# Patient Record
Sex: MALE | Race: BLACK OR AFRICAN AMERICAN | Employment: UNEMPLOYED | URBAN - METROPOLITAN AREA
[De-identification: names, ages, dates, MRNs, and addresses within clinical notes are randomized per-mention and may not be internally consistent; named-entity substitution may affect disease eponyms.]

---

## 2022-03-19 ENCOUNTER — APPOINTMENT (OUTPATIENT)
Dept: CT IMAGING | Age: 62
End: 2022-03-19
Payer: MEDICAID

## 2022-03-19 ENCOUNTER — HOSPITAL ENCOUNTER (EMERGENCY)
Age: 62
Discharge: HOME | End: 2022-03-19
Payer: MEDICAID

## 2022-03-19 ENCOUNTER — APPOINTMENT (OUTPATIENT)
Dept: ULTRASOUND IMAGING | Age: 62
End: 2022-03-19
Payer: MEDICAID

## 2022-03-19 VITALS
DIASTOLIC BLOOD PRESSURE: 98 MMHG | TEMPERATURE: 98 F | SYSTOLIC BLOOD PRESSURE: 171 MMHG | BODY MASS INDEX: 24.25 KG/M2 | HEART RATE: 87 BPM | RESPIRATION RATE: 15 BRPM | WEIGHT: 160 LBS | HEIGHT: 68 IN | OXYGEN SATURATION: 98 %

## 2022-03-19 DIAGNOSIS — R10.84 GENERALIZED ABDOMINAL PAIN: Primary | ICD-10-CM

## 2022-03-19 DIAGNOSIS — R03.0 ELEVATED BLOOD PRESSURE READING: ICD-10-CM

## 2022-03-19 DIAGNOSIS — D73.4 SPLENIC CYST: ICD-10-CM

## 2022-03-19 DIAGNOSIS — K76.89 HEPATIC CYST: ICD-10-CM

## 2022-03-19 LAB
A/G RATIO: 1.4 (ref 1.1–2.2)
ALBUMIN SERPL-MCNC: 4.3 G/DL (ref 3.4–5)
ALP BLD-CCNC: 100 U/L (ref 40–129)
ALT SERPL-CCNC: 16 U/L (ref 10–40)
ANION GAP SERPL CALCULATED.3IONS-SCNC: 11 MMOL/L (ref 3–16)
AST SERPL-CCNC: 22 U/L (ref 15–37)
BASOPHILS ABSOLUTE: 0 K/UL (ref 0–0.2)
BASOPHILS RELATIVE PERCENT: 0.8 %
BILIRUB SERPL-MCNC: 0.6 MG/DL (ref 0–1)
BILIRUBIN URINE: NEGATIVE
BLOOD, URINE: ABNORMAL
BUN BLDV-MCNC: 20 MG/DL (ref 7–20)
CALCIUM SERPL-MCNC: 9.8 MG/DL (ref 8.3–10.6)
CHLORIDE BLD-SCNC: 100 MMOL/L (ref 99–110)
CLARITY: CLEAR
CO2: 27 MMOL/L (ref 21–32)
COLOR: YELLOW
CREAT SERPL-MCNC: 1.3 MG/DL (ref 0.8–1.3)
EOSINOPHILS ABSOLUTE: 0.1 K/UL (ref 0–0.6)
EOSINOPHILS RELATIVE PERCENT: 1.2 %
EPITHELIAL CELLS, UA: 1 /HPF (ref 0–5)
GFR AFRICAN AMERICAN: >60
GFR NON-AFRICAN AMERICAN: 56
GLUCOSE BLD-MCNC: 260 MG/DL (ref 70–99)
GLUCOSE URINE: 500 MG/DL
HCT VFR BLD CALC: 38.5 % (ref 40.5–52.5)
HEMOGLOBIN: 13.1 G/DL (ref 13.5–17.5)
HYALINE CASTS: 1 /LPF (ref 0–8)
KETONES, URINE: NEGATIVE MG/DL
LEUKOCYTE ESTERASE, URINE: NEGATIVE
LIPASE: 39 U/L (ref 13–60)
LYMPHOCYTES ABSOLUTE: 1.4 K/UL (ref 1–5.1)
LYMPHOCYTES RELATIVE PERCENT: 30 %
MCH RBC QN AUTO: 30.9 PG (ref 26–34)
MCHC RBC AUTO-ENTMCNC: 34.1 G/DL (ref 31–36)
MCV RBC AUTO: 90.7 FL (ref 80–100)
MICROSCOPIC EXAMINATION: YES
MONOCYTES ABSOLUTE: 0.5 K/UL (ref 0–1.3)
MONOCYTES RELATIVE PERCENT: 11.5 %
NEUTROPHILS ABSOLUTE: 2.6 K/UL (ref 1.7–7.7)
NEUTROPHILS RELATIVE PERCENT: 56.5 %
NITRITE, URINE: NEGATIVE
PDW BLD-RTO: 12.6 % (ref 12.4–15.4)
PH UA: 6 (ref 5–8)
PLATELET # BLD: 263 K/UL (ref 135–450)
PMV BLD AUTO: 7.9 FL (ref 5–10.5)
POTASSIUM REFLEX MAGNESIUM: 3.6 MMOL/L (ref 3.5–5.1)
PROTEIN UA: >=300 MG/DL
RBC # BLD: 4.24 M/UL (ref 4.2–5.9)
RBC UA: 7 /HPF (ref 0–4)
SODIUM BLD-SCNC: 138 MMOL/L (ref 136–145)
SPECIFIC GRAVITY UA: 1.02 (ref 1–1.03)
TOTAL PROTEIN: 7.4 G/DL (ref 6.4–8.2)
URINE REFLEX TO CULTURE: ABNORMAL
URINE TYPE: ABNORMAL
UROBILINOGEN, URINE: 0.2 E.U./DL
WBC # BLD: 4.6 K/UL (ref 4–11)
WBC UA: 2 /HPF (ref 0–5)

## 2022-03-19 PROCEDURE — 80053 COMPREHEN METABOLIC PANEL: CPT

## 2022-03-19 PROCEDURE — 6360000004 HC RX CONTRAST MEDICATION: Performed by: PHYSICIAN ASSISTANT

## 2022-03-19 PROCEDURE — 85025 COMPLETE CBC W/AUTO DIFF WBC: CPT

## 2022-03-19 PROCEDURE — 83690 ASSAY OF LIPASE: CPT

## 2022-03-19 PROCEDURE — 76870 US EXAM SCROTUM: CPT

## 2022-03-19 PROCEDURE — 74177 CT ABD & PELVIS W/CONTRAST: CPT

## 2022-03-19 PROCEDURE — 36415 COLL VENOUS BLD VENIPUNCTURE: CPT

## 2022-03-19 PROCEDURE — 99283 EMERGENCY DEPT VISIT LOW MDM: CPT

## 2022-03-19 PROCEDURE — 6370000000 HC RX 637 (ALT 250 FOR IP): Performed by: PHYSICIAN ASSISTANT

## 2022-03-19 PROCEDURE — 81001 URINALYSIS AUTO W/SCOPE: CPT

## 2022-03-19 RX ORDER — OXYCODONE HYDROCHLORIDE AND ACETAMINOPHEN 5; 325 MG/1; MG/1
1 TABLET ORAL ONCE
Status: COMPLETED | OUTPATIENT
Start: 2022-03-19 | End: 2022-03-19

## 2022-03-19 RX ADMIN — OXYCODONE AND ACETAMINOPHEN 1 TABLET: 5; 325 TABLET ORAL at 09:49

## 2022-03-19 RX ADMIN — IOPAMIDOL 75 ML: 755 INJECTION, SOLUTION INTRAVENOUS at 10:32

## 2022-03-19 ASSESSMENT — PAIN SCALES - GENERAL
PAINLEVEL_OUTOF10: 10
PAINLEVEL_OUTOF10: 10

## 2022-03-19 ASSESSMENT — PAIN DESCRIPTION - PAIN TYPE: TYPE: CHRONIC PAIN

## 2022-03-19 ASSESSMENT — PAIN - FUNCTIONAL ASSESSMENT: PAIN_FUNCTIONAL_ASSESSMENT: 0-10

## 2022-03-19 NOTE — ED NOTES
Pt given dc instructions with justyna.  States pain is \"much better\"     Debby Kilgore, TIBURCIO  03/19/22 3238

## 2022-03-19 NOTE — ED PROVIDER NOTES
Diabetes mellitus ([facility] [address]     Hypertension          SURGICAL HISTORY   No past surgical history on file. CURRENTMEDICATIONS       There are no discharge medications for this patient. ALLERGIES     Patient has no known allergies. FAMILYHISTORY     No family history on file. SOCIAL HISTORY       Social History     Tobacco Use    Smoking status: Never Smoker    Smokeless tobacco: Never Used   Vaping Use    Vaping Use: Never used   Substance Use Topics    Alcohol use: Never    Drug use: Never       SCREENINGS    Parul Coma Scale  Eye Opening: Spontaneous  Best Verbal Response: Oriented  Best Motor Response: Obeys commands  Parul Coma Scale Score: 15        PHYSICAL EXAM    (up to 7 for level 4, 8 or more for level 5)     ED Triage Vitals [03/19/22 0922]   BP Temp Temp Source Pulse Resp SpO2 Height Weight   (!) 213/106 98 °F (36.7 °C) Oral 81 16 97 % 5' 8\" (1.727 m) 160 lb (72.6 kg)       Physical Exam  Vitals and nursing note reviewed. Constitutional:       Appearance: He is well-developed. He is not diaphoretic. HENT:      Head: Atraumatic. Nose: Nose normal.   Eyes:      General:         Right eye: No discharge. Left eye: No discharge. Cardiovascular:      Rate and Rhythm: Normal rate and regular rhythm. Heart sounds: No murmur heard. No friction rub. No gallop. Pulmonary:      Effort: Pulmonary effort is normal. No respiratory distress. Breath sounds: No stridor. No wheezing, rhonchi or rales. Abdominal:      General: Bowel sounds are normal. There is no distension. Palpations: Abdomen is soft. There is no mass. Tenderness: There is abdominal tenderness. There is no guarding or rebound. Hernia: No hernia is present. Comments: Subjective tenderness to palpate in the lower abdomen without guarding, rebound or rigidity. Genitourinary:     Comments: No external rash, erythema or vesicle. Normal lie of testicles.   No testicular mass or crepitus. Musculoskeletal:         General: No swelling. Normal range of motion. Cervical back: Normal range of motion. Skin:     General: Skin is warm and dry. Findings: No erythema or rash. Neurological:      Mental Status: He is alert and oriented to person, place, and time. Cranial Nerves: No cranial nerve deficit. Psychiatric:         Behavior: Behavior normal.         DIAGNOSTIC RESULTS   LABS:    Labs Reviewed   CBC WITH AUTO DIFFERENTIAL - Abnormal; Notable for the following components:       Result Value    Hemoglobin 13.1 (*)     Hematocrit 38.5 (*)     All other components within normal limits   COMPREHENSIVE METABOLIC PANEL W/ REFLEX TO MG FOR LOW K - Abnormal; Notable for the following components:    Glucose 260 (*)     GFR Non- 56 (*)     All other components within normal limits   URINALYSIS WITH REFLEX TO CULTURE - Abnormal; Notable for the following components:    Glucose, Ur 500 (*)     Blood, Urine SMALL (*)     Protein, UA >=300 (*)     All other components within normal limits   MICROSCOPIC URINALYSIS - Abnormal; Notable for the following components:    RBC, UA 7 (*)     All other components within normal limits   LIPASE       When ordered only abnormal lab results are displayed. All other labs were within normal range or not returned as of this dictation. EKG: When ordered, EKG's are interpreted by the Emergency Department Physician in the absence of a cardiologist.  Please see their note for interpretation of EKG. RADIOLOGY:   Non-plain film images such as CT, Ultrasound and MRI are read by the radiologist. Plain radiographic images are visualized and preliminarily interpreted by the ED Provider with the below findings:        Interpretation per the Radiologist below, if available at the time of this note:    [address]   Final Result   Some nonspecific heterogeneous echotexture of the bilateral testicles without   focal abnormality. Otherwise unremarkable testicular ultrasound with normal Doppler flow. RECOMMENDATIONS:   Unavailable         CT ABDOMEN PELVIS W IV CONTRAST Additional Contrast? None   Final Result   1. No evidence of acute appendicitis or other acute gastrointestinal   abnormality. 2. Mild prostatomegaly. 3. Right nephrolithiasis with a dominant stone in the lower pole of the right   kidney measuring 1.2 cm but no obstructive uropathy. 4. Hepatic and splenic cyst are likely benign. PROCEDURES   Unless otherwise noted below, none     Procedures    CRITICAL CARE TIME       CONSULTS:  None      EMERGENCY DEPARTMENT COURSE and DIFFERENTIAL DIAGNOSIS/MDM:   Vitals:    Vitals:    03/19/22 0922 03/19/22 1348   BP: (!) 213/106 (!) 171/98   Pulse: 81 87   Resp: 16 15   Temp: 98 °F (36.7 °C)    TempSrc: Oral    SpO2: 97% 98%   Weight: 160 lb (72.6 kg)    Height: 5' 8\" (1.727 m)        Patient was given the following medications:  Medications   oxyCODONE-acetaminophen (PERCOCET) 5-325 MG per tablet 1 tablet (1 tablet Oral Given 3/19/22 0949)   iopamidol (ISOVUE-370) 76 % injection 75 mL (75 mLs IntraVENous Given 3/19/22 1032)           Patient presented with some generalized abdominal discomfort. Has history of prostate cancer. Has been having pain for multiple months. Is followed with urology as an outpatient. Apparently supposed get MRI as an outpatient which she is yet to get. Given his abdominal pain CT was obtained that does reveal some hepatic cysts and splenic cyst.  No other acute findings. Blood pressure is slightly elevated. Nothing to suggest endorgan damage or hypertensive urgency or emergency. Low suspicion for obstruction, perforated viscus, diverticulitis, obstruction, testicular torsion, Myriam's gangrene or other emergent etiology. He will follow up with urology as an outpatient return here for any worsening of symptoms or problems at home. FINAL IMPRESSION      1.  Generalized abdominal pain    2. Hepatic cyst    3. Splenic cyst    4. Elevated blood pressure reading          DISPOSITION/PLAN   DISPOSITION Decision To Discharge 03/19/2022 01:44:46 PM      PATIENT REFERRED TO:  Naty Alba MD  [facility]. [phone]    Schedule an appointment as soon as possible for a visit in 3 days  For re-check    [address] [facility], [address] [facility]  [phone]    Schedule an appointment as soon as possible for a visit in 3 days  For re-check    [facility] Emergency Department  [address] [place] [address]  [phone]    As needed      DISCHARGE MEDICATIONS:  There are no discharge medications for this patient. DISCONTINUED MEDICATIONS:  There are no discharge medications for this patient.              (Please note that portions of this note were completed with a voice recognition program.  Efforts were made to edit the dictations but occasionally words are mis-transcribed.)    Kady Herrera PA-C (electronically signed)            Kady Herrera PA-C  03/19/22 1745

## 2022-04-21 ENCOUNTER — HOSPITAL ENCOUNTER (OUTPATIENT)
Age: 62
Discharge: HOME | End: 2022-04-21
Payer: MEDICAID

## 2022-04-21 ENCOUNTER — HOSPITAL ENCOUNTER (OUTPATIENT)
Dept: GENERAL RADIOLOGY | Age: 62
Discharge: HOME | End: 2022-04-21
Payer: MEDICAID

## 2022-04-21 ENCOUNTER — HOSPITAL ENCOUNTER (OUTPATIENT)
Dept: NUCLEAR MEDICINE | Age: 62
Discharge: HOME | End: 2022-04-21
Payer: MEDICAID

## 2022-04-21 DIAGNOSIS — C61 MALIGNANT NEOPLASM OF PROSTATE (HCC): ICD-10-CM

## 2022-04-21 DIAGNOSIS — R97.20 ELEVATED PROSTATE SPECIFIC ANTIGEN (PSA): ICD-10-CM

## 2022-04-21 PROCEDURE — 3430000000 HC RX DIAGNOSTIC RADIOPHARMACEUTICAL: Performed by: UROLOGY

## 2022-04-21 PROCEDURE — 78306 BONE IMAGING WHOLE BODY: CPT

## 2022-04-21 PROCEDURE — A9503 TC99M MEDRONATE: HCPCS | Performed by: UROLOGY

## 2022-04-21 PROCEDURE — 71046 X-RAY EXAM CHEST 2 VIEWS: CPT

## 2022-04-21 RX ORDER — TC 99M MEDRONATE 20 MG/10ML
24.55 INJECTION, POWDER, LYOPHILIZED, FOR SOLUTION INTRAVENOUS
Status: COMPLETED | OUTPATIENT
Start: 2022-04-21 | End: 2022-04-21

## 2022-04-21 RX ADMIN — TC 99M MEDRONATE 24.55 MILLICURIE: 20 INJECTION, POWDER, LYOPHILIZED, FOR SOLUTION INTRAVENOUS at 10:55

## 2022-04-25 RX ORDER — AMLODIPINE BESYLATE 10 MG/1
10 TABLET ORAL DAILY
Status: ON HOLD | COMMUNITY
End: 2022-06-06 | Stop reason: HOSPADM

## 2022-04-25 NOTE — PROGRESS NOTES
Name_______________________________________Printed:____________________  Date and time of surgery_5/16 0730_______________________Arrival Time:_0600_____________   1. The instructions given regarding when and if a patient needs to stop oral intake prior to surgery varies. Follow the specific instructions you were given                  _x__Nothing to eat or to drink after Midnight the night before.                   ____Carbo loading or ERAS instructions will be given to select patients-if you have been given those instructions -please do the following                           The evening before your surgery after dinner before midnight drink 40 ounces of gatorade. If you are diabetic use sugar free. The morning of surgery drink 40 ounces of water. This needs to be finished 3 hours prior to your surgery start time. 2. Take the following pills with a small sip of water on the morning of surgery__ norvasc_________________________________________________                  Do not take blood pressure medications ending in pril or sartan the anais prior to surgery or the morning of surgery_   3. Aspirin, Ibuprofen, Advil, Naproxen, Vitamin E and other Anti-inflammatory products and supplements should be stopped for 5 -7days before surgery or as directed by your physician. 4. Check with your Doctor regarding stopping Plavix, Coumadin,Eliquis, Lovenox,Effient,Pradaxa,Xarelto, Fragmin or other blood thinners and follow their instructions. 5. Do not smoke, and do not drink any alcoholic beverages 24 hours prior to surgery. This includes NA Beer. Refrain from the usage of any recreational drugs. 6. You may brush your teeth and gargle the morning of surgery. DO NOT SWALLOW WATER   7. You MUST make arrangements for a responsible adult to stay on site while you are here and take you home after your surgery. You will not be allowed to leave alone or drive yourself home.   It is strongly suggested someone stay with you the first 24 hrs. Your surgery will be cancelled if you do not have a ride home. 8. A parent/legal guardian must accompany a child scheduled for surgery and plan to stay at the hospital until the child is discharged. Please do not bring other children with you. 9. Please wear simple, loose fitting clothing to the hospital.  Kana Brown not bring valuables (money, credit cards, checkbooks, etc.) Do not wear any makeup (including no eye makeup) or nail polish on your fingers or toes. 10. DO NOT wear any jewelry or piercings on day of surgery. All body piercing jewelry must be removed. 11. If you have ___dentures, they will be removed before going to the OR; we will provide you a container. If you wear ___contact lenses or ___glasses, they will be removed; please bring a case for them. 12. Please see your family doctor/pediatrician for a history & physical and/or concerning medications. Bring any test results/reports from your physician's office. PCP__________________Phone___________H&P Appt. Date________             13 If you  have a Living Will and Durable Power of  for Healthcare, please bring in a copy. 15. Notify your Surgeon if you develop any illness between now and surgery  time, cough, cold, fever, sore throat, nausea, vomiting, etc.  Please notify your surgeon if you experience dizziness, shortness of breath or blurred vision between now & the time of your surgery             15. DO NOT shave your operative site 96 hours prior to surgery. For face & neck surgery, men may use an electric razor 48 hours prior to surgery. 16. Shower the night before or morning of surgery using an antibacterial soap or as you have been instructed. 17. To provide excellent care visitors will be limited to one in the room at any given time. 18.  Please bring picture ID and insurance card.              19.  Visit our web site for additional information:  [email or web address]              20.During flu season no children under the age of 15 are permitted in the hospital for the safety of all patients. 21. If you take a long acting insulin in the evening only  take half of your usual  dose the night  before your procedure              22. If you use a c-pap please bring DOS if staying overnight,             23.For your convenience [facility] has a pharmacy on site to fill your prescriptions. 24. If you use oxygen and have a portable tank please bring it  with you the DOS             25. Bring a complete list of all your medications with name and dose include any supplements. 26. Other___PATs by 5/6  PCP 4/26_______________________________________   *Please call pre admission testing if you any further questions   [address] [place]  [phone]   [address] [place] [address]       VISITOR POLICY(subject to change)    Current policy is 2 visitors per patient. No children. A mask is required. Visiting hours are 8a-8p. Overnight visitors will be at the discretion of the nurse. All above information reviewed with patient in person or by phone. Patient verbalizes understanding. All questions and concerns addressed.                                                                                                  Patient/Rep_per phone/pt___________________                                                                                                                                    PRE OP INSTRUCTIONS

## 2022-04-25 NOTE — PROGRESS NOTES
Per [employer] [facility]  arranged-#[id number]     5/16/22 0700 at main [id number]

## 2022-05-02 ENCOUNTER — HOSPITAL ENCOUNTER (OUTPATIENT)
Age: 62
Discharge: HOME | End: 2022-05-02
Payer: MEDICAID

## 2022-05-02 DIAGNOSIS — Z01.818 PREOP TESTING: ICD-10-CM

## 2022-05-02 LAB
A/G RATIO: 1.6 (ref 1.1–2.2)
ABO/RH: NORMAL
ALBUMIN SERPL-MCNC: 4.4 G/DL (ref 3.4–5)
ALP BLD-CCNC: 83 U/L (ref 40–129)
ALT SERPL-CCNC: 10 U/L (ref 10–40)
ANION GAP SERPL CALCULATED.3IONS-SCNC: 15 MMOL/L (ref 3–16)
ANTIBODY SCREEN: NORMAL
APTT: 34.5 SEC (ref 26.2–38.6)
AST SERPL-CCNC: 15 U/L (ref 15–37)
BASOPHILS ABSOLUTE: 0 K/UL (ref 0–0.2)
BASOPHILS RELATIVE PERCENT: 1 %
BILIRUB SERPL-MCNC: 0.5 MG/DL (ref 0–1)
BUN BLDV-MCNC: 21 MG/DL (ref 7–20)
CALCIUM SERPL-MCNC: 10.2 MG/DL (ref 8.3–10.6)
CHLORIDE BLD-SCNC: 102 MMOL/L (ref 99–110)
CO2: 24 MMOL/L (ref 21–32)
CREAT SERPL-MCNC: 1.5 MG/DL (ref 0.8–1.3)
EKG ATRIAL RATE: 84 BPM
EKG DIAGNOSIS: NORMAL
EKG P AXIS: 59 DEGREES
EKG P-R INTERVAL: 166 MS
EKG Q-T INTERVAL: 368 MS
EKG QRS DURATION: 82 MS
EKG QTC CALCULATION (BAZETT): 434 MS
EKG R AXIS: -5 DEGREES
EKG T AXIS: 51 DEGREES
EKG VENTRICULAR RATE: 84 BPM
EOSINOPHILS ABSOLUTE: 0 K/UL (ref 0–0.6)
EOSINOPHILS RELATIVE PERCENT: 0.8 %
GFR AFRICAN AMERICAN: 57
GFR NON-AFRICAN AMERICAN: 47
GLUCOSE BLD-MCNC: 150 MG/DL (ref 70–99)
HCT VFR BLD CALC: 37.6 % (ref 40.5–52.5)
HEMOGLOBIN: 12.9 G/DL (ref 13.5–17.5)
INR BLD: 1.14 (ref 0.88–1.12)
LYMPHOCYTES ABSOLUTE: 1.3 K/UL (ref 1–5.1)
LYMPHOCYTES RELATIVE PERCENT: 28.2 %
MCH RBC QN AUTO: 31.4 PG (ref 26–34)
MCHC RBC AUTO-ENTMCNC: 34.2 G/DL (ref 31–36)
MCV RBC AUTO: 91.6 FL (ref 80–100)
MONOCYTES ABSOLUTE: 0.5 K/UL (ref 0–1.3)
MONOCYTES RELATIVE PERCENT: 10.2 %
NEUTROPHILS ABSOLUTE: 2.8 K/UL (ref 1.7–7.7)
NEUTROPHILS RELATIVE PERCENT: 59.8 %
PDW BLD-RTO: 12.4 % (ref 12.4–15.4)
PLATELET # BLD: 211 K/UL (ref 135–450)
PMV BLD AUTO: 7.9 FL (ref 5–10.5)
POTASSIUM SERPL-SCNC: 4.3 MMOL/L (ref 3.5–5.1)
PROTHROMBIN TIME: 12.9 SEC (ref 9.9–12.7)
RBC # BLD: 4.11 M/UL (ref 4.2–5.9)
SODIUM BLD-SCNC: 141 MMOL/L (ref 136–145)
TOTAL PROTEIN: 7.2 G/DL (ref 6.4–8.2)
WBC # BLD: 4.6 K/UL (ref 4–11)

## 2022-05-02 PROCEDURE — 85610 PROTHROMBIN TIME: CPT

## 2022-05-02 PROCEDURE — 80053 COMPREHEN METABOLIC PANEL: CPT

## 2022-05-02 PROCEDURE — 85730 THROMBOPLASTIN TIME PARTIAL: CPT

## 2022-05-02 PROCEDURE — 86850 RBC ANTIBODY SCREEN: CPT

## 2022-05-02 PROCEDURE — 86900 BLOOD TYPING SEROLOGIC ABO: CPT

## 2022-05-02 PROCEDURE — 86901 BLOOD TYPING SEROLOGIC RH(D): CPT

## 2022-05-02 PROCEDURE — 85025 COMPLETE CBC W/AUTO DIFF WBC: CPT

## 2022-05-02 PROCEDURE — 36415 COLL VENOUS BLD VENIPUNCTURE: CPT

## 2022-05-02 PROCEDURE — 93005 ELECTROCARDIOGRAM TRACING: CPT | Performed by: UROLOGY

## 2022-05-02 PROCEDURE — 93010 ELECTROCARDIOGRAM REPORT: CPT | Performed by: INTERNAL MEDICINE

## 2022-05-12 NOTE — PROGRESS NOTES
Michael Mendoza at Dr. Gricelda Pino office contacted regarding h & p.  Vmsg left for Dariana at Dr. Rosa Virgen office as well.

## 2022-05-16 ENCOUNTER — HOSPITAL ENCOUNTER (OUTPATIENT)
Age: 62
Setting detail: SURGERY ADMIT
Discharge: HOME | End: 2022-05-16
Attending: UROLOGY | Admitting: UROLOGY
Payer: MEDICAID

## 2022-05-16 ENCOUNTER — ANESTHESIA (OUTPATIENT)
Dept: OPERATING ROOM | Age: 62
End: 2022-05-16
Payer: MEDICAID

## 2022-05-16 ENCOUNTER — ANESTHESIA EVENT (OUTPATIENT)
Dept: OPERATING ROOM | Age: 62
End: 2022-05-16
Payer: MEDICAID

## 2022-05-16 VITALS
TEMPERATURE: 97.4 F | HEART RATE: 88 BPM | WEIGHT: 148 LBS | BODY MASS INDEX: 23.23 KG/M2 | OXYGEN SATURATION: 94 % | DIASTOLIC BLOOD PRESSURE: 102 MMHG | RESPIRATION RATE: 18 BRPM | SYSTOLIC BLOOD PRESSURE: 169 MMHG | HEIGHT: 67 IN

## 2022-05-16 DIAGNOSIS — C61 PROSTATE CANCER (HCC): ICD-10-CM

## 2022-05-16 DIAGNOSIS — Z01.818 PREOP TESTING: Primary | ICD-10-CM

## 2022-05-16 LAB
ABO/RH: NORMAL
ANTIBODY SCREEN: NORMAL
GLUCOSE BLD-MCNC: 139 MG/DL (ref 70–99)
GLUCOSE BLD-MCNC: 148 MG/DL (ref 70–99)
PERFORMED ON: ABNORMAL
PERFORMED ON: ABNORMAL

## 2022-05-16 PROCEDURE — 3600000019 HC SURGERY ROBOT ADDTL 15MIN: Performed by: UROLOGY

## 2022-05-16 PROCEDURE — 2580000003 HC RX 258: Performed by: NURSE ANESTHETIST, CERTIFIED REGISTERED

## 2022-05-16 PROCEDURE — 36415 COLL VENOUS BLD VENIPUNCTURE: CPT

## 2022-05-16 PROCEDURE — 6370000000 HC RX 637 (ALT 250 FOR IP): Performed by: UROLOGY

## 2022-05-16 PROCEDURE — 6360000002 HC RX W HCPCS: Performed by: NURSE ANESTHETIST, CERTIFIED REGISTERED

## 2022-05-16 PROCEDURE — S2900 ROBOTIC SURGICAL SYSTEM: HCPCS | Performed by: UROLOGY

## 2022-05-16 PROCEDURE — 6360000002 HC RX W HCPCS: Performed by: UROLOGY

## 2022-05-16 PROCEDURE — 2709999900 HC NON-CHARGEABLE SUPPLY: Performed by: UROLOGY

## 2022-05-16 PROCEDURE — 88342 IMHCHEM/IMCYTCHM 1ST ANTB: CPT

## 2022-05-16 PROCEDURE — 86900 BLOOD TYPING SEROLOGIC ABO: CPT

## 2022-05-16 PROCEDURE — 86901 BLOOD TYPING SEROLOGIC RH(D): CPT

## 2022-05-16 PROCEDURE — 6370000000 HC RX 637 (ALT 250 FOR IP): Performed by: ANESTHESIOLOGY

## 2022-05-16 PROCEDURE — 86850 RBC ANTIBODY SCREEN: CPT

## 2022-05-16 PROCEDURE — 88309 TISSUE EXAM BY PATHOLOGIST: CPT

## 2022-05-16 PROCEDURE — 88341 IMHCHEM/IMCYTCHM EA ADD ANTB: CPT

## 2022-05-16 PROCEDURE — 2500000003 HC RX 250 WO HCPCS: Performed by: UROLOGY

## 2022-05-16 PROCEDURE — C9290 INJ, BUPIVACAINE LIPOSOME: HCPCS | Performed by: UROLOGY

## 2022-05-16 PROCEDURE — 6360000002 HC RX W HCPCS: Performed by: ANESTHESIOLOGY

## 2022-05-16 PROCEDURE — 7100000001 HC PACU RECOVERY - ADDTL 15 MIN: Performed by: UROLOGY

## 2022-05-16 PROCEDURE — 7100000011 HC PHASE II RECOVERY - ADDTL 15 MIN: Performed by: UROLOGY

## 2022-05-16 PROCEDURE — 7100000000 HC PACU RECOVERY - FIRST 15 MIN: Performed by: UROLOGY

## 2022-05-16 PROCEDURE — 88305 TISSUE EXAM BY PATHOLOGIST: CPT

## 2022-05-16 PROCEDURE — C9290 INJ, BUPIVACAINE LIPOSOME: HCPCS

## 2022-05-16 PROCEDURE — 6370000000 HC RX 637 (ALT 250 FOR IP)

## 2022-05-16 PROCEDURE — 2580000003 HC RX 258: Performed by: ANESTHESIOLOGY

## 2022-05-16 PROCEDURE — 3700000000 HC ANESTHESIA ATTENDED CARE: Performed by: UROLOGY

## 2022-05-16 PROCEDURE — 6360000002 HC RX W HCPCS

## 2022-05-16 PROCEDURE — A4217 STERILE WATER/SALINE, 500 ML: HCPCS | Performed by: UROLOGY

## 2022-05-16 PROCEDURE — 3700000001 HC ADD 15 MINUTES (ANESTHESIA): Performed by: UROLOGY

## 2022-05-16 PROCEDURE — 3600000009 HC SURGERY ROBOT BASE: Performed by: UROLOGY

## 2022-05-16 PROCEDURE — 88302 TISSUE EXAM BY PATHOLOGIST: CPT

## 2022-05-16 PROCEDURE — 2580000003 HC RX 258: Performed by: UROLOGY

## 2022-05-16 PROCEDURE — 7100000010 HC PHASE II RECOVERY - FIRST 15 MIN: Performed by: UROLOGY

## 2022-05-16 PROCEDURE — 2500000003 HC RX 250 WO HCPCS: Performed by: NURSE ANESTHETIST, CERTIFIED REGISTERED

## 2022-05-16 RX ORDER — KETAMINE HCL IN NACL, ISO-OSM 100MG/10ML
SYRINGE (ML) INJECTION PRN
Status: DISCONTINUED | OUTPATIENT
Start: 2022-05-16 | End: 2022-05-16 | Stop reason: SDUPTHER

## 2022-05-16 RX ORDER — ACETAMINOPHEN 325 MG/1
TABLET ORAL
Status: COMPLETED
Start: 2022-05-16 | End: 2022-05-16

## 2022-05-16 RX ORDER — FENTANYL CITRATE 50 UG/ML
INJECTION, SOLUTION INTRAMUSCULAR; INTRAVENOUS PRN
Status: DISCONTINUED | OUTPATIENT
Start: 2022-05-16 | End: 2022-05-16 | Stop reason: SDUPTHER

## 2022-05-16 RX ORDER — MIDAZOLAM HYDROCHLORIDE 1 MG/ML
INJECTION INTRAMUSCULAR; INTRAVENOUS PRN
Status: DISCONTINUED | OUTPATIENT
Start: 2022-05-16 | End: 2022-05-16 | Stop reason: SDUPTHER

## 2022-05-16 RX ORDER — CIPROFLOXACIN 2 MG/ML
400 INJECTION, SOLUTION INTRAVENOUS
Status: COMPLETED | OUTPATIENT
Start: 2022-05-16 | End: 2022-05-16

## 2022-05-16 RX ORDER — HYDROMORPHONE HCL 110MG/55ML
PATIENT CONTROLLED ANALGESIA SYRINGE INTRAVENOUS PRN
Status: DISCONTINUED | OUTPATIENT
Start: 2022-05-16 | End: 2022-05-16 | Stop reason: SDUPTHER

## 2022-05-16 RX ORDER — OXYCODONE HYDROCHLORIDE 5 MG/1
5 TABLET ORAL
Status: COMPLETED | OUTPATIENT
Start: 2022-05-16 | End: 2022-05-16

## 2022-05-16 RX ORDER — HYDROMORPHONE HCL 110MG/55ML
0.5 PATIENT CONTROLLED ANALGESIA SYRINGE INTRAVENOUS EVERY 5 MIN PRN
Status: DISCONTINUED | OUTPATIENT
Start: 2022-05-16 | End: 2022-05-16 | Stop reason: HOSPADM

## 2022-05-16 RX ORDER — PROPOFOL 10 MG/ML
INJECTION, EMULSION INTRAVENOUS PRN
Status: DISCONTINUED | OUTPATIENT
Start: 2022-05-16 | End: 2022-05-16 | Stop reason: SDUPTHER

## 2022-05-16 RX ORDER — ACETAMINOPHEN 325 MG/1
650 TABLET ORAL ONCE
Status: COMPLETED | OUTPATIENT
Start: 2022-05-16 | End: 2022-05-16

## 2022-05-16 RX ORDER — ONDANSETRON 2 MG/ML
4 INJECTION INTRAMUSCULAR; INTRAVENOUS
Status: COMPLETED | OUTPATIENT
Start: 2022-05-16 | End: 2022-05-16

## 2022-05-16 RX ORDER — LIDOCAINE HYDROCHLORIDE 20 MG/ML
JELLY TOPICAL
Status: COMPLETED | OUTPATIENT
Start: 2022-05-16 | End: 2022-05-16

## 2022-05-16 RX ORDER — LABETALOL HYDROCHLORIDE 5 MG/ML
5 INJECTION, SOLUTION INTRAVENOUS
Status: DISCONTINUED | OUTPATIENT
Start: 2022-05-16 | End: 2022-05-16 | Stop reason: HOSPADM

## 2022-05-16 RX ORDER — SODIUM CHLORIDE 9 MG/ML
INJECTION, SOLUTION INTRAVENOUS CONTINUOUS PRN
Status: DISCONTINUED | OUTPATIENT
Start: 2022-05-16 | End: 2022-05-16 | Stop reason: SDUPTHER

## 2022-05-16 RX ORDER — DEXMEDETOMIDINE HYDROCHLORIDE 100 UG/ML
INJECTION, SOLUTION INTRAVENOUS PRN
Status: DISCONTINUED | OUTPATIENT
Start: 2022-05-16 | End: 2022-05-16 | Stop reason: SDUPTHER

## 2022-05-16 RX ORDER — SUCCINYLCHOLINE/SOD CL,ISO/PF 200MG/10ML
SYRINGE (ML) INTRAVENOUS PRN
Status: DISCONTINUED | OUTPATIENT
Start: 2022-05-16 | End: 2022-05-16 | Stop reason: SDUPTHER

## 2022-05-16 RX ORDER — HYDROMORPHONE HCL 110MG/55ML
0.25 PATIENT CONTROLLED ANALGESIA SYRINGE INTRAVENOUS EVERY 5 MIN PRN
Status: DISCONTINUED | OUTPATIENT
Start: 2022-05-16 | End: 2022-05-16 | Stop reason: HOSPADM

## 2022-05-16 RX ORDER — SODIUM CHLORIDE 9 MG/ML
INJECTION, SOLUTION INTRAVENOUS CONTINUOUS
Status: DISCONTINUED | OUTPATIENT
Start: 2022-05-16 | End: 2022-05-16 | Stop reason: HOSPADM

## 2022-05-16 RX ORDER — GLYCOPYRROLATE 0.2 MG/ML
INJECTION INTRAMUSCULAR; INTRAVENOUS PRN
Status: DISCONTINUED | OUTPATIENT
Start: 2022-05-16 | End: 2022-05-16 | Stop reason: SDUPTHER

## 2022-05-16 RX ORDER — PHENYLEPHRINE HCL IN 0.9% NACL 1 MG/10 ML
SYRINGE (ML) INTRAVENOUS PRN
Status: DISCONTINUED | OUTPATIENT
Start: 2022-05-16 | End: 2022-05-16 | Stop reason: SDUPTHER

## 2022-05-16 RX ORDER — LIDOCAINE HYDROCHLORIDE 10 MG/ML
1 INJECTION, SOLUTION EPIDURAL; INFILTRATION; INTRACAUDAL; PERINEURAL
Status: DISCONTINUED | OUTPATIENT
Start: 2022-05-16 | End: 2022-05-16 | Stop reason: HOSPADM

## 2022-05-16 RX ORDER — BUPIVACAINE HYDROCHLORIDE 5 MG/ML
INJECTION, SOLUTION EPIDURAL; INTRACAUDAL
Status: COMPLETED | OUTPATIENT
Start: 2022-05-16 | End: 2022-05-16

## 2022-05-16 RX ORDER — AMOXICILLIN 250 MG
1 CAPSULE ORAL 2 TIMES DAILY
Qty: 30 TABLET | Refills: 1 | Status: SHIPPED | OUTPATIENT
Start: 2022-05-16 | End: 2022-06-15

## 2022-05-16 RX ORDER — MAGNESIUM HYDROXIDE 1200 MG/15ML
LIQUID ORAL
Status: COMPLETED | OUTPATIENT
Start: 2022-05-16 | End: 2022-05-16

## 2022-05-16 RX ORDER — DEXAMETHASONE SODIUM PHOSPHATE 4 MG/ML
INJECTION, SOLUTION INTRA-ARTICULAR; INTRALESIONAL; INTRAMUSCULAR; INTRAVENOUS; SOFT TISSUE PRN
Status: DISCONTINUED | OUTPATIENT
Start: 2022-05-16 | End: 2022-05-16 | Stop reason: SDUPTHER

## 2022-05-16 RX ORDER — ONDANSETRON 2 MG/ML
INJECTION INTRAMUSCULAR; INTRAVENOUS PRN
Status: DISCONTINUED | OUTPATIENT
Start: 2022-05-16 | End: 2022-05-16 | Stop reason: SDUPTHER

## 2022-05-16 RX ORDER — LIDOCAINE HYDROCHLORIDE 10 MG/ML
0.5 INJECTION, SOLUTION EPIDURAL; INFILTRATION; INTRACAUDAL; PERINEURAL ONCE
Status: DISCONTINUED | OUTPATIENT
Start: 2022-05-16 | End: 2022-05-16 | Stop reason: HOSPADM

## 2022-05-16 RX ORDER — MAGNESIUM HYDROXIDE 1200 MG/15ML
LIQUID ORAL CONTINUOUS PRN
Status: COMPLETED | OUTPATIENT
Start: 2022-05-16 | End: 2022-05-16

## 2022-05-16 RX ORDER — ATROPA BELLADONNA AND OPIUM 16.2; 6 MG/1; MG/1
SUPPOSITORY RECTAL
Status: COMPLETED | OUTPATIENT
Start: 2022-05-16 | End: 2022-05-16

## 2022-05-16 RX ORDER — MAGNESIUM SULFATE HEPTAHYDRATE 500 MG/ML
INJECTION, SOLUTION INTRAMUSCULAR; INTRAVENOUS PRN
Status: DISCONTINUED | OUTPATIENT
Start: 2022-05-16 | End: 2022-05-16 | Stop reason: SDUPTHER

## 2022-05-16 RX ORDER — HYDROCODONE BITARTRATE AND ACETAMINOPHEN 5; 325 MG/1; MG/1
1 TABLET ORAL EVERY 6 HOURS PRN
Qty: 20 TABLET | Refills: 0 | Status: SHIPPED | OUTPATIENT
Start: 2022-05-16 | End: 2022-05-21

## 2022-05-16 RX ORDER — CIPROFLOXACIN 2 MG/ML
INJECTION, SOLUTION INTRAVENOUS
Status: COMPLETED
Start: 2022-05-16 | End: 2022-05-16

## 2022-05-16 RX ORDER — PROCHLORPERAZINE EDISYLATE 5 MG/ML
5 INJECTION INTRAMUSCULAR; INTRAVENOUS ONCE
Status: COMPLETED | OUTPATIENT
Start: 2022-05-16 | End: 2022-05-16

## 2022-05-16 RX ORDER — LIDOCAINE HYDROCHLORIDE 20 MG/ML
INJECTION, SOLUTION EPIDURAL; INFILTRATION; INTRACAUDAL; PERINEURAL PRN
Status: DISCONTINUED | OUTPATIENT
Start: 2022-05-16 | End: 2022-05-16 | Stop reason: SDUPTHER

## 2022-05-16 RX ORDER — ROCURONIUM BROMIDE 10 MG/ML
INJECTION, SOLUTION INTRAVENOUS PRN
Status: DISCONTINUED | OUTPATIENT
Start: 2022-05-16 | End: 2022-05-16 | Stop reason: SDUPTHER

## 2022-05-16 RX ORDER — SODIUM CHLORIDE, SODIUM LACTATE, POTASSIUM CHLORIDE, CALCIUM CHLORIDE 600; 310; 30; 20 MG/100ML; MG/100ML; MG/100ML; MG/100ML
INJECTION, SOLUTION INTRAVENOUS CONTINUOUS PRN
Status: DISCONTINUED | OUTPATIENT
Start: 2022-05-16 | End: 2022-05-16 | Stop reason: SDUPTHER

## 2022-05-16 RX ADMIN — DEXAMETHASONE SODIUM PHOSPHATE 4 MG: 4 INJECTION, SOLUTION INTRAMUSCULAR; INTRAVENOUS at 07:48

## 2022-05-16 RX ADMIN — CIPROFLOXACIN 400 MG: 2 INJECTION, SOLUTION INTRAVENOUS at 07:26

## 2022-05-16 RX ADMIN — MAGNESIUM SULFATE HEPTAHYDRATE 1 G: 500 INJECTION, SOLUTION INTRAMUSCULAR; INTRAVENOUS at 07:49

## 2022-05-16 RX ADMIN — HYDROMORPHONE HYDROCHLORIDE 0.2 MG: 2 INJECTION, SOLUTION INTRAMUSCULAR; INTRAVENOUS; SUBCUTANEOUS at 08:54

## 2022-05-16 RX ADMIN — SODIUM CHLORIDE, POTASSIUM CHLORIDE, SODIUM LACTATE AND CALCIUM CHLORIDE: 600; 310; 30; 20 INJECTION, SOLUTION INTRAVENOUS at 09:24

## 2022-05-16 RX ADMIN — PROPOFOL 20 MG: 10 INJECTION, EMULSION INTRAVENOUS at 08:51

## 2022-05-16 RX ADMIN — ACETAMINOPHEN 650 MG: 325 TABLET ORAL at 07:10

## 2022-05-16 RX ADMIN — SODIUM CHLORIDE: 9 INJECTION, SOLUTION INTRAVENOUS at 07:10

## 2022-05-16 RX ADMIN — HYDROMORPHONE HYDROCHLORIDE 0.5 MG: 2 INJECTION, SOLUTION INTRAMUSCULAR; INTRAVENOUS; SUBCUTANEOUS at 11:25

## 2022-05-16 RX ADMIN — MIDAZOLAM 2 MG: 1 INJECTION INTRAMUSCULAR; INTRAVENOUS at 07:33

## 2022-05-16 RX ADMIN — GLYCOPYRROLATE 0.1 MG: 0.2 INJECTION, SOLUTION INTRAMUSCULAR; INTRAVENOUS at 07:53

## 2022-05-16 RX ADMIN — Medication 10 MG: at 08:16

## 2022-05-16 RX ADMIN — ONDANSETRON 4 MG: 2 INJECTION INTRAMUSCULAR; INTRAVENOUS at 07:48

## 2022-05-16 RX ADMIN — PROPOFOL 200 MG: 10 INJECTION, EMULSION INTRAVENOUS at 07:40

## 2022-05-16 RX ADMIN — ROCURONIUM BROMIDE 10 MG: 10 INJECTION, SOLUTION INTRAVENOUS at 07:40

## 2022-05-16 RX ADMIN — LIDOCAINE HYDROCHLORIDE 50 MG: 20 INJECTION, SOLUTION EPIDURAL; INFILTRATION; INTRACAUDAL; PERINEURAL at 07:40

## 2022-05-16 RX ADMIN — Medication 50 MCG: at 08:31

## 2022-05-16 RX ADMIN — SUGAMMADEX 200 MG: 100 INJECTION, SOLUTION INTRAVENOUS at 10:17

## 2022-05-16 RX ADMIN — OXYCODONE 5 MG: 5 TABLET ORAL at 13:08

## 2022-05-16 RX ADMIN — FENTANYL CITRATE 50 MCG: 50 INJECTION, SOLUTION INTRAMUSCULAR; INTRAVENOUS at 07:40

## 2022-05-16 RX ADMIN — PHENYLEPHRINE HYDROCHLORIDE 50 MCG/MIN: 10 INJECTION INTRAVENOUS at 07:46

## 2022-05-16 RX ADMIN — HYDROMORPHONE HYDROCHLORIDE 0.5 MG: 2 INJECTION, SOLUTION INTRAMUSCULAR; INTRAVENOUS; SUBCUTANEOUS at 11:46

## 2022-05-16 RX ADMIN — ROCURONIUM BROMIDE 10 MG: 10 INJECTION, SOLUTION INTRAVENOUS at 08:50

## 2022-05-16 RX ADMIN — HYDROMORPHONE HYDROCHLORIDE 0.4 MG: 2 INJECTION, SOLUTION INTRAMUSCULAR; INTRAVENOUS; SUBCUTANEOUS at 08:19

## 2022-05-16 RX ADMIN — PROCHLORPERAZINE EDISYLATE 5 MG: 5 INJECTION INTRAMUSCULAR; INTRAVENOUS at 14:07

## 2022-05-16 RX ADMIN — ROCURONIUM BROMIDE 10 MG: 10 INJECTION, SOLUTION INTRAVENOUS at 09:21

## 2022-05-16 RX ADMIN — Medication 30 MG: at 07:53

## 2022-05-16 RX ADMIN — PROPOFOL 20 MG: 10 INJECTION, EMULSION INTRAVENOUS at 08:06

## 2022-05-16 RX ADMIN — FENTANYL CITRATE 50 MCG: 50 INJECTION, SOLUTION INTRAMUSCULAR; INTRAVENOUS at 08:01

## 2022-05-16 RX ADMIN — Medication 50 MCG: at 09:57

## 2022-05-16 RX ADMIN — DEXMEDETOMIDINE HYDROCHLORIDE 8 MCG: 100 INJECTION, SOLUTION INTRAVENOUS at 09:58

## 2022-05-16 RX ADMIN — ROCURONIUM BROMIDE 40 MG: 10 INJECTION, SOLUTION INTRAVENOUS at 07:48

## 2022-05-16 RX ADMIN — ONDANSETRON 4 MG: 2 INJECTION INTRAMUSCULAR; INTRAVENOUS at 13:40

## 2022-05-16 RX ADMIN — PROPOFOL 40 MG: 10 INJECTION, EMULSION INTRAVENOUS at 09:09

## 2022-05-16 RX ADMIN — PROPOFOL 40 MG: 10 INJECTION, EMULSION INTRAVENOUS at 09:12

## 2022-05-16 RX ADMIN — Medication 120 MG: at 07:41

## 2022-05-16 RX ADMIN — Medication 50 MCG: at 07:40

## 2022-05-16 RX ADMIN — Medication 50 MCG: at 09:39

## 2022-05-16 ASSESSMENT — PAIN DESCRIPTION - LOCATION
LOCATION: ABDOMEN
LOCATION: ABDOMEN

## 2022-05-16 ASSESSMENT — PAIN DESCRIPTION - DESCRIPTORS
DESCRIPTORS: DISCOMFORT;ACHING
DESCRIPTORS: ACHING;DISCOMFORT
DESCRIPTORS: ACHING

## 2022-05-16 ASSESSMENT — PAIN SCALES - GENERAL
PAINLEVEL_OUTOF10: 0
PAINLEVEL_OUTOF10: 3
PAINLEVEL_OUTOF10: 7
PAINLEVEL_OUTOF10: 5
PAINLEVEL_OUTOF10: 8

## 2022-05-16 ASSESSMENT — PAIN - FUNCTIONAL ASSESSMENT
PAIN_FUNCTIONAL_ASSESSMENT: 0-10
PAIN_FUNCTIONAL_ASSESSMENT: PREVENTS OR INTERFERES SOME ACTIVE ACTIVITIES AND ADLS
PAIN_FUNCTIONAL_ASSESSMENT: PREVENTS OR INTERFERES SOME ACTIVE ACTIVITIES AND ADLS

## 2022-05-16 ASSESSMENT — PAIN DESCRIPTION - PAIN TYPE: TYPE: SURGICAL PAIN

## 2022-05-16 NOTE — ANESTHESIA PRE PROCEDURE
Department of Anesthesiology  Preprocedure Note       Name:  Jagdeep Means   Age:  58 y.o.  :  1960                                          MRN:  [id number]         Date:  2022      Surgeon: Shira Nguyenor):  Pema Jones MD    Procedure: Procedure(s):  ROBOTIC LAPAROSCOPIC RADICAL PROSTATECTOMY WITH BILATERAL LYMPH NODE DISSECTION AND BILATERAL NERVE SPARE    Medications prior to admission:   Prior to Admission medications    Medication Sig Start Date End Date Taking? Authorizing Provider   amLODIPine (NORVASC) 5 MG tablet Take 5 mg by mouth daily   Yes Historical Provider, MD   UNABLE TO FIND Diabetes meds-daughter to call back with name   Yes Historical Provider, MD       Current medications:    No current facility-administered medications for this encounter. Allergies:  No Known Allergies    Problem List:  There is no problem list on file for this patient. Past Medical History:        Diagnosis Date    Diabetes mellitus ([address]     Hypertension        Past Surgical History:  History reviewed. No pertinent surgical history. Social History:    Social History     Tobacco Use    Smoking status: Never Smoker    Smokeless tobacco: Never Used   Substance Use Topics    Alcohol use: Never                                Counseling given: Not Answered      Vital Signs (Current):   Vitals:    22 1317 22 0639   BP:  (!) 206/97   Pulse:  109   Resp:  20   Temp:  98.4 °F (36.9 °C)   TempSrc:  Temporal   SpO2:  99%   Weight: 150 lb (68 kg) 148 lb (67.1 kg)   Height: 5' 7\" (1.702 m)                                               BP Readings from Last 3 Encounters:   22 (!) 206/97   22 (!) 171/98       NPO Status:                                                                                 BMI:   Wt Readings from Last 3 Encounters:   22 148 lb (67.1 kg)   22 160 lb (72.6 kg)     Body mass index is 23.18 kg/m².     CBC:   Lab Results   Component Value Date WBC 4.6 05/02/2022    RBC 4.11 05/02/2022    HGB 12.9 05/02/2022    HCT 37.6 05/02/2022    MCV 91.6 05/02/2022    RDW 12.4 05/02/2022     05/02/2022       CMP:   Lab Results   Component Value Date     05/02/2022    K 4.3 05/02/2022    K 3.6 03/19/2022     05/02/2022    CO2 24 05/02/2022    BUN 21 05/02/2022    CREATININE 1.5 05/02/2022    GFRAA 57 05/02/2022    AGRATIO 1.6 05/02/2022    LABGLOM 47 05/02/2022    GLUCOSE 150 05/02/2022    PROT 7.2 05/02/2022    CALCIUM 10.2 05/02/2022    BILITOT 0.5 05/02/2022    ALKPHOS 83 05/02/2022    AST 15 05/02/2022    ALT 10 05/02/2022       POC Tests: No results for input(s): POCGLU, POCNA, POCK, POCCL, POCBUN, POCHEMO, POCHCT in the last 72 hours. Coags:   Lab Results   Component Value Date    PROTIME 12.9 05/02/2022    INR 1.14 05/02/2022    APTT 34.5 05/02/2022       HCG (If Applicable): No results found for: PREGTESTUR, PREGSERUM, HCG, HCGQUANT     ABGs: No results found for: PHART, PO2ART, AVF8WKN, OXZ5ABX, BEART, W7UWIIIB     Type & Screen (If Applicable):  No results found for: LABABO, LABRH    Drug/Infectious Status (If Applicable):  No results found for: HIV, HEPCAB    COVID-19 Screening (If Applicable): No results found for: COVID19        Anesthesia Evaluation  Patient summary reviewed and Nursing notes reviewed no history of anesthetic complications:   Airway: Mallampati: II  TM distance: >3 FB   Neck ROM: full  Mouth opening: > = 3 FB Dental: normal exam         Pulmonary:Negative Pulmonary ROS breath sounds clear to auscultation      (-) wheezes                           Cardiovascular:  Exercise tolerance: good (>4 METS),   (+) hypertension:,     (-) CABG/stent, dysrhythmias and  angina      Rhythm: regular  Rate: abnormal                 ROS comment: BP elevated today.   Patient stopped norvasc May 8th     Neuro/Psych:      (-) seizures, TIA and CVA            ROS comment: Patient states he is very tense GI/Hepatic/Renal:        (-) GERD       Endo/Other:    (+) Diabetes, malignancy/cancer (prostate). ROS comment: Denies FH of problems with GA Abdominal:             Vascular:     - DVT and PE. Other Findings:           Anesthesia Plan      general     ASA 3     (Medical history and informed consent taken with the help of  at bedside.)  Induction: intravenous. MIPS: Postoperative opioids intended and Prophylactic antiemetics administered. Anesthetic plan and risks discussed with patient. Plan discussed with CRNA.                 Atul Gaona MD   5/16/2022

## 2022-05-16 NOTE — PROGRESS NOTES
Phase 1 complete, pt seen by anesthesiologist. VSS, pt resting comfortably. Incision sites x5 are CDI, ice applied. Will transition to phase 2 for d/c.

## 2022-05-16 NOTE — H&P
Urology Preoperative History & Physical  [facility]     Patient: Raad He MRN: [id number]  Room/Bed: OR/NONE   YOB: 1960  Age/Sex: 58 y.o.male  Admission Date: 5/16/2022     Date of Service:  5/16/2022    ASSESSMENT/PLAN     PCa here for RALP/BPLND    Plan: To OR for above procedure    All patient questions were answered. He understands the plan as listed above. HISTORY     Chief Complaint: As above    History of Present Illness: Raad He is a 58 y.o. male with above listed problems. No changes in history/physical since last evaluation. No change in symptoms. Past Medical History:  He has a past medical history of Diabetes mellitus ([address] and Hypertension. Hospital Problem List:  Active Problems:    * No active hospital problems. *  Resolved Problems:    * No resolved hospital problems. *      Past Surgical History:  He has no past surgical history on file. Social History:  He reports that he has never smoked. He has never used smokeless tobacco. He reports that he does not drink alcohol and does not use drugs. Family History:  family history is not on file. Allergies:  No Known Allergies    Medications:  Scheduled Meds:   lidocaine PF  0.5 mL IntraDERmal Once    ciprofloxacin  400 mg IntraVENous On Call to OR     Continuous Infusions:   sodium chloride      sodium chloride 125 mL/hr at 05/16/22 0710     PRN Meds:lidocaine PF    Review of Systems:  Pertinent positives/negatives reviewed in HPI. All other systems reviewed and negative, unless noted below.     Constitutional: Negative  Genitourinary: see HPI  HEENT: Negative   Cardiovascular: Negative   Respiratory: Negative   Gastrointestinal: Negative   Musculoskeletal: Negative   Neurological: Negative   Psychiatric: Negative   Integumentary: Negative     PHYSICAL EXAM     Vitals:    05/16/22 0639   BP: (!) 206/97   Pulse: 109   Resp: 20   Temp: 98.4 °F (36.9 °C)   SpO2: 99% CONSTITUTIONAL: The patient is well nourished/developed, with no distress noted. CARDIOVASCULAR: normal rate. RESPIRATOR: non-labored breathing. GENITOURINARY: Defer to OR; see clinic note for  exam.    Ins/Outs:  No intake or output data in the 24 hours ending 05/16/22 0733    LABS     CBC   Lab Results   Component Value Date    WBC 4.6 05/02/2022    RBC 4.11 05/02/2022    HGB 12.9 05/02/2022    HCT 37.6 05/02/2022    MCV 91.6 05/02/2022    MCH 31.4 05/02/2022    MCHC 34.2 05/02/2022    RDW 12.4 05/02/2022     05/02/2022    MPV 7.9 05/02/2022     BMP   Lab Results   Component Value Date     05/02/2022    K 4.3 05/02/2022    K 3.6 03/19/2022     05/02/2022    CO2 24 05/02/2022    BUN 21 05/02/2022    CREATININE 1.5 05/02/2022    GLUCOSE 150 05/02/2022    CALCIUM 10.2 05/02/2022     Urinalysis:   Lab Results   Component Value Date    COLORU Yellow 03/19/2022    GLUCOSEU 500 03/19/2022    BLOODU SMALL 03/19/2022    NITRU Negative 03/19/2022    LEUKOCYTESUR Negative 03/19/2022     Urine culture: No results for input(s): Christine Palm in the last 72 hours. PSA: No results found for: PSA      IMAGING     XR CHEST (2 VW)    Result Date: 4/22/2022  EXAMINATION: TWO XRAY VIEWS OF THE CHEST 4/21/2022 1:22 pm COMPARISON: None. HISTORY: ORDERING SYSTEM PROVIDED HISTORY: Malignant neoplasm of prostate [facility] TECHNOLOGIST PROVIDED HISTORY: Reason for Exam: malignant neoplasm FINDINGS: The cardiomediastinal silhouette is normal in size and contour. The lungs are clear. No pleural effusion or pneumothorax is present. No acute cardiopulmonary process     NM BONE SCAN WHOLE BODY    Result Date: 4/21/2022  EXAMINATION: WHOLE BODY BONE SCAN  4/21/2022 TECHNIQUE: The patient was injected intravenously with 24.55 mCi of 99 mTc MDP and scintigraphy of the entire skeleton was performed approximately three hours later.  Oblique coned-down images of the thorax are submitted as well as lateral coned-down images of the head and neck. COMPARISON: CT abdomen and pelvis 19 March 2022 HISTORY: ORDERING SYSTEM PROVIDED HISTORY: Malignant neoplasm of prostate [facility]) TECHNOLOGIST PROVIDED HISTORY: Reason for Exam: Prostate Cancer FINDINGS: No abnormal foci of radiotracer uptake to suggest metastatic disease. Foci of activity in the shoulders, sternoclavicular joints, hips, knees, ankles, wrists, SI joints and elbows are most likely degenerative. Asymmetric increased activity is noted in the L5 facets, right greater than left. However, review recent CT scan demonstrates significant degenerative changes these locations accounting for activity. Physiologic activity is present in the skeletal and renal collecting systems. No evidence of bony metastatic disease. Distribution of radiotracer uptake most compatible with degenerative change.             Electronically signed by: Lin Thomas MD MD, JONELLE 5/16/2022   [facility]  Office Contact: [phone]

## 2022-05-16 NOTE — PROGRESS NOTES
Pt arrived to PACU from OR, VSS, pt arouses to voice. Laparoscopic incision sites x5 are CDI, ice applied. Baeza catheter drainage noted to be light pink. Will continue to monitor.

## 2022-05-16 NOTE — PROGRESS NOTES
CLINICAL PHARMACY NOTE: MEDS TO BEDS    Total # of Prescriptions Filled: 2   The following medications were delivered to the patient:  · Stimulant laxative 8.6-50 mg  · Norco 5-325 mg    Additional Documentation:    Delivered to Patient daughter =signed  Candace Adams CPhT

## 2022-05-16 NOTE — ANESTHESIA POSTPROCEDURE EVALUATION
Department of Anesthesiology  Postprocedure Note    Patient: Silke Kruse  MRN: [id number]  YOB: 1960  Date of evaluation: 5/16/2022  Time:  11:26 AM     Procedure Summary     Date: 05/16/22 Room / Location: [facility] OR [address] [place] [address]    Anesthesia Start: 6083 Anesthesia Stop: 7600    Procedure: ROBOTIC LAPAROSCOPIC RADICAL PROSTATECTOMY WITH BILATERAL LYMPH NODE DISSECTION AND BILATERAL NERVE SPARE (N/A Abdomen) Diagnosis: ([address] [place] [address]  MALIGNANT NEOPLASM PROSTATE)    Surgeons: Kari Pham MD Responsible Provider: Andrés Ray MD    Anesthesia Type: general ASA Status: 3          Anesthesia Type: No value filed. Sim Phase I: Sim Score: 8    Sim Phase II:      Last vitals: Reviewed and per EMR flowsheets.        Anesthesia Post Evaluation    Patient location during evaluation: PACU  Patient participation: complete - patient participated  Level of consciousness: awake  Airway patency: patent  Nausea & Vomiting: no vomiting  Complications: no  Cardiovascular status: hemodynamically stable  Respiratory status: acceptable  Hydration status: euvolemic  Multimodal analgesia pain management approach

## 2022-05-16 NOTE — PROGRESS NOTES
Discharge note:  Discharge order obtained, and discharge instructions reviewed. Patient educated, using the teach back method, about follow up instructions and discharge instructions. A completed copy of the AVS instructions given to patient and all questions answered. IV catheter removed without complaints, catheter intact, site WNL. Discharged to lobby via wheel chair per transportation.

## 2022-05-16 NOTE — OP NOTE
Urology Operative Report  [facility]    Provider: Luis Enrique Hernandez MD MD Patient ID:  Admission Date: 2022 Name: Ying Zee  OR Date: 2022  MRN: [id number]   Patient Location: OR/NONE : 1960  Attending: Luis Enrique Hernandez MD Date of Service: 2022  PCP: No primary care provider on file. Date of Operation: 2022     Preoperative Diagnosis: High risk prostate cancer    Postoperative Diagnosis: same    Procedure:    1. Robotic assisted laparoscopic radical prostatectomy  2. Right side pelvic lymphadenectomy  3. Left side pelvic lymphadenectomy    Surgeon:   Luis Enrique Hernandez MD, JONELEL    Anesthesia: General endotracheal anesthesia    Indications: Ying Zee is a 58 y.o. male who presents for the above named surgery. Informed consent was obtained and the risks, benefits, and details of the procedure were explained to the patient who elected to proceed. Details of Procedure:  After informed consent was obtained, making the patient aware of the risks, benefits and alternatives to the procedure, he was taken back to the surgical suite and positioned in a supine position on the surgical table. SCDs were placed on the lower extremities. General anesthesia was induced, and he was transferred to a dorsal lithotomy position. All pressure points were carefully padded. His genitalia and abdomen were prepped and draped in the usual sterile fashion. A timeout procedure was performed, properly identifying the patient, procedure, and operative site. A pierre catheter was placed and the bladder was drained. A supraumbilical incision was made, and a Veress needle was used to introduce pneumoperitoneum requiring 1 attempt(s). A camera port was then introduced into the abdomen, and the intra-abdominal contents were inspected for any sign of injury. There was none.  The laparoscopic ports were placed in the usual fashion, two 8 mm ports in the left lower quadrant, one 8 mm port in the right lower quadrant, an assistant port in the lateral right lower quadrant, and a 5 mm port in the right upper quadrant. The patient was put in steep Trendelenburg position and the robot was docked. The laparoscopic instruments were introduced into the abdomen under direct vision. A retrovesical approach to the prostate was used. An incision was made in the peritoneum and the bilateral seminal vesicles and vas deferens were identified in the midline. The vas was followed out laterally and cut. The seminal vesicles were freed from all surrounding attachments. Care was taken on the side of attempted nerve sparing to avoid using electrical energy. Denonvier's fascia was incised and the posterior plane of dissection extended distally to the apex of the prostate. The bladder was then dropped from its retropubic location and bladder attachments were taken down until the endopelvic fascia was identified. The endopelvic fascia was incised and lateral prostate attachments taken down allowing completion of the lateral dissection of the prostate. This was performed bilaterally. The anterior prostate fat was cleaned off the prostate and removed by the bedside assistant. A dorsal vein stitch of 2-0 vicryl was used to tie off the vascular complex. At this point, attention was turned to the bladder neck. The prostato-vesicle junction was identified and the Baeza balloon was deflated. The bladder neck was opened using the monopolar scissors. Care was taken to preserve the bladder neck. The urethral catheter was pulled back and the prostate elevated under tension using the fourth arm. The posterior bladder neck was then divided until the bilateral seminal vesicles and bilateral vas deferens were identified in the midline. They were delivered into the anterior surgical field. A nerve-sparing procedure was attempted on the both sides. The nervous tissues were carefully dissected off the lateral aspect of the prostate. No electrical energy was used, and minimal traction was applied. The lateral pelvic vascular pedicles were taken down using a combination of bipolar electrocautery with hem-o-lock clips, completing the posterior-lateral dissections. The dorsal vein complex was transected followed by transection of the urethral-prostatic junction using sharp dissection with the monopolar scissors. An adequate stump of urethra was left in place. The prostate was completely free and was placed into a specimen bag. Hemostasis was ensured. The bilateral lymphadenectomy was then done. Beginning with the right side, the peritoneum was incised over the iliac vessles. The lymph node packet margins were the external iliac vessels, obturator nerve, internal iliac artery, bladder medially, and pelvic floor. The obturator neve was identified and spared. Surgicel was placed into the dissection bed. We then performed lymphadenectomy of the left side. The peritoneum was incised over the iliac vessles. The lymph node packet margins were the external iliac vessels, obturator nerve, internal iliac artery, bladder medially, and pelvic floor. The obturator neve was identified and spared. Surgicel was placed into the dissection bed. The left and right side lymph node packets were then placed in a specimen pouch. A Reinaldo stitch was then placed using a 3-0 stratafix stitch to reapproximate the posterior bladder fascia with the posterior periurethral fascia. The urethrovesical anastomosis was accomplished using two interlocked 3-0 stratafix stitches in a running fashion starting at the 6 o'clock position and running circumferentially to the 12 o'clock position. These were tied in the midline. After completion of the anastomosis, a new 18-Syriac Baeza catheter was placed into the bladder and the balloon was inflated with 10mL of sterile water. The bladder was irrigated with 150 mL of normal saline. There was no evidence of any leakage.  The catheter was allowed to remain in the bladder. Hemostasis was again ensured. The midline incision was extended, allowing delivery of the specimen bags through the midline. The midline incision was closed with 0-PDS interrupted stitches for the fascial level followed by 3-0 Vicryl deep dermal stitches. All incisions were closed with 4-0 monofilament and dermabond. The new 18-Yoruba Baeza catheter had a statLock positioned. At the end of the procedure all needle, lap, instrument and sponge counts were correct. The patient tolerated the procedure well, was extubated without issue in the operating room, and was transported to the PACU in stable condition. Findings: There did appear to be an adequate surgical margin. There was a watertight urethral vesicle anastomosis. Estimated Blood Loss: Approximately 50 mL                  Drains:   18Fr urethral Baeza catheter to gravity drainage          Specimens:   1. Prostate, bilateral seminal vesicles, and anterior prostate fat. 2. Right side pelvic lymph nodes. 3. Left side pelvic lymph nodes. Complications: none apparent           Disposition:  PACU - hemodynamically stable.      Plan: Observation in PACU, Baeza x1 week, follow-up pathology           Loren Martinez MD, [facility] [id number]  5/16/2022

## 2022-05-26 ENCOUNTER — APPOINTMENT (OUTPATIENT)
Dept: CT IMAGING | Age: 62
DRG: 720 | End: 2022-05-26
Payer: MEDICAID

## 2022-05-26 ENCOUNTER — ANESTHESIA EVENT (OUTPATIENT)
Dept: OPERATING ROOM | Age: 62
DRG: 720 | End: 2022-05-26
Payer: MEDICAID

## 2022-05-26 ENCOUNTER — HOSPITAL ENCOUNTER (INPATIENT)
Age: 62
LOS: 11 days | Discharge: HOME HEALTH | DRG: 720 | End: 2022-06-06
Attending: EMERGENCY MEDICINE | Admitting: INTERNAL MEDICINE
Payer: MEDICAID

## 2022-05-26 ENCOUNTER — APPOINTMENT (OUTPATIENT)
Dept: GENERAL RADIOLOGY | Age: 62
DRG: 720 | End: 2022-05-26
Payer: MEDICAID

## 2022-05-26 ENCOUNTER — ANESTHESIA (OUTPATIENT)
Dept: OPERATING ROOM | Age: 62
DRG: 720 | End: 2022-05-26
Payer: MEDICAID

## 2022-05-26 DIAGNOSIS — A41.9 SEPTIC SHOCK (HCC): ICD-10-CM

## 2022-05-26 DIAGNOSIS — R65.21 SEPTIC SHOCK (HCC): ICD-10-CM

## 2022-05-26 DIAGNOSIS — N17.9 AKI (ACUTE KIDNEY INJURY) (HCC): Primary | ICD-10-CM

## 2022-05-26 DIAGNOSIS — N20.1 URETEROLITHIASIS: ICD-10-CM

## 2022-05-26 PROBLEM — R65.20 SEVERE SEPSIS (HCC): Status: ACTIVE | Noted: 2022-05-26

## 2022-05-26 LAB
A/G RATIO: 0.8 (ref 1.1–2.2)
A/G RATIO: 0.8 (ref 1.1–2.2)
ALBUMIN SERPL-MCNC: 3 G/DL (ref 3.4–5)
ALBUMIN SERPL-MCNC: 3.1 G/DL (ref 3.4–5)
ALP BLD-CCNC: 131 U/L (ref 40–129)
ALP BLD-CCNC: 142 U/L (ref 40–129)
ALT SERPL-CCNC: 25 U/L (ref 10–40)
ALT SERPL-CCNC: 44 U/L (ref 10–40)
ANION GAP SERPL CALCULATED.3IONS-SCNC: 21 MMOL/L (ref 3–16)
ANION GAP SERPL CALCULATED.3IONS-SCNC: 25 MMOL/L (ref 3–16)
AST SERPL-CCNC: 33 U/L (ref 15–37)
AST SERPL-CCNC: 93 U/L (ref 15–37)
BACTERIA: ABNORMAL /HPF
BASOPHILS ABSOLUTE: 0.1 K/UL (ref 0–0.2)
BASOPHILS RELATIVE PERCENT: 0.7 %
BILIRUB SERPL-MCNC: 1.5 MG/DL (ref 0–1)
BILIRUB SERPL-MCNC: 1.9 MG/DL (ref 0–1)
BILIRUBIN URINE: ABNORMAL
BLOOD, URINE: ABNORMAL
BUN BLDV-MCNC: 90 MG/DL (ref 7–20)
BUN BLDV-MCNC: 95 MG/DL (ref 7–20)
CALCIUM SERPL-MCNC: 8.6 MG/DL (ref 8.3–10.6)
CALCIUM SERPL-MCNC: 9.2 MG/DL (ref 8.3–10.6)
CHLORIDE BLD-SCNC: 83 MMOL/L (ref 99–110)
CHLORIDE BLD-SCNC: 88 MMOL/L (ref 99–110)
CHLORIDE URINE RANDOM: 81 MMOL/L
CLARITY: CLEAR
CO2: 15 MMOL/L (ref 21–32)
CO2: 21 MMOL/L (ref 21–32)
COLOR: YELLOW
CREAT SERPL-MCNC: 8 MG/DL (ref 0.8–1.3)
CREAT SERPL-MCNC: 8.4 MG/DL (ref 0.8–1.3)
CREATININE URINE: 20.1 MG/DL (ref 39–259)
EOSINOPHILS ABSOLUTE: 0 K/UL (ref 0–0.6)
EOSINOPHILS RELATIVE PERCENT: 0 %
EPITHELIAL CELLS, UA: ABNORMAL /HPF (ref 0–5)
GFR AFRICAN AMERICAN: 8
GFR AFRICAN AMERICAN: 8
GFR NON-AFRICAN AMERICAN: 6
GFR NON-AFRICAN AMERICAN: 7
GLUCOSE BLD-MCNC: 261 MG/DL (ref 70–99)
GLUCOSE BLD-MCNC: 274 MG/DL (ref 70–99)
GLUCOSE BLD-MCNC: 297 MG/DL (ref 70–99)
GLUCOSE BLD-MCNC: 370 MG/DL (ref 70–99)
GLUCOSE URINE: 500 MG/DL
HCT VFR BLD CALC: 31.6 % (ref 40.5–52.5)
HEMOGLOBIN: 10.5 G/DL (ref 13.5–17.5)
KETONES, URINE: NEGATIVE MG/DL
LACTIC ACID, SEPSIS: 2.7 MMOL/L (ref 0.4–1.9)
LACTIC ACID, SEPSIS: 6.5 MMOL/L (ref 0.4–1.9)
LEUKOCYTE ESTERASE, URINE: ABNORMAL
LIPASE: 31 U/L (ref 13–60)
LYMPHOCYTES ABSOLUTE: 0.2 K/UL (ref 1–5.1)
LYMPHOCYTES RELATIVE PERCENT: 1.2 %
MCH RBC QN AUTO: 30.3 PG (ref 26–34)
MCHC RBC AUTO-ENTMCNC: 33.3 G/DL (ref 31–36)
MCV RBC AUTO: 90.9 FL (ref 80–100)
MICROSCOPIC EXAMINATION: YES
MONOCYTES ABSOLUTE: 0.9 K/UL (ref 0–1.3)
MONOCYTES RELATIVE PERCENT: 5.7 %
NEUTROPHILS ABSOLUTE: 14.8 K/UL (ref 1.7–7.7)
NEUTROPHILS RELATIVE PERCENT: 92.4 %
NITRITE, URINE: POSITIVE
PDW BLD-RTO: 13 % (ref 12.4–15.4)
PERFORMED ON: ABNORMAL
PERFORMED ON: ABNORMAL
PH UA: 7.5 (ref 5–8)
PHOSPHORUS: 5.2 MG/DL (ref 2.5–4.9)
PLATELET # BLD: 228 K/UL (ref 135–450)
PMV BLD AUTO: 8 FL (ref 5–10.5)
POTASSIUM REFLEX MAGNESIUM: 4.1 MMOL/L (ref 3.5–5.1)
POTASSIUM REFLEX MAGNESIUM: 4.2 MMOL/L (ref 3.5–5.1)
POTASSIUM, UR: 5.7 MMOL/L
PROTEIN UA: >=300 MG/DL
RBC # BLD: 3.47 M/UL (ref 4.2–5.9)
RBC UA: ABNORMAL /HPF (ref 0–4)
SODIUM BLD-SCNC: 125 MMOL/L (ref 136–145)
SODIUM BLD-SCNC: 128 MMOL/L (ref 136–145)
SODIUM URINE: 128 MMOL/L
SPECIFIC GRAVITY UA: 1.02 (ref 1–1.03)
TOTAL CK: 64 U/L (ref 39–308)
TOTAL PROTEIN: 6.8 G/DL (ref 6.4–8.2)
TOTAL PROTEIN: 7.2 G/DL (ref 6.4–8.2)
UREA NITROGEN, UR: 87.2 MG/DL (ref 800–1666)
URIC ACID, SERUM: 8.8 MG/DL (ref 3.5–7.2)
URINE REFLEX TO CULTURE: YES
URINE TYPE: ABNORMAL
UROBILINOGEN, URINE: 0.2 E.U./DL
WBC # BLD: 16.1 K/UL (ref 4–11)
WBC UA: ABNORMAL /HPF (ref 0–5)

## 2022-05-26 PROCEDURE — 82550 ASSAY OF CK (CPK): CPT

## 2022-05-26 PROCEDURE — 2580000003 HC RX 258: Performed by: EMERGENCY MEDICINE

## 2022-05-26 PROCEDURE — C1758 CATHETER, URETERAL: HCPCS | Performed by: UROLOGY

## 2022-05-26 PROCEDURE — 74420 UROGRAPHY RTRGR +-KUB: CPT

## 2022-05-26 PROCEDURE — 7100000001 HC PACU RECOVERY - ADDTL 15 MIN: Performed by: UROLOGY

## 2022-05-26 PROCEDURE — 2580000003 HC RX 258: Performed by: PHYSICIAN ASSISTANT

## 2022-05-26 PROCEDURE — 96365 THER/PROPH/DIAG IV INF INIT: CPT

## 2022-05-26 PROCEDURE — 2580000003 HC RX 258: Performed by: INTERNAL MEDICINE

## 2022-05-26 PROCEDURE — 84520 ASSAY OF UREA NITROGEN: CPT

## 2022-05-26 PROCEDURE — 81001 URINALYSIS AUTO W/SCOPE: CPT

## 2022-05-26 PROCEDURE — 3700000000 HC ANESTHESIA ATTENDED CARE: Performed by: UROLOGY

## 2022-05-26 PROCEDURE — 83690 ASSAY OF LIPASE: CPT

## 2022-05-26 PROCEDURE — 2580000003 HC RX 258: Performed by: NURSE ANESTHETIST, CERTIFIED REGISTERED

## 2022-05-26 PROCEDURE — 87086 URINE CULTURE/COLONY COUNT: CPT

## 2022-05-26 PROCEDURE — 84540 ASSAY OF URINE/UREA-N: CPT

## 2022-05-26 PROCEDURE — 85025 COMPLETE CBC W/AUTO DIFF WBC: CPT

## 2022-05-26 PROCEDURE — 2720000010 HC SURG SUPPLY STERILE: Performed by: UROLOGY

## 2022-05-26 PROCEDURE — 3600000004 HC SURGERY LEVEL 4 BASE: Performed by: UROLOGY

## 2022-05-26 PROCEDURE — BT141ZZ FLUOROSCOPY OF KIDNEYS, URETERS AND BLADDER USING LOW OSMOLAR CONTRAST: ICD-10-PCS | Performed by: UROLOGY

## 2022-05-26 PROCEDURE — 82040 ASSAY OF SERUM ALBUMIN: CPT

## 2022-05-26 PROCEDURE — 84295 ASSAY OF SERUM SODIUM: CPT

## 2022-05-26 PROCEDURE — 7100000000 HC PACU RECOVERY - FIRST 15 MIN: Performed by: UROLOGY

## 2022-05-26 PROCEDURE — 87040 BLOOD CULTURE FOR BACTERIA: CPT

## 2022-05-26 PROCEDURE — 3600000014 HC SURGERY LEVEL 4 ADDTL 15MIN: Performed by: UROLOGY

## 2022-05-26 PROCEDURE — 36415 COLL VENOUS BLD VENIPUNCTURE: CPT

## 2022-05-26 PROCEDURE — 82436 ASSAY OF URINE CHLORIDE: CPT

## 2022-05-26 PROCEDURE — 82374 ASSAY BLOOD CARBON DIOXIDE: CPT

## 2022-05-26 PROCEDURE — 87150 DNA/RNA AMPLIFIED PROBE: CPT

## 2022-05-26 PROCEDURE — 84133 ASSAY OF URINE POTASSIUM: CPT

## 2022-05-26 PROCEDURE — 6360000004 HC RX CONTRAST MEDICATION: Performed by: UROLOGY

## 2022-05-26 PROCEDURE — 87088 URINE BACTERIA CULTURE: CPT

## 2022-05-26 PROCEDURE — P9045 ALBUMIN (HUMAN), 5%, 250 ML: HCPCS | Performed by: NURSE ANESTHETIST, CERTIFIED REGISTERED

## 2022-05-26 PROCEDURE — 82435 ASSAY OF BLOOD CHLORIDE: CPT

## 2022-05-26 PROCEDURE — 6370000000 HC RX 637 (ALT 250 FOR IP): Performed by: INTERNAL MEDICINE

## 2022-05-26 PROCEDURE — C2617 STENT, NON-COR, TEM W/O DEL: HCPCS | Performed by: UROLOGY

## 2022-05-26 PROCEDURE — 2000000000 HC ICU R&B

## 2022-05-26 PROCEDURE — 80053 COMPREHEN METABOLIC PANEL: CPT

## 2022-05-26 PROCEDURE — C1769 GUIDE WIRE: HCPCS | Performed by: UROLOGY

## 2022-05-26 PROCEDURE — 84550 ASSAY OF BLOOD/URIC ACID: CPT

## 2022-05-26 PROCEDURE — 96375 TX/PRO/DX INJ NEW DRUG ADDON: CPT

## 2022-05-26 PROCEDURE — 6360000002 HC RX W HCPCS: Performed by: INTERNAL MEDICINE

## 2022-05-26 PROCEDURE — 82570 ASSAY OF URINE CREATININE: CPT

## 2022-05-26 PROCEDURE — 82947 ASSAY GLUCOSE BLOOD QUANT: CPT

## 2022-05-26 PROCEDURE — 74176 CT ABD & PELVIS W/O CONTRAST: CPT

## 2022-05-26 PROCEDURE — 3700000001 HC ADD 15 MINUTES (ANESTHESIA): Performed by: UROLOGY

## 2022-05-26 PROCEDURE — 6360000002 HC RX W HCPCS: Performed by: PHYSICIAN ASSISTANT

## 2022-05-26 PROCEDURE — 82310 ASSAY OF CALCIUM: CPT

## 2022-05-26 PROCEDURE — 2709999900 HC NON-CHARGEABLE SUPPLY: Performed by: UROLOGY

## 2022-05-26 PROCEDURE — 6360000002 HC RX W HCPCS: Performed by: EMERGENCY MEDICINE

## 2022-05-26 PROCEDURE — 2580000003 HC RX 258: Performed by: UROLOGY

## 2022-05-26 PROCEDURE — 6360000002 HC RX W HCPCS: Performed by: NURSE ANESTHETIST, CERTIFIED REGISTERED

## 2022-05-26 PROCEDURE — 99285 EMERGENCY DEPT VISIT HI MDM: CPT

## 2022-05-26 PROCEDURE — 6370000000 HC RX 637 (ALT 250 FOR IP): Performed by: PHYSICIAN ASSISTANT

## 2022-05-26 PROCEDURE — 83605 ASSAY OF LACTIC ACID: CPT

## 2022-05-26 PROCEDURE — 51702 INSERT TEMP BLADDER CATH: CPT

## 2022-05-26 PROCEDURE — 87186 SC STD MICRODIL/AGAR DIL: CPT

## 2022-05-26 PROCEDURE — 82565 ASSAY OF CREATININE: CPT

## 2022-05-26 PROCEDURE — 0T768DZ DILATION OF RIGHT URETER WITH INTRALUMINAL DEVICE, VIA NATURAL OR ARTIFICIAL OPENING ENDOSCOPIC: ICD-10-PCS | Performed by: UROLOGY

## 2022-05-26 PROCEDURE — 84100 ASSAY OF PHOSPHORUS: CPT

## 2022-05-26 PROCEDURE — 84300 ASSAY OF URINE SODIUM: CPT

## 2022-05-26 DEVICE — STENT URET 6FR L26CM PERCFLX HYDR+ TAPR TIP GRAD
Type: IMPLANTABLE DEVICE | Site: URETER | Status: NON-FUNCTIONAL
Removed: 2022-08-18

## 2022-05-26 RX ORDER — SILDENAFIL CITRATE 20 MG/1
20 TABLET ORAL DAILY
Status: ON HOLD | COMMUNITY
End: 2022-06-06 | Stop reason: HOSPADM

## 2022-05-26 RX ORDER — ACETAMINOPHEN 650 MG/1
650 SUPPOSITORY RECTAL EVERY 6 HOURS PRN
Status: DISCONTINUED | OUTPATIENT
Start: 2022-05-26 | End: 2022-06-06 | Stop reason: HOSPADM

## 2022-05-26 RX ORDER — HYDROMORPHONE HCL 110MG/55ML
0.5 PATIENT CONTROLLED ANALGESIA SYRINGE INTRAVENOUS EVERY 5 MIN PRN
Status: DISCONTINUED | OUTPATIENT
Start: 2022-05-26 | End: 2022-05-26 | Stop reason: HOSPADM

## 2022-05-26 RX ORDER — POLYETHYLENE GLYCOL 3350 17 G/17G
17 POWDER, FOR SOLUTION ORAL DAILY PRN
Status: DISCONTINUED | OUTPATIENT
Start: 2022-05-26 | End: 2022-06-06 | Stop reason: HOSPADM

## 2022-05-26 RX ORDER — ACETAMINOPHEN 325 MG/1
650 TABLET ORAL EVERY 6 HOURS PRN
Status: DISCONTINUED | OUTPATIENT
Start: 2022-05-26 | End: 2022-06-06 | Stop reason: HOSPADM

## 2022-05-26 RX ORDER — METOCLOPRAMIDE 10 MG/1
10 TABLET ORAL
Status: DISCONTINUED | OUTPATIENT
Start: 2022-05-26 | End: 2022-05-26

## 2022-05-26 RX ORDER — DIPHENHYDRAMINE HYDROCHLORIDE 50 MG/ML
25 INJECTION INTRAMUSCULAR; INTRAVENOUS ONCE
Status: COMPLETED | OUTPATIENT
Start: 2022-05-26 | End: 2022-05-26

## 2022-05-26 RX ORDER — ONDANSETRON 2 MG/ML
4 INJECTION INTRAMUSCULAR; INTRAVENOUS
Status: DISCONTINUED | OUTPATIENT
Start: 2022-05-26 | End: 2022-05-26 | Stop reason: HOSPADM

## 2022-05-26 RX ORDER — FENTANYL CITRATE 50 UG/ML
INJECTION, SOLUTION INTRAMUSCULAR; INTRAVENOUS PRN
Status: DISCONTINUED | OUTPATIENT
Start: 2022-05-26 | End: 2022-05-26 | Stop reason: SDUPTHER

## 2022-05-26 RX ORDER — SODIUM CHLORIDE 9 MG/ML
INJECTION, SOLUTION INTRAVENOUS PRN
Status: DISCONTINUED | OUTPATIENT
Start: 2022-05-26 | End: 2022-05-26 | Stop reason: HOSPADM

## 2022-05-26 RX ORDER — ALBUMIN, HUMAN INJ 5% 5 %
SOLUTION INTRAVENOUS PRN
Status: DISCONTINUED | OUTPATIENT
Start: 2022-05-26 | End: 2022-05-26 | Stop reason: SDUPTHER

## 2022-05-26 RX ORDER — 0.9 % SODIUM CHLORIDE 0.9 %
1000 INTRAVENOUS SOLUTION INTRAVENOUS ONCE
Status: COMPLETED | OUTPATIENT
Start: 2022-05-26 | End: 2022-05-26

## 2022-05-26 RX ORDER — SODIUM CHLORIDE 9 MG/ML
INJECTION, SOLUTION INTRAVENOUS CONTINUOUS PRN
Status: DISCONTINUED | OUTPATIENT
Start: 2022-05-26 | End: 2022-05-26 | Stop reason: SDUPTHER

## 2022-05-26 RX ORDER — ONDANSETRON 4 MG/1
4 TABLET, ORALLY DISINTEGRATING ORAL EVERY 8 HOURS PRN
Status: DISCONTINUED | OUTPATIENT
Start: 2022-05-26 | End: 2022-06-06 | Stop reason: HOSPADM

## 2022-05-26 RX ORDER — 0.9 % SODIUM CHLORIDE 0.9 %
1109 INTRAVENOUS SOLUTION INTRAVENOUS ONCE
Status: DISCONTINUED | OUTPATIENT
Start: 2022-05-26 | End: 2022-05-26 | Stop reason: ALTCHOICE

## 2022-05-26 RX ORDER — SODIUM CHLORIDE 0.9 % (FLUSH) 0.9 %
5-40 SYRINGE (ML) INJECTION EVERY 12 HOURS SCHEDULED
Status: DISCONTINUED | OUTPATIENT
Start: 2022-05-26 | End: 2022-06-06 | Stop reason: HOSPADM

## 2022-05-26 RX ORDER — FENTANYL CITRATE 50 UG/ML
50 INJECTION, SOLUTION INTRAMUSCULAR; INTRAVENOUS
Status: DISCONTINUED | OUTPATIENT
Start: 2022-05-26 | End: 2022-05-26 | Stop reason: ALTCHOICE

## 2022-05-26 RX ORDER — PROPOFOL 10 MG/ML
INJECTION, EMULSION INTRAVENOUS PRN
Status: DISCONTINUED | OUTPATIENT
Start: 2022-05-26 | End: 2022-05-26 | Stop reason: SDUPTHER

## 2022-05-26 RX ORDER — SODIUM CHLORIDE 9 MG/ML
INJECTION, SOLUTION INTRAVENOUS PRN
Status: DISCONTINUED | OUTPATIENT
Start: 2022-05-26 | End: 2022-06-06 | Stop reason: HOSPADM

## 2022-05-26 RX ORDER — ONDANSETRON 2 MG/ML
4 INJECTION INTRAMUSCULAR; INTRAVENOUS EVERY 6 HOURS PRN
Status: DISCONTINUED | OUTPATIENT
Start: 2022-05-26 | End: 2022-06-06 | Stop reason: HOSPADM

## 2022-05-26 RX ORDER — SODIUM CHLORIDE 0.9 % (FLUSH) 0.9 %
5-40 SYRINGE (ML) INJECTION PRN
Status: DISCONTINUED | OUTPATIENT
Start: 2022-05-26 | End: 2022-05-26 | Stop reason: HOSPADM

## 2022-05-26 RX ORDER — SODIUM CHLORIDE 0.9 % (FLUSH) 0.9 %
5-40 SYRINGE (ML) INJECTION PRN
Status: DISCONTINUED | OUTPATIENT
Start: 2022-05-26 | End: 2022-06-06 | Stop reason: HOSPADM

## 2022-05-26 RX ORDER — ONDANSETRON 2 MG/ML
4 INJECTION INTRAMUSCULAR; INTRAVENOUS ONCE
Status: COMPLETED | OUTPATIENT
Start: 2022-05-26 | End: 2022-05-26

## 2022-05-26 RX ORDER — SODIUM CHLORIDE 9 MG/ML
INJECTION, SOLUTION INTRAVENOUS CONTINUOUS
Status: DISCONTINUED | OUTPATIENT
Start: 2022-05-26 | End: 2022-05-27

## 2022-05-26 RX ORDER — INSULIN LISPRO 100 [IU]/ML
0-12 INJECTION, SOLUTION INTRAVENOUS; SUBCUTANEOUS
Status: DISCONTINUED | OUTPATIENT
Start: 2022-05-26 | End: 2022-06-06 | Stop reason: HOSPADM

## 2022-05-26 RX ORDER — MAGNESIUM HYDROXIDE 1200 MG/15ML
LIQUID ORAL
Status: COMPLETED | OUTPATIENT
Start: 2022-05-26 | End: 2022-05-26

## 2022-05-26 RX ORDER — HEPARIN SODIUM 5000 [USP'U]/ML
5000 INJECTION, SOLUTION INTRAVENOUS; SUBCUTANEOUS 3 TIMES DAILY
Status: DISCONTINUED | OUTPATIENT
Start: 2022-05-26 | End: 2022-06-06 | Stop reason: HOSPADM

## 2022-05-26 RX ORDER — INSULIN LISPRO 100 [IU]/ML
0-6 INJECTION, SOLUTION INTRAVENOUS; SUBCUTANEOUS NIGHTLY
Status: DISCONTINUED | OUTPATIENT
Start: 2022-05-26 | End: 2022-06-06 | Stop reason: HOSPADM

## 2022-05-26 RX ORDER — PROPOFOL 10 MG/ML
INJECTION, EMULSION INTRAVENOUS CONTINUOUS PRN
Status: DISCONTINUED | OUTPATIENT
Start: 2022-05-26 | End: 2022-05-26 | Stop reason: SDUPTHER

## 2022-05-26 RX ORDER — INSULIN GLARGINE 100 [IU]/ML
10 INJECTION, SOLUTION SUBCUTANEOUS NIGHTLY
Status: DISCONTINUED | OUTPATIENT
Start: 2022-05-26 | End: 2022-05-28

## 2022-05-26 RX ORDER — SULFAMETHOXAZOLE AND TRIMETHOPRIM 800; 160 MG/1; MG/1
1 TABLET ORAL 2 TIMES DAILY
Status: ON HOLD | COMMUNITY
Start: 2022-05-26 | End: 2022-06-06 | Stop reason: HOSPADM

## 2022-05-26 RX ORDER — SODIUM CHLORIDE 0.9 % (FLUSH) 0.9 %
5-40 SYRINGE (ML) INJECTION EVERY 12 HOURS SCHEDULED
Status: DISCONTINUED | OUTPATIENT
Start: 2022-05-26 | End: 2022-05-26 | Stop reason: HOSPADM

## 2022-05-26 RX ORDER — MIDAZOLAM HYDROCHLORIDE 1 MG/ML
INJECTION INTRAMUSCULAR; INTRAVENOUS PRN
Status: DISCONTINUED | OUTPATIENT
Start: 2022-05-26 | End: 2022-05-26 | Stop reason: SDUPTHER

## 2022-05-26 RX ADMIN — PIPERACILLIN AND TAZOBACTAM 3375 MG: 3; .375 INJECTION, POWDER, LYOPHILIZED, FOR SOLUTION INTRAVENOUS at 20:53

## 2022-05-26 RX ADMIN — Medication 10 ML: at 21:24

## 2022-05-26 RX ADMIN — FENTANYL CITRATE 25 MCG: 50 INJECTION, SOLUTION INTRAMUSCULAR; INTRAVENOUS at 17:32

## 2022-05-26 RX ADMIN — FENTANYL CITRATE 25 MCG: 50 INJECTION, SOLUTION INTRAMUSCULAR; INTRAVENOUS at 17:24

## 2022-05-26 RX ADMIN — HEPARIN SODIUM 5000 UNITS: 5000 INJECTION INTRAVENOUS; SUBCUTANEOUS at 21:06

## 2022-05-26 RX ADMIN — SODIUM CHLORIDE: 9 INJECTION, SOLUTION INTRAVENOUS at 19:04

## 2022-05-26 RX ADMIN — SODIUM CHLORIDE 1000 ML: 9 INJECTION, SOLUTION INTRAVENOUS at 13:02

## 2022-05-26 RX ADMIN — INSULIN GLARGINE 10 UNITS: 100 INJECTION, SOLUTION SUBCUTANEOUS at 21:25

## 2022-05-26 RX ADMIN — PROPOFOL 100 MCG/KG/MIN: 10 INJECTION, EMULSION INTRAVENOUS at 17:25

## 2022-05-26 RX ADMIN — FENTANYL CITRATE 50 MCG: 50 INJECTION, SOLUTION INTRAMUSCULAR; INTRAVENOUS at 14:45

## 2022-05-26 RX ADMIN — SODIUM CHLORIDE: 9 INJECTION, SOLUTION INTRAVENOUS at 17:18

## 2022-05-26 RX ADMIN — DIPHENHYDRAMINE HYDROCHLORIDE 25 MG: 50 INJECTION, SOLUTION INTRAMUSCULAR; INTRAVENOUS at 13:03

## 2022-05-26 RX ADMIN — ALBUMIN (HUMAN) 12.5 G: 12.5 INJECTION, SOLUTION INTRAVENOUS at 17:35

## 2022-05-26 RX ADMIN — FENTANYL CITRATE 25 MCG: 50 INJECTION, SOLUTION INTRAMUSCULAR; INTRAVENOUS at 17:27

## 2022-05-26 RX ADMIN — INSULIN LISPRO 3 UNITS: 100 INJECTION, SOLUTION INTRAVENOUS; SUBCUTANEOUS at 21:24

## 2022-05-26 RX ADMIN — FENTANYL CITRATE 25 MCG: 50 INJECTION, SOLUTION INTRAMUSCULAR; INTRAVENOUS at 17:37

## 2022-05-26 RX ADMIN — CEFEPIME HYDROCHLORIDE 1000 MG: 1 INJECTION, POWDER, FOR SOLUTION INTRAMUSCULAR; INTRAVENOUS at 14:50

## 2022-05-26 RX ADMIN — SODIUM CHLORIDE: 9 INJECTION, SOLUTION INTRAVENOUS at 15:22

## 2022-05-26 RX ADMIN — PROPOFOL 30 MG: 10 INJECTION, EMULSION INTRAVENOUS at 17:25

## 2022-05-26 RX ADMIN — PROPOFOL 30 MG: 10 INJECTION, EMULSION INTRAVENOUS at 17:24

## 2022-05-26 RX ADMIN — MIDAZOLAM 2 MG: 1 INJECTION INTRAMUSCULAR; INTRAVENOUS at 17:24

## 2022-05-26 RX ADMIN — METOCLOPRAMIDE 10 MG: 10 TABLET ORAL at 12:06

## 2022-05-26 RX ADMIN — ONDANSETRON 4 MG: 2 INJECTION INTRAMUSCULAR; INTRAVENOUS at 13:06

## 2022-05-26 ASSESSMENT — PAIN - FUNCTIONAL ASSESSMENT: PAIN_FUNCTIONAL_ASSESSMENT: 0-10

## 2022-05-26 ASSESSMENT — ENCOUNTER SYMPTOMS
RESPIRATORY NEGATIVE: 1
BACK PAIN: 0
ANAL BLEEDING: 0
RECTAL PAIN: 0
COLOR CHANGE: 0
ABDOMINAL PAIN: 0
CONSTIPATION: 0
NAUSEA: 1
BLOOD IN STOOL: 0
COUGH: 0
DIARRHEA: 0
VOMITING: 1
CHEST TIGHTNESS: 0
SHORTNESS OF BREATH: 0

## 2022-05-26 ASSESSMENT — PAIN SCALES - GENERAL
PAINLEVEL_OUTOF10: 9
PAINLEVEL_OUTOF10: 0

## 2022-05-26 ASSESSMENT — PAIN DESCRIPTION - LOCATION: LOCATION: ABDOMEN

## 2022-05-26 NOTE — ED NOTES
Pt transported to OR by OR staff with all belongings. No sign of distress at time of transfer.       Silvia Santoyo RN  05/26/22 4324

## 2022-05-26 NOTE — ANESTHESIA PRE PROCEDURE
Department of Anesthesiology  Preprocedure Note       Name:  Paula Jacinto   Age:  58 y.o.  :  1960                                          MRN:  [id number]         Date:  2022      Surgeon: Talita Guzman):  Peggy Sullivan MD    Procedure: Procedure(s):  CYSTOSCOPY, RIGHT RETROGRADE PYELOGRAM, PLACEMENT OF RIGHT URETERAL STENT    Medications prior to admission:   Prior to Admission medications    Medication Sig Start Date End Date Taking? Authorizing Provider   sildenafil (REVATIO) 20 MG tablet Take 20 mg by mouth daily    Historical Provider, MD   sulfamethoxazole-trimethoprim (BACTRIM DS;SEPTRA DS) 800-160 MG per tablet Take 1 tablet by mouth 2 times daily 22  Historical Provider, MD   metFORMIN (GLUCOPHAGE) 1000 MG tablet Take 1,000 mg by mouth 2 times daily (with meals)     Historical Provider, MD   senna-docusate (PERICOLACE) 8.6-50 MG per tablet Take 1 tablet by mouth 2 times daily For constipation while on pain medication. 5/16/22 6/15/22  Torey Tinsley MD   amLODIPine (NORVASC) 10 MG tablet Take 10 mg by mouth daily     Historical Provider, MD       Current medications:    No current facility-administered medications for this visit. Current Outpatient Medications   Medication Sig Dispense Refill    sildenafil (REVATIO) 20 MG tablet Take 20 mg by mouth daily      sulfamethoxazole-trimethoprim (BACTRIM DS;SEPTRA DS) 800-160 MG per tablet Take 1 tablet by mouth 2 times daily      metFORMIN (GLUCOPHAGE) 1000 MG tablet Take 1,000 mg by mouth 2 times daily (with meals)       senna-docusate (PERICOLACE) 8.6-50 MG per tablet Take 1 tablet by mouth 2 times daily For constipation while on pain medication.  30 tablet 1    amLODIPine (NORVASC) 10 MG tablet Take 10 mg by mouth daily        Facility-Administered Medications Ordered in Other Visits   Medication Dose Route Frequency Provider Last Rate Last Admin    fentaNYL (SUBLIMAZE) injection 50 mcg  50 mcg IntraVENous Q1H PRN Lisbet Kasper D Pendl, DO   50 mcg at 05/26/22 1445    0.9 % sodium chloride infusion   IntraVENous Continuous Germaine Ortega  mL/hr at 05/26/22 1522 New Bag at 05/26/22 1522    0.9 % sodium chloride bolus  1,109 mL IntraVENous Once TALYA Mancuso           Allergies:  No Known Allergies    Problem List:    Patient Active Problem List   Diagnosis Code    [facility] C61       Past Medical History:        Diagnosis Date    Diabetes mellitus ([facility]     Hypertension        Past Surgical History:        Procedure Laterality Date    PROSTATECTOMY N/A 5/16/2022    ROBOTIC LAPAROSCOPIC RADICAL PROSTATECTOMY WITH BILATERAL LYMPH NODE DISSECTION AND BILATERAL NERVE SPARE performed by Radha Jenkins MD at [address] [place] [address] [employer] History:    Social History     Tobacco Use    Smoking status: Never Smoker    Smokeless tobacco: Never Used   Substance Use Topics    Alcohol use: Never                                Counseling given: Not Answered      Vital Signs (Current): There were no vitals filed for this visit.                                            BP Readings from Last 3 Encounters:   05/26/22 113/73   05/16/22 (!) 169/102   03/19/22 (!) 171/98       NPO Status:                                                                                 BMI:   Wt Readings from Last 3 Encounters:   05/26/22 155 lb (70.3 kg)   05/16/22 148 lb (67.1 kg)   03/19/22 160 lb (72.6 kg)     There is no height or weight on file to calculate BMI.    CBC:   Lab Results   Component Value Date    WBC 16.1 05/26/2022    RBC 3.47 05/26/2022    HGB 10.5 05/26/2022    HCT 31.6 05/26/2022    MCV 90.9 05/26/2022    RDW 13.0 05/26/2022     05/26/2022       CMP:   Lab Results   Component Value Date     05/26/2022    K 4.2 05/26/2022    CL 83 05/26/2022    CO2 21 05/26/2022    BUN 90 05/26/2022    CREATININE 8.0 05/26/2022    GFRAA 8 05/26/2022    AGRATIO 0.8 05/26/2022    LABGLOM 7 05/26/2022    GLUCOSE 370 05/26/2022    PROT 7.2 05/26/2022    CALCIUM 9.2 05/26/2022    BILITOT 1.5 05/26/2022    ALKPHOS 131 05/26/2022    AST 33 05/26/2022    ALT 25 05/26/2022       POC Tests: No results for input(s): POCGLU, POCNA, POCK, POCCL, POCBUN, POCHEMO, POCHCT in the last 72 hours. Coags:   Lab Results   Component Value Date    PROTIME 12.9 05/02/2022    INR 1.14 05/02/2022    APTT 34.5 05/02/2022       HCG (If Applicable): No results found for: PREGTESTUR, PREGSERUM, HCG, HCGQUANT     ABGs: No results found for: PHART, PO2ART, GOW3TVB, MSE2EST, BEART, H5IMJMWY     Type & Screen (If Applicable):  No results found for: LABABO, LABRH    Drug/Infectious Status (If Applicable):  No results found for: HIV, HEPCAB    COVID-19 Screening (If Applicable): No results found for: COVID19        Anesthesia Evaluation  Patient summary reviewed and Nursing notes reviewed no history of anesthetic complications:   Airway: Mallampati: II  TM distance: >3 FB   Neck ROM: full  Mouth opening: > = 3 FB   Dental: normal exam         Pulmonary:Negative Pulmonary ROS breath sounds clear to auscultation      (-) wheezes                           Cardiovascular:  Exercise tolerance: good (>4 METS),   (+) hypertension:,     (-) CABG/stent, dysrhythmias and  angina      Rhythm: regular  Rate: abnormal                 ROS comment: BP elevated today. Patient stopped norvasc May 8th     Neuro/Psych:      (-) seizures, TIA and CVA            ROS comment: Patient states he is very tense GI/Hepatic/Renal:        (-) GERD       Endo/Other:    (+) Diabetes, malignancy/cancer (prostate). ROS comment: Denies FH of problems with GA Abdominal:             Vascular:     - DVT and PE. Other Findings:             Anesthesia Plan      general     ASA 3     (Medical history and informed consent taken with the help of  at bedside.)  Induction: intravenous. MIPS: Postoperative opioids intended and Prophylactic antiemetics administered.   Anesthetic plan and risks discussed with patient. Plan discussed with CRNA.                     Merari Mayfield MD   5/26/2022

## 2022-05-26 NOTE — ACP (ADVANCE CARE PLANNING)
Advanced Care Planning Note. Purpose of Encounter: Advanced care planning in light of hospitalization  Parties In Attendance: Patient,  Daughter  Decisional Capacity: Yes  Subjective: Patient  understand that this conversation is to address long term care goal  Objective: To hospital severe sepsis  Goals of Care Determination: Patient would pursue CPR and Intubation if required.   Code Status: full code  Time spent on Advanced care Plannin minutes  Advanced Care Planning Documents: documented patient's wishes, would like Daughter [email or web address] to make medical decisions if unable to make decisions    Cherise Cabrera MD  2022 5:13 PM

## 2022-05-26 NOTE — CONSULTS
Cortland Jeans, MD Catana Chill, MD Lorna Bradford, MD                Office: [phone]                      Fax: [phone]               [email or web address]                   Nephrology consult received . Brief Summary Note -- full consult report will follow. Chart reviewed. Discussed with ER team.   Thank you for allowing me to participate in this patient's care. Please do not hesitate to contact us anytime. We will follow along with you. Elaine Schultz MD  [facility] [address] [place] [address]   [phone] or [address] [employer]    =====================================================    Brief / Initial Plan:   NS bolus  Then start maintainance NS at higher rate    Mildly obstructing 12 mm right UPJ  Consult urology  pierre catheter inserted in ER    Urine lytes  Labs in am again    Dialysis not needed urgently today. Active Problems:    * No active hospital problems. *  Resolved Problems:    * No resolved hospital problems. *          Labs reviewed by me     CBC: Recent Labs     05/26/22  1205   WBC 16.1*   HGB 10.5*   HCT 31.6*   MCV 90.9        BMP:   Recent Labs     05/26/22  1205   *   K 4.2   CL 83*   CO2 21   BUN 90*   CREATININE 8.0*     Magnesium: No results found for: MG    Arterial Blood Gasses  No results for input(s): PH, PCO2, PO2 in the last 72 hours. Invalid input(s): Kali Condon    UA:No results for input(s): NITRITE, COLORU, PHUR, LABCAST, WBCUA, RBCUA, MUCUS, TRICHOMONAS, YEAST, BACTERIA, CLARITYU, SPECGRAV, LEUKOCYTESUR, UROBILINOGEN, BILIRUBINUR, BLOODU, GLUCOSEU, AMORPHOUS in the last 72 hours.     Invalid input(s): Merceda Anatoliy    LIVER PROFILE:   Recent Labs     05/26/22  1205   AST 33   ALT 25   LIPASE 31.0   BILITOT 1.5*   ALKPHOS 131*     PT/INR:    Lab Results   Component Value Date    PROTIME 12.9 05/02/2022    INR 1.14 05/02/2022     PTT:    Lab Results   Component Value Date    APTT 34.5 05/02/2022     BRITTANY:  No

## 2022-05-26 NOTE — OP NOTE
Urology Operative Report  [facility]    Provider: Denis Parish MD MD Patient ID:  Admission Date: 2022 Name: Ying Zee  OR Date: 2022  MRN: [id number]   Patient Location: OR/NONE : 1960  Attending: Neida Fonseca MD Date of Service: 2022  PCP: Elmo Montoya PA-C     Date of Operation: 2022    Preoperative Diagnosis:   1. RIGHT side ureteral stone  2. Renal failure  3. Urosepsis    Postoperative Diagnosis: same    Procedure:    1. Cystoscopy  2. Right side ureteral stent placement  3. Fluoroscopic imaging < 1 hr physician time  4. Bilateral retrograde ureteropyelogram    Surgeon:   Denis Parish MD, MD    Anesthesia: Monitored Local Anesthesia with Sedation    Indications: Ying Zee is a 58 y.o. male who presents for the above named surgery. Informed consent was obtained and the risks, benefits, and details of the procedure were explained to the patient who elected to proceed. Details of Procedure: The patient was brought to the operating room and placed in the supine position on the operating room table. SCDs were placed on the lower extremities. Following induction of anesthesia the patient was positioned in a lithotomy position, all pressure points were padded, and the genitals were prepped and draped in the usual sterile fashion. A routine timeout was performed, confirming the patient, procedure, site, risk of fire, patient allergies and confirming that preoperative antibiotics had been administered prior to beginning. A 21 fr rigid cystoscope was advanced into the urethra. The urethrovesical anastomosis appeared intact with some healing necrotic tissue, and no evidence of breakdown of the anastomosis. The bladder was inspected and there were no suspicious lesions, stones or diverticula seen. There was some expected catheter cystitis. Attention was turned to the left ureteral orifice.  A 5 Fr open ended catheter was inserted into the

## 2022-05-26 NOTE — ANESTHESIA POSTPROCEDURE EVALUATION
Department of Anesthesiology  Postprocedure Note    Patient: Prosper Phelps  MRN: [id number]  YOB: 1960  Date of evaluation: 5/26/2022  Time:  5:57 PM     Procedure Summary     Date: 05/26/22 Room / Location: [address] [place] [address]    Anesthesia Start: 1718 Anesthesia Stop: 4332    Procedure: CYSTOSCOPY, BILATERAL RETROGRADE PYELOGRAM, PLACEMENT OF RIGHT URETERAL STENT (Right Ureter) Diagnosis:       Hydronephrosis with ureteral calculus      (Right Ureteral Calculus, Right Hydronephrosis, Urinary Tract Infection)    Surgeons: Jazmine Stephenson MD Responsible Provider: Blaise Jenkins MD    Anesthesia Type: general ASA Status: 3          Anesthesia Type: No value filed. Sim Phase I:      Sim Phase II:      Last vitals: Reviewed and per EMR flowsheets.        Anesthesia Post Evaluation    Patient location during evaluation: PACU  Patient participation: complete - patient participated  Level of consciousness: awake  Airway patency: patent  Nausea & Vomiting: no vomiting and no nausea  Complications: no  Cardiovascular status: hemodynamically stable  Respiratory status: acceptable  Hydration status: stable  Multimodal analgesia pain management approach

## 2022-05-26 NOTE — CONSULTS
MD Alyse Bowers MD Brendan Rice, MD               Office: [phone]                      Fax: [phone]             [facility]                   NEPHROLOGY INITIAL CONSULT NOTE:     PATIENT NAME: Natasha Espinoza  : 1960  MRN: [id number]  REASON FOR CONSULT: For evaluation and management of Acute Kidney Injury. (My recommendations will be communicated by way of shared medical record.)  Ordered By TALYA Washburn         RECOMMENDATIONS:   -In ER NS bolus ~1L  Then start maintainance NS at higher rate     Mildly obstructing 12 mm right UPJ  Consulting urology   -Discussed with urologist-Dr. Shanti Traylor   pierre catheter inserted in ER     Trend lactic acid with IV fluids  Follow-up sodium trend, goal less than 8 points in 24 hours    Empirical antibiotics, follow urine culture    check UA w/ microscopy, urine lytes,  Hold ADDISON blockade     no need for dialysis,  at higher risk for decompensation, needing closer monitoring. D/W team - referring ER TALYA Washburn, nurse , urology - Dr Shanti Traylor       IMPRESSION:       Admitted for:  Ureterolithiasis [N20.1]  CHRISSY (acute kidney injury) ([facility] [N17.9]  Septic shock ([facility] [facility] [A41.9, R65.21]  Severe sepsis ([address] [A41.9, R65.20]    ICD-10-CM    1. CHRISSY (acute kidney injury) ([address]  N17.9    2. Ureterolithiasis  N20.1    3. Septic shock (HCC)  A41.9     R65.21          CHRISSY (on CKD: 3B):   - BL Scr- 1.5 as off 22 ---> 8.0 on admission  -: Etiology of CHRISSY - presumed ATN + obstruction    - other differentials: unlikely GN / TI / TMA process  - UA : results reviewed:  Showing large amount of blood, with significant proteinuria, with leukocytes, with white cells RBC high specific gravity  - Renal imaging: on on admission with CT scan: - Obstructed 12 mm right UPJ    History of prostate cancer    Associated problems:   - Volume status: hypo-volemic  : BP: no need for tight control    : Na: hyponatremia - acute-moderate range, likely due to renal failure  - Azotemia: Prerenal severe, likely some uremic encephalopathy  - Electrolytes: K: Normal  - Acid-Base: Lactic acidosis high  - Anemia: Mild on chronic disease      Other major problems: Management per primary and other consulting teams.   //         Hospital Problems           Last Modified POA    * (Principal) Severe sepsis ([facility] 5/26/2022 Yes        : other supportive care :   - Check daily renal function panel with electrolytes-phosphorus  - Strict monitoring of I/Os, daily weight  - Renal feeds/diet  - Current medications reviewed. - Nephrotoxic medications have been discontinued. - Dose adjusted and appropriate. - Dose meds for eGFR <15 mL/min/1.73m2 during CHRISSY    - Avoid heavy opioids due to renal failure - may use very low dose dilaudid / fentanyl with close monitoring of CNS and respiratory depression. Please refer to the orders. High Complexity. Multiple complex problems. Discussed with patient and treatment team-    Time spent > 30 (~35) minutes. Thank you for allowing me to participate in this patient's care. Please do not hesitate to contact me anytime. We will follow along with you.        Shavonne Radford MD,  [facility] [address] [place] [address] [place]: [phone] or Via [employer]  Fax: [phone]        =======================================================================================   =======================================================================================      CHIEF COMPLAINT:   Chief Complaint   Patient presents with    Post-op Problem     prostate ca, pt had catheter removed on the 16th, has been having pain since, placed on antibiotic without relief, pt has n/v. pt is incontinent of urine and stool     History Obtained From:  reason patient could not give history:  altered mental status + treatment team + Electronic Medical Records    HPI: Mr. Abhishek Mosqueda is a 58 y.o. male with significant past medical history as below:   Past Medical History:   Diagnosis Date    Diabetes mellitus ([facility]     Hypertension       Presents with Post-op Problem (prostate ca, pt had catheter removed on the 16th, has been having pain since, placed on antibiotic without relief, pt has n/v. pt is incontinent of urine and stool)    Admitted with Ureterolithiasis [N20.1]  CHRISSY (acute kidney injury) ([facility] [facility] [N17.9]  Septic shock ([facility] [address] [A41.9, R65.21]  Severe sepsis ([facility] [A41.9, R65.20]   Found to have severe acute kidney injury, with creatinine around 8, so we are called for that. I evaluated patient, was taken to urgently OR, for stent placement, patient currently in PACU, drowsy, with sedation,  Regarding: CHRISSY on CKD  · Duration: acute on chronic  · Location: kidneys  · Severity: Severe   · Timing: continous  · Context (ex: related to condition):  , refer to my assessment / impression. · Modifying factors (ex: medications, interventions):  , refer to my plan / recommendation. · Associated signs & symptoms (ex: edema, SOB): As mentioned above in CC and HPI      Past medical, Surgical, Social, Family medical history reviewed by me. PAST MEDICAL HISTORY:   Past Medical History:   Diagnosis Date    [facility]     Hypertension      PAST SURGICAL HISTORY:   Past Surgical History:   Procedure Laterality Date    PROSTATECTOMY N/A 5/16/2022    ROBOTIC LAPAROSCOPIC RADICAL PROSTATECTOMY WITH BILATERAL LYMPH NODE DISSECTION AND BILATERAL NERVE SPARE performed by Leilani Agustin MD at [address] [place] [address]: History reviewed. No pertinent family history.   SOCIAL HISTORY:   Social History     Socioeconomic History    Marital status:      Spouse name: None    Number of children: None    Years of education: None    Highest education level: None   Occupational History    None   Tobacco Use    Smoking status: Never Smoker    Smokeless tobacco: Never Used   Vaping Use    Vaping Use: Never used   Substance and Sexual Activity    Alcohol use: Never    Drug use: Never    Sexual activity: None   Other Topics Concern    None   Social History Narrative    None     Social Determinants of Health     Financial Resource Strain:     Difficulty of Paying Living Expenses: Not on file   Food Insecurity:     Worried About Running Out of Food in the Last Year: Not on file    Carmen of Food in the Last Year: Not on file   Transportation Needs:     Lack of Transportation (Medical): Not on file    Lack of Transportation (Non-Medical): Not on file   Physical Activity:     Days of Exercise per Week: Not on file    Minutes of Exercise per Session: Not on file   Stress:     Feeling of Stress : Not on file   Social Connections:     Frequency of Communication with Friends and Family: Not on file    Frequency of Social Gatherings with Friends and Family: Not on file    Attends Mandaeism Services: Not on file    Active Member of [address] [place] [address] [employer] or Organizations: Not on file    Attends Club or Organization Meetings: Not on file    Marital Status: Not on file   Intimate Partner Violence:     Fear of Current or Ex-Partner: Not on file    Emotionally Abused: Not on file    Physically Abused: Not on file    Sexually Abused: Not on file   Housing Stability:     Unable to Pay for Housing in the Last Year: Not on file    Number of [place] in the Last Year: Not on file    Unstable Housing in the Last Year: Not on file          MEDICATIONS: reviewed by me. Medications Prior to Admission:  No current facility-administered medications on file prior to encounter.      Current Outpatient Medications on File Prior to Encounter   Medication Sig Dispense Refill    sildenafil (REVATIO) 20 MG tablet Take 20 mg by mouth daily      sulfamethoxazole-trimethoprim (BACTRIM DS;SEPTRA DS) 800-160 MG per tablet Take 1 tablet by mouth 2 times daily      metFORMIN (GLUCOPHAGE) 1000 MG tablet Take 1,000 mg by mouth 2 times daily (with meals)       senna-docusate (PERICOLACE) 8.6-50 MG per tablet Take 1 tablet by mouth 2 times daily For constipation while on pain medication. 30 tablet 1    amLODIPine (NORVASC) 10 MG tablet Take 10 mg by mouth daily            Current Facility-Administered Medications:     fentaNYL (SUBLIMAZE) injection 50 mcg, 50 mcg, IntraVENous, Q1H PRN, Angelica NEVAREZ Pendl, DO, 50 mcg at 05/26/22 1445    0.9 % sodium chloride infusion, , IntraVENous, Continuous, Elaine Schultz MD, Last Rate: 125 mL/hr at 05/26/22 1522, New Bag at 05/26/22 1522    0.9 % sodium chloride bolus, 1,109 mL, IntraVENous, Once, TALYA Rapp    insulin lispro (HUMALOG) injection vial 0-12 Units, 0-12 Units, SubCUTAneous, TID WC, Juliana Forde MD    insulin lispro (HUMALOG) injection vial 0-6 Units, 0-6 Units, SubCUTAneous, Nightly, Collette Olguin MD    insulin glargine (LANTUS) injection vial 10 Units, 10 Units, SubCUTAneous, Nightly, Collette Olguin MD      Allergies reviewed by me: Patient has no known allergies. REVIEW OF SYSTEMS:Review of systems not obtained due to patient factors - mental status          =======================================================================================     PHYSICAL EXAM:  Recent vital signs and recent I/Os reviewed by me.      Wt Readings from Last 3 Encounters:   05/26/22 155 lb (70.3 kg)   05/16/22 148 lb (67.1 kg)   03/19/22 160 lb (72.6 kg)     BP Readings from Last 3 Encounters:   05/26/22 104/65   05/16/22 (!) 169/102   03/19/22 (!) 171/98     Patient Vitals for the past 24 hrs:   BP Temp Temp src Pulse Resp SpO2 Height Weight   05/26/22 1815 104/65 -- -- (!) 104 24 99 % -- --   05/26/22 1800 95/60 -- -- 99 21 99 % -- --   05/26/22 1755 (!) 95/56 -- -- 99 21 98 % -- --   05/26/22 1750 (!) 94/59 -- -- (!) 101 18 97 % -- --   05/26/22 1746 (!) 97/59 97.5 °F (36.4 °C) Temporal (!) 104 20 99 % -- --   05/26/22 1615 113/73 -- -- (!) 104 24 97 % -- --   05/26/22 1600 117/72 -- -- (!) 107 27 97 % -- --   05/26/22 1545 117/75 -- -- (!) 103 23 96 % -- --   05/26/22 1530 120/71 -- -- (!) 103 23 97 % -- --   05/26/22 1515 119/73 -- -- (!) 103 23 96 % -- --   05/26/22 1445 122/66 -- -- (!) 107 26 -- -- --   05/26/22 1430 126/77 -- -- (!) 111 25 -- -- --   05/26/22 1415 134/72 -- -- (!) 107 26 -- -- --   05/26/22 1326 -- -- -- (!) 111 (!) 32 94 % -- --   05/26/22 1245 (!) 158/79 -- -- -- -- 91 % -- --   05/26/22 1116 127/74 97.3 °F (36.3 °C) Tympanic (!) 108 16 95 % 5' 9\" (1.753 m) 155 lb (70.3 kg)       Intake/Output Summary (Last 24 hours) at 5/26/2022 1832  Last data filed at 5/26/2022 1741  Gross per 24 hour   Intake 850 ml   Output --   Net 850 ml       Physical Exam  Vitals reviewed. Constitutional:       General: He is not in acute distress. Appearance: Normal appearance. HENT:      Head: Normocephalic and atraumatic. Right Ear: External ear normal.      Left Ear: External ear normal.      Nose: Nose normal.      Mouth/Throat:      Mouth: Mucous membranes are dry. Eyes:      General: No scleral icterus. Conjunctiva/sclera: Conjunctivae normal.   Neck:      Vascular: No JVD. Cardiovascular:      Rate and Rhythm: Normal rate and regular rhythm. Heart sounds: S1 normal and S2 normal.   Pulmonary:      Effort: Respiratory distress (mild) present. Breath sounds: Rhonchi present. Abdominal:      General: Bowel sounds are normal. There is no distension. Musculoskeletal:         General: No swelling or deformity. Cervical back: Normal range of motion and neck supple. Skin:     General: Skin is dry. Coloration: Skin is not jaundiced. Neurological:      Mental Status: He is disoriented.       Comments: Unable to obtain as part of sedation     Psychiatric:      Comments: Unable to obtain as part of sedation                  =======================================================================================     DATA:  Diagnostic tests reviewed by me for today's visit: (AS NEEDED FOR MY EVALUATION AND MANAGEMENT). Recent Labs     05/26/22  1205   WBC 16.1*   HCT 31.6*        Iron Saturation:  No components found for: PERCENTFE  FERRITIN:  No results found for: FERRITIN  IRON:  No results found for: IRON  TIBC:  No results found for: TIBC    Recent Labs     05/26/22  1205   *   K 4.2   CL 83*   CO2 21   BUN 90*   CREATININE 8.0*     Recent Labs     05/26/22  1205   CALCIUM 9.2     No results for input(s): PH, PCO2, PO2 in the last 72 hours.     Invalid input(s): Lacey Stage    ABG:  No results found for: PH, PCO2, PO2, HCO3, BE, THGB, TCO2, O2SAT  VBG:  No results found for: PHVEN, MYD2NJQ, BEVEN, B0ZQIITE    LDH:  No results found for: LDH  Uric Acid:    Lab Results   Component Value Date    LABURIC 8.8 05/26/2022       PT/INR:    Lab Results   Component Value Date    PROTIME 12.9 05/02/2022    INR 1.14 05/02/2022     Warfarin PT/INR:  No components found for: Albino Maurer  PTT:    Lab Results   Component Value Date    APTT 34.5 05/02/2022   [APTT}  Last 3 Troponin:  No results found for: TROPONINI    U/A:    Lab Results   Component Value Date    COLORU Yellow 05/26/2022    PROTEINU >=300 05/26/2022    PHUR 7.5 05/26/2022    WBCUA 10-20 05/26/2022    RBCUA 5-10 05/26/2022    BACTERIA 2+ 05/26/2022    CLARITYU Clear 05/26/2022    SPECGRAV 1.020 05/26/2022    LEUKOCYTESUR LARGE 05/26/2022    UROBILINOGEN 0.2 05/26/2022    BILIRUBINUR SMALL 05/26/2022    BLOODU LARGE 05/26/2022    GLUCOSEU 500 05/26/2022     Microalbumen/Creatinine ratio:  No components found for: RUCREAT  24 Hour Urine for Protein:  No components found for: RAWUPRO, UHRS3, WKLC04EE, UTV3  24 Hour Urine for Creatinine Clearance:  No components found for: CREAT4, UHRS10, UTV10  Urine Toxicology:  No components found for: IAMMENTA, IBARBIT, IBENZO, ICOCAINE, IMARTHC, IOPIATES, IPHENCYC    HgBA1c:  No results found for: LABA1C  RPR:  No results found for: RPR  HIV:  No results found for: HIV  BRITTANY:  No results found for: ANATITER, BRITTANY  RF:  No results found for: RF  DSDNA:  No components found for: DNA  AMYLASE:  No results found for: AMYLASE  LIPASE:    Lab Results   Component Value Date    LIPASE 31.0 05/26/2022     Fibrinogen Level:  No components found for: FIB       BELOW MENTIONED RADIOLOGY STUDY RESULTS BY ME (AS NEEDED FOR MY EVALUATION AND MANAGEMENT). CT ABDOMEN PELVIS WO CONTRAST Additional Contrast? None    Result Date: 5/26/2022  EXAMINATION: CT OF THE ABDOMEN AND PELVIS WITHOUT CONTRAST 5/26/2022 1:36 pm TECHNIQUE: CT of the abdomen and pelvis was performed without the administration of intravenous contrast. Multiplanar reformatted images are provided for review. Automated exposure control, iterative reconstruction, and/or weight based adjustment of the mA/kV was utilized to reduce the radiation dose to as low as reasonably achievable. COMPARISON: 03/19/2022 HISTORY: ORDERING SYSTEM PROVIDED HISTORY: recent prostate surg - n/v TECHNOLOGIST PROVIDED HISTORY: Reason for exam:->recent prostate surg - n/v Additional Contrast?->None Reason for Exam: recent prostate surg - n/v Additional signs and symptoms: french breathing instructions provided, pt still not holding breath. Best scan possible. FINDINGS: Lower Chest: There is mild bibasilar dependent atelectasis. Organs: There is mild right hydronephrosis secondary to a 12 mm calculus lodged within the right ureteral vesicular junction. There is mild right perinephric stranding. The remainder of the solid abdominal organs are unremarkable. GI/Bowel: There is no bowel dilatation, wall thickening or obstruction. Pelvis: The bladder is decompressed by Baeza catheter and thus not evaluated. Postop changes of prostatectomy are present. There are multiple extraperitoneal pelvic gas collections within the surgical bed and pelvic sidewalls.  Peritoneum/Retroperitoneum: There is no free air, free fluid or intraperitoneal in phlegm a nat change. There is no adenopathy. Bones/Soft Tissues: There is no acute fracture or aggressive osseous lesion. Mildly obstructing 12 mm right UPJ Postsurgical pelvic changes without complicating feature.  RECOMMENDATIONS: Unavailable

## 2022-05-26 NOTE — CONSULTS
[facility] Consult Note  [facility]    Provider: Jaden Pandey MD MD Patient ID:  Admission Date: 2022 Name: Kirk Hurley Date: n/a MRN: [id number]   Patient Location: ED- : 1960  Attending: Jere Linton DO Date of Service: 2022  PCP: Josie Mendiola PA-C     Diagnoses:  1. CHRISSY (acute kidney injury) ([facility] [address]    2. Ureterolithiasis    3. Severe sepsis [facility]      Prostate cancer     Assessment/Plan:  57 yo M s/p RARP and BL PLND 5/15, final path pT3b, pN0. Baeza removed 2 days ago and since that time patient with increasing pain, fatigue, nausea and emesis. Endorses minimal UOP, UI and FI with loose stool that he says resembles urine. Renal failure with Scr 8 and leukocytosis with  Lactic acid 6.5. CT showing mostly expected post op changes, no clear e/o bowel injury, abscess, or urine leak,  but multiple extraperitoneal gas collections in the surgical bed. There is a 12 mm R UPJ stone with hydro    - UCX empiric abx  - plan on OR for EUA, cystoscopy with bilateral retograde pyelogram, and RIGHT ureteral stent placement possible LEFT if indicated. - No clear evidence of bowel injury/rectal injury on CT scan- consider general surgery next if no clinical improvement after stent placement   - entire conversation with his daughter translating for him      All the patients questions were answered in detail. He understands the plan as listed above. · Review/order of labs  · Review/order of radiology studies  · discussion with referring MD  · Decision to obtain old records or supplemental information from family.  daughter  · transferring data from old records into today's office visit record  · Independent review and interpretation of test or study   Total time spent face-to-face with the patient 20 min, including greater than 50% of this time in discussion with the patient/family concerning the following:  · Recommended tests  · management options  · risks/benefits of management options  · importance of compliance  · Prognosis  · Risk factor reduction  · Patient/family education                                                                                                                                                      CC:   Chief Complaint   Patient presents with    Post-op Problem     prostate ca, pt had catheter removed on the 16th, has been having pain since, placed on antibiotic without relief, pt has n/v. pt is incontinent of urine and stool       HPI: Mary Sheppard is a 58 y.o. male. I saw the patient today for obstructing infected stone, UI, sepsis, and renal failure     Past medical History:   He has a past medical history of Diabetes mellitus ([address] and Hypertension. Past Surgical History:  He has a past surgical history that includes Prostatectomy (N/A, 5/16/2022). Allergies:   No Known Allergies    Social History:  He reports that he has never smoked. He has never used smokeless tobacco. He reports that he does not drink alcohol and does not use drugs. Family History:  family history is not on file. Medications:   Scheduled Meds:   sodium chloride  1,109 mL IntraVENous Once     Continuous Infusions:   sodium chloride 125 mL/hr at 05/26/22 1522     PRN Meds:fentanNYL    Review of Systems:  Constitutional: Negative for fever    Genitourinary: see HPI  Eyes: negative for sudden change in vision  EENT: no complaints  Cardiovascular: Negative for chest pain  Respiratory: Negative for shortness of breath  Gastrointestinal: +nausea  Musculoskeletal: + back pain   Neurological: +weakness  Psychiatric: Negative for anxiety  Integumentary: Negative for rashes or adenopathy     Physical Exam:  Vitals:    05/26/22 1530   BP: 120/71   Pulse: (!) 103   Resp: 23   Temp:    SpO2: 97%     Constitutional: ill appearing  HEENT: MMM. Hearing intact. PERRL  Neck: no thyroid masses appreciated. Trachea is midline.  Neck appears unremarkable   Lymph: no palpable adenopathy in supraclavicular, or axillary lymph nodes  Cardiovascular: Regular rate. No peripheral edema  Respiratory: Respirations are even and non-labored. No audible breath sounds. Genitourinary:pierre in place   Rectal:  Deferred for OR  Abdomen: No evidence of peritonitis, no rebound but diffusely TTP and distended, incisions CDI  Psychiatric: A + O x 3, normal affect. Insight appears intact. Muskuloskeletal: GERMAN x 4   Skin: Pink, warm and dry. No rashes on face and arms. Labs:  Lab Results   Component Value Date    WBC 16.1 (H) 05/26/2022    HGB 10.5 (L) 05/26/2022    HCT 31.6 (L) 05/26/2022    MCV 90.9 05/26/2022     05/26/2022     Lab Results   Component Value Date    CREATININE 8.0 ([facility]) 05/26/2022    BUN 90 (HH) 05/26/2022     (L) 05/26/2022    K 4.2 05/26/2022    CL 83 (L) 05/26/2022    CO2 21 05/26/2022     No results found for: PSA     Imaging:   EXAMINATION:   CT OF THE ABDOMEN AND PELVIS WITHOUT CONTRAST 5/26/2022 1:36 pm       TECHNIQUE:   CT of the abdomen and pelvis was performed without the administration of   intravenous contrast. Multiplanar reformatted images are provided for review. Automated exposure control, iterative reconstruction, and/or weight based   adjustment of the mA/kV was utilized to reduce the radiation dose to as low   as reasonably achievable.       COMPARISON:   03/19/2022       HISTORY:   ORDERING SYSTEM PROVIDED HISTORY: recent prostate surg - n/v   TECHNOLOGIST PROVIDED HISTORY:   Reason for exam:->recent prostate surg - n/v   Additional Contrast?->None   Reason for Exam: recent prostate surg - n/v   Additional signs and symptoms: Nigerian breathing instructions provided, pt   still not holding breath. Best scan possible.       FINDINGS:   Lower Chest: There is mild bibasilar dependent atelectasis.       Organs:  There is mild right hydronephrosis secondary to a 12 mm calculus   lodged within the right ureteral vesicular junction.  There is mild right   perinephric stranding.  The remainder of the solid abdominal organs are   unremarkable.       GI/Bowel: There is no bowel dilatation, wall thickening or obstruction.       Pelvis: The bladder is decompressed by Baeza catheter and thus not evaluated. Postop changes of prostatectomy are present.  There are multiple   extraperitoneal pelvic gas collections within the surgical bed and pelvic   sidewalls.       Peritoneum/Retroperitoneum: There is no free air, free fluid or   intraperitoneal in phlegm a nat change.  There is no adenopathy.       Bones/Soft Tissues:  There is no acute fracture or aggressive osseous lesion.           Impression   Mildly obstructing 12 mm right UPJ       Postsurgical pelvic changes without complicating feature.       RECOMMENDATIONS:   Unavailable             Denis Parish MD, MD  5/26/2022

## 2022-05-26 NOTE — PROGRESS NOTES
Pharmacy Home Medication Reconciliation Note    A medication reconciliation has been completed for Lisa December 1960    Pharmacy: [address]  Information provided by: family member    The patient's home medication list is as follows:  Prior to Admission medications    Medication Sig Start Date End Date Taking? Authorizing Provider   sildenafil (REVATIO) 20 MG tablet Take 20 mg by mouth daily   Yes Historical Provider, MD   sulfamethoxazole-trimethoprim (BACTRIM DS;SEPTRA DS) 800-160 MG per tablet Take 1 tablet by mouth 2 times daily 5/26/22 6/1/22 Yes Historical Provider, MD   metFORMIN (GLUCOPHAGE) 1000 MG tablet Take 1,000 mg by mouth 2 times daily (with meals)     Historical Provider, MD   senna-docusate (PERICOLACE) 8.6-50 MG per tablet Take 1 tablet by mouth 2 times daily For constipation while on pain medication. 5/16/22 6/15/22  Andra Champion MD   amLODIPine (NORVASC) 10 MG tablet Take 10 mg by mouth daily     Historical Provider, MD   UNABLE TO FIND Diabetes meds-daughter to call back with name  5/26/22  Historical Provider, MD       Of note, patient was recently prescribed bactrim. He has not yet started taking this but has picked it up from the pharmacy. Timing of last doses updated.     Thank you,  Bonnie Palomares, PharmD, BCCCP

## 2022-05-26 NOTE — H&P
HOSPITALISTS HISTORY AND PHYSICAL    5/26/2022 5:09 PM    Patient Information:  Reema German is a 58 y.o. male [id number]  PCP:  Nitin Rider PA-C (Tel: [phone] )    Chief complaint:    Chief Complaint   Patient presents with    Post-op Problem     prostate ca, pt had catheter removed on the 16th, has been having pain since, placed on antibiotic without relief, pt has n/v. pt is incontinent of urine and stool     History of Present Illness:  Rocio Garcia is a 58 y.o. male history of diabetes and hypertension recent adding a low-dose with prostate cancer underwent robotic radical prostatectomy by Dr. Ria Layton on 5/16/2022 was started on Bactrim. Patient had Baeza placed liters removed on the 23rd since then patient has been having increased urinary output nausea and vomiting and poor p.o. intake. Patient has been having chills and sweats at home but no fever in the ED patient was found to have CHRISSY severe sepsis and 5 mm right nephrolithiasis            REVIEW OF SYSTEMS:   Constitutional:see above  ENT: Negative for rhinorrhea, epistaxis, hoarseness, sore throat. Respiratory: Negative for shortness of breath,wheezing  Cardiovascular: Negative for chest pain, palpitations   Gastrointestinal:see above  Genitourinary: Negative for polyuria, dysuria   Hematologic/Lymphatic: Negative for bleeding tendency, easy bruising  Musculoskeletal: Negative for myalgias and arthralgias  Neurologic: Negative for confusion,dysarthria. Skin: Negative for itching,rash  Psychiatric: Negative for depression,anxiety, agitation. Endocrine: Negative for polydipsia,polyuria,heat /cold intolerance. Past Medical History:   has a past medical history of Diabetes mellitus ([address] and Hypertension. Past Surgical History:   has a past surgical history that includes Prostatectomy (N/A, 5/16/2022). Medications:  No current facility-administered medications on file prior to encounter. Current Outpatient Medications on File Prior to Encounter   Medication Sig Dispense Refill    sildenafil (REVATIO) 20 MG tablet Take 20 mg by mouth daily      sulfamethoxazole-trimethoprim (BACTRIM DS;SEPTRA DS) 800-160 MG per tablet Take 1 tablet by mouth 2 times daily      metFORMIN (GLUCOPHAGE) 1000 MG tablet Take 1,000 mg by mouth 2 times daily (with meals)       senna-docusate (PERICOLACE) 8.6-50 MG per tablet Take 1 tablet by mouth 2 times daily For constipation while on pain medication. 30 tablet 1    amLODIPine (NORVASC) 10 MG tablet Take 10 mg by mouth daily          Allergies:  No Known Allergies     Social History:  Patient Lives at home   reports that he has never smoked. He has never used smokeless tobacco. He reports that he does not drink alcohol and does not use drugs. Family History:  htn    Physical Exam:  /73   Pulse (!) 104   Temp 97.3 °F (36.3 °C) (Tympanic)   Resp 24   Ht 5' 9\" (1.753 m)   Wt 155 lb (70.3 kg)   SpO2 97%   BMI 22.89 kg/m²     General appearance:  Appears comfortable. AAOx3  HEENT: atraumatic, Pupils equal, muscous membranes moist, no masses appreciated  Cardiovascular:tachycardia no murmurs appreciated cap refill < 2 secs  Respiratory: CTAB no wheezing  Gastrointestinal: Abdomen soft, non-tender, BS+  EXT: no edema  Neurology: no gross focal deficts  Psychiatry: Appropriate affect.  Not agitated  Skin: Warm, dry, no rashes appreciated    Labs:  CBC:   Lab Results   Component Value Date    WBC 16.1 05/26/2022    RBC 3.47 05/26/2022    HGB 10.5 05/26/2022    HCT 31.6 05/26/2022    MCV 90.9 05/26/2022    MCH 30.3 05/26/2022    MCHC 33.3 05/26/2022    RDW 13.0 05/26/2022     05/26/2022    MPV 8.0 05/26/2022     BMP:    Lab Results   Component Value Date     05/26/2022    K 4.2 05/26/2022    CL 83 05/26/2022    CO2 21 05/26/2022    BUN 90 05/26/2022

## 2022-05-26 NOTE — ED PROVIDER NOTES
[facility]        Pt Name: Berenice Corado  MRN: [id number]  [place] 1960  Date of evaluation: 5/26/2022  Provider: TALYA Garrett  PCP: Catrachito Jesus PA-C  Note Started: 2:59 PM EDT        I have seen and evaluated this patient with my supervising physician Breana Harris, [address] [place] [address]       Chief Complaint   Patient presents with    Post-op Problem     prostate ca, pt had catheter removed on the 16th, has been having pain since, placed on antibiotic without relief, pt has n/v. pt is incontinent of urine and stool       HISTORY OF PRESENT ILLNESS   (Location, Timing/Onset, Context/Setting, Quality, Duration, Modifying Factors, Severity, Associated Signs and Symptoms)  Note limiting factors. Chief Complaint: Post-op Problem     Berenice Corado is a 58 y.o. male with past medical history of diabetes and hypertension who presents to the ED with complaint of a postop problem. Patient states he was recently diagnosed with prostate cancer. Patient states he had robotic radical prostatectomy done by urologist, Dr. Jasmin Locke, on 5/16/2022. Had Baeza catheter that he states was removed on 5/23/2022. Patient states ever since the catheter has been removed he has had decreased urinary output. Patient states he has had some dribbling of urine and some incontinence. He states he has had decreased oral intake with associated nausea and vomiting. Denies abdominal pain, flank pain or back pain. Denies any changes in bowel movements. Denies fever or chills. Denies any rashes or lesions. Denies chest pain or shortness of breath. Came to the ED due to nausea, vomiting and feelings of fatigue. States he is scheduled to follow-up with urologist on 30 June. Nursing Notes were all reviewed and agreed with or any disagreements were addressed in the HPI.     REVIEW OF SYSTEMS    (2-9 systems for level 4, 10 or more for level 5)     Review of Systems   Constitutional: Positive for activity change, appetite change and fatigue. Negative for chills, diaphoresis and fever. Respiratory: Negative. Negative for cough, chest tightness and shortness of breath. Cardiovascular: Negative. Negative for chest pain, palpitations and leg swelling. Gastrointestinal: Positive for nausea and vomiting. Negative for abdominal pain, anal bleeding, blood in stool, constipation, diarrhea and rectal pain. Genitourinary: Positive for decreased urine volume and difficulty urinating. Negative for dysuria, flank pain, frequency, hematuria and urgency. Musculoskeletal: Negative for arthralgias, back pain, myalgias, neck pain and neck stiffness. Skin: Negative for color change, pallor, rash and wound. Neurological: Positive for weakness. Negative for dizziness and light-headedness. Positives and Pertinent negatives as per HPI. Except as noted above in the ROS, all other systems were reviewed and negative. PAST MEDICAL HISTORY     Past Medical History:   Diagnosis Date    Diabetes mellitus [address] [address]     Hypertension          SURGICAL HISTORY     Past Surgical History:   Procedure Laterality Date    PROSTATECTOMY N/A 5/16/2022    ROBOTIC LAPAROSCOPIC RADICAL PROSTATECTOMY WITH BILATERAL LYMPH NODE DISSECTION AND BILATERAL NERVE SPARE performed by Nidia Garcia MD at [address] [place] [address]       Previous Medications    AMLODIPINE (NORVASC) 10 MG TABLET    Take 10 mg by mouth daily     METFORMIN (GLUCOPHAGE) 1000 MG TABLET    Take 1,000 mg by mouth 2 times daily (with meals)     SENNA-DOCUSATE (PERICOLACE) 8.6-50 MG PER TABLET    Take 1 tablet by mouth 2 times daily For constipation while on pain medication.     SILDENAFIL (REVATIO) 20 MG TABLET    Take 20 mg by mouth daily    SULFAMETHOXAZOLE-TRIMETHOPRIM (BACTRIM DS;SEPTRA DS) 800-160 MG PER TABLET    Take 1 tablet by mouth 2 times daily         ALLERGIES     Patient has no known allergies. FAMILYHISTORY     History reviewed. No pertinent family history. SOCIAL HISTORY       Social History     Tobacco Use    Smoking status: Never Smoker    Smokeless tobacco: Never Used   Vaping Use    Vaping Use: Never used   Substance Use Topics    Alcohol use: Never    Drug use: Never       SCREENINGS    Parul Coma Scale  Eye Opening: Spontaneous  Best Verbal Response: Oriented  Best Motor Response: Obeys commands  Parul Coma Scale Score: 15        PHYSICAL EXAM    (up to 7 for level 4, 8 or more for level 5)     ED Triage Vitals [05/26/22 1116]   BP Temp Temp Source Heart Rate Resp SpO2 Height Weight   127/74 97.3 °F (36.3 °C) Tympanic (!) 108 16 95 % 5' 9\" (1.753 m) 155 lb (70.3 kg)       Physical Exam  Constitutional:       General: He is not in acute distress. Appearance: Normal appearance. He is well-developed. He is not ill-appearing, toxic-appearing or diaphoretic. HENT:      Head: Normocephalic and atraumatic. Right Ear: External ear normal.      Left Ear: External ear normal.   Eyes:      General:         Right eye: No discharge. Left eye: No discharge. Cardiovascular:      Rate and Rhythm: Regular rhythm. Tachycardia present. Pulses: Normal pulses. Heart sounds: Normal heart sounds. No murmur heard. No friction rub. No gallop. Comments: 2+ radial pulses bilaterally. No pedal edema. No calf tenderness. No JVD. Pulmonary:      Effort: Pulmonary effort is normal. No respiratory distress. Breath sounds: Normal breath sounds. No stridor. No wheezing, rhonchi or rales. Chest:      Chest wall: No tenderness. Abdominal:      General: Abdomen is flat. Bowel sounds are normal. There is no distension. Palpations: Abdomen is soft. There is no mass. Tenderness: There is no abdominal tenderness. There is no right CVA tenderness, left CVA tenderness, guarding or rebound.  Negative signs include Cooley's sign, Rovsing's sign and McBurney's sign. Hernia: No hernia is present. Musculoskeletal:         General: Normal range of motion. Cervical back: Normal range of motion and neck supple. Skin:     General: Skin is warm and dry. Coloration: Skin is not pale. Findings: No erythema or rash. Neurological:      Mental Status: He is alert and oriented to person, place, and time.    Psychiatric:         Behavior: Behavior normal.         DIAGNOSTIC RESULTS   LABS:    Labs Reviewed   URINALYSIS WITH REFLEX TO CULTURE - Abnormal; Notable for the following components:       Result Value    Glucose, Ur 500 (*)     Bilirubin Urine SMALL (*)     Blood, Urine LARGE (*)     Protein, UA >=300 (*)     Nitrite, Urine POSITIVE (*)     Leukocyte Esterase, Urine LARGE (*)     All other components within normal limits   CBC WITH AUTO DIFFERENTIAL - Abnormal; Notable for the following components:    WBC 16.1 (*)     RBC 3.47 (*)     Hemoglobin 10.5 (*)     Hematocrit 31.6 (*)     Neutrophils Absolute 14.8 (*)     Lymphocytes Absolute 0.2 (*)     All other components within normal limits   COMPREHENSIVE METABOLIC PANEL W/ REFLEX TO MG FOR LOW K - Abnormal; Notable for the following components:    Sodium 125 (*)     Chloride 83 (*)     Anion Gap 21 (*)     Glucose 370 (*)     BUN 90 (*)     CREATININE 8.0 (*)     GFR Non- 7 (*)     GFR  8 (*)     Albumin 3.1 (*)     Albumin/Globulin Ratio 0.8 (*)     Total Bilirubin 1.5 (*)     Alkaline Phosphatase 131 (*)     All other components within normal limits    Narrative:     CALL  [facility] tel. [phone],  Chemistry results called to and read back by marian fregoso, 05/26/2022 12:41,  by Clarke Beckwith, SEPSIS - Abnormal; Notable for the following components:    Lactic Acid, Sepsis 2.7 (*)     All other components within normal limits   LACTATE, SEPSIS - Abnormal; Notable for the following components:    Lactic Acid, Sepsis 6.5 (*)     All other components within normal limits    Narrative:     Isai Tucker  [facility] tel. [phone],  Chemistry results called to and read back by TIBURCIO Parada, 05/26/2022  15:16, by Carlos Cifuentes   URIC ACID - Abnormal; Notable for the following components:    Uric Acid, Serum 8.8 (*)     All other components within normal limits   MICROSCOPIC URINALYSIS - Abnormal; Notable for the following components:    WBC, UA 10-20 (*)     RBC, UA 5-10 (*)     Bacteria, UA 2+ (*)     All other components within normal limits   CULTURE, BLOOD 1   CULTURE, BLOOD 2   CULTURE, URINE   LIPASE    Narrative:     Isai Tucker  [facility] tel. [phone],  Chemistry results called to and read back by marian fregoso, 05/26/2022 12:41,  by Adriel Leyva [facility]    CK   ELECTROLYTES URINE RANDOM   UREA NITROGEN, URINE   CREATININE, RANDOM URINE   SODIUM       When ordered only abnormal lab results are displayed. All other labs were within normal range or not returned as of this dictation. EKG: When ordered, EKG's are interpreted by the Emergency Department Physician in the absence of a cardiologist.  Please see their note for interpretation of EKG. RADIOLOGY:   Non-plain film images such as CT, Ultrasound and MRI are read by the radiologist. Plain radiographic images are visualized and preliminarily interpreted by the ED Provider with the below findings:        Interpretation per the Radiologist below, if available at the time of this note:    CT ABDOMEN PELVIS WO CONTRAST Additional Contrast? None   Final Result   Mildly obstructing 12 mm right UPJ      Postsurgical pelvic changes without complicating feature. RECOMMENDATIONS:   Unavailable           CT ABDOMEN PELVIS WO CONTRAST Additional Contrast? None    Result Date: 5/26/2022  EXAMINATION: CT OF THE ABDOMEN AND PELVIS WITHOUT CONTRAST 5/26/2022 1:36 pm TECHNIQUE: CT of the abdomen and pelvis was performed without the administration of intravenous contrast. Multiplanar reformatted images are provided for review.  Automated exposure control, iterative reconstruction, and/or weight based adjustment of the mA/kV was utilized to reduce the radiation dose to as low as reasonably achievable. COMPARISON: 03/19/2022 HISTORY: ORDERING SYSTEM PROVIDED HISTORY: recent prostate surg - n/v TECHNOLOGIST PROVIDED HISTORY: Reason for exam:->recent prostate surg - n/v Additional Contrast?->None Reason for Exam: recent prostate surg - n/v Additional signs and symptoms: [place] breathing instructions provided, pt still not holding breath. Best scan possible. FINDINGS: Lower Chest: There is mild bibasilar dependent atelectasis. Organs: There is mild right hydronephrosis secondary to a 12 mm calculus lodged within the right ureteral vesicular junction. There is mild right perinephric stranding. The remainder of the solid abdominal organs are unremarkable. GI/Bowel: There is no bowel dilatation, wall thickening or obstruction. Pelvis: The bladder is decompressed by Baeza catheter and thus not evaluated. Postop changes of prostatectomy are present. There are multiple extraperitoneal pelvic gas collections within the surgical bed and pelvic sidewalls. Peritoneum/Retroperitoneum: There is no free air, free fluid or intraperitoneal in phlegm a ant change. There is no adenopathy. Bones/Soft Tissues: There is no acute fracture or aggressive osseous lesion. Mildly obstructing 12 mm right UPJ Postsurgical pelvic changes without complicating feature. RECOMMENDATIONS: Unavailable           PROCEDURES   Unless otherwise noted below, none     Procedures    CRITICAL CARE TIME   There was a high probability of life-threatening clinical deterioration in the patient's condition requiring my urgent intervention. I personally saw the patient and independently provided 34 minutes of non-concurrent critical care out of the total shared critical care time provided, excluding separately reportable procedures.          CONSULTS:  IP CONSULT TO UROLOGY  IP CONSULT TO NEPHROLOGY  IP CONSULT TO NEPHROLOGY      EMERGENCY DEPARTMENT COURSE and DIFFERENTIAL DIAGNOSIS/MDM:   Vitals:    Vitals:    05/26/22 1430 05/26/22 1445 05/26/22 1515 05/26/22 1530   BP: 126/77 122/66 119/73 120/71   Pulse: (!) 111 (!) 107 (!) 103 (!) 103   Resp: 25 26 23 23   Temp:       TempSrc:       SpO2:   96% 97%   Weight:       Height:           Patient was given the following medications:  Medications   fentaNYL (SUBLIMAZE) injection 50 mcg (50 mcg IntraVENous Given 5/26/22 1445)   0.9 % sodium chloride infusion ( IntraVENous New Bag 5/26/22 1522)   0.9 % sodium chloride bolus (has no administration in time range)   diphenhydrAMINE (BENADRYL) injection 25 mg (25 mg IntraVENous Given 5/26/22 1303)   0.9 % sodium chloride bolus (0 mLs IntraVENous Stopped 5/26/22 1510)   ondansetron (ZOFRAN) injection 4 mg (4 mg IntraVENous Given 5/26/22 1306)   cefepime (MAXIPIME) 1000 mg IVPB minibag (0 mg IntraVENous Stopped 5/26/22 1541)         Is this patient to be included in the SEP-1 Core Measure due to severe sepsis or septic shock? Yes   SEP-1 CORE MEASURE DATA      Sepsis Criteria   Severe Sepsis Criteria   Septic Shock Criteria     Must be confirmed or suspected to move forward with diagnosis of sepsis. Must meet 2:    [] Temperature > 100.9 F (38.3 C)        or < 96.8 F (36 C)  [x] HR > 90  [x] RR > 20  [x] WBC > 12 or < 4 or 10% bands      AND:      [x] Infection Confirmed or        Suspected. Must meet 1:    [x] Lactate > 2       or   [x] Signs of Organ Dysfunction:    - SBP < 90 or MAP < 65  - Altered mental status  - Creatinine > 2 or increased from      baseline  - Urine Output < 0.5 ml/kg/hr  - Bilirubin > 2  - INR > 1.5 (not anticoagulated)  - Platelets < 981,391  - Acute Respiratory Failure as     evidenced by new need for NIPPV     or mechanical ventilation      [] No criteria met for Severe Sepsis.    Must meet 1:    [x] Lactate > 4        or   [] SBP < 90 or MAP < 65 for at        least two readings in the first        hour after fluid bolus        administration      [] Vasopressors initiated (if hypotension persists after fluid resuscitation)        [] No criteria met for Septic Shock. Patient Vitals for the past 6 hrs:   BP Temp Pulse Resp SpO2 Height Weight Weight Method Percent Weight Change   05/26/22 1116 127/74 97.3 °F (36.3 °C) (!) 108 16 95 % 5' 9\" (1.753 m) 155 lb (70.3 kg) Stated 0   05/26/22 1245 (!) 158/79 -- -- -- 91 % -- -- -- --   05/26/22 1326 -- -- (!) 111 (!) 32 94 % -- -- -- --   05/26/22 1415 134/72 -- (!) 107 26 -- -- -- -- --   05/26/22 1430 126/77 -- (!) 111 25 -- -- -- -- --   05/26/22 1445 122/66 -- (!) 107 26 -- -- -- -- --   05/26/22 1515 119/73 -- (!) 103 23 96 % -- -- -- --   05/26/22 1530 120/71 -- (!) 103 23 97 % -- -- -- --      Recent Labs     05/26/22  1205   WBC 16.1*   CREATININE 8.0*   BILITOT 1.5*            Time Septic Shock Identified: [id number]    Fluid Resuscitation Rational: at least 30mL/kg based on entered actual weight at time of triage    Repeat lactate level: worsening    Reassessment Exam:   I have reassessed tissue perfusion and hemodynamic status after fluid bolus at this time: 1630    Patient is a 80-year-old male who presents to the ED with complaint of nausea, vomiting and difficulty with urination ever since he had prostate surgery. He had prostate surgery on 5/16. Worsening symptoms for the past couple days. Upon arrival patient noted to be tachycardic. Remaining vital signs were relatively unremarkable. He had some dribbling of urine. IV was established and blood work obtained. CBC showed white count of 16.1 with hemoglobin of 10.5 and platelets of 212. CMP showed CHRISSY with creatinine of 8 and BUN of 90. Anion gap of 21. Sodium 125. Glucose of 370. Lipase was normal.  Initial lactic 2.7 but repeat was 6.5.   Ordered 30 mL/kg fluid bolus here in the emergency department given lactic greater than 4 and concern for severe sepsis with tachycardia, tachypnea, suspected infection, white blood cell elevation and lactic acid elevation. CT of the abdomen and pelvis showed a mildly obstructing 12 mm right UPJ stone. Case discussed with nephrologist, Dr. Bonilla Soares, and urologist, Dr. Evan Resendez, here in the emergency department. Nephrology will come see patient here in the emergency department. Urology states would like urine sent for infection. Baeza catheter was placed given the significant CHRISSY and concern for retention. With Baeza catheter insertion patient still had only approximately 10 cc of urine output from the Baeza catheter. We will try to send for urinalysis but may not be adequate specimen. He was already started on antibiotics. Urology will speak to the OR staff and may take patient to the OR for further intervention. Patient will require admission. Case will discussed with hospital service for admission. FINAL IMPRESSION      1. CHRISSY (acute kidney injury) ([facility] [address]    2. Ureterolithiasis    3. Septic shock [facility])          DISPOSITION/PLAN   DISPOSITION Decision To Admit 05/26/2022 03:53:21 PM      PATIENT REFERRED TO:  No follow-up provider specified.     DISCHARGE MEDICATIONS:  New Prescriptions    No medications on file       DISCONTINUED MEDICATIONS:  Discontinued Medications    UNABLE TO FIND    Diabetes meds-daughter to call back with name              (Please note that portions of this note were completed with a voice recognition program.  Efforts were made to edit the dictations but occasionally words are mis-transcribed.)    TALYA Clements (electronically signed)          TALYA Oliveros  05/26/22 [place], [place]  05/26/22 8080

## 2022-05-27 ENCOUNTER — APPOINTMENT (OUTPATIENT)
Dept: GENERAL RADIOLOGY | Age: 62
DRG: 720 | End: 2022-05-27
Payer: MEDICAID

## 2022-05-27 PROBLEM — R65.21 SEPTIC SHOCK (HCC): Status: ACTIVE | Noted: 2022-05-26

## 2022-05-27 LAB
A/G RATIO: 0.7 (ref 1.1–2.2)
ALBUMIN SERPL-MCNC: 2.7 G/DL (ref 3.4–5)
ALP BLD-CCNC: 120 U/L (ref 40–129)
ALT SERPL-CCNC: 42 U/L (ref 10–40)
ANION GAP SERPL CALCULATED.3IONS-SCNC: 26 MMOL/L (ref 3–16)
AST SERPL-CCNC: 81 U/L (ref 15–37)
BANDED NEUTROPHILS RELATIVE PERCENT: 6 % (ref 0–7)
BASOPHILS ABSOLUTE: 0 K/UL (ref 0–0.2)
BASOPHILS RELATIVE PERCENT: 0 %
BILIRUB SERPL-MCNC: 2 MG/DL (ref 0–1)
BUN BLDV-MCNC: 104 MG/DL (ref 7–20)
BURR CELLS: ABNORMAL
CALCIUM SERPL-MCNC: 8.4 MG/DL (ref 8.3–10.6)
CHLORIDE BLD-SCNC: 90 MMOL/L (ref 99–110)
CO2: 15 MMOL/L (ref 21–32)
CREAT SERPL-MCNC: 10 MG/DL (ref 0.8–1.3)
DOHLE BODIES: PRESENT
EOSINOPHILS ABSOLUTE: 0 K/UL (ref 0–0.6)
EOSINOPHILS RELATIVE PERCENT: 0 %
GFR AFRICAN AMERICAN: 6
GFR NON-AFRICAN AMERICAN: 5
GLUCOSE BLD-MCNC: 153 MG/DL (ref 70–99)
GLUCOSE BLD-MCNC: 157 MG/DL (ref 70–99)
GLUCOSE BLD-MCNC: 201 MG/DL (ref 70–99)
GLUCOSE BLD-MCNC: 221 MG/DL (ref 70–99)
HCT VFR BLD CALC: 30.1 % (ref 40.5–52.5)
HEMOGLOBIN: 10 G/DL (ref 13.5–17.5)
LACTIC ACID: 4.7 MMOL/L (ref 0.4–2)
LACTIC ACID: 4.8 MMOL/L (ref 0.4–2)
LACTIC ACID: 5.5 MMOL/L (ref 0.4–2)
LYMPHOCYTES ABSOLUTE: 0.3 K/UL (ref 1–5.1)
LYMPHOCYTES RELATIVE PERCENT: 2 %
MCH RBC QN AUTO: 30.5 PG (ref 26–34)
MCHC RBC AUTO-ENTMCNC: 33.1 G/DL (ref 31–36)
MCV RBC AUTO: 92.2 FL (ref 80–100)
MONOCYTES ABSOLUTE: 0.6 K/UL (ref 0–1.3)
MONOCYTES RELATIVE PERCENT: 5 %
NEUTROPHILS ABSOLUTE: 11.9 K/UL (ref 1.7–7.7)
NEUTROPHILS RELATIVE PERCENT: 87 %
PDW BLD-RTO: 13.2 % (ref 12.4–15.4)
PERFORMED ON: ABNORMAL
PHOSPHORUS: 6 MG/DL (ref 2.5–4.9)
PLATELET # BLD: 175 K/UL (ref 135–450)
PMV BLD AUTO: 8.1 FL (ref 5–10.5)
POTASSIUM SERPL-SCNC: 4.1 MMOL/L (ref 3.5–5.1)
RBC # BLD: 3.26 M/UL (ref 4.2–5.9)
REPORT: NORMAL
SODIUM BLD-SCNC: 131 MMOL/L (ref 136–145)
TOTAL PROTEIN: 6.4 G/DL (ref 6.4–8.2)
WBC # BLD: 12.8 K/UL (ref 4–11)

## 2022-05-27 PROCEDURE — 2500000003 HC RX 250 WO HCPCS: Performed by: INTERNAL MEDICINE

## 2022-05-27 PROCEDURE — 6360000002 HC RX W HCPCS: Performed by: INTERNAL MEDICINE

## 2022-05-27 PROCEDURE — 87040 BLOOD CULTURE FOR BACTERIA: CPT

## 2022-05-27 PROCEDURE — 99291 CRITICAL CARE FIRST HOUR: CPT | Performed by: INTERNAL MEDICINE

## 2022-05-27 PROCEDURE — 87449 NOS EACH ORGANISM AG IA: CPT

## 2022-05-27 PROCEDURE — 83605 ASSAY OF LACTIC ACID: CPT

## 2022-05-27 PROCEDURE — 2580000003 HC RX 258: Performed by: INTERNAL MEDICINE

## 2022-05-27 PROCEDURE — 99223 1ST HOSP IP/OBS HIGH 75: CPT | Performed by: INTERNAL MEDICINE

## 2022-05-27 PROCEDURE — 2000000000 HC ICU R&B

## 2022-05-27 PROCEDURE — 6370000000 HC RX 637 (ALT 250 FOR IP): Performed by: INTERNAL MEDICINE

## 2022-05-27 PROCEDURE — 71045 X-RAY EXAM CHEST 1 VIEW: CPT

## 2022-05-27 PROCEDURE — 36415 COLL VENOUS BLD VENIPUNCTURE: CPT

## 2022-05-27 PROCEDURE — 80053 COMPREHEN METABOLIC PANEL: CPT

## 2022-05-27 PROCEDURE — 51798 US URINE CAPACITY MEASURE: CPT

## 2022-05-27 PROCEDURE — 85025 COMPLETE CBC W/AUTO DIFF WBC: CPT

## 2022-05-27 RX ORDER — HYDROMORPHONE HYDROCHLORIDE 1 MG/ML
1 INJECTION, SOLUTION INTRAMUSCULAR; INTRAVENOUS; SUBCUTANEOUS EVERY 4 HOURS PRN
Status: DISCONTINUED | OUTPATIENT
Start: 2022-05-27 | End: 2022-06-06 | Stop reason: HOSPADM

## 2022-05-27 RX ORDER — METOCLOPRAMIDE HYDROCHLORIDE 5 MG/ML
5 INJECTION INTRAMUSCULAR; INTRAVENOUS EVERY 8 HOURS PRN
Status: DISCONTINUED | OUTPATIENT
Start: 2022-05-27 | End: 2022-06-06 | Stop reason: HOSPADM

## 2022-05-27 RX ORDER — 0.9 % SODIUM CHLORIDE 0.9 %
1000 INTRAVENOUS SOLUTION INTRAVENOUS ONCE
Status: DISCONTINUED | OUTPATIENT
Start: 2022-05-27 | End: 2022-05-27

## 2022-05-27 RX ORDER — 0.9 % SODIUM CHLORIDE 0.9 %
500 INTRAVENOUS SOLUTION INTRAVENOUS ONCE
Status: COMPLETED | OUTPATIENT
Start: 2022-05-27 | End: 2022-05-27

## 2022-05-27 RX ORDER — FUROSEMIDE 10 MG/ML
20 INJECTION INTRAMUSCULAR; INTRAVENOUS ONCE
Status: COMPLETED | OUTPATIENT
Start: 2022-05-27 | End: 2022-05-27

## 2022-05-27 RX ADMIN — MEROPENEM 1000 MG: 1 INJECTION, POWDER, FOR SOLUTION INTRAVENOUS at 11:07

## 2022-05-27 RX ADMIN — METOCLOPRAMIDE 5 MG: 5 INJECTION, SOLUTION INTRAMUSCULAR; INTRAVENOUS at 20:39

## 2022-05-27 RX ADMIN — HEPARIN SODIUM 5000 UNITS: 5000 INJECTION INTRAVENOUS; SUBCUTANEOUS at 08:57

## 2022-05-27 RX ADMIN — INSULIN LISPRO 1 UNITS: 100 INJECTION, SOLUTION INTRAVENOUS; SUBCUTANEOUS at 21:49

## 2022-05-27 RX ADMIN — SODIUM CHLORIDE 500 ML: 9 INJECTION, SOLUTION INTRAVENOUS at 08:56

## 2022-05-27 RX ADMIN — HYDROMORPHONE HYDROCHLORIDE 1 MG: 1 INJECTION, SOLUTION INTRAMUSCULAR; INTRAVENOUS; SUBCUTANEOUS at 20:40

## 2022-05-27 RX ADMIN — HEPARIN SODIUM 5000 UNITS: 5000 INJECTION INTRAVENOUS; SUBCUTANEOUS at 15:44

## 2022-05-27 RX ADMIN — Medication 10 ML: at 11:09

## 2022-05-27 RX ADMIN — HYDROMORPHONE HYDROCHLORIDE 1 MG: 1 INJECTION, SOLUTION INTRAMUSCULAR; INTRAVENOUS; SUBCUTANEOUS at 16:50

## 2022-05-27 RX ADMIN — FUROSEMIDE 20 MG: 10 INJECTION, SOLUTION INTRAMUSCULAR; INTRAVENOUS at 19:11

## 2022-05-27 RX ADMIN — INSULIN LISPRO 4 UNITS: 100 INJECTION, SOLUTION INTRAVENOUS; SUBCUTANEOUS at 13:08

## 2022-05-27 RX ADMIN — INSULIN LISPRO 4 UNITS: 100 INJECTION, SOLUTION INTRAVENOUS; SUBCUTANEOUS at 17:06

## 2022-05-27 RX ADMIN — Medication 10 ML: at 22:00

## 2022-05-27 RX ADMIN — HEPARIN SODIUM 5000 UNITS: 5000 INJECTION INTRAVENOUS; SUBCUTANEOUS at 21:49

## 2022-05-27 RX ADMIN — SODIUM BICARBONATE: 84 INJECTION, SOLUTION INTRAVENOUS at 11:44

## 2022-05-27 RX ADMIN — INSULIN GLARGINE 10 UNITS: 100 INJECTION, SOLUTION SUBCUTANEOUS at 21:49

## 2022-05-27 ASSESSMENT — PAIN DESCRIPTION - DESCRIPTORS
DESCRIPTORS: DISCOMFORT

## 2022-05-27 ASSESSMENT — PAIN SCALES - GENERAL
PAINLEVEL_OUTOF10: 9
PAINLEVEL_OUTOF10: 6
PAINLEVEL_OUTOF10: 5
PAINLEVEL_OUTOF10: 6

## 2022-05-27 ASSESSMENT — PAIN DESCRIPTION - ORIENTATION
ORIENTATION: MID

## 2022-05-27 ASSESSMENT — ENCOUNTER SYMPTOMS
EYE DISCHARGE: 0
CONSTIPATION: 0
COUGH: 0
BACK PAIN: 0
EYE REDNESS: 0
SORE THROAT: 0
DIARRHEA: 0
WHEEZING: 0
ABDOMINAL PAIN: 0
RHINORRHEA: 0
NAUSEA: 0
SHORTNESS OF BREATH: 0
TROUBLE SWALLOWING: 0

## 2022-05-27 ASSESSMENT — PAIN DESCRIPTION - LOCATION
LOCATION: ABDOMEN
LOCATION: ABDOMEN
LOCATION: ABDOMEN;OTHER (COMMENT)

## 2022-05-27 ASSESSMENT — PAIN - FUNCTIONAL ASSESSMENT
PAIN_FUNCTIONAL_ASSESSMENT: PREVENTS OR INTERFERES SOME ACTIVE ACTIVITIES AND ADLS
PAIN_FUNCTIONAL_ASSESSMENT: PREVENTS OR INTERFERES SOME ACTIVE ACTIVITIES AND ADLS

## 2022-05-27 ASSESSMENT — PAIN DESCRIPTION - PAIN TYPE
TYPE: ACUTE PAIN
TYPE: ACUTE PAIN

## 2022-05-27 ASSESSMENT — PAIN SCALES - WONG BAKER: WONGBAKER_NUMERICALRESPONSE: 0

## 2022-05-27 NOTE — PROGRESS NOTES
Patient admitted to room 5910 from PACU. Bedside report received. VSS. NSR on tele. Patient on 2L with saturation of 98%. Daughter at bedside to translate for father. Patient denies any pain per daughter. Pierre in place. 25 ml of light pink urine in pierre. Plan of care reviewed and all questions answered. Call light within reach.  Bed alarm on

## 2022-05-27 NOTE — CONSULTS
Infectious Diseases   Consult Note        Admission Date: 5/26/2022  Hospital Day: Hospital Day: 2   Attending: Connor Chiu MD  Date of service: 5/27/22     Reason for admission: Ureterolithiasis [N20.1]  CHRISSY (acute kidney injury) ([facility] [address] [N17.9]  Septic shock ([facility] [address] [A41.9, R65.21]  Severe sepsis ([facility] [address] [A41.9, R65.20]    Chief complaint/ Reason for consult: Urosepsis     Microbiology:        I have reviewed allavailable micro lab data and cultures    · Blood culture (2/2) - collected on 5/26/2022: ESBL E coli    · Urine culture  - collected on 5/26/2022: > 100,000 cfu/ml of E coli      Antibiotics and immunizations:       Current antibiotics: All antibiotics and their doses were reviewed by me    Recent Abx Admin                   meropenem (MERREM) 1,000 mg in sodium chloride 0.9 % 100 mL IVPB (mini-bag) (mg) 1,000 mg New Bag 05/27/22 1107    piperacillin-tazobactam (ZOSYN) 3,375 mg in dextrose 5 % 50 mL IVPB extended infusion (mini-bag) (mg) 3,375 mg New Bag 05/26/22 2053    cefepime (MAXIPIME) 1000 mg IVPB minibag (mg) 1,000 mg New Bag 05/26/22 1450                  Immunization History: All immunization history was reviewed by me today. There is no immunization history on file for this patient. Known drug allergies: All allergies were reviewed and updated    No Known Allergies    Social history:     Social History:  All social andepidemiologic history was reviewed and updated by me today as needed. · Tobacco use:   reports that he has never smoked. He has never used smokeless tobacco.  · Alcohol use:   reports no history of alcohol use. · Currently lives in: [facility]  ·  reports no history of drug use.      COVID VACCINATION AND LAB RESULT RECORDS:     Internal Administration   First Dose      Second Dose           Last COVID Lab No results found for: SARS-COV-2, SARS-COV-2 RNA, SARS-COV-2, SARS-COV-2, SARS-COV-2 BY PCR, SARS-COV-2, SARS-COV-2, SARS-COV-2         Assessment: The patient is a 58 y.o. old male who  has a past medical history of Diabetes mellitus ([address] and Hypertension. with following problems:    · Septic shock with leukocytosis, tachycardia, tachypnea and hypotension  · Severe lactic acidosis  · Gram-negative bacteremia with ESBL E. coli  · Complicated E. coli UTI  · History of robotic assisted laparoscopic radical prostatectomy with bilateral pelvic lymphadenectomy on 5/16/2022 -surgical pathology indicated acinar adenocarcinoma  · S/p cystoscopy and right ureteral stent placement for right ureteral stone  · Hyponatremia  · Poorly controlled type 2 diabetes mellitus      Discussion:      The patient was admitted with septic shock. He is tolerating the hypotensive and has lactic acidosis despite adequate fluid resuscitation and vasopressors. Blood and urine cultures from 5/26/2022 have grown ESBL E. coli.     The patient had a CT scan of abdomen and pelvis without contrast done yesterday which showed mildly obstructing right ureteropelvic junction stone measuring 12 mm in diameter    Serum creatinine is still 4.7 this morning    The patient received IV cefepime and IV Zosyn yesterday    Plan:     Diagnostic Workup:    · Will order repeat blood cultures today  · Trend lactate level  · Follow-up on formal susceptibility of E. coli isolated from the urine culture  · Continue to follow fever curve, WBC count and blood cultures  · Follow up on liverand renal functions closely    Antimicrobials:    · Agree with switching antibiotic to IV meropenem for ESBL E. coli combination   · Continue IV meropenem at a renally adjusted dose dialysis dose of 1 g every 24-hour as an extended infusion over 3 hours  · Vasopressor support as needed to maintain mean arterial pressure greater than 65 mmHg  · Continue oxygen support to maintain oxygen saturation above 92%  · We will follow up on the culture results and clinical progress and will make further recommendations accordingly. · Continue close vitals monitoring. · Maintain good glycemic control. · Fall precautions. Aspiration precautions. · Continue to watch for new fever or diarrhea. · DVT prophylaxis. · The patient is critically ill. High risk of morbidity and mortality  · Continue close monitoring in ICU setting  · Discussed with clinical pharmacist      Drug Monitoring:    · Continue serial monitoring for antibiotic toxicity as follows: CBC, CMP  · Continue to watch for following: new or worsening fever, hypotension, hives, lip swelling and redness or purulence at vascular access sites. I/v access Management:    · Continue to monitor i.v access sites for erythema, induration, discharge or tenderness. · As always, continue efforts to minimizetubes/lines/drains as clinically appropriate to reduce chances of line associated infections. Current isolation precautions: There are no current isolations documented for this patient. Risk of Complications/Morbidity/Mortality: High     · Patient is critically ill and has a potentially life threatening infection that poses threat to life/bodily function. · There is potential for worsening infection/ sudden clinically significant or life-threatening deterioration in the patient's condition without appropriate antimicrobial therapy. · Complex medical decision making process was involved to select appropriate antimicrobial agents to reverse the cause of patient's severe infection/ illness. · Antimicrobial therapy requires intensive monitoring for toxicity and frequent dose adjustments to prevent toxicity and permanent end-organ dysfunction. Critical care time:  33 minutes      Thank you for involving me in the care of your patient. I will continue to follow. If you have any additional questions, please do not hesitate to contact me.     Subjective:     Presenting complaint in ER:     Chief Complaint   Patient presents with    Post-op Problem     prostate ca, pt had catheter removed on the 16th, has been having pain since, placed on antibiotic without relief, pt has n/v. pt is incontinent of urine and stool        HPI: Silke Kruse is a 58 y.o. male patient, who was seen at the request of Dr. Porsha Chavez MD.    History was obtained from chart review and the patient. The patient was admitted on 5/26/2022. I have been consulted to see the patient for above mentioned reason(s). The patient has multiple medical comorbidities, and presented to the ER for Nausea, vomiting and abdominal pain. The patient has history of recent prostatectomy. He is a [employer] speaker. The patient had a cystoscopy and ureteral stent placement done on 5/26/2022    I have been asked for my opinion for management for this patient. Past Medical History: All past medical history reviewed today. Past Medical History:   Diagnosis Date    Diabetes mellitus ([facility] [address]     Hypertension          Past Surgical History: All pastsurgical history was reviewed today. Past Surgical History:   Procedure Laterality Date    CYSTOSCOPY Right 5/26/2022    CYSTOSCOPY, BILATERAL RETROGRADE PYELOGRAM, PLACEMENT OF RIGHT URETERAL STENT performed by Lizzeth Martell MD at [address] [place] [address] 5/16/2022    ROBOTIC LAPAROSCOPIC RADICAL PROSTATECTOMY WITH BILATERAL LYMPH NODE DISSECTION AND BILATERAL NERVE SPARE performed by Kari Pham MD at [facility]         Family History: All family history was reviewed today. History reviewed. No pertinent family history. Medications: All current and past medications were reviewed.     Medications Prior to Admission: sildenafil (REVATIO) 20 MG tablet, Take 20 mg by mouth daily  sulfamethoxazole-trimethoprim (BACTRIM DS;SEPTRA DS) 800-160 MG per tablet, Take 1 tablet by mouth 2 times daily  metFORMIN (GLUCOPHAGE) 1000 MG tablet, Take 1,000 mg by mouth 2 times daily (with meals)   senna-docusate (PERICOLACE) 8.6-50 MG per tablet, Take 1 tablet by mouth 2 times daily For constipation while on pain medication. amLODIPine (NORVASC) 10 MG tablet, Take 10 mg by mouth daily      meropenem  500 mg IntraVENous Q12H    sodium chloride flush  5-40 mL IntraVENous 2 times per day    heparin (porcine)  5,000 Units SubCUTAneous TID    insulin lispro  0-12 Units SubCUTAneous TID WC    insulin lispro  0-6 Units SubCUTAneous Nightly    insulin glargine  10 Units SubCUTAneous Nightly          REVIEW OF SYSTEMS:       Review of Systems   Constitutional: Positive for fatigue. Negative for chills, diaphoresis and fever. HENT: Negative for ear discharge, ear pain, rhinorrhea, sore throat and trouble swallowing. Eyes: Negative for discharge and redness. Respiratory: Negative for cough, shortness of breath and wheezing. Cardiovascular: Negative for chest pain and leg swelling. Gastrointestinal: Negative for abdominal pain, constipation, diarrhea and nausea. Endocrine: Negative for polyuria. Genitourinary: Negative for dysuria, flank pain, frequency, hematuria and urgency. Musculoskeletal: Negative for back pain and myalgias. Skin: Negative for rash. Neurological: Negative for dizziness, seizures and headaches. Hematological: Does not bruise/bleed easily. Psychiatric/Behavioral: Negative for hallucinations and suicidal ideas. All other systems reviewed and are negative. Objective:       PHYSICAL EXAM:      Vitals:   Vitals:    05/27/22 0900 05/27/22 1000 05/27/22 1100 05/27/22 1200   BP: 132/67 127/66 138/72 131/74   Pulse: 95 94 96 95   Resp:    17   Temp:       TempSrc:       SpO2: 95% 91% 93% 93%   Weight:       Height:           Physical Exam  Vitals and nursing note reviewed. Constitutional:       Appearance: Normal appearance. He is well-developed. Comments: Tired, hypotensive at times   HENT:      Head: Normocephalic and atraumatic.       Right Ear: External ear normal.      Left Ear: External ear normal.      Nose: Nose normal. No congestion or rhinorrhea. Mouth/Throat:      Mouth: Mucous membranes are moist.      Pharynx: No oropharyngeal exudate or posterior oropharyngeal erythema. Eyes:      General: No scleral icterus. Right eye: No discharge. Left eye: No discharge. Conjunctiva/sclera: Conjunctivae normal.      Pupils: Pupils are equal, round, and reactive to light. Cardiovascular:      Rate and Rhythm: Normal rate and regular rhythm. Pulses: Normal pulses. Heart sounds: No murmur heard. No friction rub. Pulmonary:      Effort: Pulmonary effort is normal. No respiratory distress. Breath sounds: Normal breath sounds. No stridor. No wheezing, rhonchi or rales. Abdominal:      General: Bowel sounds are normal.      Palpations: Abdomen is soft. Tenderness: There is no abdominal tenderness. There is no right CVA tenderness, left CVA tenderness, guarding or rebound. Musculoskeletal:         General: No swelling or tenderness. Normal range of motion. Cervical back: Normal range of motion and neck supple. No rigidity. No muscular tenderness. Lymphadenopathy:      Cervical: No cervical adenopathy. Skin:     General: Skin is warm and dry. Coloration: Skin is not jaundiced. Findings: No erythema or rash. Neurological:      General: No focal deficit present. Mental Status: He is alert and oriented to person, place, and time. Mental status is at baseline. Motor: No abnormal muscle tone. Psychiatric:         Mood and Affect: Mood normal.         Behavior: Behavior normal.         Thought Content: Thought content normal.           Lines and drains: All vascular access sites are healthy with no local erythema, discharge or tenderness. Intake and output:     I/O last 3 completed shifts: In: 2162.6 [I.V.:1817.3; IV Piggyback:345.3]  Out: 75 [Urine:75]    Lab Data:   All available labs were reviewed by me today.      CBC: Complicated UTI (urinary tract infection) N39.0    Hyponatremia E87.1    S/P radical cystoprostatectomy Z90.79, Z90.6    Poorly controlled type 2 diabetes mellitus ([address] E11.65         Please note that this chart was generated using Dragon dictation software. Although every effort was made to ensure the accuracy of this automated transcription, some errors in transcription may have occurred inadvertently. If you may need any clarification, please do not hesitate to contact me through EPIC or at the phone number provided below with my electronic signature. Any pictures or media included in this note were obtained after taking informed verbal consent from the patient and with their approval to include those in the patient's medical record.         Farzad Cyr MD, MPH, FACP, [facility], [facility]  5/27/2022, 1:16 PM  [facility] Infectious Disease   [address] [place] [address]., [address]  Office: [phone]  Fax: [phone]  Clinic days:  Tuesday & Thursday

## 2022-05-27 NOTE — PLAN OF CARE
Problem: Pain  Goal: Verbalizes/displays adequate comfort level or baseline comfort level  5/27/2022 1008 by Duglas Moses, RN  Outcome: Progressing  Flowsheets (Taken 5/27/2022 1008)  Verbalizes/displays adequate comfort level or baseline comfort level:   Encourage patient to monitor pain and request assistance   Assess pain using appropriate pain scale  Note: Patient able to use pain rating scale 0/10 adequately without problems with the use of bedside . Pain medications explained along with frequency and when to notify the nurse with  possible side effects. Verbalizes understanding. Call light within reach. Resting quietly in bed. Denies needs at present.     5/27/2022 0433 by Tito Reddy, RN  Outcome: Not Progressing

## 2022-05-27 NOTE — PROGRESS NOTES
Spoke with Dr. Leonela Hagan this AM via telephone. Stated that HD is a possibility for this weekend. Requested permission to place Vascath this afternoon via IR. Dr. Leonela Hagan agreeable. Orders placed. Contacted IR; stated that Dr. Leonela Hagan would need to contact Dr. Nika Garcia in Radiology prior to them scheduling placement. Sent Dr. Leonela Hagan a perfect serve secure message of need to call and speak with Dr. Nika Garcia. Message read at ; no response. Awaiting further instructions at this time.

## 2022-05-27 NOTE — PROGRESS NOTES
[facility]    5/27/2022 8:54 AM        Name: Betsy Monaco . Admitted: 5/26/2022  Primary Care Provider: Randee Paul (Tel: [phone])          Subjective: In bed no further nausea vomiting or diarrhea no chest pain    Reviewed interval ancillary notes    Current Medications  meropenem (MERREM) 1,000 mg in sodium chloride 0.9 % 100 mL IVPB (mini-bag), Once   Followed by  [START ON 5/28/2022] meropenem (MERREM) 1,000 mg in sodium chloride 0.9 % 100 mL IVPB (mini-bag), Q24H  0.9 % sodium chloride bolus, Once  sodium bicarbonate 150 mEq in dextrose 5 % 1,000 mL infusion, Continuous  sodium chloride flush 0.9 % injection 5-40 mL, 2 times per day  sodium chloride flush 0.9 % injection 5-40 mL, PRN  0.9 % sodium chloride infusion, PRN  heparin (porcine) injection 5,000 Units, TID  ondansetron (ZOFRAN-ODT) disintegrating tablet 4 mg, Q8H PRN   Or  ondansetron (ZOFRAN) injection 4 mg, Q6H PRN  polyethylene glycol (GLYCOLAX) packet 17 g, Daily PRN  acetaminophen (TYLENOL) tablet 650 mg, Q6H PRN   Or  acetaminophen (TYLENOL) suppository 650 mg, Q6H PRN  insulin lispro (HUMALOG) injection vial 0-12 Units, TID WC  insulin lispro (HUMALOG) injection vial 0-6 Units, Nightly  insulin glargine (LANTUS) injection vial 10 Units, Nightly        Objective:  /72   Pulse 95   Temp 98.2 °F (36.8 °C) (Temporal)   Resp 17   Ht 5' 9\" (1.753 m)   Wt 161 lb 6.4 oz (73.2 kg)   SpO2 91%   BMI 23.83 kg/m²     Intake/Output Summary (Last 24 hours) at 5/27/2022 0854  Last data filed at 5/27/2022 0529  Gross per 24 hour   Intake 2162.63 ml   Output 75 ml   Net 2087.63 ml      Wt Readings from Last 3 Encounters:   05/27/22 161 lb 6.4 oz (73.2 kg)   05/16/22 148 lb (67.1 kg)   03/19/22 160 lb (72.6 kg)       General appearance:  Appears comfortable.  AAOx3  HEENT: atraumatic, Pupils equal, muscous

## 2022-05-27 NOTE — PROGRESS NOTES
Pt resting in bed at this time. Denies pain at this time. Perfect serve secure message sent to nephrologist on call to come speak to family regarding pt condition. Family would like nephrologist to explain fluids to them and their purpose, as well as to address HD plans and low urine OP. Nephrology aware of low urine OP. 50 cc out this shift and 50 cc out overnight reported. Spoke with Dr. Adelina Perez earlier in shift and he stated that he updated family via telephone earlier in the day. Made nephrologist on call aware. New orders placed. Will monitor.

## 2022-05-27 NOTE — PLAN OF CARE
Problem: Chronic Conditions and Co-morbidities  Goal: Patient's chronic conditions and co-morbidity symptoms are monitored and maintained or improved  Outcome: Not Progressing     Problem: Discharge Planning  Goal: Discharge to home or other facility with appropriate resources  Outcome: Not Progressing     Problem: Pain  Goal: Verbalizes/displays adequate comfort level or baseline comfort level  Outcome: Not Progressing     Problem: Skin/Tissue Integrity  Goal: Absence of new skin breakdown  Description: 1. Monitor for areas of redness and/or skin breakdown  2. Assess vascular access sites hourly  3. Every 4-6 hours minimum:  Change oxygen saturation probe site  4. Every 4-6 hours:  If on nasal continuous positive airway pressure, respiratory therapy assess nares and determine need for appliance change or resting period.   Outcome: Not Progressing     Problem: Safety - Adult  Goal: Free from fall injury  Outcome: Not Progressing

## 2022-05-27 NOTE — ED PROVIDER NOTES
In addition to the advanced practice provider, I personally saw Lisa Rivera and performed a substantive portion of the visit including all aspects of the medical decision making. Briefly, this is a 58 y.o. male here for difficulty urinating. Patient with history of prostate cancer, had a radical prostatectomy on 5/16/2022, he had his Baeza catheter removed 2 days ago however is making minimal urine since that time. Patient also reports \"deep burning\" lower abdominal pain and generalized weakness. .    On exam, patient afebrile, modestly ill-appearing however nontoxic. No distress. Heart tachycardic, regular rhythm. Lungs CTAB. Abdomen soft, nondistended, no tenderness elicited with palpation all quadrants. Of note, patient is primarily [place] speaking, his daughter is present at bedside acting as . Patient declined  phone when offered and preferred to speak through his daughter. Screenings   [place] Coma Scale  Eye Opening: Spontaneous  Best Verbal Response: Oriented  Best Motor Response: Obeys commands  [place] Coma Scale Score: 15      Is this patient to be included in the SEP-1 Core Measure due to severe sepsis or septic shock? Yes   SEP-1 CORE MEASURE DATA      Sepsis Criteria   Severe Sepsis Criteria   Septic Shock Criteria     Must be confirmed or suspected to move forward with diagnosis of sepsis. Must meet 2:    [] Temperature > 100.9 F (38.3 C)        or < 96.8 F (36 C)  [] HR > 90  [] RR > 20  [] WBC > 12 or < 4 or 10% bands      AND:      [] Infection Confirmed or        Suspected.      Must meet 1:    [] Lactate > 2       or   [] Signs of Organ Dysfunction:    - SBP < 90 or MAP < 65  - Altered mental status  - Creatinine > 2 or increased from      baseline  - Urine Output < 0.5 ml/kg/hr  - Bilirubin > 2  - INR > 1.5 (not anticoagulated)  - Platelets < 995,555  - Acute Respiratory Failure as     evidenced by new need for NIPPV     or mechanical ventilation      [] No criteria met for Severe Sepsis. Must meet 1:    [] Lactate > 4        or   [] SBP < 90 or MAP < 65 for at        least two readings in the first        hour after fluid bolus        administration      [] Vasopressors initiated (if hypotension persists after fluid resuscitation)        [] No criteria met for Septic Shock. Patient Vitals for the past 6 hrs:   BP Temp Pulse Resp SpO2   05/27/22 0734 119/72 98.2 °F (36.8 °C) 95 17 (!) 89 %   05/27/22 0736 -- -- 95 -- 91 %   05/27/22 0800 126/73 -- 93 -- 95 %   05/27/22 0900 132/67 -- 95 -- 95 %   05/27/22 1000 127/66 -- 94 -- 91 %      Recent Labs     05/26/22  1205 05/26/22  1845 05/27/22  0314 05/27/22  0659   WBC 16.1*  --  12.8*  --    LACTA  --   --   --  4.7*   CREATININE 8.0* 8.4* 10.0*  --    BILITOT 1.5* 1.9* 2.0*  --      --  175  --          Time Severe Sepsis Identified: [id number]    Fluid Resuscitation Rational: at least 30mL/kg based on entered actual weight at time of triage    Repeat lactate level: ordered and pending at this time    Reassessment Exam:   Not applicable. Patient does not have septic shock. MDM    Patient afebrile, modestly ill-appearing however nontoxic. He displays no outward painful or respiratory distress. Abdomen is without peritoneal signs on my exam.  CT imaging does return with 12 mm ureteral stone, no evidence of bowel obstruction, perforation or intra-abdominal abscess. Labs with acute renal failure. A Baeza catheter was was placed with only about 10 cc urine output, there was delay in obtaining urinalysis given lack of urine output however my suspicion for infection is very high. Lactic acid 6.5, suspect due to dehydration and infection, very low suspicion for mesenteric ischemia. Patient received IV fluid rehydration, will initiate on cefepime for antibiotic coverage. Case was discussed with nephrology who recommend normal saline infusion and will order.   Case was also discussed with urology who will evaluate for potential surgical intervention. Patient remained alert and not hypotensive over his emergency department course, he is not in shock. Case discussed with internal medicine team who will admit for further evaluation and care. I discussed findings and plan with patient and his daughter at length and daughter continue to translate for us. Critical Care:    I have discussed the case with the advanced practice provider. I have personally performed a history, physical exam, and my own medical decision making. I have reviewed the note and agree with the findings and plan. Upon my evaluation, this patient had a high probability of imminent or life-threatening deterioration due to severe sepsis, acute renal failure which required my direct attention, intervention, and personal management. Specialist consultation with nephrology and urology was obtained. I personally saw the patient and independently provided 38 minutes of non-concurrent critical care out of the total shared critical care time provided. The critical care time spent while evaluating and treating this patient was exclusive of any time spent doing separately billable procedures. This critical care time includes time at the bedside, data interpretation, medication management, monitoring for potential decompensation and physician consultation. Specifics of interventions taken and potentially life-threatening diagnostic considerations are listed above in the medical decision making. Patient Referrals:  Nidia Garcia MD  [address] [address]  [phone]    Call  Call ASAP for follow up at discharge       Discharge Medications:  Current Discharge Medication List          FINAL IMPRESSION  1. CHRISSY (acute kidney injury) ([facility] [address]    2. Ureterolithiasis    3. Septic shock ([facility])        Blood pressure 127/66, pulse 94, temperature 98.2 °F (36.8 °C), temperature source Temporal, resp.  rate 17, height 5' 9\" (1.753 m), weight 161 lb 6.4 oz (73.2 kg), SpO2 91 %. For further details of [address] [place] [address]'s emergency department encounter, please see documentation by advanced practice provider, TALYA Monk.     Aleena Reed DO (electronically signed)  Attending Emergency Physician       Aleena Reed DO  05/27/22 1024

## 2022-05-27 NOTE — CONSULTS
P Pulmonary and Critical Care   Consult Note      Reason for Consult: Severe sepsis  Requesting Physician: Dr Davin Mejia:   Savanna Shorten / HPI:                The patient is a 58 y.o. male with significant past medical history of:      Diagnosis Date    Diabetes mellitus ([address]     Hypertension      Patient presented to the emergency department yesterday with a chief complaint of abdominal pain, nausea and vomiting. He had had a recent prostatectomy. Baeza catheter was removed and after removal of the catheter he had poor urine output and developed the symptoms. Evaluation in the ED revealed a partially obstructing stone. He did have cystoscopy and stent placement yesterday. He is [place] speaking. The visit was conducted with the assistance of a qualified .   His main complaint is hiccups which she had been having even before he was admitted to the hospital.      Past Surgical History:        Procedure Laterality Date    CYSTOSCOPY Right 5/26/2022    CYSTOSCOPY, BILATERAL RETROGRADE PYELOGRAM, PLACEMENT OF RIGHT URETERAL STENT performed by Julisa Pate MD at [address] [place] [address] 5/16/2022    ROBOTIC LAPAROSCOPIC RADICAL PROSTATECTOMY WITH BILATERAL LYMPH NODE DISSECTION AND BILATERAL NERVE SPARE performed by Toshia Cifuentes MD at [address] [place] [address]     Current Medications:    Current Facility-Administered Medications: meropenem (MERREM) 1,000 mg in sodium chloride 0.9 % 100 mL IVPB (mini-bag), 1,000 mg, IntraVENous, Once **FOLLOWED BY** [START ON 5/28/2022] meropenem (MERREM) 1,000 mg in sodium chloride 0.9 % 100 mL IVPB (mini-bag), 1,000 mg, IntraVENous, Q24H  0.9 % sodium chloride bolus, 500 mL, IntraVENous, Once  sodium bicarbonate 150 mEq in dextrose 5 % 1,000 mL infusion, , IntraVENous, Continuous  metoclopramide (REGLAN) injection 5 mg, 5 mg, IntraVENous, Q8H PRN  sodium chloride flush 0.9 % injection 5-40 mL, 5-40 mL, IntraVENous, 2 times per day  sodium chloride flush 0.9 % injection 5-40 mL, 5-40 mL, IntraVENous, PRN  0.9 % sodium chloride infusion, , IntraVENous, PRN  heparin (porcine) injection 5,000 Units, 5,000 Units, SubCUTAneous, TID  ondansetron (ZOFRAN-ODT) disintegrating tablet 4 mg, 4 mg, Oral, Q8H PRN **OR** ondansetron (ZOFRAN) injection 4 mg, 4 mg, IntraVENous, Q6H PRN  polyethylene glycol (GLYCOLAX) packet 17 g, 17 g, Oral, Daily PRN  acetaminophen (TYLENOL) tablet 650 mg, 650 mg, Oral, Q6H PRN **OR** acetaminophen (TYLENOL) suppository 650 mg, 650 mg, Rectal, Q6H PRN  insulin lispro (HUMALOG) injection vial 0-12 Units, 0-12 Units, SubCUTAneous, TID WC  insulin lispro (HUMALOG) injection vial 0-6 Units, 0-6 Units, SubCUTAneous, Nightly  insulin glargine (LANTUS) injection vial 10 Units, 10 Units, SubCUTAneous, Nightly    No Known Allergies    Social History:    TOBACCO:   reports that he has never smoked. He has never used smokeless tobacco.  ETOH:   reports no history of alcohol use. Patient currently lives independently  Environmental/chemical exposure: Unknown    Family History:   History reviewed. No pertinent family history. REVIEW OF SYSTEMS:    CONSTITUTIONAL:  negative for fevers, chills, diaphoresis, activity change, appetite change, fatigue, night sweats and unexpected weight change.    EYES:  negative for blurred vision, eye discharge, visual disturbance and icterus  HEENT:  negative for hearing loss, tinnitus, ear drainage, sinus pressure, nasal congestion, epistaxis and snoring  RESPIRATORY:  See HPI  CARDIOVASCULAR:  negative for chest pain, palpitations, exertional chest pressure/discomfort, edema, syncope  GASTROINTESTINAL:  negative for nausea, vomiting, diarrhea, constipation, blood in stool and abdominal pain  GENITOURINARY:  negative for frequency, dysuria, urinary incontinence, decreased urine volume, and hematuria  HEMATOLOGIC/LYMPHATIC:  negative for easy bruising, bleeding and lymphadenopathy  ALLERGIC/IMMUNOLOGIC:  negative for recurrent infections, angioedema, anaphylaxis and drug reactions  ENDOCRINE:  negative for weight changes and diabetic symptoms including polyuria, polydipsia and polyphagia  MUSCULOSKELETAL:  negative for  pain, joint swelling, decreased range of motion and muscle weakness  NEUROLOGICAL:  negative for headaches, slurred speech, unilateral weakness  PSYCHIATRIC/BEHAVIORAL: negative for hallucinations, behavioral problems, confusion and agitation. Objective:   PHYSICAL EXAM:      VITALS:  /66   Pulse 94   Temp 98.2 °F (36.8 °C) (Temporal)   Resp 17   Ht 5' 9\" (1.753 m)   Wt 161 lb 6.4 oz (73.2 kg)   SpO2 91%   BMI 23.83 kg/m²      24HR INTAKE/OUTPUT:      Intake/Output Summary (Last 24 hours) at 5/27/2022 1041  Last data filed at 5/27/2022 7517  Gross per 24 hour   Intake 2162.63 ml   Output 75 ml   Net 2087.63 ml     CONSTITUTIONAL:  awake, alert, cooperative, no apparent distress, and appears stated age  NECK:  Supple, symmetrical, trachea midline, no adenopathy, thyroid symmetric, not enlarged and no tenderness, skin normal  LUNGS:  no increased work of breathing and clear to auscultation. No accessory muscle use  CARDIOVASCULAR: S1 and S2, no edema and no JVD  ABDOMEN:  normal bowel sounds, non-distended and no masses palpated, and no tenderness to palpation. No hepatospleenomegaly  LYMPHADENOPATHY:  no axillary or supraclavicular adenopathy. No cervical adnenopathy  PSYCHIATRIC: Oriented to person place and time. No obvious depression or anxiety. MUSCULOSKELETAL: No obvious misalignment or effusion of the joints. No clubbing, cyanosis of the digits. SKIN:  normal skin color, texture, turgor and no redness, warmth, or swelling.  No palpable nodules    DATA:    Old records have been reviewed    CBC:  Recent Labs     05/26/22  1205 05/27/22  0314   WBC 16.1* 12.8*   RBC 3.47* 3.26*   HGB 10.5* 10.0*   HCT 31.6* 30.1*    175   MCV 90.9 92.2   MCH 30.3 30.5   MCHC 33.3 33.1   RDW 13.0 13.2 BANDSPCT  --  6      BMP:  Recent Labs     05/26/22  1205 05/26/22  1845 05/27/22  0314   * 128* 131*   K 4.2 4.1 4.1   CL 83* 88* 90*   CO2 21 15* 15*   BUN 90* 95* 104*   CREATININE 8.0* 8.4* 10.0*   CALCIUM 9.2 8.6 8.4   GLUCOSE 370* 297* 153*      ABG:  No results for input(s): PHART, ZLH0XRB, PO2ART, KJJ3PTB, W4KMDOZI, BEART in the last 72 hours. Procalcitonin  No results for input(s): PROCAL in the last 72 hours. No results found for: BNP  Lab Results   Component Value Date    CKTOTAL 64 05/26/2022           Radiology Review:  All pertinent images / reports were reviewed as a part of this visit. Assessment:     1. Severe sepsis secondary to ESBL E. coli  2. CHRISSY  3. Nephrolithiasis  4. Prostate cancer  5. Hiccups      Plan:     I have reviewed laboratories, medical records and images for this visit  We will get chest x-ray. No recent chest imaging. Does appear to have severe sepsis. Has metabolic acidosis with a lactate of 4.7. Growing ESBL E. coli from blood  On Merrem which should cover that    Has poor urine output and his creatinine is now 10. CHRISSY likely related to severe sepsis. Kidney stone was partially obstructing but does not seem like it is the sole cause of his CHRISSY. Start bicarb drip  So far blood pressure and pulse are okay    His actual main complaint is hiccups. Try him on some Reglan and see if that helps.   If not we could try giving him some Thorazine

## 2022-05-27 NOTE — PROGRESS NOTES
Urology Progress Note  [facility]    Provider: LIZ Arciniega CNP  Patient ID:  Admission Date: 2022 Name: Kurtis Kiser  OR Date: 2022 MRN: [id number]   Patient Location: [id number] : [id number]  Attending: Connor Chiu MD Date of Service: 2022  PCP: Raza Molina PA-C     Diagnoses:  1. CHRISSY (acute kidney injury) ([facility] [address]    2. Ureterolithiasis    3. Severe sepsis [facility])       Prostate cancer      Assessment/Plan:  57 yo M s/p RARP and BL PLND 5/15, final path pT3b, pN0. Pierre removed 2 days ago and since that time patient with increasing pain, fatigue, nausea and emesis. Endorses minimal UOP, UI and FI with loose stool that he says resembles urine. Renal failure with Scr 8 and leukocytosis with  Lactic acid 6.5. CT showing mostly expected post op changes, no clear e/o bowel injury, abscess, or urine leak,  but multiple extraperitoneal gas collections in the surgical bed. There is a 12 mm R UPJ stone with hydro    ===  Cr 10  Wbc 12.8     - UCX empiric abx  - s/p cystoscopy with bilateral retograde pyelogram, and RIGHT ureteral stent placement   - No clear evidence of bowel injury/rectal injury on CT scan  -Possible gen surg consult if worsening- feeling better today   - entire conversation with his daughter translating for him    The patient had a chance to ask questions which were answered. he understands the above plan. Subjective:   Kurtis Kiser is a 58 y.o. male. He was seen and examined this morning. Today we discussed stent and pierre catheter. Discussed f/u surgery for kidney stone.       Objective:   Vitals:  Vitals:    22 1100   BP: 138/72   Pulse: 96   Resp:    Temp:    SpO2: 93%       Intake/Output Summary (Last 24 hours) at 2022 1233  Last data filed at 2022 9329  Gross per 24 hour   Intake 2162.63 ml   Output 75 ml   Net 2087.63 ml     Physical Exam:  Gen: Alert and oriented x3, no acute distress  CV: Regular rate   Resp: unlabored respirations  Abd: Soft, non-distended, non-tender, no masses  Ext: no peripheral edema noted, moves upper and lower extremities spontaneously  Skin: warmand well perfused, no rashes noted on the face, or arms.      Labs:  Lab Results   Component Value Date    WBC 12.8 (H) 05/27/2022    HGB 10.0 (L) 05/27/2022    HCT 30.1 (L) 05/27/2022    MCV 92.2 05/27/2022     05/27/2022     Lab Results   Component Value Date    CREATININE 10.0 ([facility]) 05/27/2022     (HH) 05/27/2022     (L) 05/27/2022    K 4.1 05/27/2022    CL 90 (L) 05/27/2022    CO2 15 (L) 05/27/2022       LIZ Land - CNP   5/27/2022

## 2022-05-27 NOTE — PROGRESS NOTES
MD Rambo Gomez MD Butler Roller, MD               Office: [phone]                      Fax: [phone]              [facility]                   NEPHROLOGY INPATIENT PROGRESS NOTE:     PATIENT NAME: Roxie Kendall  : 1960  MRN: [id number]       RECOMMENDATIONS:   Give more NS bolus 500 cc  Then change NS mIVF to bicarb infusion  Trend lactic acidosis as persisting  - consult Critical Care     S/P right ureteral stent and bilateral retrograde ureteropyelogram for obstructing 12 mm right UPJ  -Appreciate assistance by urology   -Discussed-Dr. Simba Leon   pierre catheter inserted in ER  -per nursing many sediments, flushed pierre  -monitor PVR w/ hx of prostate CA  -? Need to repeat renal imaging       Follow-up sodium trend, goal less than 8 points in 24 hours    Empirical antibiotics, follow urine culture  ID consult pending     Recheck UA w/ microscopy,   Reviewed urine lytes,-likely suggestive of ATN  Hold ADDISON blockade     no need for dialysis,  at higher risk for decompensation, needing closer monitoring.  -With creatinine 1.5 about 3 weeks ago good chance of recovering,      Discussed with team, nurse, urology, Dr Simba Leon, NP-Laure[place]   Discussed with family   Julien Chavez [phone]  - said wrong number   Manjinder Stern [phone]    -I talked to her on the phone and discussed about current renal failure and possibility of short-term dialysis in near future, she verbally consented if needed. IMPRESSION:       Admitted for:  Ureterolithiasis [N20.1]  CHRISSY (acute kidney injury) ([address] [N17.9]  Septic shock ([address] [A41.9, R65.21]  Severe sepsis ([address] [A41.9, R65.20]    ICD-10-CM    1. CHRISSY (acute kidney injury) ([address]  N17.9    2. Ureterolithiasis  N20.1    3.  Septic shock (HCC)  A41.9     R65.21          CHRISSY (on CKD: 3B): Very oliguric  - BL Scr- 1.5 as off 22 ---> 8.0 on admission  -: Etiology of CHRISSY - presumed ATN + obstruction    - other differentials: unlikely GN / TI / TMA process  - UA : results reviewed: Showing large amount of blood, with significant proteinuria, with leukocytes, with white cells RBC high specific gravity  - Renal imaging: on on admission with CT scan: - Obstructed 12 mm right UPJ    History of prostate cancer    Associated problems:   - Volume status: hypo-volemic  : BP: no need for tight control    : Na: hyponatremia - acute-moderate range, likely due to renal failure  - Azotemia: Prerenal severe, likely some uremic encephalopathy  - Electrolytes: K: Normal  - Acid-Base: Lactic acidosis high  - Anemia: Mild on chronic disease      Other major problems: Management per primary and other consulting teams.   //         Hospital Problems           Last Modified POA    * (Principal) Severe sepsis ([facility] 5/26/2022 Yes        : other supportive care :   - Check daily renal function panel with electrolytes-phosphorus  - Strict monitoring of I/Os, daily weight  - Renal feeds/diet  - Current medications reviewed. - Nephrotoxic medications have been discontinued. - Dose adjusted and appropriate. - Dose meds for eGFR <15 mL/min/1.73m2 during CHRISSY    - Avoid heavy opioids due to renal failure - may use very low dose dilaudid / fentanyl with close monitoring of CNS and respiratory depression. Please refer to the orders. High Complexity. Multiple complex problems. Discussed with patient and treatment team-       Severally ill, at risk of impending organ failure needing ICU higher level of care/monitoring. Time spent ~ 35 minutes that included face-to-face meeting/discussion with patient, patient's family, and treatment team (including primary/referring team and other consultants; included coordination of care with the treatment team; and review of patient's electronic medical records and ordering appropriates tests. Thank you for allowing me to participate in this patient's care.  Please do not hesitate to contact me anytime. We will follow along with you. Tyler Garcia MD,  [facility] [address]: [phone] or [employer]  Fax: [phone]        =======================================================================================   =======================================================================================  SUBJECTIVE-  Seen resting in bed  More awake  No acute distress  Tachycardia, BP slightly low normal  Temperature slightly higher  Underwent urgent stent placement on right side, urology Dr. Dinorah Alcantara on 2 L nasal cannula      Past medical, Surgical, Social, Family medical history reviewed by me. MEDICATIONS: reviewed by me. Medications Prior to Admission:  No current facility-administered medications on file prior to encounter. Current Outpatient Medications on File Prior to Encounter   Medication Sig Dispense Refill    sildenafil (REVATIO) 20 MG tablet Take 20 mg by mouth daily      sulfamethoxazole-trimethoprim (BACTRIM DS;SEPTRA DS) 800-160 MG per tablet Take 1 tablet by mouth 2 times daily      metFORMIN (GLUCOPHAGE) 1000 MG tablet Take 1,000 mg by mouth 2 times daily (with meals)       senna-docusate (PERICOLACE) 8.6-50 MG per tablet Take 1 tablet by mouth 2 times daily For constipation while on pain medication.  30 tablet 1    amLODIPine (NORVASC) 10 MG tablet Take 10 mg by mouth daily            Current Facility-Administered Medications:     meropenem (MERREM) 1,000 mg in sodium chloride 0.9 % 100 mL IVPB (mini-bag), 1,000 mg, IntraVENous, Once **FOLLOWED BY** [START ON 5/28/2022] meropenem (MERREM) 1,000 mg in sodium chloride 0.9 % 100 mL IVPB (mini-bag), 1,000 mg, IntraVENous, Q24H, Adalgisa Brown MD    0.9 % sodium chloride infusion, , IntraVENous, Continuous, Tyler Garcia MD, Last Rate: 125 mL/hr at 05/27/22 0529, Rate Verify at 05/27/22 0529    sodium chloride flush 0.9 % injection 5-40 mL, 5-40 mL, IntraVENous, 2 times per day, Porsha Chavez MD, 10 mL at 05/26/22 2124    sodium chloride flush 0.9 % injection 5-40 mL, 5-40 mL, IntraVENous, PRN, Porsha Chavez MD    0.9 % sodium chloride infusion, , IntraVENous, PRN, Porsha Chavez MD    heparin (porcine) injection 5,000 Units, 5,000 Units, SubCUTAneous, TID, Porsha Chavez MD, 5,000 Units at 05/26/22 2106    ondansetron (ZOFRAN-ODT) disintegrating tablet 4 mg, 4 mg, Oral, Q8H PRN **OR** ondansetron (ZOFRAN) injection 4 mg, 4 mg, IntraVENous, Q6H PRN, Porsha Chavez MD    polyethylene glycol (GLYCOLAX) packet 17 g, 17 g, Oral, Daily PRN, Porsha Chavez MD    acetaminophen (TYLENOL) tablet 650 mg, 650 mg, Oral, Q6H PRN **OR** acetaminophen (TYLENOL) suppository 650 mg, 650 mg, Rectal, Q6H PRN, Porsha Chavez MD    insulin lispro (HUMALOG) injection vial 0-12 Units, 0-12 Units, SubCUTAneous, TID WC, Juliana Serna MD    insulin lispro (HUMALOG) injection vial 0-6 Units, 0-6 Units, SubCUTAneous, Nightly, Porsha Chavez MD, 3 Units at 05/26/22 2124    insulin glargine (LANTUS) injection vial 10 Units, 10 Units, SubCUTAneous, Nightly, Porsha Chavez MD, 10 Units at 05/26/22 2125      REVIEW OF SYSTEMS:Review of systems not obtained due to patient factors - mental status          =======================================================================================     PHYSICAL EXAM:  Recent vital signs and recent I/Os reviewed by me.      Wt Readings from Last 3 Encounters:   05/27/22 161 lb 6.4 oz (73.2 kg)   05/16/22 148 lb (67.1 kg)   03/19/22 160 lb (72.6 kg)     BP Readings from Last 3 Encounters:   05/27/22 119/72   05/16/22 (!) 169/102   03/19/22 (!) 171/98     Patient Vitals for the past 24 hrs:   BP Temp Temp src Pulse Resp SpO2 Height Weight   05/27/22 0736 -- -- -- 95 -- 91 % -- --   05/27/22 0734 119/72 98.2 °F (36.8 °C) Temporal 95 17 (!) 89 % -- --   05/27/22 0400 126/70 97.8 °F (36.6 °C) Temporal (!) 103 16 95 % -- 161 lb 6.4 oz (73.2 kg)   05/27/22 0000 130/78 97.4 °F (36.3 °C) Temporal 93 20 90 % -- --   05/26/22 2000 106/70 97.7 °F (36.5 °C) Temporal 97 20 94 % -- --   05/26/22 1950 107/69 98.2 °F (36.8 °C) Temporal (!) 104 22 100 % -- --   05/26/22 1900 102/64 97.2 °F (36.2 °C) Temporal 98 22 94 % -- --   05/26/22 1845 -- -- -- (!) 104 29 94 % -- --   05/26/22 1830 102/69 -- -- 98 24 96 % -- --   05/26/22 1815 104/65 -- -- (!) 104 24 99 % -- --   05/26/22 1800 95/60 -- -- 99 21 99 % -- --   05/26/22 1755 (!) 95/56 -- -- 99 21 98 % -- --   05/26/22 1750 (!) 94/59 -- -- (!) 101 18 97 % -- --   05/26/22 1746 (!) 97/59 97.5 °F (36.4 °C) Temporal (!) 104 20 99 % -- --   05/26/22 1615 113/73 -- -- (!) 104 24 97 % -- --   05/26/22 1600 117/72 -- -- (!) 107 27 97 % -- --   05/26/22 1545 117/75 -- -- (!) 103 23 96 % -- --   05/26/22 1530 120/71 -- -- (!) 103 23 97 % -- --   05/26/22 1515 119/73 -- -- (!) 103 23 96 % -- --   05/26/22 1445 122/66 -- -- (!) 107 26 -- -- --   05/26/22 1430 126/77 -- -- (!) 111 25 -- -- --   05/26/22 1415 134/72 -- -- (!) 107 26 -- -- --   05/26/22 1326 -- -- -- (!) 111 (!) 32 94 % -- --   05/26/22 1245 (!) 158/79 -- -- -- -- 91 % -- --   05/26/22 1116 127/74 97.3 °F (36.3 °C) Tympanic (!) 108 16 95 % 5' 9\" (1.753 m) 155 lb (70.3 kg)       Intake/Output Summary (Last 24 hours) at 5/27/2022 0839  Last data filed at 5/27/2022 0529  Gross per 24 hour   Intake 2162.63 ml   Output 75 ml   Net 2087.63 ml       Physical Exam  Vitals reviewed. Constitutional:       General: He is not in acute distress. Appearance: Normal appearance. HENT:      Head: Normocephalic and atraumatic. Right Ear: External ear normal.      Left Ear: External ear normal.      Nose: Nose normal.      Mouth/Throat:      Mouth: Mucous membranes are dry. Eyes:      General: No scleral icterus. Conjunctiva/sclera: Conjunctivae normal.   Neck:      Vascular: No JVD.    Cardiovascular:      Rate and Rhythm: Normal rate and regular rhythm. Heart sounds: S1 normal and S2 normal.   Pulmonary:      Effort: Respiratory distress (mild) present. Breath sounds: Rhonchi present. Abdominal:      General: Bowel sounds are normal. There is no distension. Musculoskeletal:         General: No swelling or deformity. Cervical back: Normal range of motion and neck supple. Skin:     General: Skin is dry. Coloration: Skin is not jaundiced. Neurological:      Mental Status: He is disoriented. Comments: Unable to obtain as part of sedation     Psychiatric:      Comments: Unable to obtain as part of sedation                  =======================================================================================     DATA:  Diagnostic tests reviewed by me for today's visit:   (AS NEEDED FOR MY EVALUATION AND MANAGEMENT). Recent Labs     05/26/22 1205 05/27/22  0314   WBC 16.1* 12.8*   HCT 31.6* 30.1*    175     Iron Saturation:  No components found for: PERCENTFE  FERRITIN:  No results found for: FERRITIN  IRON:  No results found for: IRON  TIBC:  No results found for: TIBC    Recent Labs     05/26/22 1205 05/26/22 1845 05/27/22  0314   * 128* 131*   K 4.2 4.1 4.1   CL 83* 88* 90*   CO2 21 15* 15*   BUN 90* 95* 104*   CREATININE 8.0* 8.4* 10.0*     Recent Labs     05/26/22 1205 05/26/22 1845 05/27/22  0314   CALCIUM 9.2 8.6 8.4   PHOS  --  5.2* 6.0*     No results for input(s): PH, PCO2, PO2 in the last 72 hours.     Invalid input(s): Zach Cough    ABG:  No results found for: PH, PCO2, PO2, HCO3, BE, THGB, TCO2, O2SAT  VBG:  No results found for: PHVEN, SMM2TXF, BEVEN, K0ICEFPC    LDH:  No results found for: LDH  Uric Acid:    Lab Results   Component Value Date    LABURIC 8.8 05/26/2022       PT/INR:    Lab Results   Component Value Date    PROTIME 12.9 05/02/2022    INR 1.14 05/02/2022     Warfarin PT/INR:  No components found for: PTPATWAR, PTINRWAR  PTT:    Lab Results   Component Value Date    APTT 34.5 05/02/2022   [APTT}  Last 3 Troponin:  No results found for: TROPONINI    U/A:    Lab Results   Component Value Date    COLORU Yellow 05/26/2022    PROTEINU >=300 05/26/2022    PHUR 7.5 05/26/2022    WBCUA 10-20 05/26/2022    RBCUA 5-10 05/26/2022    BACTERIA 2+ 05/26/2022    CLARITYU Clear 05/26/2022    SPECGRAV 1.020 05/26/2022    LEUKOCYTESUR LARGE 05/26/2022    UROBILINOGEN 0.2 05/26/2022    BILIRUBINUR SMALL 05/26/2022    BLOODU LARGE 05/26/2022    GLUCOSEU 500 05/26/2022     Microalbumen/Creatinine ratio:  No components found for: RUCREAT  24 Hour Urine for Protein:  No components found for: RAWUPRO, UHRS3, OQPD29AV, UTV3  24 Hour Urine for Creatinine Clearance:  No components found for: CREAT4, UHRS10, UTV10  Urine Toxicology:  No components found for: IAMMENTA, IBARBIT, IBENZO, ICOCAINE, IMARTHC, IOPIATES, IPHENCYC    HgBA1c:  No results found for: LABA1C  RPR:  No results found for: RPR  HIV:  No results found for: HIV  BRITTANY:  No results found for: ANATITER, BRITTANY  RF:  No results found for: RF  DSDNA:  No components found for: DNA  AMYLASE:  No results found for: AMYLASE  LIPASE:    Lab Results   Component Value Date    LIPASE 31.0 05/26/2022     Fibrinogen Level:  No components found for: FIB       BELOW MENTIONED RADIOLOGY STUDY RESULTS BY ME (AS NEEDED FOR MY EVALUATION AND MANAGEMENT). CT ABDOMEN PELVIS WO CONTRAST Additional Contrast? None    Result Date: 5/26/2022  EXAMINATION: CT OF THE ABDOMEN AND PELVIS WITHOUT CONTRAST 5/26/2022 1:36 pm TECHNIQUE: CT of the abdomen and pelvis was performed without the administration of intravenous contrast. Multiplanar reformatted images are provided for review. Automated exposure control, iterative reconstruction, and/or weight based adjustment of the mA/kV was utilized to reduce the radiation dose to as low as reasonably achievable.  COMPARISON: 03/19/2022 HISTORY: ORDERING SYSTEM PROVIDED HISTORY: recent prostate surg - n/v TECHNOLOGIST PROVIDED HISTORY: Reason for exam:->recent prostate surg - n/v Additional Contrast?->None Reason for Exam: recent prostate surg - n/v Additional signs and symptoms: Mexican breathing instructions provided, pt still not holding breath. Best scan possible. FINDINGS: Lower Chest: There is mild bibasilar dependent atelectasis. Organs: There is mild right hydronephrosis secondary to a 12 mm calculus lodged within the right ureteral vesicular junction. There is mild right perinephric stranding. The remainder of the solid abdominal organs are unremarkable. GI/Bowel: There is no bowel dilatation, wall thickening or obstruction. Pelvis: The bladder is decompressed by Baeza catheter and thus not evaluated. Postop changes of prostatectomy are present. There are multiple extraperitoneal pelvic gas collections within the surgical bed and pelvic sidewalls. Peritoneum/Retroperitoneum: There is no free air, free fluid or intraperitoneal in phlegm a nat change. There is no adenopathy. Bones/Soft Tissues: There is no acute fracture or aggressive osseous lesion. Mildly obstructing 12 mm right UPJ Postsurgical pelvic changes without complicating feature.  RECOMMENDATIONS: Unavailable

## 2022-05-28 ENCOUNTER — APPOINTMENT (OUTPATIENT)
Dept: INTERVENTIONAL RADIOLOGY/VASCULAR | Age: 62
DRG: 720 | End: 2022-05-28
Payer: MEDICAID

## 2022-05-28 LAB
A/G RATIO: 0.8 (ref 1.1–2.2)
ALBUMIN SERPL-MCNC: 2.6 G/DL (ref 3.4–5)
ALP BLD-CCNC: 111 U/L (ref 40–129)
ALT SERPL-CCNC: 33 U/L (ref 10–40)
ANION GAP SERPL CALCULATED.3IONS-SCNC: 23 MMOL/L (ref 3–16)
ANISOCYTOSIS: ABNORMAL
AST SERPL-CCNC: 46 U/L (ref 15–37)
BASOPHILS ABSOLUTE: 0 K/UL (ref 0–0.2)
BASOPHILS RELATIVE PERCENT: 0 %
BILIRUB SERPL-MCNC: 2 MG/DL (ref 0–1)
BUN BLDV-MCNC: 135 MG/DL (ref 7–20)
BURR CELLS: ABNORMAL
CALCIUM SERPL-MCNC: 8.4 MG/DL (ref 8.3–10.6)
CHLORIDE BLD-SCNC: 90 MMOL/L (ref 99–110)
CO2: 18 MMOL/L (ref 21–32)
CREAT SERPL-MCNC: 11.2 MG/DL (ref 0.8–1.3)
EOSINOPHILS ABSOLUTE: 0 K/UL (ref 0–0.6)
EOSINOPHILS RELATIVE PERCENT: 0 %
GFR AFRICAN AMERICAN: 6
GFR NON-AFRICAN AMERICAN: 5
GLUCOSE BLD-MCNC: 114 MG/DL (ref 70–99)
GLUCOSE BLD-MCNC: 147 MG/DL (ref 70–99)
GLUCOSE BLD-MCNC: 220 MG/DL (ref 70–99)
GLUCOSE BLD-MCNC: 88 MG/DL (ref 70–99)
GLUCOSE BLD-MCNC: 96 MG/DL (ref 70–99)
HCT VFR BLD CALC: 28.4 % (ref 40.5–52.5)
HEMATOLOGY PATH CONSULT: YES
HEMOGLOBIN: 9.5 G/DL (ref 13.5–17.5)
LYMPHOCYTES ABSOLUTE: 0.2 K/UL (ref 1–5.1)
LYMPHOCYTES RELATIVE PERCENT: 2 %
MACROCYTES: ABNORMAL
MCH RBC QN AUTO: 30.1 PG (ref 26–34)
MCHC RBC AUTO-ENTMCNC: 33.5 G/DL (ref 31–36)
MCV RBC AUTO: 90 FL (ref 80–100)
MONOCYTES ABSOLUTE: 2.5 K/UL (ref 0–1.3)
MONOCYTES RELATIVE PERCENT: 20 %
NEUTROPHILS ABSOLUTE: 9.6 K/UL (ref 1.7–7.7)
NEUTROPHILS RELATIVE PERCENT: 78 %
ORGANISM: ABNORMAL
PDW BLD-RTO: 13.2 % (ref 12.4–15.4)
PERFORMED ON: ABNORMAL
PERFORMED ON: ABNORMAL
PERFORMED ON: NORMAL
PERFORMED ON: NORMAL
PHOSPHORUS: 5.4 MG/DL (ref 2.5–4.9)
PLATELET # BLD: 194 K/UL (ref 135–450)
PLATELET SLIDE REVIEW: ADEQUATE
PMV BLD AUTO: 8.1 FL (ref 5–10.5)
POLYCHROMASIA: ABNORMAL
POTASSIUM REFLEX MAGNESIUM: 3.9 MMOL/L (ref 3.5–5.1)
POTASSIUM SERPL-SCNC: 3.9 MMOL/L (ref 3.5–5.1)
RBC # BLD: 3.16 M/UL (ref 4.2–5.9)
SLIDE REVIEW: ABNORMAL
SODIUM BLD-SCNC: 131 MMOL/L (ref 136–145)
TOTAL PROTEIN: 6 G/DL (ref 6.4–8.2)
TOXIC GRANULATION: PRESENT
URINE CULTURE, ROUTINE: ABNORMAL
WBC # BLD: 12.3 K/UL (ref 4–11)

## 2022-05-28 PROCEDURE — 77001 FLUOROGUIDE FOR VEIN DEVICE: CPT

## 2022-05-28 PROCEDURE — 76937 US GUIDE VASCULAR ACCESS: CPT

## 2022-05-28 PROCEDURE — 5A1D70Z PERFORMANCE OF URINARY FILTRATION, INTERMITTENT, LESS THAN 6 HOURS PER DAY: ICD-10-PCS | Performed by: INTERNAL MEDICINE

## 2022-05-28 PROCEDURE — 6370000000 HC RX 637 (ALT 250 FOR IP): Performed by: INTERNAL MEDICINE

## 2022-05-28 PROCEDURE — 6360000002 HC RX W HCPCS: Performed by: RADIOLOGY

## 2022-05-28 PROCEDURE — 6360000002 HC RX W HCPCS: Performed by: INTERNAL MEDICINE

## 2022-05-28 PROCEDURE — 02H633Z INSERTION OF INFUSION DEVICE INTO RIGHT ATRIUM, PERCUTANEOUS APPROACH: ICD-10-PCS | Performed by: INTERNAL MEDICINE

## 2022-05-28 PROCEDURE — 90935 HEMODIALYSIS ONE EVALUATION: CPT

## 2022-05-28 PROCEDURE — 99233 SBSQ HOSP IP/OBS HIGH 50: CPT | Performed by: INTERNAL MEDICINE

## 2022-05-28 PROCEDURE — 2580000003 HC RX 258: Performed by: INTERNAL MEDICINE

## 2022-05-28 PROCEDURE — 0JH63XZ INSERTION OF TUNNELED VASCULAR ACCESS DEVICE INTO CHEST SUBCUTANEOUS TISSUE AND FASCIA, PERCUTANEOUS APPROACH: ICD-10-PCS | Performed by: INTERNAL MEDICINE

## 2022-05-28 PROCEDURE — 97166 OT EVAL MOD COMPLEX 45 MIN: CPT

## 2022-05-28 PROCEDURE — 86704 HEP B CORE ANTIBODY TOTAL: CPT

## 2022-05-28 PROCEDURE — 36561 INSERT TUNNELED CV CATH: CPT

## 2022-05-28 PROCEDURE — 86706 HEP B SURFACE ANTIBODY: CPT

## 2022-05-28 PROCEDURE — 87340 HEPATITIS B SURFACE AG IA: CPT

## 2022-05-28 PROCEDURE — 97162 PT EVAL MOD COMPLEX 30 MIN: CPT

## 2022-05-28 PROCEDURE — 85025 COMPLETE CBC W/AUTO DIFF WBC: CPT

## 2022-05-28 PROCEDURE — 97116 GAIT TRAINING THERAPY: CPT

## 2022-05-28 PROCEDURE — 36415 COLL VENOUS BLD VENIPUNCTURE: CPT

## 2022-05-28 PROCEDURE — 2500000003 HC RX 250 WO HCPCS: Performed by: INTERNAL MEDICINE

## 2022-05-28 PROCEDURE — C1894 INTRO/SHEATH, NON-LASER: HCPCS

## 2022-05-28 PROCEDURE — 80053 COMPREHEN METABOLIC PANEL: CPT

## 2022-05-28 PROCEDURE — 2000000000 HC ICU R&B

## 2022-05-28 PROCEDURE — 97530 THERAPEUTIC ACTIVITIES: CPT

## 2022-05-28 RX ORDER — HEPARIN SODIUM 5000 [USP'U]/ML
6000 INJECTION, SOLUTION INTRAVENOUS; SUBCUTANEOUS ONCE
Status: COMPLETED | OUTPATIENT
Start: 2022-05-28 | End: 2022-05-28

## 2022-05-28 RX ORDER — LIDOCAINE HYDROCHLORIDE 10 MG/ML
10 INJECTION, SOLUTION EPIDURAL; INFILTRATION; INTRACAUDAL; PERINEURAL ONCE
Status: COMPLETED | OUTPATIENT
Start: 2022-05-28 | End: 2022-05-28

## 2022-05-28 RX ORDER — LIDOCAINE HYDROCHLORIDE AND EPINEPHRINE 10; 10 MG/ML; UG/ML
7.5 INJECTION, SOLUTION INFILTRATION; PERINEURAL ONCE
Status: COMPLETED | OUTPATIENT
Start: 2022-05-28 | End: 2022-05-28

## 2022-05-28 RX ORDER — FENTANYL CITRATE 50 UG/ML
INJECTION, SOLUTION INTRAMUSCULAR; INTRAVENOUS
Status: COMPLETED | OUTPATIENT
Start: 2022-05-28 | End: 2022-05-28

## 2022-05-28 RX ORDER — HEPARIN SODIUM 1000 [USP'U]/ML
3200 INJECTION, SOLUTION INTRAVENOUS; SUBCUTANEOUS PRN
Status: DISCONTINUED | OUTPATIENT
Start: 2022-05-28 | End: 2022-06-06 | Stop reason: HOSPADM

## 2022-05-28 RX ADMIN — SODIUM CHLORIDE: 9 INJECTION, SOLUTION INTRAVENOUS at 10:59

## 2022-05-28 RX ADMIN — Medication 10 ML: at 09:22

## 2022-05-28 RX ADMIN — HEPARIN SODIUM 5000 UNITS: 5000 INJECTION INTRAVENOUS; SUBCUTANEOUS at 10:54

## 2022-05-28 RX ADMIN — HEPARIN SODIUM 3200 UNITS: 5000 INJECTION INTRAVENOUS; SUBCUTANEOUS at 15:18

## 2022-05-28 RX ADMIN — METOCLOPRAMIDE 5 MG: 5 INJECTION, SOLUTION INTRAMUSCULAR; INTRAVENOUS at 16:44

## 2022-05-28 RX ADMIN — INSULIN LISPRO 4 UNITS: 100 INJECTION, SOLUTION INTRAVENOUS; SUBCUTANEOUS at 16:44

## 2022-05-28 RX ADMIN — LIDOCAINE HYDROCHLORIDE 10 ML: 10 INJECTION, SOLUTION EPIDURAL; INFILTRATION; INTRACAUDAL; PERINEURAL at 15:19

## 2022-05-28 RX ADMIN — FENTANYL CITRATE 25 MCG: 50 INJECTION, SOLUTION INTRAMUSCULAR; INTRAVENOUS at 14:54

## 2022-05-28 RX ADMIN — ACETAMINOPHEN 650 MG: 325 TABLET ORAL at 17:45

## 2022-05-28 RX ADMIN — HEPARIN SODIUM 5000 UNITS: 5000 INJECTION INTRAVENOUS; SUBCUTANEOUS at 22:03

## 2022-05-28 RX ADMIN — METOCLOPRAMIDE 5 MG: 5 INJECTION, SOLUTION INTRAMUSCULAR; INTRAVENOUS at 06:31

## 2022-05-28 RX ADMIN — ONDANSETRON 4 MG: 2 INJECTION INTRAMUSCULAR; INTRAVENOUS at 17:45

## 2022-05-28 RX ADMIN — HEPARIN SODIUM 5000 UNITS: 5000 INJECTION INTRAVENOUS; SUBCUTANEOUS at 16:44

## 2022-05-28 RX ADMIN — HYDROMORPHONE HYDROCHLORIDE 1 MG: 1 INJECTION, SOLUTION INTRAMUSCULAR; INTRAVENOUS; SUBCUTANEOUS at 11:10

## 2022-05-28 RX ADMIN — HYDROMORPHONE HYDROCHLORIDE 1 MG: 1 INJECTION, SOLUTION INTRAMUSCULAR; INTRAVENOUS; SUBCUTANEOUS at 22:03

## 2022-05-28 RX ADMIN — MEROPENEM 1000 MG: 1 INJECTION, POWDER, FOR SOLUTION INTRAVENOUS at 11:01

## 2022-05-28 RX ADMIN — LIDOCAINE HYDROCHLORIDE,EPINEPHRINE BITARTRATE 7.5 ML: 10; .01 INJECTION, SOLUTION INFILTRATION; PERINEURAL at 15:19

## 2022-05-28 ASSESSMENT — PAIN SCALES - GENERAL
PAINLEVEL_OUTOF10: 0
PAINLEVEL_OUTOF10: 7
PAINLEVEL_OUTOF10: 0
PAINLEVEL_OUTOF10: 9
PAINLEVEL_OUTOF10: 0
PAINLEVEL_OUTOF10: 6
PAINLEVEL_OUTOF10: 7
PAINLEVEL_OUTOF10: 8

## 2022-05-28 NOTE — PROGRESS NOTES
Cristy Garcia RN and I were unable to document the  Waste of the fentanyl in the omnicell. No fentanyl was given. All was wasted.

## 2022-05-28 NOTE — PROGRESS NOTES
Patient with daughter at bedside. Complaints of 9 - 10 / 10 pain. Mid. Abdomen. Generalized ache and discomfort. Patient also with complaints of persistent hiccups which are exacerbating his pain / discomfort. Reglan and Dilaudid given, see MAR. Patient and daughter with questions regarding fluid status, IV hydration, nutrition, and plan of care. Each question was addressed and answered to satisfaction. Patient and family updated on plan of care and agreeable at this time. Patient encouraged to call for assistance as needed. Will continue to monitor.

## 2022-05-28 NOTE — PROGRESS NOTES
[facility] [facility]   Phone: [phone]    Occupational Therapy    [x] Initial Evaluation            [] Daily Treatment Note         [] Discharge Summary      Patient: Efrain Rangel   : 1960   MRN: [id number]   Date of Service:  2022    Admitting Diagnosis:  Septic shock [facility])  Current Admission Summary: Patient presented to the emergency department with a chief complaint of abdominal pain, nausea and vomiting. He had had a recent prostatectomy. Baeza catheter was removed and after removal of the catheter he had poor urine output and developed the symptoms. Evaluation in the ED revealed a partially obstructing stone. He did have cystoscopy and stent placement  Past Medical History:  has a past medical history of Diabetes mellitus ([address] and Hypertension. Past Surgical History:  has a past surgical history that includes Prostatectomy (N/A, 2022) and Cystoscopy (Right, 2022). Discharge Recommendations: Efrain Rangel scored a 18/24 on the AM-PAC ADL Inpatient form. Current research shows that an AM-PAC score of 18 or greater is typically associated with a discharge to the patient's home setting. Based on the patient's AM-PAC score, and their current ADL deficits, it is recommended that the patient have 2-3 sessions per week of Occupational Therapy at d/c to increase the patient's independence. At this time, this patient demonstrates the endurance and safety to discharge home with home services and a follow up treatment frequency of 2-3x/wk. Please see assessment section for further patient specific details. If patient discharges prior to next session this note will serve as a discharge summary. Please see below for the latest assessment towards goals.      HOME HEALTH CARE: LEVEL 2 SOCIAL     - Initial home health evaluation to occur within 24-48 hours, in patient home   - Therapy to evaluate with goal of regaining prior level of functioning   - Therapy to evaluate if patient has [address] needs for personal care   -  evaluation within 24-48 hours, includes evaluation of resources and insurance to determine AL/IL/LTC/Medicaid options   - [place] on Aging Referral   - Aleida Jolley to discuss home support and care needs post discharge within two weeks of discharge. DME Required For Discharge: rolling walker, bedside commode    Precautions/Restrictions: high fall risk, contact precautions    Pre-Admission Information   Lives With: spouse    Type of Home: house  Home Layout: two level, bedroom/bathroom upstairs  Home Access: 1 step to enter without rails   Bathroom Layout: tub/shower unit, walk in shower  Toilet Height: standard height  Home Equipment: no prior equipment  Transfer Assistance: Independent without use of device  Ambulation Assistance:Independent without use of device  ADL Assistance: independent with all ADL's  IADL Assistance: independent with homemaking tasks  Hobbies: get outside  Recent Falls: no falls    Examination   Vision:   Vision Gross Assessment: Impaired and Vision Corrective Device: wears glasses for reading  Hearing:   WFL  Perception:   WFL   ROM:   (B) UE AROM WFL  Strength:   (B) UE strength grossly WFL    Decision Making: medium complexity  Clinical Presentation: evolving      Subjective  General: Patient supine in bed upon arrival, Chinese speaking with dtr translating--pt agreeable to evaluation. No pain, did c/o hiccups which resolved some with mobility  Pain: 0/10  Pain Interventions: not applicable        Activities of Daily Living  Basic Activities of Daily Living  General Comments: Declines  Instrumental Activities of Daily Living  No IADL completed on this date.     Functional Mobility  Bed Mobility  Supine to Sit: minimal assistance  Comments:  Transfers  Sit to stand transfer:minimal assistance  Stand to sit transfer: contact guard assistance  Comments:  Functional Mobility:  Functional Mobility: rolling walker. contact guard assistance. Activity: initially min/mod x1 for standing balance with no device and HHA--provided with RW and pt able to amb with ' in hallway with increased time    Other Therapeutic Interventions    Functional Outcomes  AM-PAC Inpatient Daily Activity Raw Score: 18    Cognition  WFL--appears WFL, limited assessment d/t language  Orientation:    alert and oriented x 4  Command Following:   [facility]  Barriers To Learning: language  Patient Education: patient educated on goals, OT role and benefits, discharge recommendations  Learning Assessment:  patient verbalizes and demonstrates understanding, patient verbalizes understanding, would benefit from continued reinforcement    Assessment  Activity Tolerance: Tolerated well, limited by fatigue but benefits from rest breaks  Impairments Requiring Therapeutic Intervention: decreased functional mobility, decreased ADL status, decreased strength, decreased endurance, decreased balance, decreased IADL  Prognosis: good  Clinical Assessment: Pt presents below baseline level of function d/t above deficits--normally independent with ADL/mobility and now requiring use of RW with CGA for ambulation.  Anticipate safe to return home with family support and HHOT once medically stable, with acute OT f/u during hospitalization to facilitate return to PLOF  Safety Interventions: patient left in chair, call light within reach, nurse notified and family/caregiver present RN present to provide medication, prior to helping pt return to bed    Plan  Frequency: 3-5 x/per week  Current Treatment Recommendations: strengthening, balance training, functional mobility training, transfer training, endurance training, patient/caregiver education, ADL/self-care training, home management training and equipment evaluation/education    Goals    Short Term Goals:  Time Frame: by discharge  Patient will complete upper body ADL at modified independent   Patient will complete lower body ADL at supervision   Patient will complete grooming at Independent   Patient will complete functional transfers at Independent   Patient will complete functional mobility at supervision     Therapy Session Time     Individual Group Co-treatment   Time In    1015   Time Out    1055   Minutes    40        Timed Code Treatment Minutes:   25 minutes  Total Treatment Minutes:  40 minutes       Electronically Signed By: ARACELIS Ricks MOT OTR/L [id number]

## 2022-05-28 NOTE — PLAN OF CARE

## 2022-05-28 NOTE — PROGRESS NOTES
MD Grey Powers MD Gaylyn Mourning, MD               Office: [phone]                      Fax: [phone]             1 [facility]                   NEPHROLOGY INPATIENT PROGRESS NOTE:     PATIENT NAME: Mojgan Zavaleta  : 1960  MRN: [id number]         Nephrology treatment plan update. Appreciate assistance from Dr. Irma Joseph- interventional radiology and placing tunneled hemodialysis catheter. Patient will have urgent hemodialysis treatment done today. Repeat hemodialysis treatment tomorrow      Acute renal failure-severe-critical-poor urine output.  - BUN is 135 and a creatinine of 11.5  - Etiology most likely related to ATN from sepsis. Developing multiple electrolyte imbalance due to severity of renal failure. - Hyponatremia-sodium is 131. - Worsening metabolic acidosis-CO2 is 18. Mild fluid overload. - I have reviewed the chest x-ray and it shows mild fluid overload and cardiomegaly. Patient being treated with IV antibiotics for gram-negative sepsis.-On IV meropenem. - Blood cultures and -positive for E. coli sepsis. - Blood cultures done on -negative so far. - Would recommend placing tunneled hemodialysis catheter as patient will need hemodialysis treatment for some time-sepsis appears to be responding to treatment      Discussed with treatment team.  Discussed with Dr. Krystal Wei. Discussed with and appreciate help from Dr. Irma Joseph. Discussed with nursing. Family updated about treatment plan. Remains critically ill. T.35 m                                          Admitted for:  Ureterolithiasis [N20.1]  CHRISSY (acute kidney injury) ([facility] [N17.9]  Septic shock ([facility] [A41.9, R65.21]  Severe sepsis ([address] [A41.9, R65.20]    ICD-10-CM    1. CHRISSY (acute kidney injury) ([facility]  N17.9    2. Ureterolithiasis  N20.1    3.  Septic shock (HCC)  A41.9     R65.21          CHRISSY (on CKD: 3B): Very oliguric  - BL Scr- 1.5 as team-       Severally ill, at risk of impending organ failure needing ICU higher level of care/monitoring. Time spent ~ 35 minutes that included face-to-face meeting/discussion with patient, patient's family, and treatment team (including primary/referring team and other consultants; included coordination of care with the treatment team; and review of patient's electronic medical records and ordering appropriates tests. Thank you for allowing me to participate in this patient's care. Please do not hesitate to contact me anytime. We will follow along with you. Kassandra Abbasi MD,  [facility] [address] [place] [address] [place]: [phone] or [employer]  Fax: [phone]        =======================================================================================   =======================================================================================  SUBJECTIVE-  Seen resting in bed  More awake  No acute distress  Tachycardia, BP slightly low normal  Temperature slightly higher  Underwent urgent stent placement on right side, urology Dr. Jose Bauer on 2 L nasal cannula      Past medical, Surgical, Social, Family medical history reviewed by me. MEDICATIONS: reviewed by me. Medications Prior to Admission:  No current facility-administered medications on file prior to encounter. Current Outpatient Medications on File Prior to Encounter   Medication Sig Dispense Refill    sildenafil (REVATIO) 20 MG tablet Take 20 mg by mouth daily      sulfamethoxazole-trimethoprim (BACTRIM DS;SEPTRA DS) 800-160 MG per tablet Take 1 tablet by mouth 2 times daily      metFORMIN (GLUCOPHAGE) 1000 MG tablet Take 1,000 mg by mouth 2 times daily (with meals)       senna-docusate (PERICOLACE) 8.6-50 MG per tablet Take 1 tablet by mouth 2 times daily For constipation while on pain medication.  30 tablet 1    amLODIPine (NORVASC) 10 MG tablet Take 10 mg by mouth daily            Current Facility-Administered Medications:     metoclopramide (REGLAN) injection 5 mg, 5 mg, IntraVENous, Q8H PRN, Farrah Gonzalez MD, 5 mg at 05/28/22 0631    [COMPLETED] meropenem (MERREM) 1,000 mg in sodium chloride 0.9 % 100 mL IVPB (mini-bag), 1,000 mg, IntraVENous, Once, Stopped at 05/27/22 1137 **FOLLOWED BY** meropenem (MERREM) 1,000 mg in sodium chloride 0.9 % 100 mL IVPB (mini-bag), 1,000 mg, IntraVENous, Q24H, Neal Fraga MD    HYDROmorphone HCl PF (DILAUDID) injection 1 mg, 1 mg, IntraVENous, Q4H PRN, Collette Olguin MD, 1 mg at 05/27/22 2040    sodium chloride flush 0.9 % injection 5-40 mL, 5-40 mL, IntraVENous, 2 times per day, Collette Olguin MD, 10 mL at 05/27/22 2200    sodium chloride flush 0.9 % injection 5-40 mL, 5-40 mL, IntraVENous, PRN, Collette Olguin MD    0.9 % sodium chloride infusion, , IntraVENous, PRN, Collette Olguin MD    heparin (porcine) injection 5,000 Units, 5,000 Units, SubCUTAneous, TID, Collette Olguin MD, 5,000 Units at 05/27/22 2149    ondansetron (ZOFRAN-ODT) disintegrating tablet 4 mg, 4 mg, Oral, Q8H PRN **OR** ondansetron (ZOFRAN) injection 4 mg, 4 mg, IntraVENous, Q6H PRN, Collette Olguin MD    polyethylene glycol (GLYCOLAX) packet 17 g, 17 g, Oral, Daily PRN, Collette Olguin MD    acetaminophen (TYLENOL) tablet 650 mg, 650 mg, Oral, Q6H PRN **OR** acetaminophen (TYLENOL) suppository 650 mg, 650 mg, Rectal, Q6H PRN, Collette Olguin MD    insulin lispro (HUMALOG) injection vial 0-12 Units, 0-12 Units, SubCUTAneous, TID WC, Collette Olguin MD, 4 Units at 05/27/22 1706    insulin lispro (HUMALOG) injection vial 0-6 Units, 0-6 Units, SubCUTAneous, Nightly, Collette Olguin MD, 1 Units at 05/27/22 4903      REVIEW OF SYSTEMS:Review of systems not obtained due to patient factors - mental status          =======================================================================================     PHYSICAL EXAM:  Recent vital signs and recent I/Os reviewed by me.      Wt Readings from Last 3 Encounters:   05/28/22 162 lb 1.6 oz (73.5 kg)   05/16/22 148 lb (67.1 kg)   03/19/22 160 lb (72.6 kg)     BP Readings from Last 3 Encounters:   05/28/22 138/72   05/16/22 (!) 169/102   03/19/22 (!) 171/98     Patient Vitals for the past 24 hrs:   BP Temp Temp src Pulse Resp SpO2 Weight   05/28/22 0400 -- 97.9 °F (36.6 °C) Temporal -- 16 -- 162 lb 1.6 oz (73.5 kg)   05/28/22 0000 138/72 98 °F (36.7 °C) Temporal 95 16 96 % --   05/27/22 2110 -- -- -- -- 18 -- --   05/27/22 2000 (!) 145/73 97.8 °F (36.6 °C) Temporal 97 18 92 % --   05/27/22 1720 -- -- -- -- 17 -- --   05/27/22 1700 133/73 -- -- 92 -- (!) 88 % --   05/27/22 1500 (!) 141/79 -- -- 94 -- 93 % --   05/27/22 1400 136/76 -- -- 93 -- 94 % --   05/27/22 1300 (!) 146/75 -- -- 97 -- 92 % --   05/27/22 1200 131/74 -- -- 95 17 93 % --   05/27/22 1100 138/72 -- -- 96 -- 93 % --       Intake/Output Summary (Last 24 hours) at 5/28/2022 1041  Last data filed at 5/27/2022 1656  Gross per 24 hour   Intake 512.99 ml   Output 50 ml   Net 462.99 ml       Physical Exam  Vitals reviewed. Constitutional:       General: He is not in acute distress. Appearance: Normal appearance. HENT:      Head: Normocephalic and atraumatic. Right Ear: External ear normal.      Left Ear: External ear normal.      Nose: Nose normal.      Mouth/Throat:      Mouth: Mucous membranes are dry. Eyes:      General: No scleral icterus. Conjunctiva/sclera: Conjunctivae normal.   Neck:      Vascular: No JVD. Cardiovascular:      Rate and Rhythm: Normal rate and regular rhythm. Heart sounds: S1 normal and S2 normal.   Pulmonary:      Effort: Respiratory distress (mild) present. Breath sounds: Rhonchi present. Abdominal:      General: Bowel sounds are normal. There is no distension. Musculoskeletal:         General: No swelling or deformity. Cervical back: Normal range of motion and neck supple. Skin:     General: Skin is dry.       Coloration: Skin is not jaundiced. Neurological:      Mental Status: He is disoriented. Comments: Unable to obtain as part of sedation     Psychiatric:      Comments: Unable to obtain as part of sedation                  =======================================================================================     DATA:  Diagnostic tests reviewed by me for today's visit:   (AS NEEDED FOR MY EVALUATION AND MANAGEMENT). Recent Labs     05/26/22 1205 05/27/22 0314 05/28/22  0244   WBC 16.1* 12.8* 12.3*   HCT 31.6* 30.1* 28.4*    175 194     Iron Saturation:  No components found for: PERCENTFE  FERRITIN:  No results found for: FERRITIN  IRON:  No results found for: IRON  TIBC:  No results found for: TIBC    Recent Labs     05/26/22 1205 05/26/22 1845 05/27/22 0314 05/28/22  0244   * 128* 131* 131*   K 4.2 4.1 4.1 3.9  3.9   CL 83* 88* 90* 90*   CO2 21 15* 15* 18*   BUN 90* 95* 104* 135*   CREATININE 8.0* 8.4* 10.0* 11.2*     Recent Labs     05/26/22 1205 05/26/22 1845 05/27/22 0314 05/28/22  0244   CALCIUM 9.2 8.6 8.4 8.4   PHOS  --  5.2* 6.0* 5.4*     No results for input(s): PH, PCO2, PO2 in the last 72 hours.     Invalid input(s): Eden Hanson    ABG:  No results found for: PH, PCO2, PO2, HCO3, BE, THGB, TCO2, O2SAT  VBG:  No results found for: PHVEN, ZMI4UNI, BEVEN, L0QVFILY    LDH:  No results found for: LDH  Uric Acid:    Lab Results   Component Value Date    LABURIC 8.8 05/26/2022       PT/INR:    Lab Results   Component Value Date    PROTIME 12.9 05/02/2022    INR 1.14 05/02/2022     Warfarin PT/INR:  No components found for: PTPATWAR, PTINRWAR  PTT:    Lab Results   Component Value Date    APTT 34.5 05/02/2022   [APTT}  Last 3 Troponin:  No results found for: TROPONINI    U/A:    Lab Results   Component Value Date    COLORU Yellow 05/26/2022    PROTEINU >=300 05/26/2022    PHUR 7.5 05/26/2022    WBCUA 10-20 05/26/2022    RBCUA 5-10 05/26/2022    BACTERIA 2+ 05/26/2022    CLARITYU Clear 05/26/2022    SPECGRAV 1.020 05/26/2022    LEUKOCYTESUR LARGE 05/26/2022    UROBILINOGEN 0.2 05/26/2022    BILIRUBINUR SMALL 05/26/2022    BLOODU LARGE 05/26/2022    GLUCOSEU 500 05/26/2022     Microalbumen/Creatinine ratio:  No components found for: RUCREAT  24 Hour Urine for Protein:  No components found for: RAWUPRO, UHRS3, ERCT94MU, UTV3  24 Hour Urine for Creatinine Clearance:  No components found for: CREAT4, UHRS10, UTV10  Urine Toxicology:  No components found for: IAMMENTA, IBARBIT, IBENZO, ICOCAINE, IMARTHC, IOPIATES, IPHENCYC    HgBA1c:  No results found for: LABA1C  RPR:  No results found for: RPR  HIV:  No results found for: HIV  BRITTANY:  No results found for: ANATITER, BRITTANY  RF:  No results found for: RF  DSDNA:  No components found for: DNA  AMYLASE:  No results found for: AMYLASE  LIPASE:    Lab Results   Component Value Date    LIPASE 31.0 05/26/2022     Fibrinogen Level:  No components found for: FIB       BELOW MENTIONED RADIOLOGY STUDY RESULTS BY ME (AS NEEDED FOR MY EVALUATION AND MANAGEMENT). CT ABDOMEN PELVIS WO CONTRAST Additional Contrast? None    Result Date: 5/26/2022  EXAMINATION: CT OF THE ABDOMEN AND PELVIS WITHOUT CONTRAST 5/26/2022 1:36 pm TECHNIQUE: CT of the abdomen and pelvis was performed without the administration of intravenous contrast. Multiplanar reformatted images are provided for review. Automated exposure control, iterative reconstruction, and/or weight based adjustment of the mA/kV was utilized to reduce the radiation dose to as low as reasonably achievable. COMPARISON: 03/19/2022 HISTORY: ORDERING SYSTEM PROVIDED HISTORY: recent prostate surg - n/v TECHNOLOGIST PROVIDED HISTORY: Reason for exam:->recent prostate surg - n/v Additional Contrast?->None Reason for Exam: recent prostate surg - n/v Additional signs and symptoms: french breathing instructions provided, pt still not holding breath. Best scan possible.  FINDINGS: Lower Chest: There is mild bibasilar dependent atelectasis. Organs: There is mild right hydronephrosis secondary to a 12 mm calculus lodged within the right ureteral vesicular junction. There is mild right perinephric stranding. The remainder of the solid abdominal organs are unremarkable. GI/Bowel: There is no bowel dilatation, wall thickening or obstruction. Pelvis: The bladder is decompressed by Baeza catheter and thus not evaluated. Postop changes of prostatectomy are present. There are multiple extraperitoneal pelvic gas collections within the surgical bed and pelvic sidewalls. Peritoneum/Retroperitoneum: There is no free air, free fluid or intraperitoneal in phlegm a nat change. There is no adenopathy. Bones/Soft Tissues: There is no acute fracture or aggressive osseous lesion. Mildly obstructing 12 mm right UPJ Postsurgical pelvic changes without complicating feature.  RECOMMENDATIONS: Unavailable

## 2022-05-28 NOTE — PROGRESS NOTES
Treatment time: 2.5 hours  Net UF: 1500 ml     Pre weight: 73.5 kg  Post weight:72 kg  EDW: CHRISSY kg     05/28/22 1720 05/28/22 2015   Vital Signs   BP (!) 160/85 (!) 150/79   Temp (!) 100.7 °F (38.2 °C) 98.8 °F (37.1 °C)   Heart Rate 94 88   Resp 22 18   Height 5' 7\" (1.702 m) 5' 7\" (1.702 m)   Weight 162 lb 0.6 oz (73.5 kg) 158 lb 11.7 oz (72 kg)   Weight Method Bed scale Bed scale   Percent Weight Change -0.04 -2.04     Access used: R TDC (new)    Access function: Well with  ml/min     Medications or blood products given: Tylenol 650mg PO and Zofran 4mg IVP     Regular outpatient schedule: CHRISSY (next HD scheduled tomorrow 5/29)     Summary of response to treatment: Patient tolerated treatment well and without any complications. Patient remained stable throughout entire treatment and upon exiting the dialysis suite via RN transport. Report given to [facility], RN and copy of dialysis treatment record placed in chart, to be scanned into EMR.

## 2022-05-28 NOTE — PROGRESS NOTES
[facility] Department   Phone: [phone]    Physical Therapy    [x] Initial Evaluation            [] Daily Treatment Note         [] Discharge Summary      Patient: Charlette Lawson   : 1960   MRN: [id number]   Date of Service:  2022  Admitting Diagnosis: Septic shock [facility])     Current Admission Summary: Charlette Lawson is a 58 y.o. male with past medical history of diabetes and hypertension who presents to the ED with complaint of a postop problem. Patient states he was recently diagnosed with prostate cancer. Patient states he had robotic radical prostatectomy done by urologist, Dr. Cass Payne, on 2022. Had Baeza catheter that he states was removed on 2022. Patient states ever since the catheter has been removed he has had decreased urinary output. Patient states he has had some dribbling of urine and some incontinence. He states he has had decreased oral intake with associated nausea and vomiting. Denies abdominal pain, flank pain or back pain. Denies any changes in bowel movements. Denies fever or chills. Denies any rashes or lesions. Denies chest pain or shortness of breath. Came to the ED due to nausea, vomiting and feelings of fatigue. States he is scheduled to follow-up with urologist on . Past Medical History:  has a past medical history of Diabetes mellitus ([address] and Hypertension. Past Surgical History:  has a past surgical history that includes Prostatectomy (N/A, 2022) and Cystoscopy (Right, 2022). Discharge Recommendations: Charlette Lawson scored a 16/24 on the AM-PAC short mobility form. Current research shows that an AM-PAC score of 18 or greater is typically associated with a discharge to the patient's home setting.  Based on the patient's AM-PAC score and their current functional mobility deficits, it is recommended that the patient have 2-3 sessions per week of Physical Therapy at d/c to increase the patient's independence. At this time, this patient demonstrates the endurance and safety to discharge home with home health PT and a follow up treatment frequency of 2-3x/wk. Please see assessment section for further patient specific details. If patient discharges prior to next session this note will serve as a discharge summary. Please see below for the latest assessment towards goals. DME Required For Discharge: rolling walker  Precautions/Restrictions: high fall risk, contact precautions, Isolation precautions, up as tolerated  Weight Bearing Restrictions: weight bearing as tolerated  [] Right Upper Extremity  [] Left Upper Extremity [] Right Lower Extremity  [] Left Lower Extremity     Required Braces/Orthotics: no braces required   [] Right  [] Left  Positional Restrictions:no positional restrictions    Pre-Admission Information   Lives With: spouse                  Type of Home: house  Home Layout: two level, bedroom/bathroom upstairs  Home Access: 1 step to enter without rails   Bathroom Layout: tub/shower unit, walk in shower  Toilet Height: standard height  Home Equipment: no prior equipment  Transfer Assistance: Independent without use of device  Ambulation Assistance:Independent without use of device  ADL Assistance: independent with all ADL's  IADL Assistance: independent with homemaking tasks  Hobbies: get outside  Recent Falls: no falls    Examination   Vision:   Vision Gross Assessment: WFL  Hearing:   WFL  Observation:   General Observation:  Supine in bed w/ HOB elevated, constant hiccups noted. Posture: WNL  Sensation:   WFL  Proprioception:    WFL  Tone:   Normotonic  Coordination Testing:   WFL    ROM:   (B) LE AROM WFL  Strength:   (B) LE strength grossly WFL  Decision Making: medium complexity  Clinical Presentation: stable      Subjective  General: Patient's primary complaint is hiccups that won't stop.   Pain: 0/10 and Pain rating taken based on observed faces and behaviors  Pain Interventions: not applicable       Functional Mobility  Bed Mobility  Supine to Sit: minimal assistance  Comments:  Transfers  Sit to stand transfer: minimal assistance  Stand to sit transfer: contact guard assistance  Bed to chair transfer: contact guard assistance  Comments:  Gait belt donned. RW used for support. Ambulation  Surface:level surface  Assistive Device: rolling walker  Assistance: contact guard assistance  Distance: 270'  Gait Mechanics: dec'd tyler  Comments:  Patient requiring BUE support via walker for safety/balance. Stair Mobility  Stair mobility not completed on this date. Comments:  Wheelchair Mobility:  No w/c mobility completed on this date. Comments:  Balance  Static Sitting Balance: good: independent with functional balance in unsupported position  Dynamic Sitting Balance: fair: maintains balance at CGA without use of UE support  Static Standing Balance: poor (+): requires min (A) to maintain balance  Dynamic Standing Balance: fair (-): maintains balance at CGA with use of UE support  Comments:    Other Therapeutic Interventions    Functional Outcomes  AM-PAC Inpatient Mobility Raw Score : 16              Cognition  WFL  Orientation:    alert and oriented x 4  Command Following:   [facility]    Education  Barriers To Learning: language  Patient Education: patient educated on goals, PT role and benefits, plan of care, general safety, functional mobility training, proper use of assistive device/equipment, energy conservation, discharge recommendations  Learning Assessment:  patient verbalizes and demonstrates understanding    Assessment  Activity Tolerance: Patient limited by weakness/endurance.   Impairments Requiring Therapeutic Intervention: decreased functional mobility, decreased strength, decreased balance  Prognosis: good  Clinical Assessment: Patient presents physically below baseline level of function requiring assistance for all mobility and need for use of RW during ambulation. Patient was previously functionally independent without a device. Recommend 24 hour assist and continued therapy at d/c. Safety Interventions: patient left in chair, call light within reach, gait belt, patient at risk for falls, nurse notified and family/caregiver present, nurse present    Plan  Frequency: 3-5 x/per week  Current Treatment Recommendations: strengthening, balance training, functional mobility training, transfer training, gait training, stair training, endurance training, patient/caregiver education, safety education and equipment evaluation/education    Goals  Patient Goals: Return home. Short Term Goals:  Time Frame: Discharge.   Patient will complete bed mobility at modified independent   Patient will complete transfers at [facility]   Patient will ambulate 270 ft with use of LRAD at [facility]  Patient will ascend/descend 12 stairs with (L) ascending handrail at contact guard assistance    Therapy Session Time      Individual Group Co-treatment   Time In     1015   Time Out     1055   Minutes     40     Timed Code Treatment Minutes:  25 Minutes  Total Treatment Minutes:  40       Electronically Signed By: Gera Nash PT, DPT, ATC-R [id number]

## 2022-05-28 NOTE — PROGRESS NOTES
Patient feeling okay this morning. Hiccups continue. Medicated, see MAR. Patient requested to be put to chair. Nurse assist x2 at bedside for pivot to chair. Tolerated well and reports being more comfortable.

## 2022-05-28 NOTE — PROGRESS NOTES
[facility] PROGRESS NOTE    5/28/2022 1:12 PM        Name: Madonna Vargas . Admitted: 5/26/2022  Primary Care Provider: Berenice Barboza PA-C (Tel: [phone])          Subjective:      I patient lying in bed abdominal pain slightly improved nausea improved still having very poor urine output    Reviewed interval ancillary notes    Current Medications  metoclopramide (REGLAN) injection 5 mg, Q8H PRN  meropenem (MERREM) 1,000 mg in sodium chloride 0.9 % 100 mL IVPB (mini-bag), Q24H  HYDROmorphone HCl PF (DILAUDID) injection 1 mg, Q4H PRN  sodium chloride flush 0.9 % injection 5-40 mL, 2 times per day  sodium chloride flush 0.9 % injection 5-40 mL, PRN  0.9 % sodium chloride infusion, PRN  heparin (porcine) injection 5,000 Units, TID  ondansetron (ZOFRAN-ODT) disintegrating tablet 4 mg, Q8H PRN   Or  ondansetron (ZOFRAN) injection 4 mg, Q6H PRN  polyethylene glycol (GLYCOLAX) packet 17 g, Daily PRN  acetaminophen (TYLENOL) tablet 650 mg, Q6H PRN   Or  acetaminophen (TYLENOL) suppository 650 mg, Q6H PRN  insulin lispro (HUMALOG) injection vial 0-12 Units, TID WC  insulin lispro (HUMALOG) injection vial 0-6 Units, Nightly        Objective:  /72   Pulse 95   Temp 97.9 °F (36.6 °C) (Temporal)   Resp 16   Ht 5' 9\" (1.753 m)   Wt 162 lb 1.6 oz (73.5 kg)   SpO2 96%   BMI 23.94 kg/m²     Intake/Output Summary (Last 24 hours) at 5/28/2022 1312  Last data filed at 5/28/2022 4798  Gross per 24 hour   Intake 522.99 ml   Output 50 ml   Net 472.99 ml      Wt Readings from Last 3 Encounters:   05/28/22 162 lb 1.6 oz (73.5 kg)   05/16/22 148 lb (67.1 kg)   03/19/22 160 lb (72.6 kg)       General appearance:  Appears comfortable.  AAOx3  HEENT: atraumatic, Pupils equal, muscous membranes moist, no masses appreciated  Cardiovascular: tachycardia no murmurs appreciated  Respiratory: CTAB no wheezing  Gastrointestinal: Abdomen soft, non-tender, BS+  EXT: no edema  Neurology: no gross focal deficts  Psychiatry: Appropriate affect. Not agitated  Skin: Warm, dry, no rashes appreciated    Labs and Tests:  CBC:   Recent Labs     05/26/22  1205 05/27/22  0314 05/28/22  0244   WBC 16.1* 12.8* 12.3*   HGB 10.5* 10.0* 9.5*    175 194     BMP:    Recent Labs     05/26/22  1845 05/27/22  0314 05/28/22  0244   * 131* 131*   K 4.1 4.1 3.9  3.9   CL 88* 90* 90*   CO2 15* 15* 18*   BUN 95* 104* 135*   CREATININE 8.4* 10.0* 11.2*   GLUCOSE 297* 153* 114*     Hepatic:   Recent Labs     05/26/22  1845 05/27/22  0314 05/28/22  0244   AST 93* 81* 46*   ALT 44* 42* 33   BILITOT 1.9* 2.0* 2.0*   ALKPHOS 142* 120 111     XR CHEST PORTABLE   Final Result   Mild cardiomegaly with pulmonary vascular congestion. FL RETROGRADE PYELOGRAM W WO KUB   Final Result   For documentation purposes only. See separate procedure report for more   information. CT ABDOMEN PELVIS WO CONTRAST Additional Contrast? None   Final Result   Mildly obstructing 12 mm right UPJ      Postsurgical pelvic changes without complicating feature. RECOMMENDATIONS:   Unavailable         IR NONTUNNELED VASCULAR CATHETER > 5 YEARS    (Results Pending)       Recent imaging reviewed    Problem List  Principal Problem:    Septic shock ([address]  Active Problems:    CHRISSY (acute kidney injury) ([address]    Ureterolithiasis    Lactic acidosis    Infection due to ESBL-producing Escherichia coli    Bacteremia due to Gram-negative bacteria    Complicated UTI (urinary tract infection)    Hyponatremia    S/P radical cystoprostatectomy    Poorly controlled type 2 diabetes mellitus ([address]  Resolved Problems:    * No resolved hospital problems.  *       Assessment/Plan:   Severe sepsis secondary to e coli bacteremia secondary to  UTI and right nephrolithiasis 12mm s/p right ureteral stent placement  iv meropenem   -repeat bc pending  -id on board     chrissy secondary to above  - plan for hd today    Metabolic acidosis: dialysis today     Hyponatremia: imrpvoing     dm2 with hyperglycemia :continue insuling regimen        DVT prophylaxis heparin sub q   Code status  Full code    Bdu Tobias MD   5/28/2022 1:12 PM

## 2022-05-28 NOTE — PROGRESS NOTES
Infectious Diseases   Progress Note      Admission Date: 5/26/2022  Hospital Day: Hospital Day: 3   Attending: Barbara Lombardi MD  Date of service: 5/28/2022     Chief complaint/ Reason for consult:     · Septic shock with leukocytosis, tachycardia, tachypnea and hypotension  · Severe lactic acidosis  · Gram-negative bacteremia with ESBL E. coli  · Complicated E. coli UTI    Microbiology:        I have reviewed allavailable micro lab data and cultures    · Blood culture (2/2) - collected on 5/26/2022 : ESBL E. Coli    · Urine culture  - collected on 5/26/2022: Greater than 100,000 CFU per mL of ESBL E. coli    Susceptibility      Escherichia coli (esbl) (1)    Antibiotic Interpretation Microscan  Method Status    ampicillin Resistant >=32 mcg/mL BACTERIAL SUSCEPTIBILITY PANEL BY RICA     ampicillin-sulbactam Sensitive 4 mcg/mL BACTERIAL SUSCEPTIBILITY PANEL BY RICA     ceFAZolin Resistant >=64 mcg/mL BACTERIAL SUSCEPTIBILITY PANEL BY RICA     cefepime Resistant   BACTERIAL SUSCEPTIBILITY PANEL BY RICA     cefTRIAXone Resistant >=64 mcg/mL BACTERIAL SUSCEPTIBILITY PANEL BY RICA     ciprofloxacin Resistant >=4 mcg/mL BACTERIAL SUSCEPTIBILITY PANEL BY RICA     ertapenem Sensitive <=0.12 mcg/mL BACTERIAL SUSCEPTIBILITY PANEL BY RICA     gentamicin Sensitive <=1 mcg/mL BACTERIAL SUSCEPTIBILITY PANEL BY RICA     levofloxacin Resistant >=8 mcg/mL BACTERIAL SUSCEPTIBILITY PANEL BY RICA     nitrofurantoin Sensitive <=16 mcg/mL BACTERIAL SUSCEPTIBILITY PANEL BY RICA     trimethoprim-sulfamethoxazole Resistant >=320 mcg/mL BACTERIAL SUSCEPTIBILITY PANEL BY RICA         Antibiotics and immunizations:       Current antibiotics: All antibiotics and their doses were reviewed by me    Recent Abx Admin                   meropenem (MERREM) 1,000 mg in sodium chloride 0.9 % 100 mL IVPB (mini-bag) (mg) 1,000 mg New Bag 05/28/22 1101                  Immunization History: All immunization history was reviewed by me today.       There is no immunization history on file for this patient. Known drug allergies: All allergies were reviewed and updated    No Known Allergies    Social history:     Social History:  All social andepidemiologic history was reviewed and updated by me today as needed. · Tobacco use:   reports that he has never smoked. He has never used smokeless tobacco.  · Alcohol use:   reports no history of alcohol use. · Currently lives in: [facility]  ·  reports no history of drug use. COVID VACCINATION AND LAB RESULT RECORDS:     Internal Administration   First Dose      Second Dose           Last COVID Lab No results found for: SARS-COV-2, SARS-COV-2 RNA, SARS-COV-2, SARS-COV-2, SARS-COV-2 BY PCR, SARS-COV-2, SARS-COV-2, SARS-COV-2         Assessment:     The patient is a 58 y.o. old male who  has a past medical history of Diabetes mellitus ([address] and Hypertension. with following problems:    · Septic shock with leukocytosis, tachycardia, tachypnea and hypotension-improving  · Severe lactic acidosis-improving  · Gram-negative bacteremia with ESBL E. coli-covered with meropenem  · Complicated E. coli UTI  · History of robotic assisted laparoscopic radical prostatectomy with bilateral pelvic lymphadenectomy on 5/16/2022 -surgical pathology indicated acinar adenocarcinoma  · S/p cystoscopy and right ureteral stent placement for right ureteral stone  · Hyponatremia-being corrected  · Poorly controlled type 2 diabetes mellitus-maintain good glycemic control  ·       Discussion:      The patient is afebrile. He is on IV meropenem. He is tolerating antibiotics okay. White cell count is 12,300 today. Repeat blood culture from yesterday is in process. Plan:     Diagnostic Workup:    · Follow-up on repeat blood culture from yesterday  · Continue to follow  fever curve, WBC count and blood cultures. · Continue to monitor blood counts, liver and renal function.     Antimicrobials:    · Will continue IV meropenem at [facility] listed dose of 1 g every 24 hours  · Contact isolation for ESBL  · Nephrology plans for repeat hemodialysis tomorrow noted  · We will follow up on the culture results and clinical progress and will make further recommendations accordingly. · Continue close vitals monitoring. · Maintain good glycemic control. · Fall precautions. Aspiration precautions. · Continue to watch for new fever or diarrhea. · DVT prophylaxis. · Discussed all above with patient and RN. · Discussed in detail with patient's daughter at bedside      Drug Monitoring:    · Continue monitoring for antibiotic toxicity as follows: CBC, CMP   · Continue to watch for following: new or worsening fever, new hypotension, hives, lip swelling and redness or purulence at vascular access sites. I/v access Management:    · Continue to monitor i.v access sites for erythema, induration, discharge or tenderness. · As always, continue efforts to minimize tubes/lines/drains as clinically appropriate to reduce chances of line associated infections. Patient education and counseling:        · The patient was educated in detail about the side-effects of various antibiotics and things to watch for like new rashes, lip swelling, severe reaction, worsening diarrhea, break through fever etc.  · Discussed patient's condition and what to expect. All of the patient's questions were addressed in a satisfactory manner and patient verbalized understanding all instructions. Level of complexity of visit: High     TIME SPENT TODAY:     - Spent over  36 minutes on visit (including interval history, physical exam, review of data including labs, cultures, imaging, development and implementation of treatment plan and coordination of complex care). More than 50 percent of this includes face-to-face time spent with the patient for counseling and coordination of care. Thank you for involving me in the care of your patient.  I will continue to follow. If you have anyadditional questions, please do not hesitate to contact me. Subjective: Interval history: Interval history was obtained from chart review and patient/ RN. The patient is afebrile. He is on IV meropenem. He is tolerating it okay     REVIEW OF SYSTEMS:      Review of Systems   Constitutional: Positive for fatigue. Negative for chills, diaphoresis and fever. HENT: Negative for ear discharge, ear pain, rhinorrhea, sore throat and trouble swallowing. Eyes: Negative for discharge and redness. Respiratory: Negative for cough, shortness of breath and wheezing. Cardiovascular: Negative for chest pain and leg swelling. Gastrointestinal: Negative for abdominal pain, constipation, diarrhea and nausea. Endocrine: Negative for polyuria. Genitourinary: Negative for dysuria, flank pain, frequency, hematuria and urgency. Musculoskeletal: Negative for back pain and myalgias. Skin: Negative for rash. Neurological: Negative for dizziness, seizures and headaches. Hematological: Does not bruise/bleed easily. Psychiatric/Behavioral: Negative for hallucinations and suicidal ideas. All other systems reviewed and are negative. Past Medical History: All past medical history reviewed today. Past Medical History:   Diagnosis Date    Diabetes mellitus ([facility]     Hypertension        Past Surgical History: All past surgical history was reviewed today. Past Surgical History:   Procedure Laterality Date    CYSTOSCOPY Right 5/26/2022    CYSTOSCOPY, BILATERAL RETROGRADE PYELOGRAM, PLACEMENT OF RIGHT URETERAL STENT performed by Jerica Green MD at [address] 5/16/2022    ROBOTIC LAPAROSCOPIC RADICAL PROSTATECTOMY WITH BILATERAL LYMPH NODE DISSECTION AND BILATERAL NERVE SPARE performed by Aylin Garcia MD at [address]       Family History: All family history was reviewed today. History reviewed. No pertinent family history.     Objective:       PHYSICAL EXAM:      Vitals:   Vitals:    05/28/22 1400 05/28/22 1449 05/28/22 1455 05/28/22 1500   BP: 136/71 (!) 148/78 (!) 146/77 (!) 153/79   Pulse: 93 95 (!) 103 100   Resp:  27 22 28   Temp:       TempSrc:       SpO2: 90% 90% 93% 92%   Weight:       Height:           Physical Exam  Vitals and nursing note reviewed. Constitutional:       Appearance: Normal appearance. He is well-developed. HENT:      Head: Normocephalic and atraumatic. Right Ear: External ear normal.      Left Ear: External ear normal.      Nose: Nose normal. No congestion or rhinorrhea. Mouth/Throat:      Mouth: Mucous membranes are moist.      Pharynx: No oropharyngeal exudate or posterior oropharyngeal erythema. Eyes:      General: No scleral icterus. Right eye: No discharge. Left eye: No discharge. Conjunctiva/sclera: Conjunctivae normal.      Pupils: Pupils are equal, round, and reactive to light. Cardiovascular:      Rate and Rhythm: Normal rate and regular rhythm. Pulses: Normal pulses. Heart sounds: No murmur heard. No friction rub. Pulmonary:      Effort: Pulmonary effort is normal. No respiratory distress. Breath sounds: Normal breath sounds. No stridor. No wheezing, rhonchi or rales. Abdominal:      General: Bowel sounds are normal.      Palpations: Abdomen is soft. Tenderness: There is no abdominal tenderness. There is no right CVA tenderness, left CVA tenderness, guarding or rebound. Musculoskeletal:         General: No swelling or tenderness. Normal range of motion. Cervical back: Normal range of motion and neck supple. No rigidity. No muscular tenderness. Lymphadenopathy:      Cervical: No cervical adenopathy. Skin:     General: Skin is warm and dry. Coloration: Skin is not jaundiced. Findings: No erythema or rash. Neurological:      General: No focal deficit present. Mental Status: He is alert and oriented to person, place, and time.  Mental status is at baseline. Motor: No abnormal muscle tone. Psychiatric:         Mood and Affect: Mood normal.         Behavior: Behavior normal.         Thought Content: Thought content normal.           Lines and drains: All vascular access sites are healthy with no local erythema, discharge or tenderness. Intake and output:    I/O last 3 completed shifts: In: 1825.6 [I.V.:1642.7; IV Piggyback:182.9]  Out: 125 [Urine:125]    Lab Data:   All available labs and old records have been reviewed by me. CBC:  Recent Labs     05/26/22  1205 05/27/22  0314 05/28/22  0244   WBC 16.1* 12.8* 12.3*   RBC 3.47* 3.26* 3.16*   HGB 10.5* 10.0* 9.5*   HCT 31.6* 30.1* 28.4*    175 194   MCV 90.9 92.2 90.0   MCH 30.3 30.5 30.1   MCHC 33.3 33.1 33.5   RDW 13.0 13.2 13.2   BANDSPCT  --  6  --         BMP:  Recent Labs     05/26/22  1845 05/27/22  0314 05/28/22  0244   * 131* 131*   K 4.1 4.1 3.9  3.9   CL 88* 90* 90*   CO2 15* 15* 18*   BUN 95* 104* 135*   CREATININE 8.4* 10.0* 11.2*   CALCIUM 8.6 8.4 8.4   GLUCOSE 297* 153* 114*        Hepatic Function Panel:   Lab Results   Component Value Date    ALKPHOS 111 05/28/2022    ALT 33 05/28/2022    AST 46 05/28/2022    PROT 6.0 05/28/2022    BILITOT 2.0 05/28/2022    LABALBU 2.6 05/28/2022       CPK:   Lab Results   Component Value Date    CKTOTAL 64 05/26/2022     ESR: No results found for: SEDRATE  CRP: No results found for: CRP        Imaging: All pertinent images and reports for the current visit were reviewed by me during this visit. I reviewed the chest x-ray/CT scan/MRI images and independently interpreted the findings and results today. XR CHEST PORTABLE   Final Result   Mild cardiomegaly with pulmonary vascular congestion. FL RETROGRADE PYELOGRAM W WO KUB   Final Result   For documentation purposes only. See separate procedure report for more   information.          CT ABDOMEN PELVIS WO CONTRAST Additional Contrast? None   Final Result   Mildly obstructing 12 mm right UPJ      Postsurgical pelvic changes without complicating feature. RECOMMENDATIONS:   Unavailable         IR NONTUNNELED VASCULAR CATHETER > 5 YEARS    (Results Pending)       Medications: All current and past medications were reviewed.  meropenem  1,000 mg IntraVENous Q24H    sodium chloride flush  5-40 mL IntraVENous 2 times per day    heparin (porcine)  5,000 Units SubCUTAneous TID    insulin lispro  0-12 Units SubCUTAneous TID WC    insulin lispro  0-6 Units SubCUTAneous Nightly        sodium chloride 10 mL/hr at 05/28/22 1059       metoclopramide, HYDROmorphone, sodium chloride flush, sodium chloride, ondansetron **OR** ondansetron, polyethylene glycol, acetaminophen **OR** acetaminophen      Problem list:       Patient Active Problem List   Diagnosis Code    Prostate cancer ([facility] C61    Septic shock ([facility] A41.9, R65.21    CHRISSY (acute kidney injury) ([facility] N17.9    Ureterolithiasis N20.1    Lactic acidosis E87.2    Infection due to ESBL-producing Escherichia coli A49.8, Z16.12    Bacteremia due to Gram-negative bacteria M55.14    Complicated UTI (urinary tract infection) N39.0    Hyponatremia E87.1    S/P radical cystoprostatectomy Z90.79, Z90.6    Poorly controlled type 2 diabetes mellitus ([facility] E11.65       Please note that this chart was generated using Dragon dictation software. Although every effort was made to ensure the accuracy of this automated transcription, some errors in transcription may have occurred inadvertently. If you may need any clarification, please do not hesitate to contact me through EPIC or at the phone number provided below with my electronic signature. Any pictures or media included in this note were obtained after taking informed verbal consent from the patient and with their approval to include those in the patient's medical record.       Efrain Moya MD, MPH, Jeremy Davis, [address] [facility]  5/28/2022, 3:22 PM  [facility] Infectious Disease   [address] [facility], [address] [place] [address]  Office: [phone]  Fax: [phone]  Clinic days:  Tuesday & Thursday

## 2022-05-28 NOTE — PROGRESS NOTES
gu note    vss afeb  Min urine output    Creat 11    abd soft, nt    Appetite poor    impr    Sp rrp  Cysto right stent for stone  Urosepsis  ecoli blood  Renal failure    recc  Cont antibx  Cont pierre  Cont stent  Disc with family and nephrology

## 2022-05-28 NOTE — PROGRESS NOTES
PRN **OR** acetaminophen (TYLENOL) suppository 650 mg, 650 mg, Rectal, Q6H PRN  insulin lispro (HUMALOG) injection vial 0-12 Units, 0-12 Units, SubCUTAneous, TID WC  insulin lispro (HUMALOG) injection vial 0-6 Units, 0-6 Units, SubCUTAneous, Nightly  insulin glargine (LANTUS) injection vial 10 Units, 10 Units, SubCUTAneous, Nightly    No Known Allergies    Social History:    TOBACCO:   reports that he has never smoked. He has never used smokeless tobacco.  ETOH:   reports no history of alcohol use. Patient currently lives independently  Environmental/chemical exposure: Unknown    Family History:   History reviewed. No pertinent family history. REVIEW OF SYSTEMS:    CONSTITUTIONAL:  negative for fevers, chills, diaphoresis, activity change, appetite change, fatigue, night sweats and unexpected weight change. EYES:  negative for blurred vision, eye discharge, visual disturbance and icterus  HEENT:  negative for hearing loss, tinnitus, ear drainage, sinus pressure, nasal congestion, epistaxis and snoring  RESPIRATORY:  See HPI  CARDIOVASCULAR:  negative for chest pain, palpitations, exertional chest pressure/discomfort, edema, syncope  GASTROINTESTINAL:  negative for nausea, vomiting, diarrhea, constipation, blood in stool and abdominal pain  GENITOURINARY:  negative for frequency, dysuria, urinary incontinence, decreased urine volume, and hematuria  HEMATOLOGIC/LYMPHATIC:  negative for easy bruising, bleeding and lymphadenopathy  ALLERGIC/IMMUNOLOGIC:  negative for recurrent infections, angioedema, anaphylaxis and drug reactions  ENDOCRINE:  negative for weight changes and diabetic symptoms including polyuria, polydipsia and polyphagia  MUSCULOSKELETAL:  negative for  pain, joint swelling, decreased range of motion and muscle weakness  NEUROLOGICAL:  negative for headaches, slurred speech, unilateral weakness  PSYCHIATRIC/BEHAVIORAL: negative for hallucinations, behavioral problems, confusion and agitation. Objective:   PHYSICAL EXAM:      VITALS:  /72   Pulse 95   Temp 97.9 °F (36.6 °C) (Temporal)   Resp 16   Ht 5' 9\" (1.753 m)   Wt 162 lb 1.6 oz (73.5 kg)   SpO2 96%   BMI 23.94 kg/m²      24HR INTAKE/OUTPUT:      Intake/Output Summary (Last 24 hours) at 5/28/2022 7517  Last data filed at 5/27/2022 1656  Gross per 24 hour   Intake 512.99 ml   Output 50 ml   Net 462.99 ml     CONSTITUTIONAL:  awake, alert, cooperative, no apparent distress, and appears stated age  NECK:  Supple, symmetrical, trachea midline, no adenopathy, thyroid symmetric, not enlarged and no tenderness, skin normal  LUNGS:  no increased work of breathing and clear to auscultation. No accessory muscle use  CARDIOVASCULAR: S1 and S2, no edema and no JVD  ABDOMEN:  normal bowel sounds, non-distended and no masses palpated, and no tenderness to palpation. No hepatospleenomegaly  LYMPHADENOPATHY:  no axillary or supraclavicular adenopathy. No cervical adnenopathy  PSYCHIATRIC: Oriented to person place and time. No obvious depression or anxiety. MUSCULOSKELETAL: No obvious misalignment or effusion of the joints. No clubbing, cyanosis of the digits. SKIN:  normal skin color, texture, turgor and no redness, warmth, or swelling.  No palpable nodules    DATA:    Old records have been reviewed    CBC:  Recent Labs     05/26/22  1205 05/27/22  0314 05/28/22  0244   WBC 16.1* 12.8* 12.3*   RBC 3.47* 3.26* 3.16*   HGB 10.5* 10.0* 9.5*   HCT 31.6* 30.1* 28.4*    175 194   MCV 90.9 92.2 90.0   MCH 30.3 30.5 30.1   MCHC 33.3 33.1 33.5   RDW 13.0 13.2 13.2   BANDSPCT  --  6  --       BMP:  Recent Labs     05/26/22  1845 05/27/22  0314 05/28/22  0244   * 131* 131*   K 4.1 4.1 3.9  3.9   CL 88* 90* 90*   CO2 15* 15* 18*   BUN 95* 104* 135*   CREATININE 8.4* 10.0* 11.2*   CALCIUM 8.6 8.4 8.4   GLUCOSE 297* 153* 114*      ABG:  No results for input(s): PHART, RKY2EXA, PO2ART, MSG4JFE, I6FKVPNS, BEART in the last 72 hours.  Procalcitonin  No results for input(s): PROCAL in the last 72 hours. No results found for: BNP  Lab Results   Component Value Date    CKTOTAL 64 05/26/2022           Radiology Review:  All pertinent images / reports were reviewed as a part of this visit. Assessment:     1. Severe sepsis secondary to ESBL E. coli  2. CHRISSY  3. Nephrolithiasis  4. Prostate cancer  5. Hiccups      Plan:     I have reviewed laboratories, medical records and images for this visit  Chest imaging yesterday revealed evidence of mild vascular congestion and cardiomegaly. Continues on 2 L/min nasal cannula oxygen supplement but was actually on room air when I was in the room with saturations in the low 90s. Growing ESBL E. coli from blood  On Merrem per sensitivities  ID is following    Has poor urine output and his creatinine is now 11  CHRISSY likely related to severe sepsis. Kidney stone was partially obstructing but does not seem like it is the sole cause of his CHRISSY.   Bicarb drip is stopped  Him dynamically stable  No hyperkalemia or acidemia  However, may yet need hemodialysis at some point    Hiccups are controlled

## 2022-05-29 ENCOUNTER — APPOINTMENT (OUTPATIENT)
Dept: CT IMAGING | Age: 62
DRG: 720 | End: 2022-05-29
Payer: MEDICAID

## 2022-05-29 LAB
A/G RATIO: 0.7 (ref 1.1–2.2)
ALBUMIN SERPL-MCNC: 2.3 G/DL (ref 3.4–5)
ALP BLD-CCNC: 213 U/L (ref 40–129)
ALT SERPL-CCNC: 30 U/L (ref 10–40)
ANION GAP SERPL CALCULATED.3IONS-SCNC: 19 MMOL/L (ref 3–16)
AST SERPL-CCNC: 42 U/L (ref 15–37)
BASOPHILS ABSOLUTE: 0 K/UL (ref 0–0.2)
BASOPHILS RELATIVE PERCENT: 0 %
BILIRUB SERPL-MCNC: 1.9 MG/DL (ref 0–1)
BLOOD CULTURE, ROUTINE: ABNORMAL
BUN BLDV-MCNC: 101 MG/DL (ref 7–20)
BURR CELLS: ABNORMAL
CALCIUM SERPL-MCNC: 8.3 MG/DL (ref 8.3–10.6)
CHLORIDE BLD-SCNC: 93 MMOL/L (ref 99–110)
CO2: 20 MMOL/L (ref 21–32)
CREAT SERPL-MCNC: 8.6 MG/DL (ref 0.8–1.3)
CULTURE, BLOOD 2: ABNORMAL
CULTURE, BLOOD 2: ABNORMAL
DOHLE BODIES: PRESENT
EOSINOPHILS ABSOLUTE: 0 K/UL (ref 0–0.6)
EOSINOPHILS RELATIVE PERCENT: 0 %
GFR AFRICAN AMERICAN: 8
GFR NON-AFRICAN AMERICAN: 6
GLUCOSE BLD-MCNC: 186 MG/DL (ref 70–99)
GLUCOSE BLD-MCNC: 186 MG/DL (ref 70–99)
GLUCOSE BLD-MCNC: 188 MG/DL (ref 70–99)
GLUCOSE BLD-MCNC: 199 MG/DL (ref 70–99)
GLUCOSE BLD-MCNC: 240 MG/DL (ref 70–99)
GLUCOSE BLD-MCNC: 264 MG/DL (ref 70–99)
HBV SURFACE AB TITR SER: 53.77 MIU/ML
HCT VFR BLD CALC: 25.9 % (ref 40.5–52.5)
HEMOGLOBIN: 8.8 G/DL (ref 13.5–17.5)
HEPATITIS B SURFACE ANTIGEN INTERPRETATION: NORMAL
LACTIC ACID: 1.9 MMOL/L (ref 0.4–2)
LYMPHOCYTES ABSOLUTE: 0.1 K/UL (ref 1–5.1)
LYMPHOCYTES RELATIVE PERCENT: 1 %
MCH RBC QN AUTO: 30.3 PG (ref 26–34)
MCHC RBC AUTO-ENTMCNC: 34 G/DL (ref 31–36)
MCV RBC AUTO: 89 FL (ref 80–100)
MONOCYTES ABSOLUTE: 1.1 K/UL (ref 0–1.3)
MONOCYTES RELATIVE PERCENT: 8 %
NEUTROPHILS ABSOLUTE: 12.4 K/UL (ref 1.7–7.7)
NEUTROPHILS RELATIVE PERCENT: 91 %
ORGANISM: ABNORMAL
PDW BLD-RTO: 13.1 % (ref 12.4–15.4)
PERFORMED ON: ABNORMAL
PLATELET # BLD: 209 K/UL (ref 135–450)
PLATELET SLIDE REVIEW: ADEQUATE
PMV BLD AUTO: 8.3 FL (ref 5–10.5)
POTASSIUM REFLEX MAGNESIUM: 3.9 MMOL/L (ref 3.5–5.1)
RBC # BLD: 2.91 M/UL (ref 4.2–5.9)
SLIDE REVIEW: ABNORMAL
SODIUM BLD-SCNC: 132 MMOL/L (ref 136–145)
TARGET CELLS: ABNORMAL
TOTAL PROTEIN: 5.7 G/DL (ref 6.4–8.2)
TOXIC GRANULATION: PRESENT
WBC # BLD: 13.6 K/UL (ref 4–11)

## 2022-05-29 PROCEDURE — 2580000003 HC RX 258: Performed by: INTERNAL MEDICINE

## 2022-05-29 PROCEDURE — 83605 ASSAY OF LACTIC ACID: CPT

## 2022-05-29 PROCEDURE — 6370000000 HC RX 637 (ALT 250 FOR IP): Performed by: INTERNAL MEDICINE

## 2022-05-29 PROCEDURE — 6360000002 HC RX W HCPCS: Performed by: INTERNAL MEDICINE

## 2022-05-29 PROCEDURE — 99232 SBSQ HOSP IP/OBS MODERATE 35: CPT | Performed by: INTERNAL MEDICINE

## 2022-05-29 PROCEDURE — 90935 HEMODIALYSIS ONE EVALUATION: CPT

## 2022-05-29 PROCEDURE — 85025 COMPLETE CBC W/AUTO DIFF WBC: CPT

## 2022-05-29 PROCEDURE — 99233 SBSQ HOSP IP/OBS HIGH 50: CPT | Performed by: INTERNAL MEDICINE

## 2022-05-29 PROCEDURE — 36415 COLL VENOUS BLD VENIPUNCTURE: CPT

## 2022-05-29 PROCEDURE — 6370000000 HC RX 637 (ALT 250 FOR IP): Performed by: PEDIATRICS

## 2022-05-29 PROCEDURE — 80053 COMPREHEN METABOLIC PANEL: CPT

## 2022-05-29 PROCEDURE — 2000000000 HC ICU R&B

## 2022-05-29 RX ORDER — DEXTROSE MONOHYDRATE 50 MG/ML
100 INJECTION, SOLUTION INTRAVENOUS PRN
Status: DISCONTINUED | OUTPATIENT
Start: 2022-05-29 | End: 2022-06-06 | Stop reason: HOSPADM

## 2022-05-29 RX ORDER — INSULIN GLARGINE 100 [IU]/ML
8 INJECTION, SOLUTION SUBCUTANEOUS DAILY
Status: DISCONTINUED | OUTPATIENT
Start: 2022-05-29 | End: 2022-06-06 | Stop reason: HOSPADM

## 2022-05-29 RX ORDER — BACLOFEN 10 MG/1
5 TABLET ORAL ONCE
Status: COMPLETED | OUTPATIENT
Start: 2022-05-29 | End: 2022-05-29

## 2022-05-29 RX ORDER — TRAZODONE HYDROCHLORIDE 50 MG/1
50 TABLET ORAL NIGHTLY
Status: DISCONTINUED | OUTPATIENT
Start: 2022-05-29 | End: 2022-05-30

## 2022-05-29 RX ORDER — LORAZEPAM 0.5 MG/1
0.5 TABLET ORAL EVERY 6 HOURS PRN
Status: DISCONTINUED | OUTPATIENT
Start: 2022-05-29 | End: 2022-06-06 | Stop reason: HOSPADM

## 2022-05-29 RX ADMIN — INSULIN GLARGINE 8 UNITS: 100 INJECTION, SOLUTION SUBCUTANEOUS at 17:09

## 2022-05-29 RX ADMIN — Medication 10 ML: at 08:35

## 2022-05-29 RX ADMIN — INSULIN LISPRO 6 UNITS: 100 INJECTION, SOLUTION INTRAVENOUS; SUBCUTANEOUS at 08:34

## 2022-05-29 RX ADMIN — HYDROMORPHONE HYDROCHLORIDE 1 MG: 1 INJECTION, SOLUTION INTRAMUSCULAR; INTRAVENOUS; SUBCUTANEOUS at 10:22

## 2022-05-29 RX ADMIN — METOCLOPRAMIDE 5 MG: 5 INJECTION, SOLUTION INTRAMUSCULAR; INTRAVENOUS at 00:10

## 2022-05-29 RX ADMIN — HEPARIN SODIUM 5000 UNITS: 5000 INJECTION INTRAVENOUS; SUBCUTANEOUS at 08:34

## 2022-05-29 RX ADMIN — METOCLOPRAMIDE 5 MG: 5 INJECTION, SOLUTION INTRAMUSCULAR; INTRAVENOUS at 18:29

## 2022-05-29 RX ADMIN — INSULIN LISPRO 1 UNITS: 100 INJECTION, SOLUTION INTRAVENOUS; SUBCUTANEOUS at 22:07

## 2022-05-29 RX ADMIN — BACLOFEN 5 MG: 10 TABLET ORAL at 22:07

## 2022-05-29 RX ADMIN — METOCLOPRAMIDE 5 MG: 5 INJECTION, SOLUTION INTRAMUSCULAR; INTRAVENOUS at 08:34

## 2022-05-29 RX ADMIN — TRAZODONE HYDROCHLORIDE 50 MG: 50 TABLET ORAL at 22:07

## 2022-05-29 RX ADMIN — POLYETHYLENE GLYCOL 3350 17 G: 17 POWDER, FOR SOLUTION ORAL at 18:02

## 2022-05-29 RX ADMIN — INSULIN LISPRO 4 UNITS: 100 INJECTION, SOLUTION INTRAVENOUS; SUBCUTANEOUS at 12:49

## 2022-05-29 RX ADMIN — MEROPENEM 1000 MG: 1 INJECTION, POWDER, FOR SOLUTION INTRAVENOUS at 10:25

## 2022-05-29 RX ADMIN — HEPARIN SODIUM 5000 UNITS: 5000 INJECTION INTRAVENOUS; SUBCUTANEOUS at 22:06

## 2022-05-29 RX ADMIN — INSULIN LISPRO 2 UNITS: 100 INJECTION, SOLUTION INTRAVENOUS; SUBCUTANEOUS at 17:10

## 2022-05-29 RX ADMIN — HYDROMORPHONE HYDROCHLORIDE 1 MG: 1 INJECTION, SOLUTION INTRAMUSCULAR; INTRAVENOUS; SUBCUTANEOUS at 05:24

## 2022-05-29 RX ADMIN — ACETAMINOPHEN 650 MG: 325 TABLET ORAL at 12:49

## 2022-05-29 RX ADMIN — SODIUM CHLORIDE: 9 INJECTION, SOLUTION INTRAVENOUS at 10:23

## 2022-05-29 RX ADMIN — HEPARIN SODIUM 5000 UNITS: 5000 INJECTION INTRAVENOUS; SUBCUTANEOUS at 17:13

## 2022-05-29 RX ADMIN — Medication 10 ML: at 00:10

## 2022-05-29 RX ADMIN — LORAZEPAM 0.5 MG: 0.5 TABLET ORAL at 12:49

## 2022-05-29 ASSESSMENT — PAIN SCALES - GENERAL
PAINLEVEL_OUTOF10: 0
PAINLEVEL_OUTOF10: 6
PAINLEVEL_OUTOF10: 8
PAINLEVEL_OUTOF10: 3
PAINLEVEL_OUTOF10: 9
PAINLEVEL_OUTOF10: 6
PAINLEVEL_OUTOF10: 8

## 2022-05-29 ASSESSMENT — ENCOUNTER SYMPTOMS
NAUSEA: 0
EYE REDNESS: 0
EYE DISCHARGE: 0
DIARRHEA: 0
WHEEZING: 0
ABDOMINAL PAIN: 0
COUGH: 0
BACK PAIN: 0
TROUBLE SWALLOWING: 0
RHINORRHEA: 0
SHORTNESS OF BREATH: 0
CONSTIPATION: 0
SORE THROAT: 0

## 2022-05-29 NOTE — PROGRESS NOTES
MD Jonathan Carlos MD Norlene Sarna, MD               Office: [phone]                      Fax: [phone]             [facility]                   NEPHROLOGY ICU  INPATIENT PROGRESS NOTE:     PATIENT NAME: Toni Began  : 1960  MRN: [id number]         Nephrology treatment plan update. Patient had first hemodialysis treatment yesterday which he tolerated well. - Due to have repeat hemodialysis treatment today      Acute renal failure-severe-critical-poor urine output.  - BUN is 135 and a creatinine of 11.5  - Etiology most likely related to ATN from sepsis. Patient started on hemodialysis treatment due to severe renal failure. - Multiple electrolyte imbalance-slow improvement after hemodialysis treatment. - Metabolic acidosis improving.  - Hyperkalemia appears stable. - Patient still has volume overload and will correct with hemodialysis treatment. - Hyponatremia remained stable. Patient remains anuria. Mild fluid overload. - I have reviewed the chest x-ray and it shows mild fluid overload and cardiomegaly. Patient being treated with IV antibiotics for gram-negative sepsis.-On IV meropenem. - Blood cultures and -positive for E. coli sepsis. - Blood cultures done on -negative so far. -complicated urology problems    Discussed with treatment team.  Discussed with Dr. Zuly Ortega. Discussed with nursing. Family updated about treatment plan. Remains critically ill. T.35 m                                          Admitted for:  Ureterolithiasis [N20.1]  CHRISSY (acute kidney injury) ([facility] [address] [N17.9]  Septic shock ([facility] [address] [A41.9, R65.21]  Severe sepsis ([address] [A41.9, R65.20]    ICD-10-CM    1. CHRISSY (acute kidney injury) ([address]  N17.9    2. Ureterolithiasis  N20.1    3.  Septic shock (HCC)  A41.9     R65.21          CHRISSY (on CKD: 3B): Very oliguric  - BL Scr- 1.5 as off 22 ---> 8.0 on admission  -: Etiology of CHRISSY - presumed ATN + obstruction    - other differentials: unlikely GN / TI / TMA process  - UA : results reviewed: Showing large amount of blood, with significant proteinuria, with leukocytes, with white cells RBC high specific gravity  - Renal imaging: on on admission with CT scan: - Obstructed 12 mm right UPJ    History of prostate cancer    Associated problems:   - Volume status: hypo-volemic  : BP: no need for tight control    : Na: hyponatremia - acute-moderate range, likely due to renal failure  - Azotemia: Prerenal severe, likely some uremic encephalopathy  - Electrolytes: K: Normal  - Acid-Base: Lactic acidosis high  - Anemia: Mild on chronic disease      Other major problems: Management per primary and other consulting teams.   //         Hospital Problems           Last Modified POA    * (Principal) Septic shock ([facility] [address] 5/27/2022 Yes    CHRISSY (acute kidney injury) ([facility] [address] 5/27/2022 Yes    Ureterolithiasis 5/27/2022 Yes    Lactic acidosis 5/27/2022 Yes    Infection due to ESBL-producing Escherichia coli 5/27/2022 Yes    Bacteremia due to Gram-negative bacteria 5/68/4770 Yes    Complicated UTI (urinary tract infection) 5/27/2022 Yes    Hyponatremia 5/27/2022 Yes    S/P radical cystoprostatectomy 5/27/2022 Yes    Poorly controlled type 2 diabetes mellitus ([facility] [address] 5/27/2022 Yes        : other supportive care :   - Check daily renal function panel with electrolytes-phosphorus  - Strict monitoring of I/Os, daily weight  - Renal feeds/diet  - Current medications reviewed. - Nephrotoxic medications have been discontinued. - Dose adjusted and appropriate. - Dose meds for eGFR <15 mL/min/1.73m2 during CHRISSY    - Avoid heavy opioids due to renal failure - may use very low dose dilaudid / fentanyl with close monitoring of CNS and respiratory depression. Please refer to the orders. High Complexity. Multiple complex problems.   Discussed with patient and treatment team-       Severally ill, at risk of impending organ failure needing ICU higher level of care/monitoring. Time spent ~ 35 minutes that included face-to-face meeting/discussion with patient, patient's family, and treatment team (including primary/referring team and other consultants; included coordination of care with the treatment team; and review of patient's electronic medical records and ordering appropriates tests. Thank you for allowing me to participate in this patient's care. Please do not hesitate to contact me anytime. We will follow along with you. Moira Leung MD,  [facility] [address] [place] [address] [place]: [phone] or [address] [employer]  Fax: [phone]        =======================================================================================   =======================================================================================  SUBJECTIVE-  Seen resting in bed  More awake  No acute distress  Tachycardia, BP slightly low normal  Temperature slightly higher  Underwent urgent stent placement on right side, urology Dr. Zazueta Holding on 2 L nasal cannula      Past medical, Surgical, Social, Family medical history reviewed by me. MEDICATIONS: reviewed by me. Medications Prior to Admission:  No current facility-administered medications on file prior to encounter. Current Outpatient Medications on File Prior to Encounter   Medication Sig Dispense Refill    sildenafil (REVATIO) 20 MG tablet Take 20 mg by mouth daily      sulfamethoxazole-trimethoprim (BACTRIM DS;SEPTRA DS) 800-160 MG per tablet Take 1 tablet by mouth 2 times daily      metFORMIN (GLUCOPHAGE) 1000 MG tablet Take 1,000 mg by mouth 2 times daily (with meals)       senna-docusate (PERICOLACE) 8.6-50 MG per tablet Take 1 tablet by mouth 2 times daily For constipation while on pain medication.  30 tablet 1    amLODIPine (NORVASC) 10 MG tablet Take 10 mg by mouth daily            Current Facility-Administered Medications:     heparin (porcine) injection 3,200 Units, 3,200 Units, IntraCATHeter, PRN, Fabby Choi MD    metoclopramide (REGLAN) injection 5 mg, 5 mg, IntraVENous, Q8H PRN, Garrett Forde MD, 5 mg at 05/29/22 0834    [COMPLETED] meropenem (MERREM) 1,000 mg in sodium chloride 0.9 % 100 mL IVPB (mini-bag), 1,000 mg, IntraVENous, Once, Stopped at 05/27/22 1137 **FOLLOWED BY** meropenem (MERREM) 1,000 mg in sodium chloride 0.9 % 100 mL IVPB (mini-bag), 1,000 mg, IntraVENous, Q24H, Cynthia Siegel MD, Last Rate: 33.3 mL/hr at 05/29/22 1025, 1,000 mg at 05/29/22 1025    HYDROmorphone HCl PF (DILAUDID) injection 1 mg, 1 mg, IntraVENous, Q4H PRN, Elizabeth Linder MD, 1 mg at 05/29/22 1022    sodium chloride flush 0.9 % injection 5-40 mL, 5-40 mL, IntraVENous, 2 times per day, Elizabeth Linder MD, 10 mL at 05/29/22 0835    sodium chloride flush 0.9 % injection 5-40 mL, 5-40 mL, IntraVENous, PRN, Elizabeth Linder MD    0.9 % sodium chloride infusion, , IntraVENous, PRN, Elizabeth Linder MD, Last Rate: 10 mL/hr at 05/29/22 1023, New Bag at 05/29/22 1023    heparin (porcine) injection 5,000 Units, 5,000 Units, SubCUTAneous, TID, Elizabeth Linder MD, 5,000 Units at 05/29/22 0834    ondansetron (ZOFRAN-ODT) disintegrating tablet 4 mg, 4 mg, Oral, Q8H PRN **OR** ondansetron (ZOFRAN) injection 4 mg, 4 mg, IntraVENous, Q6H PRN, Elizabeth Linder MD, 4 mg at 05/28/22 1745    polyethylene glycol (GLYCOLAX) packet 17 g, 17 g, Oral, Daily PRN, Elizabeth Linder MD    acetaminophen (TYLENOL) tablet 650 mg, 650 mg, Oral, Q6H PRN, 650 mg at 05/28/22 1745 **OR** acetaminophen (TYLENOL) suppository 650 mg, 650 mg, Rectal, Q6H PRN, Elizabeth Linder MD    insulin lispro (HUMALOG) injection vial 0-12 Units, 0-12 Units, SubCUTAneous, TID WC, Elizabeth Linder MD, 6 Units at 05/29/22 0834    insulin lispro (HUMALOG) injection vial 0-6 Units, 0-6 Units, SubCUTAneous, Nightly, Elizabeth Linder MD, 1 Units at 05/27/22 0400      REVIEW OF SYSTEMS:Review of systems not obtained due to patient factors - mental status          =======================================================================================     PHYSICAL EXAM:  Recent vital signs and recent I/Os reviewed by me.      Wt Readings from Last 3 Encounters:   05/29/22 159 lb 1.6 oz (72.2 kg)   05/16/22 148 lb (67.1 kg)   03/19/22 160 lb (72.6 kg)     BP Readings from Last 3 Encounters:   05/29/22 (!) 148/80   05/16/22 (!) 169/102   03/19/22 (!) 171/98     Patient Vitals for the past 24 hrs:   BP Temp Temp src Pulse Resp SpO2 Height Weight   05/29/22 1028 -- -- -- 88 -- (!) 89 % -- --   05/29/22 1000 (!) 148/80 -- -- 91 -- 92 % -- --   05/29/22 0900 -- -- -- 91 -- 91 % -- --   05/29/22 0833 (!) 148/70 98.5 °F (36.9 °C) Temporal 89 18 90 % -- --   05/29/22 0800 -- -- -- (!) 105 -- -- -- --   05/29/22 0700 -- -- -- 97 -- -- -- --   05/29/22 0630 -- -- -- -- -- -- -- 159 lb 1.6 oz (72.2 kg)   05/29/22 0404 (!) 147/79 99 °F (37.2 °C) Temporal 89 16 94 % -- --   05/29/22 0204 (!) 151/77 -- -- 96 -- -- -- --   05/29/22 0006 (!) 151/74 99.6 °F (37.6 °C) Temporal 99 18 93 % -- --   05/28/22 2100 -- 98.9 °F (37.2 °C) Temporal -- 20 -- -- --   05/28/22 2015 (!) 150/79 98.8 °F (37.1 °C) -- 88 18 -- 5' 7\" (1.702 m) 158 lb 11.7 oz (72 kg)   05/28/22 1800 -- -- -- 89 -- -- -- --   05/28/22 1720 (!) 160/85 (!) 100.7 °F (38.2 °C) -- 94 22 -- 5' 7\" (1.702 m) 162 lb 0.6 oz (73.5 kg)   05/28/22 1600 -- -- -- 98 -- -- -- --   05/28/22 1500 (!) 153/79 -- -- 100 28 92 % -- --   05/28/22 1455 (!) 146/77 -- -- (!) 103 22 93 % -- --   05/28/22 1449 (!) 148/78 -- -- 95 27 90 % -- --   05/28/22 1400 136/71 -- -- 93 -- 90 % -- --   05/28/22 1302 -- -- -- -- -- 91 % -- --   05/28/22 1300 133/73 -- -- 90 -- (!) 87 % -- --   05/28/22 1200 133/69 98.2 °F (36.8 °C) Temporal 89 18 (!) 87 % -- --   05/28/22 1140 -- -- -- 85 -- 90 % -- --   05/28/22 1116 134/69 -- -- 89 -- 94 % -- --   05/28/22 1100 -- -- -- 92 -- 97 % -- --       Intake/Output Summary (Last 24 hours) at 5/29/2022 1037  Last data filed at 5/29/2022 0835  Gross per 24 hour   Intake 410 ml   Output 1930 ml   Net -1520 ml       Physical Exam  Vitals reviewed. Constitutional:       General: He is not in acute distress. Appearance: Normal appearance. HENT:      Head: Normocephalic and atraumatic. Right Ear: External ear normal.      Left Ear: External ear normal.      Nose: Nose normal.      Mouth/Throat:      Mouth: Mucous membranes are dry. Eyes:      General: No scleral icterus. Conjunctiva/sclera: Conjunctivae normal.   Neck:      Vascular: No JVD. Cardiovascular:      Rate and Rhythm: Normal rate and regular rhythm. Heart sounds: S1 normal and S2 normal.   Pulmonary:      Effort: Respiratory distress (mild) present. Breath sounds: Rhonchi present. Abdominal:      General: Bowel sounds are normal. There is no distension. Musculoskeletal:         General: No swelling or deformity. Cervical back: Normal range of motion and neck supple. Skin:     General: Skin is dry. Coloration: Skin is not jaundiced. Neurological:      Mental Status: He is disoriented. Comments: Unable to obtain as part of sedation     Psychiatric:      Comments: Unable to obtain as part of sedation                  =======================================================================================     DATA:  Diagnostic tests reviewed by me for today's visit:   (AS NEEDED FOR MY EVALUATION AND MANAGEMENT).        Recent Labs     05/26/22  1205 05/27/22  0314 05/28/22  0244 05/29/22  0146   WBC 16.1* 12.8* 12.3* 13.6*   HCT 31.6* 30.1* 28.4* 25.9*    175 194 209     Iron Saturation:  No components found for: PERCENTFE  FERRITIN:  No results found for: FERRITIN  IRON:  No results found for: IRON  TIBC:  No results found for: TIBC    Recent Labs     05/26/22  1205 05/26/22  1205 05/26/22  1845 05/27/22  0314 05/28/22  0244 05/29/22  0146   *  --  128* 131* 131* 132* K 4.2   < > 4.1 4.1 3.9  3.9 3.9   CL 83*  --  88* 90* 90* 93*   CO2 21  --  15* 15* 18* 20*   BUN 90*  --  95* 104* 135* 101*   CREATININE 8.0*  --  8.4* 10.0* 11.2* 8.6*    < > = values in this interval not displayed. Recent Labs     05/26/22  1205 05/26/22  1845 05/27/22  0314 05/28/22  0244 05/29/22  0146   CALCIUM 9.2 8.6 8.4 8.4 8.3   PHOS  --  5.2* 6.0* 5.4*  --      No results for input(s): PH, PCO2, PO2 in the last 72 hours.     Invalid input(s): Lacey Levin    ABG:  No results found for: PH, PCO2, PO2, HCO3, BE, THGB, TCO2, O2SAT  VBG:  No results found for: PHVEN, CCT1ZIU, BEVEN, V2UAMCMC    LDH:  No results found for: LDH  Uric Acid:    Lab Results   Component Value Date    LABURIC 8.8 05/26/2022       PT/INR:    Lab Results   Component Value Date    PROTIME 12.9 05/02/2022    INR 1.14 05/02/2022     Warfarin PT/INR:  No components found for: Osker Parkinson  PTT:    Lab Results   Component Value Date    APTT 34.5 05/02/2022   [APTT}  Last 3 Troponin:  No results found for: TROPONINI    U/A:    Lab Results   Component Value Date    COLORU Yellow 05/26/2022    PROTEINU >=300 05/26/2022    PHUR 7.5 05/26/2022    WBCUA 10-20 05/26/2022    RBCUA 5-10 05/26/2022    BACTERIA 2+ 05/26/2022    CLARITYU Clear 05/26/2022    SPECGRAV 1.020 05/26/2022    LEUKOCYTESUR LARGE 05/26/2022    UROBILINOGEN 0.2 05/26/2022    BILIRUBINUR SMALL 05/26/2022    BLOODU LARGE 05/26/2022    GLUCOSEU 500 05/26/2022     Microalbumen/Creatinine ratio:  No components found for: RUCREAT  24 Hour Urine for Protein:  No components found for: RAWUPRO, UHRS3, HOJD10NG, UTV3  24 Hour Urine for Creatinine Clearance:  No components found for: CREAT4, UHRS10, UTV10  Urine Toxicology:  No components found for: IAMMENTA, IBARBIT, IBENZO, ICOCAINE, IMARTHC, IOPIATES, IPHENCYC    HgBA1c:  No results found for: LABA1C  RPR:  No results found for: RPR  HIV:  No results found for: HIV  BRITTANY:  No results found for: ANATITER, BRITTANY  RF:  No results found for: RF  DSDNA:  No components found for: DNA  AMYLASE:  No results found for: AMYLASE  LIPASE:    Lab Results   Component Value Date    LIPASE 31.0 05/26/2022     Fibrinogen Level:  No components found for: FIB       BELOW MENTIONED RADIOLOGY STUDY RESULTS BY ME (AS NEEDED FOR MY EVALUATION AND MANAGEMENT). CT ABDOMEN PELVIS WO CONTRAST Additional Contrast? None    Result Date: 5/26/2022  EXAMINATION: CT OF THE ABDOMEN AND PELVIS WITHOUT CONTRAST 5/26/2022 1:36 pm TECHNIQUE: CT of the abdomen and pelvis was performed without the administration of intravenous contrast. Multiplanar reformatted images are provided for review. Automated exposure control, iterative reconstruction, and/or weight based adjustment of the mA/kV was utilized to reduce the radiation dose to as low as reasonably achievable. COMPARISON: 03/19/2022 HISTORY: ORDERING SYSTEM PROVIDED HISTORY: recent prostate surg - n/v TECHNOLOGIST PROVIDED HISTORY: Reason for exam:->recent prostate surg - n/v Additional Contrast?->None Reason for Exam: recent prostate surg - n/v Additional signs and symptoms: french breathing instructions provided, pt still not holding breath. Best scan possible. FINDINGS: Lower Chest: There is mild bibasilar dependent atelectasis. Organs: There is mild right hydronephrosis secondary to a 12 mm calculus lodged within the right ureteral vesicular junction. There is mild right perinephric stranding. The remainder of the solid abdominal organs are unremarkable. GI/Bowel: There is no bowel dilatation, wall thickening or obstruction. Pelvis: The bladder is decompressed by Baeza catheter and thus not evaluated. Postop changes of prostatectomy are present. There are multiple extraperitoneal pelvic gas collections within the surgical bed and pelvic sidewalls. Peritoneum/Retroperitoneum: There is no free air, free fluid or intraperitoneal in phlegm a nat change. There is no adenopathy.  Bones/Soft Tissues: There is no acute fracture or aggressive osseous lesion. Mildly obstructing 12 mm right UPJ Postsurgical pelvic changes without complicating feature.  RECOMMENDATIONS: Unavailable

## 2022-05-29 NOTE — PROGRESS NOTES
Infectious Diseases   Progress Note      Admission Date: 5/26/2022  Hospital Day: Hospital Day: 4   Attending: Dona Patiño MD  Date of service: 5/29/2022     Chief complaint/ Reason for consult:     · Septic shock with leukocytosis, tachycardia, tachypnea and hypotension  · Severe lactic acidosis  · Gram-negative bacteremia with ESBL E. coli  · Complicated E. coli UTI    Microbiology:        I have reviewed allavailable micro lab data and cultures    · Blood culture (2/2) - collected on 5/26/2022 : ESBL E. Coli    · Urine culture  - collected on 5/26/2022: Greater than 100,000 CFU per mL of ESBL E. coli    Susceptibility      Escherichia coli (esbl) (1)    Antibiotic Interpretation Microscan  Method Status    ampicillin Resistant >=32 mcg/mL BACTERIAL SUSCEPTIBILITY PANEL BY RICA     ampicillin-sulbactam Sensitive 4 mcg/mL BACTERIAL SUSCEPTIBILITY PANEL BY RICA     ceFAZolin Resistant >=64 mcg/mL BACTERIAL SUSCEPTIBILITY PANEL BY RICA     cefepime Resistant   BACTERIAL SUSCEPTIBILITY PANEL BY RICA     cefTRIAXone Resistant >=64 mcg/mL BACTERIAL SUSCEPTIBILITY PANEL BY RICA     ciprofloxacin Resistant >=4 mcg/mL BACTERIAL SUSCEPTIBILITY PANEL BY RICA     ertapenem Sensitive <=0.12 mcg/mL BACTERIAL SUSCEPTIBILITY PANEL BY RICA     gentamicin Sensitive <=1 mcg/mL BACTERIAL SUSCEPTIBILITY PANEL BY RICA     levofloxacin Resistant >=8 mcg/mL BACTERIAL SUSCEPTIBILITY PANEL BY RICA     nitrofurantoin Sensitive <=16 mcg/mL BACTERIAL SUSCEPTIBILITY PANEL BY RICA     trimethoprim-sulfamethoxazole Resistant >=320 mcg/mL BACTERIAL SUSCEPTIBILITY PANEL BY RICA         Antibiotics and immunizations:       Current antibiotics: All antibiotics and their doses were reviewed by me    Recent Abx Admin                   meropenem (MERREM) 1,000 mg in sodium chloride 0.9 % 100 mL IVPB (mini-bag) (mg) 1,000 mg New Bag 05/29/22 1025                  Immunization History: All immunization history was reviewed by me today.       There is no immunization history on file for this patient. Known drug allergies: All allergies were reviewed and updated    No Known Allergies    Social history:     Social History:  All social andepidemiologic history was reviewed and updated by me today as needed. · Tobacco use:   reports that he has never smoked. He has never used smokeless tobacco.  · Alcohol use:   reports no history of alcohol use. · Currently lives in: [facility]  ·  reports no history of drug use. COVID VACCINATION AND LAB RESULT RECORDS:     Internal Administration   First Dose      Second Dose           Last COVID Lab No results found for: SARS-COV-2, SARS-COV-2 RNA, SARS-COV-2, SARS-COV-2, SARS-COV-2 BY PCR, SARS-COV-2, SARS-COV-2, SARS-COV-2         Assessment:     The patient is a 58 y.o. old male who  has a past medical history of Diabetes mellitus ([address] and Hypertension. with following problems:    · Septic shock with leukocytosis, tachycardia, tachypnea and hypotension-T-max 100.7 yesterday  · Severe lactic acidosis-this is improved  · Gram-negative bacteremia with ESBL E. coli-currently on meropenem  · Complicated E. coli UTI  · History of robotic assisted laparoscopic radical prostatectomy with bilateral pelvic lymphadenectomy on 5/16/2022 -surgical pathology indicated acinar adenocarcinoma  · S/p cystoscopy and right ureteral stent placement for right ureteral stone  · Hyponatremia-being corrected  · Poorly controlled type 2 diabetes mellitus-maintain good glucose control  ·       Discussion:      The patient received dialysis treatment again today. He remains on IV meropenem. He is tolerating antibiotics okay. Serum creatinine was 8.6 today. The patient remains an uric. White cell count is 13,600. Blood cultures from 5/27/2022 remain negative. Hepatitis B surface antibody is positive indicating immunity to hepatitis B. A tunneled dialysis catheter has been ordered by primary team today.     The patient however did have a fever 100.7 yesterday    Plan:     Diagnostic Workup:    · Follow-up on repeat blood culture from 5/27/2022  · Continue to follow  fever curve, WBC count and blood cultures. · Continue to monitor blood counts, liver and renal function. · Will order CT scan of the head without contrast for encephalopathy work-up    Antimicrobials:    · If the patient develops further fevers, plan for a CT scan of abdomen pelvis, preferably with intravenous contrast, if okay with nephrology  · Will continue IV meropenem at dialysis dose of 1 g every 24 hour  · Continue to monitor his vitals closely  · Contact isolation for ESBL  · Anticipate at least 2-week course of Carbapenem coverage  · We will follow up on the culture results and clinical progress and will make further recommendations accordingly. · Continue close vitals monitoring. · Maintain good glycemic control. · Fall precautions. · Aspiration precautions. · Continue to watch for new fever or diarrhea. · DVT prophylaxis. · Discussed all above with patient and RN. Drug Monitoring:    · Continue monitoring for antibiotic toxicity as follows: CBC, CMP   · Continue to watch for following: new or worsening fever, new hypotension, hives, lip swelling and redness or purulence at vascular access sites. I/v access Management:    · Continue to monitor i.v access sites for erythema, induration, discharge or tenderness. · As always, continue efforts to minimize tubes/lines/drains as clinically appropriate to reduce chances of line associated infections. Patient education and counseling:        · The patient was educated in detail about the side-effects of various antibiotics and things to watch for like new rashes, lip swelling, severe reaction, worsening diarrhea, break through fever etc.  · Discussed patient's condition and what to expect.  All of the patient's questions were addressed in a satisfactory manner and patient verbalized understanding all instructions. Level of complexity of visit: High     Risk of Complications/Morbidity: High     · Illness(es)/ Infection present that pose threat to life/bodily function. · There is potential for severe exacerbation of infection/side effects of treatment. · Therapy requires intensive monitoring for antimicrobial agent toxicity. TIME SPENT TODAY:     - Spent over  36 minutes on visit (including interval history, physical exam, review of data including labs, cultures, imaging, development and implementation of treatment plan and coordination of complex care). More than 50 percent of this includes face-to-face time spent with the patient for counseling and coordination of care. Thank you for involving me in the care of your patient. I will continue to follow. If you have anyadditional questions, please do not hesitate to contact me. Subjective: Interval history: Interval history was obtained from chart review and patient/ RN. He had a fever 100.7 yesterday. He remains on IV meropenem. Receiving dialysis. He is tolerating antibiotics okay     REVIEW OF SYSTEMS:      Review of Systems   Constitutional: Positive for fatigue and fever. Negative for chills and diaphoresis. HENT: Negative for ear discharge, ear pain, rhinorrhea, sore throat and trouble swallowing. Eyes: Negative for discharge and redness. Respiratory: Negative for cough, shortness of breath and wheezing. Cardiovascular: Negative for chest pain and leg swelling. Gastrointestinal: Negative for abdominal pain, constipation, diarrhea and nausea. Endocrine: Negative for polyuria. Genitourinary: Negative for dysuria, flank pain, frequency, hematuria and urgency. Musculoskeletal: Negative for back pain and myalgias. Skin: Negative for rash. Neurological: Negative for dizziness, seizures and headaches. Hematological: Does not bruise/bleed easily.    Psychiatric/Behavioral: Negative for hallucinations and suicidal ideas. All other systems reviewed and are negative. Past Medical History: All past medical history reviewed today. Past Medical History:   Diagnosis Date    Diabetes mellitus ([address]     Hypertension        Past Surgical History: All past surgical history was reviewed today. Past Surgical History:   Procedure Laterality Date    CYSTOSCOPY Right 5/26/2022    CYSTOSCOPY, BILATERAL RETROGRADE PYELOGRAM, PLACEMENT OF RIGHT URETERAL STENT performed by Lyle Pack MD at [address] 5/16/2022    ROBOTIC LAPAROSCOPIC RADICAL PROSTATECTOMY WITH BILATERAL LYMPH NODE DISSECTION AND BILATERAL NERVE SPARE performed by Lin Thomas MD at [address]       Family History: All family history was reviewed today. History reviewed. No pertinent family history. Objective:       PHYSICAL EXAM:      Vitals:   Vitals:    05/29/22 1100 05/29/22 1200 05/29/22 1249 05/29/22 1300   BP:  (!) 166/71     Pulse: 91 86  (!) 101   Resp:  19     Temp:       TempSrc:  Temporal     SpO2: 93% (!) 89% 97% 91%   Weight:       Height:           Physical Exam  Vitals and nursing note reviewed. Constitutional:       Appearance: Normal appearance. He is well-developed. HENT:      Head: Normocephalic and atraumatic. Right Ear: External ear normal.      Left Ear: External ear normal.      Nose: Nose normal. No congestion or rhinorrhea. Mouth/Throat:      Mouth: Mucous membranes are moist.      Pharynx: No oropharyngeal exudate or posterior oropharyngeal erythema. Eyes:      General: No scleral icterus. Right eye: No discharge. Left eye: No discharge. Conjunctiva/sclera: Conjunctivae normal.      Pupils: Pupils are equal, round, and reactive to light. Cardiovascular:      Rate and Rhythm: Normal rate and regular rhythm. Pulses: Normal pulses. Heart sounds: No murmur heard. No friction rub.    Pulmonary:      Effort: Pulmonary effort is normal. No respiratory distress. Breath sounds: Normal breath sounds. No stridor. No wheezing, rhonchi or rales. Abdominal:      General: Bowel sounds are normal.      Palpations: Abdomen is soft. Tenderness: There is no abdominal tenderness. There is no right CVA tenderness, left CVA tenderness, guarding or rebound. Musculoskeletal:         General: No swelling or tenderness. Normal range of motion. Cervical back: Normal range of motion and neck supple. No rigidity. No muscular tenderness. Lymphadenopathy:      Cervical: No cervical adenopathy. Skin:     General: Skin is warm and dry. Coloration: Skin is not jaundiced. Findings: No erythema or rash. Neurological:      General: No focal deficit present. Mental Status: He is alert and oriented to person, place, and time. Mental status is at baseline. Motor: No abnormal muscle tone. Psychiatric:         Mood and Affect: Mood normal.         Behavior: Behavior normal.         Thought Content: Thought content normal.           *    Lines and drains: All vascular access sites are healthy with no local erythema, discharge or tenderness. Intake and output:    I/O last 3 completed shifts: In: 410 [I.V.:10]  Out: 1930 [Urine:30]    Lab Data:   All available labs and old records have been reviewed by me.     CBC:  Recent Labs     05/27/22 0314 05/28/22 0244 05/29/22 0146   WBC 12.8* 12.3* 13.6*   RBC 3.26* 3.16* 2.91*   HGB 10.0* 9.5* 8.8*   HCT 30.1* 28.4* 25.9*    194 209   MCV 92.2 90.0 89.0   MCH 30.5 30.1 30.3   MCHC 33.1 33.5 34.0   RDW 13.2 13.2 13.1   BANDSPCT 6  --   --         BMP:  Recent Labs     05/26/22  1845 05/27/22 0314 05/28/22 0244 05/29/22 0146   NA  --  131* 131* 132*   K   < > 4.1 3.9  3.9 3.9   CL  --  90* 90* 93*   CO2  --  15* 18* 20*   BUN  --  104* 135* 101*   CREATININE  --  10.0* 11.2* 8.6*   CALCIUM  --  8.4 8.4 8.3   GLUCOSE  --  153* 114* 186*    < > = values in this interval not displayed. Hepatic Function Panel:   Lab Results   Component Value Date    ALKPHOS 213 05/29/2022    ALT 30 05/29/2022    AST 42 05/29/2022    PROT 5.7 05/29/2022    BILITOT 1.9 05/29/2022    LABALBU 2.3 05/29/2022       CPK:   Lab Results   Component Value Date    CKTOTAL 64 05/26/2022     ESR: No results found for: SEDRATE  CRP: No results found for: CRP        Imaging: All pertinent images and reports for the current visit were reviewed by me during this visit. I reviewed the chest x-ray/CT scan/MRI images and independently interpreted the findings and results today. XR CHEST PORTABLE   Final Result   Mild cardiomegaly with pulmonary vascular congestion. FL RETROGRADE PYELOGRAM W WO KUB   Final Result   For documentation purposes only. See separate procedure report for more   information. CT ABDOMEN PELVIS WO CONTRAST Additional Contrast? None   Final Result   Mildly obstructing 12 mm right UPJ      Postsurgical pelvic changes without complicating feature. RECOMMENDATIONS:   Unavailable         IR TUNNELED CVC PLACE WO SQ PORT/PUMP > 5 YEARS    (Results Pending)       Medications: All current and past medications were reviewed.      insulin glargine  8 Units SubCUTAneous Daily    meropenem  1,000 mg IntraVENous Q24H    sodium chloride flush  5-40 mL IntraVENous 2 times per day    heparin (porcine)  5,000 Units SubCUTAneous TID    insulin lispro  0-12 Units SubCUTAneous TID WC    insulin lispro  0-6 Units SubCUTAneous Nightly        dextrose      sodium chloride 10 mL/hr at 05/29/22 1023       LORazepam, dextrose bolus **OR** dextrose bolus, glucagon (rDNA), dextrose, heparin (porcine), metoclopramide, HYDROmorphone, sodium chloride flush, sodium chloride, ondansetron **OR** ondansetron, polyethylene glycol, acetaminophen **OR** acetaminophen      Problem list:       Patient Active Problem List   Diagnosis Code    Prostate cancer ([facility] C61    Septic shock ([facility] A41.9, R65.21    CHRISSY (acute kidney injury) ([facility] N17.9    Ureterolithiasis N20.1    Lactic acidosis E87.2    Infection due to ESBL-producing Escherichia coli A49.8, Z16.12    Bacteremia due to Gram-negative bacteria U11.42    Complicated UTI (urinary tract infection) N39.0    Hyponatremia E87.1    S/P radical cystoprostatectomy Z90.79, Z90.6    Poorly controlled type 2 diabetes mellitus ([facility] E11.65    Fever R50.9       Please note that this chart was generated using Dragon dictation software. Although every effort was made to ensure the accuracy of this automated transcription, some errors in transcription may have occurred inadvertently. If you may need any clarification, please do not hesitate to contact me through EPIC or at the phone number provided below with my electronic signature. Any pictures or media included in this note were obtained after taking informed verbal consent from the patient and with their approval to include those in the patient's medical record.       Efrain Moya MD, MPH, [facility]  5/29/2022, 4:37 PM  [facility] Infectious Disease   [address] [place] [address], [address] [place] [address]  Office: [phone]  Fax: [phone]  Clinic days:  Tuesday & Thursday

## 2022-05-29 NOTE — PROGRESS NOTES
Patient up to commode this morning. x1 Large BM. Able to stand and pivot with good strength in his legs. Fevers between 99 - 99.8. Urinary output overnight of 55 mL. Updated patient and wife on plan of care. Agreeable and understanding at this time.

## 2022-05-29 NOTE — PROGRESS NOTES
Pt completed 2.5 hours of hemodialysis and removed 700ml net. Pt was very calm and oriented upon arrival to hd suite but became increasingly anxious and started speaking only [place] after speaking English the first hour. Using the  phone, his statements did not make sense and his blood pressure was 195/102. Once the phone call ended, bp returned to baseline. MD notified and ordered decrease in blood flow rate to 250 and decrease of dialysate flow rate to 500. Dialysis ended early due to physical non compliance and this nurse assisted floor nurse Elisabeth with transportation back to his room.         05/29/22 1335 05/29/22 1600   Vital Signs   Temp 98.2 °F (36.8 °C) 98.2 °F (36.8 °C)   Heart Rate 98 (!) 109   Resp 20 24   BP (!) 143/83 (!) 148/79   Height and Weight   Weight 159 lb 13.3 oz (72.5 kg)  --    Weight Method Bed scale  --

## 2022-05-29 NOTE — PROGRESS NOTES
Patient found trying to get out bed and repeatedly saying that he needed to go to dialysis. I explained to him that it was not until 2 pm. Wife at bedside and able to help calm him down, but she said he was very restless. MD contacted and 0.5 mg Ativan PO ordered q 6 hrs.

## 2022-05-29 NOTE — PROGRESS NOTES
[facility]   Progress Note      Reason for Consult: Severe sepsis  Requesting Physician: Dr Lakeshia Duranquest:   Severo Dolin / HPI:                The patient is a 58 y.o. male with significant past medical history of:      Diagnosis Date    Diabetes mellitus ([address]     Hypertension      Interval history: Patient got Vas-Cath and hemodialysis yesterday. Had fluid removal.  Looks and feels better today.       Past Surgical History:        Procedure Laterality Date    CYSTOSCOPY Right 5/26/2022    CYSTOSCOPY, BILATERAL RETROGRADE PYELOGRAM, PLACEMENT OF RIGHT URETERAL STENT performed by Arminda Botello MD at [address] 5/16/2022    ROBOTIC LAPAROSCOPIC RADICAL PROSTATECTOMY WITH BILATERAL LYMPH NODE DISSECTION AND BILATERAL NERVE SPARE performed by Liat Armstrong MD at [address] [facility]     Current Medications:    Current Facility-Administered Medications: heparin (porcine) injection 3,200 Units, 3,200 Units, IntraCATHeter, PRN  metoclopramide (REGLAN) injection 5 mg, 5 mg, IntraVENous, Q8H PRN  [COMPLETED] meropenem (MERREM) 1,000 mg in sodium chloride 0.9 % 100 mL IVPB (mini-bag), 1,000 mg, IntraVENous, Once **FOLLOWED BY** meropenem (MERREM) 1,000 mg in sodium chloride 0.9 % 100 mL IVPB (mini-bag), 1,000 mg, IntraVENous, Q24H  HYDROmorphone HCl PF (DILAUDID) injection 1 mg, 1 mg, IntraVENous, Q4H PRN  sodium chloride flush 0.9 % injection 5-40 mL, 5-40 mL, IntraVENous, 2 times per day  sodium chloride flush 0.9 % injection 5-40 mL, 5-40 mL, IntraVENous, PRN  0.9 % sodium chloride infusion, , IntraVENous, PRN  heparin (porcine) injection 5,000 Units, 5,000 Units, SubCUTAneous, TID  ondansetron (ZOFRAN-ODT) disintegrating tablet 4 mg, 4 mg, Oral, Q8H PRN **OR** ondansetron (ZOFRAN) injection 4 mg, 4 mg, IntraVENous, Q6H PRN  polyethylene glycol (GLYCOLAX) packet 17 g, 17 g, Oral, Daily PRN  acetaminophen (TYLENOL) tablet 650 mg, 650 mg, Oral, Q6H PRN **OR** acetaminophen (TYLENOL) suppository 650 mg, 650 mg, Rectal, Q6H PRN  insulin lispro (HUMALOG) injection vial 0-12 Units, 0-12 Units, SubCUTAneous, TID WC  insulin lispro (HUMALOG) injection vial 0-6 Units, 0-6 Units, SubCUTAneous, Nightly    No Known Allergies    Social History:    TOBACCO:   reports that he has never smoked. He has never used smokeless tobacco.  ETOH:   reports no history of alcohol use. Patient currently lives independently  Environmental/chemical exposure: Unknown    Family History:   History reviewed. No pertinent family history. REVIEW OF SYSTEMS:    CONSTITUTIONAL:  negative for fevers, chills, diaphoresis, activity change, appetite change, fatigue, night sweats and unexpected weight change. EYES:  negative for blurred vision, eye discharge, visual disturbance and icterus  HEENT:  negative for hearing loss, tinnitus, ear drainage, sinus pressure, nasal congestion, epistaxis and snoring  RESPIRATORY:  See HPI  CARDIOVASCULAR:  negative for chest pain, palpitations, exertional chest pressure/discomfort, edema, syncope  GASTROINTESTINAL:  negative for nausea, vomiting, diarrhea, constipation, blood in stool and abdominal pain  GENITOURINARY:  negative for frequency, dysuria, urinary incontinence, decreased urine volume, and hematuria  HEMATOLOGIC/LYMPHATIC:  negative for easy bruising, bleeding and lymphadenopathy  ALLERGIC/IMMUNOLOGIC:  negative for recurrent infections, angioedema, anaphylaxis and drug reactions  ENDOCRINE:  negative for weight changes and diabetic symptoms including polyuria, polydipsia and polyphagia  MUSCULOSKELETAL:  negative for  pain, joint swelling, decreased range of motion and muscle weakness  NEUROLOGICAL:  negative for headaches, slurred speech, unilateral weakness  PSYCHIATRIC/BEHAVIORAL: negative for hallucinations, behavioral problems, confusion and agitation.      Objective:   PHYSICAL EXAM:      VITALS:  BP (!) 147/79   Pulse 89   Temp 99 °F (37.2 °C) (Temporal)   Resp 16    5' 7\" (1.702 m)   Wt 159 lb 1.6 oz (72.2 kg)   SpO2 94%   BMI 24.92 kg/m²      24HR INTAKE/OUTPUT:      Intake/Output Summary (Last 24 hours) at 5/29/2022 5458  Last data filed at 5/29/2022 1482  Gross per 24 hour   Intake 420 ml   Output 1930 ml   Net -1510 ml     CONSTITUTIONAL:  awake, alert, cooperative, no apparent distress, and appears stated age  NECK:  Supple, symmetrical, trachea midline, no adenopathy, thyroid symmetric, not enlarged and no tenderness, skin normal  LUNGS:  no increased work of breathing and clear to auscultation. No accessory muscle use  CARDIOVASCULAR: S1 and S2, no edema and no JVD  ABDOMEN:  normal bowel sounds, non-distended and no masses palpated, and no tenderness to palpation. No hepatospleenomegaly  LYMPHADENOPATHY:  no axillary or supraclavicular adenopathy. No cervical adnenopathy  PSYCHIATRIC: Oriented to person place and time. No obvious depression or anxiety. MUSCULOSKELETAL: No obvious misalignment or effusion of the joints. No clubbing, cyanosis of the digits. SKIN:  normal skin color, texture, turgor and no redness, warmth, or swelling. No palpable nodules    DATA:    Old records have been reviewed    CBC:  Recent Labs     05/27/22 0314 05/28/22 0244 05/29/22 0146   WBC 12.8* 12.3* 13.6*   RBC 3.26* 3.16* 2.91*   HGB 10.0* 9.5* 8.8*   HCT 30.1* 28.4* 25.9*    194 209   MCV 92.2 90.0 89.0   MCH 30.5 30.1 30.3   MCHC 33.1 33.5 34.0   RDW 13.2 13.2 13.1   BANDSPCT 6  --   --       BMP:  Recent Labs     05/26/22  1845 05/27/22 0314 05/28/22 0244 05/29/22 0146   NA  --  131* 131* 132*   K   < > 4.1 3.9  3.9 3.9   CL  --  90* 90* 93*   CO2  --  15* 18* 20*   BUN  --  104* 135* 101*   CREATININE  --  10.0* 11.2* 8.6*   CALCIUM  --  8.4 8.4 8.3   GLUCOSE  --  153* 114* 186*    < > = values in this interval not displayed. ABG:  No results for input(s): PHART, TDM4SCG, PO2ART, GUS0SNC, O6PPTYGJ, BEART in the last 72 hours.   Procalcitonin  No results for input(s): PROCAL in the last 72 hours. No results found for: BNP  Lab Results   Component Value Date    CKTOTAL 64 05/26/2022           Radiology Review:  All pertinent images / reports were reviewed as a part of this visit. Assessment:     1. Severe sepsis secondary to ESBL E. coli  2. CHRISSY  3. Nephrolithiasis  4. Prostate cancer  5.  Hiccups      Plan:     I have reviewed laboratories, medical records and images for this visit  No new chest imaging  Now tolerating room air    Growing ESBL E. coli from blood  On Merrem per sensitivities  ID is following    Has Vas-Cath  Hemodialysis yesterday with fluid removal  Creatinine is down to 8.6  Likely to need additional HD as urine output remains poor  Nephrology following    I will sign off

## 2022-05-29 NOTE — PROGRESS NOTES
Patient brought to room from HD by this RN. Vitals taken. Elevated BP, slightly tachycardic, remains >92% on room air. Patient reports feeling hungry for the first time in days. Tray set up and patient eating well. Family remains at bedside, updated on plan of care and agreeable at this time.

## 2022-05-29 NOTE — PROGRESS NOTES
Daughter and wife at bedside. Answered many questions in regards to patient's condition, what to expect, and possible treatments. Concerns were addressed and everyone is updated and agreeable on plan of care at this time. Full bed change. Small bm x1. Minimal urinary output. Patient reports pain at insertion site of vascath, PRN medication given. See MAR. Overall, patient is in good spirits.

## 2022-05-29 NOTE — PROGRESS NOTES
HD RN called and said patient's tx had to be stopped d/t possible panic attack. MD approved of an early dose of Ativan d/t the fact that it was given at [phone] and he was taken for HD around 1315 (it was originally planned for after 2pm), and the first Ativan dose was probably dialyzed out.

## 2022-05-29 NOTE — PROGRESS NOTES
Patient confused and disoriented x4, but much calmer now resting in room with his wife at his bed side. Ativan held at this time.

## 2022-05-29 NOTE — PROGRESS NOTES
gu note    vss afeb   min urine output    cx ecoli  Mdr  On merem      impr    pca  Sp rrp  Right upj stone sepsis  Renal failure, anuric      recc    Cont pierre  Cont stent  Needs uretero laser when stable

## 2022-05-29 NOTE — PROGRESS NOTES
[facility]    5/29/2022 1:28 PM        Name: Mary Sheppard .               Admitted: 5/26/2022  Primary Care Provider: Merly Zeng PA-C (Tel: [phone])          Subjective:    Underwent dialysis yesterday, still having poor urine output pain improved no nausea vomiting or chest    Reviewed interval ancillary notes    Current Medications  LORazepam (ATIVAN) tablet 0.5 mg, Q6H PRN  insulin glargine (LANTUS) injection vial 8 Units, Daily  glucose-vitamin C chewable tablet 4 tablet, PRN  dextrose bolus 10% 125 mL, PRN   Or  dextrose bolus 10% 250 mL, PRN  glucagon (rDNA) injection 1 mg, PRN  dextrose 5 % solution, PRN  heparin (porcine) injection 3,200 Units, PRN  metoclopramide (REGLAN) injection 5 mg, Q8H PRN  meropenem (MERREM) 1,000 mg in sodium chloride 0.9 % 100 mL IVPB (mini-bag), Q24H  HYDROmorphone HCl PF (DILAUDID) injection 1 mg, Q4H PRN  sodium chloride flush 0.9 % injection 5-40 mL, 2 times per day  sodium chloride flush 0.9 % injection 5-40 mL, PRN  0.9 % sodium chloride infusion, PRN  heparin (porcine) injection 5,000 Units, TID  ondansetron (ZOFRAN-ODT) disintegrating tablet 4 mg, Q8H PRN   Or  ondansetron (ZOFRAN) injection 4 mg, Q6H PRN  polyethylene glycol (GLYCOLAX) packet 17 g, Daily PRN  acetaminophen (TYLENOL) tablet 650 mg, Q6H PRN   Or  acetaminophen (TYLENOL) suppository 650 mg, Q6H PRN  insulin lispro (HUMALOG) injection vial 0-12 Units, TID WC  insulin lispro (HUMALOG) injection vial 0-6 Units, Nightly        Objective:  BP (!) 166/71   Pulse 86   Temp 98.5 °F (36.9 °C) (Temporal)   Resp 19   Ht 5' 7\" (1.702 m)   Wt 159 lb 1.6 oz (72.2 kg)   SpO2 97%   BMI 24.92 kg/m²     Intake/Output Summary (Last 24 hours) at 5/29/2022 1328  Last data filed at 5/29/2022 0835  Gross per 24 hour   Intake 410 ml   Output 1930 ml   Net -1520 ml      Wt Readings from Last 3 Encounters: 05/29/22 159 lb 1.6 oz (72.2 kg)   05/16/22 148 lb (67.1 kg)   03/19/22 160 lb (72.6 kg)       General appearance:  Appears comfortable. AAOx3  HEENT: atraumatic, Pupils equal, muscous membranes moist, no masses appreciated  Cardiovascular: rrr no murmurs appreciated  Respiratory: CTAB no wheezing  Gastrointestinal: Abdomen soft, non-tender, BS+  EXT: no edema  Neurology: no gross focal deficts  Psychiatry: Appropriate affect. Not agitated  Skin: Warm, dry, no rashes appreciated    Labs and Tests:  CBC:   Recent Labs     05/27/22 0314 05/28/22 0244 05/29/22  0146   WBC 12.8* 12.3* 13.6*   HGB 10.0* 9.5* 8.8*    194 209     BMP:    Recent Labs     05/26/22  1845 05/27/22 0314 05/28/22 0244 05/29/22 0146   NA  --  131* 131* 132*   K   < > 4.1 3.9  3.9 3.9   CL  --  90* 90* 93*   CO2  --  15* 18* 20*   BUN  --  104* 135* 101*   CREATININE  --  10.0* 11.2* 8.6*   GLUCOSE  --  153* 114* 186*    < > = values in this interval not displayed. Hepatic:   Recent Labs     05/27/22 0314 05/28/22 0244 05/29/22 0146   AST 81* 46* 42*   ALT 42* 33 30   BILITOT 2.0* 2.0* 1.9*   ALKPHOS 120 111 213*     XR CHEST PORTABLE   Final Result   Mild cardiomegaly with pulmonary vascular congestion. FL RETROGRADE PYELOGRAM W WO KUB   Final Result   For documentation purposes only. See separate procedure report for more   information. CT ABDOMEN PELVIS WO CONTRAST Additional Contrast? None   Final Result   Mildly obstructing 12 mm right UPJ      Postsurgical pelvic changes without complicating feature.       RECOMMENDATIONS:   Unavailable         IR TUNNELED CVC PLACE WO SQ PORT/PUMP > 5 YEARS    (Results Pending)       Recent imaging reviewed    Problem List  Principal Problem:    Septic shock ([address]  Active Problems:    CHRISSY (acute kidney injury) ([address]    Ureterolithiasis    Lactic acidosis    Infection due to ESBL-producing Escherichia coli    Bacteremia due to Gram-negative bacteria    Complicated UTI (urinary tract infection)    Hyponatremia    S/P radical cystoprostatectomy    Poorly controlled type 2 diabetes mellitus ([facility]  Resolved Problems:    * No resolved hospital problems.  *       Assessment/Plan:   Severe sepsis secondary to e coli bacteremia secondary to  UTI and right nephrolithiasis 12mm s/p right ureteral stent placement  iv meropenem   -repeat bc thus far negative  -id on board     gómez secondary to above  -Still having poor urine output continue HD for now     Hyponatremia: imrpvoing     dm2 with hyperglycemia : Add Lantus to regimen        DVT prophylaxis heparin sub q   Code status  Full code    Rita Johnson MD   5/29/2022 1:28 PM

## 2022-05-29 NOTE — PLAN OF CARE
Problem: Chronic Conditions and Co-morbidities  Goal: Patient's chronic conditions and co-morbidity symptoms are monitored and maintained or improved  Outcome: Progressing  Flowsheets (Taken 5/29/2022 0800)  Care Plan - Patient's Chronic Conditions and Co-Morbidity Symptoms are Monitored and Maintained or Improved: Monitor and assess patient's chronic conditions and comorbid symptoms for stability, deterioration, or improvement     Problem: Discharge Planning  Goal: Discharge to home or other facility with appropriate resources  Outcome: Progressing     Problem: Pain  Goal: Verbalizes/displays adequate comfort level or baseline comfort level  Outcome: Progressing     Problem: Skin/Tissue Integrity  Goal: Absence of new skin breakdown  Description: 1. Monitor for areas of redness and/or skin breakdown  2. Assess vascular access sites hourly  3. Every 4-6 hours minimum:  Change oxygen saturation probe site  4. Every 4-6 hours:  If on nasal continuous positive airway pressure, respiratory therapy assess nares and determine need for appliance change or resting period.   Outcome: Progressing     Problem: Safety - Adult  Goal: Free from fall injury  Outcome: Progressing  Flowsheets (Taken 5/29/2022 1152)  Free From Fall Injury: Instruct family/caregiver on patient safety     Problem: ABCDS Injury Assessment  Goal: Absence of physical injury  Outcome: Progressing  Flowsheets (Taken 5/29/2022 1152)  Absence of Physical Injury: Implement safety measures based on patient assessment

## 2022-05-30 LAB
A/G RATIO: 0.7 (ref 1.1–2.2)
ALBUMIN SERPL-MCNC: 2.4 G/DL (ref 3.4–5)
ALP BLD-CCNC: 284 U/L (ref 40–129)
ALT SERPL-CCNC: 30 U/L (ref 10–40)
ANION GAP SERPL CALCULATED.3IONS-SCNC: 13 MMOL/L (ref 3–16)
ANISOCYTOSIS: ABNORMAL
AST SERPL-CCNC: 46 U/L (ref 15–37)
BASOPHILS ABSOLUTE: 0 K/UL (ref 0–0.2)
BASOPHILS RELATIVE PERCENT: 0 %
BILIRUB SERPL-MCNC: 1.4 MG/DL (ref 0–1)
BUN BLDV-MCNC: 76 MG/DL (ref 7–20)
CALCIUM SERPL-MCNC: 8.5 MG/DL (ref 8.3–10.6)
CHLORIDE BLD-SCNC: 94 MMOL/L (ref 99–110)
CO2: 24 MMOL/L (ref 21–32)
CREAT SERPL-MCNC: 7.3 MG/DL (ref 0.8–1.3)
DOHLE BODIES: PRESENT
EOSINOPHILS ABSOLUTE: 0 K/UL (ref 0–0.6)
EOSINOPHILS RELATIVE PERCENT: 0 %
GFR AFRICAN AMERICAN: 9
GFR NON-AFRICAN AMERICAN: 8
GLUCOSE BLD-MCNC: 120 MG/DL (ref 70–99)
GLUCOSE BLD-MCNC: 154 MG/DL (ref 70–99)
GLUCOSE BLD-MCNC: 163 MG/DL (ref 70–99)
GLUCOSE BLD-MCNC: 218 MG/DL (ref 70–99)
GLUCOSE BLD-MCNC: 220 MG/DL (ref 70–99)
HCT VFR BLD CALC: 24.5 % (ref 40.5–52.5)
HEMOGLOBIN: 8.4 G/DL (ref 13.5–17.5)
HEPATITIS B CORE TOTAL ANTIBODY: POSITIVE
LYMPHOCYTES ABSOLUTE: 0.6 K/UL (ref 1–5.1)
LYMPHOCYTES RELATIVE PERCENT: 4 %
MCH RBC QN AUTO: 30.8 PG (ref 26–34)
MCHC RBC AUTO-ENTMCNC: 34.3 G/DL (ref 31–36)
MCV RBC AUTO: 89.8 FL (ref 80–100)
MONOCYTES ABSOLUTE: 1.3 K/UL (ref 0–1.3)
MONOCYTES RELATIVE PERCENT: 8 %
NEUTROPHILS ABSOLUTE: 14 K/UL (ref 1.7–7.7)
NEUTROPHILS RELATIVE PERCENT: 88 %
PDW BLD-RTO: 12.9 % (ref 12.4–15.4)
PERFORMED ON: ABNORMAL
PHOSPHORUS: 3.8 MG/DL (ref 2.5–4.9)
PLATELET # BLD: 237 K/UL (ref 135–450)
PLATELET SLIDE REVIEW: ADEQUATE
PMV BLD AUTO: 9 FL (ref 5–10.5)
POTASSIUM REFLEX MAGNESIUM: 3.9 MMOL/L (ref 3.5–5.1)
RBC # BLD: 2.73 M/UL (ref 4.2–5.9)
SLIDE REVIEW: ABNORMAL
SODIUM BLD-SCNC: 131 MMOL/L (ref 136–145)
TOTAL PROTEIN: 5.7 G/DL (ref 6.4–8.2)
TOXIC GRANULATION: PRESENT
WBC # BLD: 15.9 K/UL (ref 4–11)

## 2022-05-30 PROCEDURE — 2580000003 HC RX 258: Performed by: INTERNAL MEDICINE

## 2022-05-30 PROCEDURE — 80053 COMPREHEN METABOLIC PANEL: CPT

## 2022-05-30 PROCEDURE — 6370000000 HC RX 637 (ALT 250 FOR IP): Performed by: INTERNAL MEDICINE

## 2022-05-30 PROCEDURE — 6360000002 HC RX W HCPCS: Performed by: PEDIATRICS

## 2022-05-30 PROCEDURE — 2000000000 HC ICU R&B

## 2022-05-30 PROCEDURE — 36415 COLL VENOUS BLD VENIPUNCTURE: CPT

## 2022-05-30 PROCEDURE — 84100 ASSAY OF PHOSPHORUS: CPT

## 2022-05-30 PROCEDURE — 85025 COMPLETE CBC W/AUTO DIFF WBC: CPT

## 2022-05-30 PROCEDURE — 6360000002 HC RX W HCPCS: Performed by: INTERNAL MEDICINE

## 2022-05-30 PROCEDURE — 2700000000 HC OXYGEN THERAPY PER DAY

## 2022-05-30 PROCEDURE — 94760 N-INVAS EAR/PLS OXIMETRY 1: CPT

## 2022-05-30 RX ORDER — BACLOFEN 10 MG/1
5 TABLET ORAL 3 TIMES DAILY PRN
Status: DISCONTINUED | OUTPATIENT
Start: 2022-05-30 | End: 2022-05-30

## 2022-05-30 RX ORDER — LORAZEPAM 2 MG/ML
1 INJECTION INTRAMUSCULAR ONCE
Status: COMPLETED | OUTPATIENT
Start: 2022-05-30 | End: 2022-05-30

## 2022-05-30 RX ORDER — QUETIAPINE FUMARATE 25 MG/1
25 TABLET, FILM COATED ORAL NIGHTLY
Status: DISCONTINUED | OUTPATIENT
Start: 2022-05-30 | End: 2022-06-06 | Stop reason: HOSPADM

## 2022-05-30 RX ORDER — BACLOFEN 10 MG/1
5 TABLET ORAL 2 TIMES DAILY PRN
Status: DISCONTINUED | OUTPATIENT
Start: 2022-05-30 | End: 2022-06-06 | Stop reason: HOSPADM

## 2022-05-30 RX ADMIN — INSULIN LISPRO 4 UNITS: 100 INJECTION, SOLUTION INTRAVENOUS; SUBCUTANEOUS at 19:54

## 2022-05-30 RX ADMIN — HEPARIN SODIUM 5000 UNITS: 5000 INJECTION INTRAVENOUS; SUBCUTANEOUS at 19:55

## 2022-05-30 RX ADMIN — INSULIN GLARGINE 8 UNITS: 100 INJECTION, SOLUTION SUBCUTANEOUS at 08:22

## 2022-05-30 RX ADMIN — MEROPENEM 1000 MG: 1 INJECTION, POWDER, FOR SOLUTION INTRAVENOUS at 11:55

## 2022-05-30 RX ADMIN — HEPARIN SODIUM 5000 UNITS: 5000 INJECTION INTRAVENOUS; SUBCUTANEOUS at 08:23

## 2022-05-30 RX ADMIN — INSULIN LISPRO 4 UNITS: 100 INJECTION, SOLUTION INTRAVENOUS; SUBCUTANEOUS at 18:10

## 2022-05-30 RX ADMIN — LORAZEPAM 1 MG: 2 INJECTION INTRAMUSCULAR; INTRAVENOUS at 00:20

## 2022-05-30 RX ADMIN — HEPARIN SODIUM 5000 UNITS: 5000 INJECTION INTRAVENOUS; SUBCUTANEOUS at 15:21

## 2022-05-30 RX ADMIN — Medication 10 ML: at 19:54

## 2022-05-30 RX ADMIN — METOCLOPRAMIDE 5 MG: 5 INJECTION, SOLUTION INTRAMUSCULAR; INTRAVENOUS at 16:45

## 2022-05-30 RX ADMIN — INSULIN LISPRO 2 UNITS: 100 INJECTION, SOLUTION INTRAVENOUS; SUBCUTANEOUS at 08:22

## 2022-05-30 RX ADMIN — Medication 10 ML: at 08:26

## 2022-05-30 RX ADMIN — ACETAMINOPHEN 650 MG: 325 TABLET ORAL at 19:54

## 2022-05-30 RX ADMIN — QUETIAPINE FUMARATE 25 MG: 25 TABLET ORAL at 19:55

## 2022-05-30 RX ADMIN — Medication 10 ML: at 20:06

## 2022-05-30 ASSESSMENT — PAIN SCALES - GENERAL
PAINLEVEL_OUTOF10: 0
PAINLEVEL_OUTOF10: 0

## 2022-05-30 ASSESSMENT — PAIN SCALES - WONG BAKER: WONGBAKER_NUMERICALRESPONSE: 0

## 2022-05-30 NOTE — PROGRESS NOTES
Jhonny Alma, MD Audrea Bolognese, MD Leona Opitz, MD               Office: [phone]                      Fax: [phone]             [facility]                   NEPHROLOGY ICU  INPATIENT PROGRESS NOTE:     PATIENT NAME: Cher Silverman  : 1960  MRN: [id number]         Nephrology treatment plan update. Patient had a second hemodialysis treatment yesterday. - Hemodialysis treatment had to be terminated early as patient with worsening confusion and agitation. Currently sedated. Poor urine output. Labs reviewed. - BUN 76 and a creatinine of 7.20.  - Electrolytes improved after dialysis-potassium much improved. - Fluid overload improved after dialysis treatment. No immediate indications for hemodialysis treatment today. - need hemodialysis treatment tomorrow    Etiology of altered mental status change/anxiety/agitation is unclear at this time. - May require neurology evaluation.  - MRI pending. Discussed with patient wife at length over the phone. Discussed treatment plan including daily dialysis. Remains critically ill. t.35min              IV antibiotics for gram-negative sepsis.-On IV meropenem. - Blood cultures and -positive for E. coli sepsis. - Blood cultures done on -negative so far. -complicated urology problems    Discussed with treatment team.  Discussed with Dr. Rita Luz. Discussed with nursing. Family updated about treatment plan. Remains critically ill. T.35 m                                          Admitted for:  Ureterolithiasis [N20.1]  CHRISSY (acute kidney injury) ([facility] [N17.9]  Septic shock ([facility] [A41.9, R65.21]  Severe sepsis ([address] [A41.9, R65.20]    ICD-10-CM    1. CHRISSY (acute kidney injury) ([facility]  N17.9    2. Ureterolithiasis  N20.1    3.  Septic shock (HCC)  A41.9     R65.21          CHRISSY (on CKD: 3B): Very oliguric  - BL Scr- 1.5 as off 22 ---> 8.0 on admission  -: Etiology of CHRISSY - presumed ATN + obstruction    - other differentials: unlikely GN / TI / TMA process  - UA : results reviewed: Showing large amount of blood, with significant proteinuria, with leukocytes, with white cells RBC high specific gravity  - Renal imaging: on on admission with CT scan: - Obstructed 12 mm right UPJ    History of prostate cancer    Associated problems:   - Volume status: hypo-volemic  : BP: no need for tight control    : Na: hyponatremia - acute-moderate range, likely due to renal failure  - Azotemia: Prerenal severe, likely some uremic encephalopathy  - Electrolytes: K: Normal  - Acid-Base: Lactic acidosis high  - Anemia: Mild on chronic disease      Other major problems: Management per primary and other consulting teams.   //         Hospital Problems           Last Modified POA    * (Principal) Septic shock ([facility] 5/27/2022 Yes    CHRISSY (acute kidney injury) ([facility] [address] 5/27/2022 Yes    Ureterolithiasis 5/27/2022 Yes    Lactic acidosis 5/27/2022 Yes    Infection due to ESBL-producing Escherichia coli 5/27/2022 Yes    Bacteremia due to Gram-negative bacteria 2/57/9441 Yes    Complicated UTI (urinary tract infection) 5/27/2022 Yes    Hyponatremia 5/27/2022 Yes    S/P radical cystoprostatectomy 5/27/2022 Yes    Poorly controlled type 2 diabetes mellitus ([facility] [address] 5/27/2022 Yes    Fever 5/29/2022 Yes        : other supportive care :   - Check daily renal function panel with electrolytes-phosphorus  - Strict monitoring of I/Os, daily weight  - Renal feeds/diet  - Current medications reviewed. - Nephrotoxic medications have been discontinued. - Dose adjusted and appropriate. - Dose meds for eGFR <15 mL/min/1.73m2 during CHRISSY    - Avoid heavy opioids due to renal failure - may use very low dose dilaudid / fentanyl with close monitoring of CNS and respiratory depression. Please refer to the orders. High Complexity. Multiple complex problems.   Discussed with patient and treatment team-       Severally ill, at risk of impending organ failure needing ICU higher level of care/monitoring. Time spent ~ 35 minutes that included face-to-face meeting/discussion with patient, patient's family, and treatment team (including primary/referring team and other consultants; included coordination of care with the treatment team; and review of patient's electronic medical records and ordering appropriates tests. Thank you for allowing me to participate in this patient's care. Please do not hesitate to contact me anytime. We will follow along with you. Luis Miguel Thomas MD,  [facility] [address] [place] [address]: [phone] or [employer]  Fax: [phone]        =======================================================================================   =======================================================================================  SUBJECTIVE-  Seen resting in bed  More awake  No acute distress  Tachycardia, BP slightly low normal  Temperature slightly higher  Underwent urgent stent placement on right side, urology Dr. Hanna Sun on 2 L nasal cannula      Past medical, Surgical, Social, Family medical history reviewed by me. MEDICATIONS: reviewed by me. Medications Prior to Admission:  No current facility-administered medications on file prior to encounter. Current Outpatient Medications on File Prior to Encounter   Medication Sig Dispense Refill    sildenafil (REVATIO) 20 MG tablet Take 20 mg by mouth daily      sulfamethoxazole-trimethoprim (BACTRIM DS;SEPTRA DS) 800-160 MG per tablet Take 1 tablet by mouth 2 times daily      metFORMIN (GLUCOPHAGE) 1000 MG tablet Take 1,000 mg by mouth 2 times daily (with meals)       senna-docusate (PERICOLACE) 8.6-50 MG per tablet Take 1 tablet by mouth 2 times daily For constipation while on pain medication.  30 tablet 1    amLODIPine (NORVASC) 10 MG tablet Take 10 mg by mouth daily            Current Facility-Administered Medications:     LORazepam (ATIVAN) tablet 0.5 mg, 0.5 mg, Oral, Q6H PRN, Glenda Ness MD, 0.5 mg at 05/29/22 1249    insulin glargine (LANTUS) injection vial 8 Units, 8 Units, SubCUTAneous, Daily, Glenda Ness MD, 8 Units at 05/30/22 4468    dextrose bolus 10% 125 mL, 125 mL, IntraVENous, PRN **OR** dextrose bolus 10% 250 mL, 250 mL, IntraVENous, PRN, Glenda Ness MD    glucagon (rDNA) injection 1 mg, 1 mg, IntraMUSCular, PRN, Glenda Ness MD    dextrose 5 % solution, 100 mL/hr, IntraVENous, PRN, Glenda Ness MD    traZODone (DESYREL) tablet 50 mg, 50 mg, Oral, Nightly, Marylee Elam, MD, 50 mg at 05/29/22 2207    heparin (porcine) injection 3,200 Units, 3,200 Units, IntraCATHeter, PRN, Radha Plata MD    metoclopramide (REGLAN) injection 5 mg, 5 mg, IntraVENous, Q8H PRN, Graham Treviño MD, 5 mg at 05/29/22 1829    [COMPLETED] meropenem (MERREM) 1,000 mg in sodium chloride 0.9 % 100 mL IVPB (mini-bag), 1,000 mg, IntraVENous, Once, Stopped at 05/27/22 1137 **FOLLOWED BY** meropenem (MERREM) 1,000 mg in sodium chloride 0.9 % 100 mL IVPB (mini-bag), 1,000 mg, IntraVENous, Q24H, Efrain Moya MD, Stopped at 05/29/22 1325    HYDROmorphone HCl PF (DILAUDID) injection 1 mg, 1 mg, IntraVENous, Q4H PRN, Glenda Ness MD, 1 mg at 05/29/22 1022    sodium chloride flush 0.9 % injection 5-40 mL, 5-40 mL, IntraVENous, 2 times per day, Glenda Ness MD, 10 mL at 05/30/22 0826    sodium chloride flush 0.9 % injection 5-40 mL, 5-40 mL, IntraVENous, PRN, Glenda Ness MD    0.9 % sodium chloride infusion, , IntraVENous, PRN, Glenda Ness MD, Stopped at 05/29/22 1628    heparin (porcine) injection 5,000 Units, 5,000 Units, SubCUTAneous, TID, Glenda Ness MD, 5,000 Units at 05/30/22 0823    ondansetron (ZOFRAN-ODT) disintegrating tablet 4 mg, 4 mg, Oral, Q8H PRN **OR** ondansetron (ZOFRAN) injection 4 mg, 4 mg, IntraVENous, Q6H PRN, Glenda Ness MD, 4 mg at 05/28/22 1741    polyethylene glycol (GLYCOLAX) packet 17 g, 17 g, Oral, Daily PRN, Dona Patiño MD, 17 g at 05/29/22 1802    acetaminophen (TYLENOL) tablet 650 mg, 650 mg, Oral, Q6H PRN, 650 mg at 05/29/22 1249 **OR** acetaminophen (TYLENOL) suppository 650 mg, 650 mg, Rectal, Q6H PRN, Dona Patiño MD    insulin lispro (HUMALOG) injection vial 0-12 Units, 0-12 Units, SubCUTAneous, TID WC, Dona Patiño MD, 2 Units at 05/30/22 0822    insulin lispro (HUMALOG) injection vial 0-6 Units, 0-6 Units, SubCUTAneous, Nightly, Dona Patiño MD, 1 Units at 05/29/22 2207      REVIEW OF SYSTEMS:Review of systems not obtained due to patient factors - mental status          =======================================================================================     PHYSICAL EXAM:  Recent vital signs and recent I/Os reviewed by me.      Wt Readings from Last 3 Encounters:   05/30/22 160 lb 1.6 oz (72.6 kg)   05/16/22 148 lb (67.1 kg)   03/19/22 160 lb (72.6 kg)     BP Readings from Last 3 Encounters:   05/30/22 (!) 172/95   05/16/22 (!) 169/102   03/19/22 (!) 171/98     Patient Vitals for the past 24 hrs:   BP Temp Temp src Pulse Resp SpO2 Weight   05/30/22 0800 (!) 172/95 99.6 °F (37.6 °C) Temporal (!) 104 16 96 % --   05/30/22 0604 -- -- -- -- -- -- 160 lb 1.6 oz (72.6 kg)   05/30/22 0600 (!) 160/81 -- -- 95 -- 94 % --   05/30/22 0502 -- -- -- 89 -- 94 % --   05/30/22 0500 (!) 146/80 97.8 °F (36.6 °C) Temporal 92 18 (!) 89 % --   05/30/22 0000 -- 100.1 °F (37.8 °C) Temporal -- 22 94 % --   05/29/22 2000 -- 99.9 °F (37.7 °C) Temporal -- 22 94 % --   05/29/22 1800 (!) 162/75 -- -- (!) 101 -- -- --   05/29/22 1638 (!) 161/86 -- -- (!) 102 -- 92 % --   05/29/22 1600 (!) 148/79 98.2 °F (36.8 °C) -- (!) 109 24 -- --   05/29/22 1335 (!) 143/83 98.2 °F (36.8 °C) -- 98 20 -- 159 lb 13.3 oz (72.5 kg)   05/29/22 1300 -- -- -- (!) 101 -- 91 % --   05/29/22 1249 -- -- -- -- -- 97 % --   05/29/22 1200 (!) 166/71 -- Temporal 86 19 (!) 89 % --   05/29/22 1100 -- -- -- 91 -- 93 % --   05/29/22 1028 -- -- -- 88 -- (!) 89 % --       Intake/Output Summary (Last 24 hours) at 5/30/2022 1026  Last data filed at 5/30/2022 0646  Gross per 24 hour   Intake 220.57 ml   Output 115 ml   Net 105.57 ml       Physical Exam  Vitals reviewed. Constitutional:       General: He is not in acute distress. Appearance: Normal appearance. HENT:      Head: Normocephalic and atraumatic. Right Ear: External ear normal.      Left Ear: External ear normal.      Nose: Nose normal.      Mouth/Throat:      Mouth: Mucous membranes are dry. Eyes:      General: No scleral icterus. Conjunctiva/sclera: Conjunctivae normal.   Neck:      Vascular: No JVD. Cardiovascular:      Rate and Rhythm: Normal rate and regular rhythm. Heart sounds: S1 normal and S2 normal.   Pulmonary:      Effort: Respiratory distress (mild) present. Breath sounds: Rhonchi present. Abdominal:      General: Bowel sounds are normal. There is no distension. Musculoskeletal:         General: No swelling or deformity. Cervical back: Normal range of motion and neck supple. Skin:     General: Skin is dry. Coloration: Skin is not jaundiced. Neurological:      Mental Status: He is disoriented. Comments: Unable to obtain as part of sedation     Psychiatric:      Comments: Unable to obtain as part of sedation                  =======================================================================================     DATA:  Diagnostic tests reviewed by me for today's visit:   (AS NEEDED FOR MY EVALUATION AND MANAGEMENT).        Recent Labs     05/28/22 0244 05/29/22  0146 05/30/22  0500   WBC 12.3* 13.6* 15.9*   HCT 28.4* 25.9* 24.5*    209 237     Iron Saturation:  No components found for: PERCENTFE  FERRITIN:  No results found for: FERRITIN  IRON:  No results found for: IRON  TIBC:  No results found for: TIBC    Recent Labs     05/28/22 0244 05/29/22  0146 05/30/22  0500   * 132* 131*   K 3.9  3.9 3.9 3.9   CL 90* 93* 94*   CO2 18* 20* 24   * 101* 76*   CREATININE 11.2* 8.6* 7.3*     Recent Labs     05/28/22  0244 05/29/22  0146 05/30/22  0500   CALCIUM 8.4 8.3 8.5   PHOS 5.4*  --  3.8     No results for input(s): PH, PCO2, PO2 in the last 72 hours.     Invalid input(s): Pasqual Splinter    ABG:  No results found for: PH, PCO2, PO2, HCO3, BE, THGB, TCO2, O2SAT  VBG:  No results found for: PHVEN, NAU2RRZ, BEVEN, A1DQRYJL    LDH:  No results found for: LDH  Uric Acid:    Lab Results   Component Value Date    LABURIC 8.8 05/26/2022       PT/INR:    Lab Results   Component Value Date    PROTIME 12.9 05/02/2022    INR 1.14 05/02/2022     Warfarin PT/INR:  No components found for: Thedford Chase  PTT:    Lab Results   Component Value Date    APTT 34.5 05/02/2022   [APTT}  Last 3 Troponin:  No results found for: TROPONINI    U/A:    Lab Results   Component Value Date    COLORU Yellow 05/26/2022    PROTEINU >=300 05/26/2022    PHUR 7.5 05/26/2022    WBCUA 10-20 05/26/2022    RBCUA 5-10 05/26/2022    BACTERIA 2+ 05/26/2022    CLARITYU Clear 05/26/2022    SPECGRAV 1.020 05/26/2022    LEUKOCYTESUR LARGE 05/26/2022    UROBILINOGEN 0.2 05/26/2022    BILIRUBINUR SMALL 05/26/2022    BLOODU LARGE 05/26/2022    GLUCOSEU 500 05/26/2022     Microalbumen/Creatinine ratio:  No components found for: RUCREAT  24 Hour Urine for Protein:  No components found for: RAWUPRO, UHRS3, XOHR98NS, UTV3  24 Hour Urine for Creatinine Clearance:  No components found for: CREAT4, UHRS10, UTV10  Urine Toxicology:  No components found for: IAMMENTA, IBARBIT, IBENZO, ICOCAINE, IMARTHC, IOPIATES, IPHENCYC    HgBA1c:  No results found for: LABA1C  RPR:  No results found for: RPR  HIV:  No results found for: HIV  BRITTANY:  No results found for: ANATITER, BRITTANY  RF:  No results found for: RF  DSDNA:  No components found for: DNA  AMYLASE:  No results found for: AMYLASE  LIPASE:    Lab Results   Component Value Date LIPASE 31.0 05/26/2022     Fibrinogen Level:  No components found for: FIB       BELOW MENTIONED RADIOLOGY STUDY RESULTS BY ME (AS NEEDED FOR MY EVALUATION AND MANAGEMENT). CT ABDOMEN PELVIS WO CONTRAST Additional Contrast? None    Result Date: 5/26/2022  EXAMINATION: CT OF THE ABDOMEN AND PELVIS WITHOUT CONTRAST 5/26/2022 1:36 pm TECHNIQUE: CT of the abdomen and pelvis was performed without the administration of intravenous contrast. Multiplanar reformatted images are provided for review. Automated exposure control, iterative reconstruction, and/or weight based adjustment of the mA/kV was utilized to reduce the radiation dose to as low as reasonably achievable. COMPARISON: 03/19/2022 HISTORY: ORDERING SYSTEM PROVIDED HISTORY: recent prostate surg - n/v TECHNOLOGIST PROVIDED HISTORY: Reason for exam:->recent prostate surg - n/v Additional Contrast?->None Reason for Exam: recent prostate surg - n/v Additional signs and symptoms: french breathing instructions provided, pt still not holding breath. Best scan possible. FINDINGS: Lower Chest: There is mild bibasilar dependent atelectasis. Organs: There is mild right hydronephrosis secondary to a 12 mm calculus lodged within the right ureteral vesicular junction. There is mild right perinephric stranding. The remainder of the solid abdominal organs are unremarkable. GI/Bowel: There is no bowel dilatation, wall thickening or obstruction. Pelvis: The bladder is decompressed by Baeza catheter and thus not evaluated. Postop changes of prostatectomy are present. There are multiple extraperitoneal pelvic gas collections within the surgical bed and pelvic sidewalls. Peritoneum/Retroperitoneum: There is no free air, free fluid or intraperitoneal in phlegm a nat change. There is no adenopathy. Bones/Soft Tissues: There is no acute fracture or aggressive osseous lesion. Mildly obstructing 12 mm right UPJ Postsurgical pelvic changes without complicating feature. RECOMMENDATIONS: Unavailable

## 2022-05-30 NOTE — SIGNIFICANT EVENT
No head CT due to patient agitation. Bedside RN reports Jaylon French didn't seem to recognize him while down in radiology. Went to see patient - he is awake, alert, and appears calm. Sitting up in bed with persistent hiccups. Asked if he had a headache and he replied that he had a fever. ( on Ipad used). His wife at bedside reports that he has not been sleeping well and thinks that something to help him sleep would be helpful. Pt without additional complaints at this time. Moves ext x 4. Trial addition of baclofen (5 mg po as a once) for hiccups. Continue plan for trazodone as sleep aid.      Jesús Gomez MD

## 2022-05-30 NOTE — PROGRESS NOTES
Patient was able to recognize this RN from the previous shift. He was friendly upon my arrival. He was notably altered compared to the last 2 shifts this RN had spent with him. He was not following commands well and when asked simple questions he would not address the question and talk about topics not related to the question or situation. Family voiced concern about his mental status. This RN used the  [id number]. The  noted that the patient was not making sense with what he was saying and attempted to ask simple questions. Same response as above. Dr. Raad Carey at bedside, spoke with him. Head CT ordered. Nighttime hospitalist notified.

## 2022-05-30 NOTE — PLAN OF CARE
Problem: Chronic Conditions and Co-morbidities  Goal: Patient's chronic conditions and co-morbidity symptoms are monitored and maintained or improved  Outcome: Not Progressing     Problem: Discharge Planning  Goal: Discharge to home or other facility with appropriate resources  Outcome: Not Progressing     Problem: Pain  Goal: Verbalizes/displays adequate comfort level or baseline comfort level  Outcome: Adequate for Discharge     Problem: Skin/Tissue Integrity  Goal: Absence of new skin breakdown  Description: 1. Monitor for areas of redness and/or skin breakdown  2. Assess vascular access sites hourly  3. Every 4-6 hours minimum:  Change oxygen saturation probe site  4. Every 4-6 hours:  If on nasal continuous positive airway pressure, respiratory therapy assess nares and determine need for appliance change or resting period.   Outcome: Progressing     Problem: Safety - Adult  Goal: Free from fall injury  Outcome: Progressing

## 2022-05-30 NOTE — PROGRESS NOTES
Attempted to take patient to CT. He was hesitant upon leaving the room and made remarks about not having money to pay. Upon arrival to CT room, patient became scared, agitated, and combative. It was not safe to continue and the patient was unable to be reoriented. This RN asked the patient if he remembered who I was or if he recognized me. At this time the patient responded \"I don't know you\". Patient taken back to room and was screaming \"They're kidnapping me. \"     Patient returned to room safely. Wife at bedside. Family was able to calm patient down after some time. Nighttime hospitalist notified. At bedside to assess.

## 2022-05-30 NOTE — PROGRESS NOTES
Dr Jacek Iglesias at bedside, pt unable to answer questions appropriately. Spouse at bedside. No new orders at this time.

## 2022-05-30 NOTE — SIGNIFICANT EVENT
Pt with confusion in context of sepsis, critical illness, azotemia (BUN > 100), hospital stay. Head CT pending. Will add trazodone for later with hope of helping patient sleep- working dx that this maybe development of delirium. QTc checked back on 5/2 of 434.      Brenda Briggs MD

## 2022-05-30 NOTE — PROGRESS NOTES
Patient still resting this morning. Labs drawn. Oxygen placed spO2 88 room air. 2L of oxygen now mid 90s.

## 2022-05-30 NOTE — PROGRESS NOTES
[facility]    5/30/2022 11:40 AM        Name: Paige Johnson .               Admitted: 5/26/2022  Primary Care Provider: Natanael Riley PA-C (Tel: [phone])          Subjective:    Still having poor urine output underwent dialysis yesterday was confused overnight not sleeping well had a low-grade fever of 100. 1    Reviewed interval ancillary notes    Current Medications  QUEtiapine (SEROQUEL) tablet 25 mg, Nightly  LORazepam (ATIVAN) tablet 0.5 mg, Q6H PRN  insulin glargine (LANTUS) injection vial 8 Units, Daily  dextrose bolus 10% 125 mL, PRN   Or  dextrose bolus 10% 250 mL, PRN  glucagon (rDNA) injection 1 mg, PRN  dextrose 5 % solution, PRN  heparin (porcine) injection 3,200 Units, PRN  metoclopramide (REGLAN) injection 5 mg, Q8H PRN  meropenem (MERREM) 1,000 mg in sodium chloride 0.9 % 100 mL IVPB (mini-bag), Q24H  HYDROmorphone HCl PF (DILAUDID) injection 1 mg, Q4H PRN  sodium chloride flush 0.9 % injection 5-40 mL, 2 times per day  sodium chloride flush 0.9 % injection 5-40 mL, PRN  0.9 % sodium chloride infusion, PRN  heparin (porcine) injection 5,000 Units, TID  ondansetron (ZOFRAN-ODT) disintegrating tablet 4 mg, Q8H PRN   Or  ondansetron (ZOFRAN) injection 4 mg, Q6H PRN  polyethylene glycol (GLYCOLAX) packet 17 g, Daily PRN  acetaminophen (TYLENOL) tablet 650 mg, Q6H PRN   Or  acetaminophen (TYLENOL) suppository 650 mg, Q6H PRN  insulin lispro (HUMALOG) injection vial 0-12 Units, TID WC  insulin lispro (HUMALOG) injection vial 0-6 Units, Nightly        Objective:  BP (!) 172/95   Pulse (!) 104   Temp 99.6 °F (37.6 °C) (Temporal)   Resp 16   Ht 5' 7\" (1.702 m)   Wt 160 lb 1.6 oz (72.6 kg)   SpO2 96%   BMI 25.08 kg/m²     Intake/Output Summary (Last 24 hours) at 5/30/2022 1140  Last data filed at 5/30/2022 0646  Gross per 24 hour   Intake 220.57 ml   Output 115 ml   Net 105.57 ml      Wt Readings from Last 3 Encounters:   05/30/22 160 lb 1.6 oz (72.6 kg)   05/16/22 148 lb (67.1 kg)   03/19/22 160 lb (72.6 kg)       General appearance:  Appears comfortable. AAOx1 to self only  HEENT: atraumatic, Pupils equal, muscous membranes moist, no masses appreciated  Cardiovascular: rrr no murmurs appreciated  Respiratory: CTAB no wheezing  Gastrointestinal: Abdomen soft, non-tender, BS+  EXT: no edema  Neurology: no gross focal deficts  Psychiatry: Appropriate affect. Not agitated  Skin: Warm, dry, no rashes appreciated    Labs and Tests:  CBC:   Recent Labs     05/28/22  0244 05/29/22  0146 05/30/22  0500   WBC 12.3* 13.6* 15.9*   HGB 9.5* 8.8* 8.4*    209 237     BMP:    Recent Labs     05/28/22 0244 05/29/22  0146 05/30/22  0500   * 132* 131*   K 3.9  3.9 3.9 3.9   CL 90* 93* 94*   CO2 18* 20* 24   * 101* 76*   CREATININE 11.2* 8.6* 7.3*   GLUCOSE 114* 186* 154*     Hepatic:   Recent Labs     05/28/22 0244 05/29/22  0146 05/30/22  0500   AST 46* 42* 46*   ALT 33 30 30   BILITOT 2.0* 1.9* 1.4*   ALKPHOS 111 213* 284*     CT HEAD WO CONTRAST         XR CHEST PORTABLE   Final Result   Mild cardiomegaly with pulmonary vascular congestion. FL RETROGRADE PYELOGRAM W WO KUB   Final Result   For documentation purposes only. See separate procedure report for more   information. CT ABDOMEN PELVIS WO CONTRAST Additional Contrast? None   Final Result   Mildly obstructing 12 mm right UPJ      Postsurgical pelvic changes without complicating feature.       RECOMMENDATIONS:   Unavailable         IR TUNNELED CVC PLACE WO SQ PORT/PUMP > 5 YEARS    (Results Pending)       Recent imaging reviewed    Problem List  Principal Problem:    Septic shock ([address]  Active Problems:    CHRISSY (acute kidney injury) ([address]    Ureterolithiasis    Lactic acidosis    Infection due to ESBL-producing Escherichia coli    Bacteremia due to Gram-negative bacteria    Complicated UTI (urinary tract infection) Hyponatremia    S/P radical cystoprostatectomy    Poorly controlled type 2 diabetes mellitus ([address]    Fever  Resolved Problems:    * No resolved hospital problems.  *       Assessment/Plan:   Severe sepsis secondary to e coli bacteremia secondary to  UTI and right nephrolithiasis 12mm s/p right ureteral stent placement  iv meropenem  -repeat bc thus far negative  -id on board  - had fever wbc slighlty up repeat labs in am if continue may need repeat imaging     gómez secondary to above  -Still having poor urine output continue HD for now     Hyponatremia: imrpvoing     dm2 with hyperglycemia : Add Lantus to regimen    icu delirium: monitor add serquel qhs     DVT prophylaxis heparin sub q   Code status  Full code    Manda Pradhan MD   5/30/2022 11:40 AM

## 2022-05-30 NOTE — PROGRESS NOTES
Am assessment completed. Pt is not  following simple commands at this time, unable to give name or date of birth. Not speaking nor eating at this time. Spouse at bedside, attempting to speak to pt , pt just ignores, however, when asked if he had pain, he states \" no\". Am meds given. Mildly febrile 99.6, very little urine output noted, mds aware. Call light within reach, bed alarm on.

## 2022-05-31 ENCOUNTER — APPOINTMENT (OUTPATIENT)
Dept: CT IMAGING | Age: 62
DRG: 720 | End: 2022-05-31
Payer: MEDICAID

## 2022-05-31 LAB
A/G RATIO: 0.8 (ref 1.1–2.2)
ALBUMIN SERPL-MCNC: 2.7 G/DL (ref 3.4–5)
ALP BLD-CCNC: 331 U/L (ref 40–129)
ALT SERPL-CCNC: 44 U/L (ref 10–40)
ANION GAP SERPL CALCULATED.3IONS-SCNC: 17 MMOL/L (ref 3–16)
ANISOCYTOSIS: ABNORMAL
AST SERPL-CCNC: 70 U/L (ref 15–37)
BASOPHILS ABSOLUTE: 0 K/UL (ref 0–0.2)
BASOPHILS RELATIVE PERCENT: 0 %
BILIRUB SERPL-MCNC: 1.4 MG/DL (ref 0–1)
BLOOD CULTURE, ROUTINE: NORMAL
BUN BLDV-MCNC: 105 MG/DL (ref 7–20)
CALCIUM SERPL-MCNC: 8.9 MG/DL (ref 8.3–10.6)
CHLORIDE BLD-SCNC: 92 MMOL/L (ref 99–110)
CO2: 22 MMOL/L (ref 21–32)
CREAT SERPL-MCNC: 9.4 MG/DL (ref 0.8–1.3)
CULTURE, BLOOD 3: NORMAL
EOSINOPHILS ABSOLUTE: 0 K/UL (ref 0–0.6)
EOSINOPHILS RELATIVE PERCENT: 0 %
GFR AFRICAN AMERICAN: 7
GFR NON-AFRICAN AMERICAN: 6
GLUCOSE BLD-MCNC: 122 MG/DL (ref 70–99)
GLUCOSE BLD-MCNC: 174 MG/DL (ref 70–99)
GLUCOSE BLD-MCNC: 181 MG/DL (ref 70–99)
GLUCOSE BLD-MCNC: 196 MG/DL (ref 70–99)
GLUCOSE BLD-MCNC: 344 MG/DL (ref 70–99)
HCT VFR BLD CALC: 28.2 % (ref 40.5–52.5)
HEMATOLOGY PATH CONSULT: NORMAL
HEMOGLOBIN: 9.4 G/DL (ref 13.5–17.5)
LYMPHOCYTES ABSOLUTE: 1 K/UL (ref 1–5.1)
LYMPHOCYTES RELATIVE PERCENT: 5 %
MACROCYTES: ABNORMAL
MCH RBC QN AUTO: 29.8 PG (ref 26–34)
MCHC RBC AUTO-ENTMCNC: 33.1 G/DL (ref 31–36)
MCV RBC AUTO: 89.9 FL (ref 80–100)
MISCELLANEOUS LAB TEST ORDER: NORMAL
MONOCYTES ABSOLUTE: 2.1 K/UL (ref 0–1.3)
MONOCYTES RELATIVE PERCENT: 10 %
NEUTROPHILS ABSOLUTE: 17.6 K/UL (ref 1.7–7.7)
NEUTROPHILS RELATIVE PERCENT: 85 %
PDW BLD-RTO: 13.5 % (ref 12.4–15.4)
PERFORMED ON: ABNORMAL
PLATELET # BLD: 319 K/UL (ref 135–450)
PLATELET SLIDE REVIEW: ADEQUATE
PMV BLD AUTO: 8.5 FL (ref 5–10.5)
POLYCHROMASIA: ABNORMAL
POTASSIUM REFLEX MAGNESIUM: 4.4 MMOL/L (ref 3.5–5.1)
RBC # BLD: 3.14 M/UL (ref 4.2–5.9)
SLIDE REVIEW: ABNORMAL
SODIUM BLD-SCNC: 131 MMOL/L (ref 136–145)
TOTAL PROTEIN: 6.2 G/DL (ref 6.4–8.2)
WBC # BLD: 20.7 K/UL (ref 4–11)

## 2022-05-31 PROCEDURE — 36415 COLL VENOUS BLD VENIPUNCTURE: CPT

## 2022-05-31 PROCEDURE — 70450 CT HEAD/BRAIN W/O DYE: CPT

## 2022-05-31 PROCEDURE — 99233 SBSQ HOSP IP/OBS HIGH 50: CPT | Performed by: INTERNAL MEDICINE

## 2022-05-31 PROCEDURE — 2000000000 HC ICU R&B

## 2022-05-31 PROCEDURE — 80053 COMPREHEN METABOLIC PANEL: CPT

## 2022-05-31 PROCEDURE — 6360000002 HC RX W HCPCS: Performed by: INTERNAL MEDICINE

## 2022-05-31 PROCEDURE — 6370000000 HC RX 637 (ALT 250 FOR IP): Performed by: INTERNAL MEDICINE

## 2022-05-31 PROCEDURE — 87040 BLOOD CULTURE FOR BACTERIA: CPT

## 2022-05-31 PROCEDURE — 85025 COMPLETE CBC W/AUTO DIFF WBC: CPT

## 2022-05-31 PROCEDURE — 90935 HEMODIALYSIS ONE EVALUATION: CPT

## 2022-05-31 PROCEDURE — 2580000003 HC RX 258: Performed by: INTERNAL MEDICINE

## 2022-05-31 PROCEDURE — 71250 CT THORAX DX C-: CPT

## 2022-05-31 RX ORDER — HYDRALAZINE HYDROCHLORIDE 20 MG/ML
10 INJECTION INTRAMUSCULAR; INTRAVENOUS EVERY 6 HOURS PRN
Status: DISCONTINUED | OUTPATIENT
Start: 2022-05-31 | End: 2022-06-02

## 2022-05-31 RX ORDER — AMLODIPINE BESYLATE 5 MG/1
10 TABLET ORAL DAILY
Status: DISCONTINUED | OUTPATIENT
Start: 2022-05-31 | End: 2022-06-01

## 2022-05-31 RX ADMIN — AMLODIPINE BESYLATE 10 MG: 5 TABLET ORAL at 16:13

## 2022-05-31 RX ADMIN — MEROPENEM 1000 MG: 1 INJECTION, POWDER, FOR SOLUTION INTRAVENOUS at 14:06

## 2022-05-31 RX ADMIN — METOCLOPRAMIDE 5 MG: 5 INJECTION, SOLUTION INTRAMUSCULAR; INTRAVENOUS at 08:06

## 2022-05-31 RX ADMIN — ACETAMINOPHEN 650 MG: 325 TABLET ORAL at 08:06

## 2022-05-31 RX ADMIN — HEPARIN SODIUM 5000 UNITS: 5000 INJECTION INTRAVENOUS; SUBCUTANEOUS at 18:26

## 2022-05-31 RX ADMIN — QUETIAPINE FUMARATE 25 MG: 25 TABLET ORAL at 20:20

## 2022-05-31 RX ADMIN — GENTAMICIN SULFATE 198.4 MG: 40 INJECTION, SOLUTION INTRAMUSCULAR; INTRAVENOUS at 18:26

## 2022-05-31 RX ADMIN — Medication 10 ML: at 08:06

## 2022-05-31 RX ADMIN — INSULIN LISPRO 2 UNITS: 100 INJECTION, SOLUTION INTRAVENOUS; SUBCUTANEOUS at 14:06

## 2022-05-31 RX ADMIN — Medication 10 ML: at 20:20

## 2022-05-31 RX ADMIN — ACETAMINOPHEN 650 MG: 325 TABLET ORAL at 20:20

## 2022-05-31 RX ADMIN — INSULIN LISPRO 2 UNITS: 100 INJECTION, SOLUTION INTRAVENOUS; SUBCUTANEOUS at 08:05

## 2022-05-31 RX ADMIN — INSULIN LISPRO 4 UNITS: 100 INJECTION, SOLUTION INTRAVENOUS; SUBCUTANEOUS at 20:20

## 2022-05-31 RX ADMIN — INSULIN GLARGINE 8 UNITS: 100 INJECTION, SOLUTION SUBCUTANEOUS at 08:05

## 2022-05-31 RX ADMIN — HEPARIN SODIUM 5000 UNITS: 5000 INJECTION INTRAVENOUS; SUBCUTANEOUS at 20:20

## 2022-05-31 RX ADMIN — METOCLOPRAMIDE 5 MG: 5 INJECTION, SOLUTION INTRAMUSCULAR; INTRAVENOUS at 18:45

## 2022-05-31 ASSESSMENT — ENCOUNTER SYMPTOMS
ABDOMINAL PAIN: 0
COUGH: 0
EYE REDNESS: 0
WHEEZING: 0
CONSTIPATION: 0
EYE DISCHARGE: 0
NAUSEA: 0
TROUBLE SWALLOWING: 0
BACK PAIN: 0
SHORTNESS OF BREATH: 0
SORE THROAT: 0
RHINORRHEA: 0
DIARRHEA: 0

## 2022-05-31 ASSESSMENT — PAIN SCALES - GENERAL: PAINLEVEL_OUTOF10: 0

## 2022-05-31 NOTE — PROGRESS NOTES
Physical Therapy Hold Note  Attempted to see pt for PT however pt currently on dialysis. Will hold until off dialysis and will follow up with pt as schedule permits.   Prabhakar Workman PT [phone]

## 2022-05-31 NOTE — PROGRESS NOTES
[facility]    5/31/2022 9:21 AM        Name: Arvind Maurer .               Admitted: 5/26/2022  Primary Care Provider: Colin Rod PA-C (Tel: [phone])          Subjective:    Slept well overnight less confused today she is confused to time did have fever of 101 increasing white blood cell count denies any abdominal pain still having hiccups still having poor urine output    Reviewed interval ancillary notes    Current Medications  QUEtiapine (SEROQUEL) tablet 25 mg, Nightly  baclofen (LIORESAL) tablet 5 mg, BID PRN  LORazepam (ATIVAN) tablet 0.5 mg, Q6H PRN  insulin glargine (LANTUS) injection vial 8 Units, Daily  dextrose bolus 10% 125 mL, PRN   Or  dextrose bolus 10% 250 mL, PRN  glucagon (rDNA) injection 1 mg, PRN  dextrose 5 % solution, PRN  heparin (porcine) injection 3,200 Units, PRN  metoclopramide (REGLAN) injection 5 mg, Q8H PRN  meropenem (MERREM) 1,000 mg in sodium chloride 0.9 % 100 mL IVPB (mini-bag), Q24H  HYDROmorphone HCl PF (DILAUDID) injection 1 mg, Q4H PRN  sodium chloride flush 0.9 % injection 5-40 mL, 2 times per day  sodium chloride flush 0.9 % injection 5-40 mL, PRN  0.9 % sodium chloride infusion, PRN  heparin (porcine) injection 5,000 Units, TID  ondansetron (ZOFRAN-ODT) disintegrating tablet 4 mg, Q8H PRN   Or  ondansetron (ZOFRAN) injection 4 mg, Q6H PRN  polyethylene glycol (GLYCOLAX) packet 17 g, Daily PRN  acetaminophen (TYLENOL) tablet 650 mg, Q6H PRN   Or  acetaminophen (TYLENOL) suppository 650 mg, Q6H PRN  insulin lispro (HUMALOG) injection vial 0-12 Units, TID WC  insulin lispro (HUMALOG) injection vial 0-6 Units, Nightly        Objective:  BP (!) 174/89   Pulse (!) 115   Temp (!) 100.9 °F (38.3 °C) (Temporal)   Resp 16   Ht 5' 7\" (1.702 m)   Wt 159 lb 2.8 oz (72.2 kg)   SpO2 95%   BMI 24.93 kg/m²     Intake/Output Summary (Last 24 hours) at 5/31/2022 0137  Last data filed at 5/31/2022 0600  Gross per 24 hour   Intake 184 ml   Output 155 ml   Net 29 ml      Wt Readings from Last 3 Encounters:   05/31/22 159 lb 2.8 oz (72.2 kg)   05/16/22 148 lb (67.1 kg)   03/19/22 160 lb (72.6 kg)       General appearance:  Appears comfortable. AAOx2 not to time  HEENT: atraumatic, Pupils equal, muscous membranes moist, no masses appreciated  Cardiovascular: tachycardia no murmurs appreciated  Respiratory: CTAB no wheezing  Gastrointestinal: Abdomen soft, non-tender, BS+  EXT: no edema  Neurology: no gross focal deficts  Psychiatry: Appropriate affect. Not agitated  Skin: Warm, dry, no rashes appreciated    Labs and Tests:  CBC:   Recent Labs     05/29/22  0146 05/30/22  0500 05/31/22  0526   WBC 13.6* 15.9* 20.7*   HGB 8.8* 8.4* 9.4*    237 319     BMP:    Recent Labs     05/29/22  0146 05/30/22  0500 05/31/22  0526   * 131* 131*   K 3.9 3.9 4.4   CL 93* 94* 92*   CO2 20* 24 22   * 76* 105*   CREATININE 8.6* 7.3* 9.4*   GLUCOSE 186* 154* 196*     Hepatic:   Recent Labs     05/29/22  0146 05/30/22  0500 05/31/22  0526   AST 42* 46* 70*   ALT 30 30 44*   BILITOT 1.9* 1.4* 1.4*   ALKPHOS 213* 284* 331*     CT HEAD WO CONTRAST         IR TUNNELED CVC PLACE WO SQ PORT/PUMP > 5 YEARS   Final Result   Successful ultrasound and fluoroscopy guided tunneled catheter placement of a   right internal jugular vein 19 cm dual lumen tip to cuff dialysis catheter as   above. RECOMMENDATIONS:   The new dialysis catheter flushes and aspirates well and can be used   immediately. XR CHEST PORTABLE   Final Result   Mild cardiomegaly with pulmonary vascular congestion. FL RETROGRADE PYELOGRAM W WO KUB   Final Result   For documentation purposes only. See separate procedure report for more   information.          CT ABDOMEN PELVIS WO CONTRAST Additional Contrast? None   Final Result   Mildly obstructing 12 mm right UPJ      Postsurgical pelvic changes without complicating feature. RECOMMENDATIONS:   Unavailable         CT CHEST ABDOMEN PELVIS WO CONTRAST    (Results Pending)       Recent imaging reviewed    Problem List  Principal Problem:    Septic shock ([facility]  Active Problems:    CHRISSY (acute kidney injury) ([facility]    Ureterolithiasis    Lactic acidosis    Infection due to ESBL-producing Escherichia coli    Bacteremia due to Gram-negative bacteria    Complicated UTI (urinary tract infection)    Hyponatremia    S/P radical cystoprostatectomy    Poorly controlled type 2 diabetes mellitus ([facility] [address]    Fever  Resolved Problems:    * No resolved hospital problems.  *       Assessment/Plan:   Severe sepsis secondary to e coli bacteremia secondary to  UTI and right nephrolithiasis 12mm s/p right ureteral stent placement  iv meropenem  - spiking fevers increased wbc will repeat blood cutlures, ct chest abd and pelvis     chrissy secondary to above  -Still having poor urine output continue HD for now          dm2 with hyperglycemia : improved monitor    icu delirium: imrpoving     DVT prophylaxis heparin sub q   Code status  Full code    Connor Chiu MD   5/31/2022 9:21 AM

## 2022-05-31 NOTE — FLOWSHEET NOTE
Treatment time: 3 hours  Net UF: 1000 ml     Pre weight: 74.5 kg standing  Post weight: 73.5 kg  EDW: TBD     Access used: [facility]  Access function: Good with  ml/min     Medications or blood products given: none     Regular outpatient schedule: TBD     Summary of response to treatment: Tolerated well. Copy of dialysis treatment record placed in chart, to be scanned into EMR.     05/31/22 0848 05/31/22 1210   Treatment   Time On 0859  --    Time Off  --  1200   Vital Signs   BP (!) 182/93 (!) 187/101   Temp 98.7 °F (37.1 °C) 97.7 °F (36.5 °C)   Heart Rate (!) 114 92   Resp 20 16   Weight 164 lb 3.9 oz (74.5 kg) 162 lb 0.6 oz (73.5 kg)   Weight Method Standing scale Estimated  (1000ml net UF deducted from pre-wt.)   Percent Weight Change 0 0   Technical Checks   Dialysis Machine No. [id number]  --    RO Machine Number [id number]  --    Dialyzer Lot No. [id number]  --    Tubing Lot Number [id number]  --    All Connections Secure Yes  --    NS Bag Yes  --    Saline Line Double Clamped Yes  --    RO Machine Log Sheet Completed Yes  --    Machine Alarm Self Test Completed; Passed  --    Air Foam Detector Tested;Proper Function  --    Extracorporeal Circuit Tested for Integrity Yes  --    Bleach Test (Neg) Yes  --    Post-Hemodialysis Assessment   Post-Treatment Procedures  --  Blood returned;Catheter capped, clamped and heparinized x 2 ports   Rinseback Volume (ml)  --  200 ml   Total Liters Processed (l/min)  --  51.4 l/min   Dialyzer Clearance  --  Moderately streaked   Duration of Treatment (minutes)  --  181 minutes   Hemodialysis Intake (ml)  --  400 ml   Hemodialysis Output (ml)  --  1400 ml   NET Removed (ml)  --  1000   Tolerated Treatment  --  Good   Patient Disposition  --  Return to room

## 2022-05-31 NOTE — PROGRESS NOTES
Infectious Diseases   Progress Note      Admission Date: 5/26/2022  Hospital Day: Hospital Day: 6   Attending: Adalgisa Brown MD  Date of service: 5/31/2022     Chief complaint/ Reason for consult:     · Septic shock with leukocytosis, tachycardia, tachypnea and hypotension  · Severe lactic acidosis  · Gram-negative bacteremia with ESBL E. coli  · Complicated E. coli UTI    Microbiology:        I have reviewed allavailable micro lab data and cultures    · Blood culture (2/2) - collected on 5/26/2022 : ESBL E. Coli    Susceptibility      Escherichia coli (esbl) (2)    Antibiotic Interpretation Microscan  Method Status    ampicillin Resistant >=32 mcg/mL BACTERIAL SUSCEPTIBILITY PANEL BY RICA     ampicillin-sulbactam Sensitive 4 mcg/mL BACTERIAL SUSCEPTIBILITY PANEL BY RICA     ceFAZolin Resistant >=64 mcg/mL BACTERIAL SUSCEPTIBILITY PANEL BY RICA     cefepime Resistant   BACTERIAL SUSCEPTIBILITY PANEL BY RICA     cefTRIAXone Resistant >=64 mcg/mL BACTERIAL SUSCEPTIBILITY PANEL BY RICA     ciprofloxacin Resistant >=4 mcg/mL BACTERIAL SUSCEPTIBILITY PANEL BY RICA     ertapenem Sensitive <=0.12 mcg/mL BACTERIAL SUSCEPTIBILITY PANEL BY RICA     gentamicin Sensitive <=1 mcg/mL BACTERIAL SUSCEPTIBILITY PANEL BY RICA     levofloxacin Resistant >=8 mcg/mL BACTERIAL SUSCEPTIBILITY PANEL BY RICA     trimethoprim-sulfamethoxazole Resistant >=320 mcg/mL BACTERIAL SUSCEPTIBILITY PANEL BY RICA         · Urine culture  - collected on 5/26/2022: Greater than 100,000 CFU per mL of ESBL E. coli    Susceptibility      Escherichia coli (esbl) (1)    Antibiotic Interpretation Microscan  Method Status    ampicillin Resistant >=32 mcg/mL BACTERIAL SUSCEPTIBILITY PANEL BY RICA     ampicillin-sulbactam Sensitive 4 mcg/mL BACTERIAL SUSCEPTIBILITY PANEL BY RICA     ceFAZolin Resistant >=64 mcg/mL BACTERIAL SUSCEPTIBILITY PANEL BY RICA     cefepime Resistant   BACTERIAL SUSCEPTIBILITY PANEL BY RICA     cefTRIAXone Resistant >=64 mcg/mL BACTERIAL SUSCEPTIBILITY PANEL BY RICA     ciprofloxacin Resistant >=4 mcg/mL BACTERIAL SUSCEPTIBILITY PANEL BY RICA     ertapenem Sensitive <=0.12 mcg/mL BACTERIAL SUSCEPTIBILITY PANEL BY RICA     gentamicin Sensitive <=1 mcg/mL BACTERIAL SUSCEPTIBILITY PANEL BY RICA     levofloxacin Resistant >=8 mcg/mL BACTERIAL SUSCEPTIBILITY PANEL BY RICA     nitrofurantoin Sensitive <=16 mcg/mL BACTERIAL SUSCEPTIBILITY PANEL BY RICA     trimethoprim-sulfamethoxazole Resistant >=320 mcg/mL BACTERIAL SUSCEPTIBILITY PANEL BY RICA         Antibiotics and immunizations:       Current antibiotics: All antibiotics and their doses were reviewed by me    Recent Abx Admin      No antibiotic orders with administrations found. Immunization History: All immunization history was reviewed by me today. There is no immunization history on file for this patient. Known drug allergies: All allergies were reviewed and updated    No Known Allergies    Social history:     Social History:  All social andepidemiologic history was reviewed and updated by me today as needed. · Tobacco use:   reports that he has never smoked. He has never used smokeless tobacco.  · Alcohol use:   reports no history of alcohol use. · Currently lives in: [facility]  ·  reports no history of drug use. COVID VACCINATION AND LAB RESULT RECORDS:     Internal Administration   First Dose      Second Dose           Last COVID Lab No results found for: SARS-COV-2, SARS-COV-2 RNA, SARS-COV-2, SARS-COV-2, SARS-COV-2 BY PCR, SARS-COV-2, SARS-COV-2, SARS-COV-2         Assessment:     The patient is a 58 y.o. old male who  has a past medical history of Diabetes mellitus ([address] and Hypertension.  with following problems:    · Septic shock with leukocytosis, tachycardia, tachypnea and hypotension-fever and white cell count is going up  · Severe lactic acidosis-this is improved  · Gram-negative bacteremia with ESBL E. coli-feeling on meropenem  · Complicated E. coli UTI-this was also ESBL   · History of robotic assisted laparoscopic radical prostatectomy with bilateral pelvic lymphadenectomy on 5/16/2022 -surgical pathology indicated acinar adenocarcinoma  · S/p cystoscopy and right ureteral stent placement for right ureteral stone  · Hyponatremia-being corrected  · Poorly controlled type 2 diabetes mellitus-maintain good glucose control  ·       Discussion:      The patient is on IV meropenem for past 5 days. Interestingly, white cell count is going up despite IV meropenem. Patient has spiked a high fever 100.9 today. Blood and urine cultures from 5/26/2022 grew ESBL E. coli. Failure of carbapenems is concerning    Serum creatinine is 9.4 today. The patient is requiring hemodialysis    He has positive hepatitis B core antibody. Urine Legionella antigen was negative. He has undergone a tunneled dialysis catheter placement today    Plan:     Diagnostic Workup:    · Follow-up on CT scan of head, chest abdomen and pelvis without contrast  · Continue to follow  fever curve, WBC count and blood cultures. · Continue to monitor blood counts, liver and renal function. Antimicrobials:    · Will continue IV meropenem 1 g every 24 hour  · Unfortunately, I will have to add gentamicin as patient seems to be clinically feeling on meropenem  · Discussed with nephrology. Got okay for gentamicin from Dr. Daniel Rubin. We will go ahead and start gentamicin and will have pharmacy dose that per nephrology recommendations  · We will follow up on the culture results and clinical progress and will make further recommendations accordingly. · Continue close vitals monitoring. · Maintain good glycemic control. · Fall precautions. · Aspiration precautions. · Continue to watch for new fever or diarrhea. · DVT prophylaxis. · Discussed all above with patient and RN.       Drug Monitoring:    · Continue monitoring for antibiotic toxicity as follows: CBC, CMP   · Continue to watch for following: new or worsening fever, new hypotension, hives, lip swelling and redness or purulence at vascular access sites. I/v access Management:    · Continue to monitor i.v access sites for erythema, induration, discharge or tenderness. · As always, continue efforts to minimize tubes/lines/drains as clinically appropriate to reduce chances of line associated infections. Patient education and counseling:        · The patient was educated in detail about the side-effects of various antibiotics and things to watch for like new rashes, lip swelling, severe reaction, worsening diarrhea, break through fever etc.  · Discussed patient's condition and what to expect. All of the patient's questions were addressed in a satisfactory manner and patient verbalized understanding all instructions. Level of complexity of visit: High     Risk of Complications/Morbidity: High     · Illness(es)/ Infection present that pose threat to life/bodily function. · There is potential for severe exacerbation of infection/side effects of treatment. · Therapy requires intensive monitoring for antimicrobial agent toxicity. Thank you for involving me in the care of your patient. I will continue to follow. If you have anyadditional questions, please do not hesitate to contact me. Subjective: Interval history: Interval history was obtained from chart review and patient/ RN. The patient unfortunately continues to have fevers despite meropenem. Seems to be tolerating dialysis okay. REVIEW OF SYSTEMS:      Review of Systems   Constitutional: Positive for fatigue and fever. Negative for chills and diaphoresis. HENT: Negative for ear discharge, ear pain, rhinorrhea, sore throat and trouble swallowing. Eyes: Negative for discharge and redness. Respiratory: Negative for cough, shortness of breath and wheezing. Cardiovascular: Negative for chest pain and leg swelling.    Gastrointestinal: Negative for abdominal pain, constipation, diarrhea and nausea. Endocrine: Negative for polyuria. Genitourinary: Negative for dysuria, flank pain, frequency, hematuria and urgency. Musculoskeletal: Negative for back pain and myalgias. Skin: Negative for rash. Neurological: Negative for dizziness, seizures and headaches. Hematological: Does not bruise/bleed easily. Psychiatric/Behavioral: Negative for hallucinations and suicidal ideas. All other systems reviewed and are negative. Past Medical History: All past medical history reviewed today. Past Medical History:   Diagnosis Date    Diabetes mellitus ([facility] [address]     Hypertension        Past Surgical History: All past surgical history was reviewed today. Past Surgical History:   Procedure Laterality Date    CYSTOSCOPY Right 5/26/2022    CYSTOSCOPY, BILATERAL RETROGRADE PYELOGRAM, PLACEMENT OF RIGHT URETERAL STENT performed by Ronaldo Hoffman MD at [address] [place] [address] IR TUNNELED [address] 5 YEARS  5/28/2022    IR TUNNELED CATHETER PLACEMENT GREATER THAN 5 YEARS 5/28/2022 [facility] SPECIAL PROCEDURES    PROSTATECTOMY N/A 5/16/2022    ROBOTIC LAPAROSCOPIC RADICAL PROSTATECTOMY WITH BILATERAL LYMPH NODE DISSECTION AND BILATERAL NERVE SPARE performed by Kim Ray MD at [facility]       Family History: All family history was reviewed today. History reviewed. No pertinent family history. Objective:       PHYSICAL EXAM:      Vitals:   Vitals:    05/31/22 0848 05/31/22 0900 05/31/22 1000 05/31/22 1210   BP: (!) 182/93   (!) 187/101   Pulse: (!) 114 (!) 115 (!) 105 92   Resp: 20   16   Temp: 98.7 °F (37.1 °C)   97.7 °F (36.5 °C)   TempSrc: Temporal   Temporal   SpO2:       Weight: 164 lb 3.9 oz (74.5 kg)   162 lb 0.6 oz (73.5 kg)   Height:           Physical Exam  Vitals and nursing note reviewed. Constitutional:       Appearance: Normal appearance. He is well-developed.       Comments: Appears very tired   HENT:      Head: Normocephalic and atraumatic. Right Ear: External ear normal.      Left Ear: External ear normal.      Nose: Nose normal. No congestion or rhinorrhea. Mouth/Throat:      Mouth: Mucous membranes are moist.      Pharynx: No oropharyngeal exudate or posterior oropharyngeal erythema. Eyes:      General: No scleral icterus. Right eye: No discharge. Left eye: No discharge. Conjunctiva/sclera: Conjunctivae normal.      Pupils: Pupils are equal, round, and reactive to light. Cardiovascular:      Rate and Rhythm: Normal rate and regular rhythm. Pulses: Normal pulses. Heart sounds: No murmur heard. No friction rub. Pulmonary:      Effort: Pulmonary effort is normal. No respiratory distress. Breath sounds: Normal breath sounds. No stridor. No wheezing, rhonchi or rales. Abdominal:      General: Bowel sounds are normal.      Palpations: Abdomen is soft. Tenderness: There is no abdominal tenderness. There is no right CVA tenderness, left CVA tenderness, guarding or rebound. Musculoskeletal:         General: No swelling or tenderness. Normal range of motion. Cervical back: Normal range of motion and neck supple. No rigidity. No muscular tenderness. Lymphadenopathy:      Cervical: No cervical adenopathy. Skin:     General: Skin is warm and dry. Coloration: Skin is not jaundiced. Findings: No erythema or rash. Neurological:      General: No focal deficit present. Mental Status: He is alert and oriented to person, place, and time. Mental status is at baseline. Motor: No abnormal muscle tone. Psychiatric:         Mood and Affect: Mood normal.         Behavior: Behavior normal.         Thought Content: Thought content normal.               Lines and drains: All vascular access sites are healthy with no local erythema, discharge or tenderness. Intake and output:    I/O last 3 completed shifts:   In: 184 [P.O.:120; IV Piggyback:64]  Out: 220 [Urine:220]    Lab Data:   All available labs and old records have been reviewed by me. CBC:  Recent Labs     05/29/22  0146 05/30/22  0500 05/31/22  0526   WBC 13.6* 15.9* 20.7*   RBC 2.91* 2.73* 3.14*   HGB 8.8* 8.4* 9.4*   HCT 25.9* 24.5* 28.2*    237 319   MCV 89.0 89.8 89.9   MCH 30.3 30.8 29.8   MCHC 34.0 34.3 33.1   RDW 13.1 12.9 13.5        BMP:  Recent Labs     05/29/22  0146 05/30/22  0500 05/31/22  0526   * 131* 131*   K 3.9 3.9 4.4   CL 93* 94* 92*   CO2 20* 24 22   * 76* 105*   CREATININE 8.6* 7.3* 9.4*   CALCIUM 8.3 8.5 8.9   GLUCOSE 186* 154* 196*        Hepatic Function Panel:   Lab Results   Component Value Date    ALKPHOS 331 05/31/2022    ALT 44 05/31/2022    AST 70 05/31/2022    PROT 6.2 05/31/2022    BILITOT 1.4 05/31/2022    LABALBU 2.7 05/31/2022       CPK:   Lab Results   Component Value Date    CKTOTAL 64 05/26/2022     ESR: No results found for: SEDRATE  CRP: No results found for: CRP        Imaging: All pertinent images and reports for the current visit were reviewed by me during this visit. I reviewed the chest x-ray/CT scan/MRI images and independently interpreted the findings and results today. CT HEAD WO CONTRAST         IR TUNNELED CVC PLACE WO SQ PORT/PUMP > 5 YEARS   Final Result   Successful ultrasound and fluoroscopy guided tunneled catheter placement of a   right internal jugular vein 19 cm dual lumen tip to cuff dialysis catheter as   above. RECOMMENDATIONS:   The new dialysis catheter flushes and aspirates well and can be used   immediately. XR CHEST PORTABLE   Final Result   Mild cardiomegaly with pulmonary vascular congestion. FL RETROGRADE PYELOGRAM W WO KUB   Final Result   For documentation purposes only. See separate procedure report for more   information.          CT ABDOMEN PELVIS WO CONTRAST Additional Contrast? None   Final Result   Mildly obstructing 12 mm right UPJ      Postsurgical pelvic changes without complicating feature. RECOMMENDATIONS:   Unavailable         CT CHEST ABDOMEN PELVIS WO CONTRAST    (Results Pending)       Medications: All current and past medications were reviewed.  QUEtiapine  25 mg Oral Nightly    insulin glargine  8 Units SubCUTAneous Daily    meropenem  1,000 mg IntraVENous Q24H    sodium chloride flush  5-40 mL IntraVENous 2 times per day    heparin (porcine)  5,000 Units SubCUTAneous TID    insulin lispro  0-12 Units SubCUTAneous TID WC    insulin lispro  0-6 Units SubCUTAneous Nightly        dextrose      sodium chloride Stopped (05/29/22 7918)       baclofen, LORazepam, dextrose bolus **OR** dextrose bolus, glucagon (rDNA), dextrose, heparin (porcine), metoclopramide, HYDROmorphone, sodium chloride flush, sodium chloride, ondansetron **OR** ondansetron, polyethylene glycol, acetaminophen **OR** acetaminophen      Problem list:       Patient Active Problem List   Diagnosis Code    Prostate cancer ([facility] C61    Septic shock ([facility] A41.9, R65.21    CHRISSY (acute kidney injury) ([facility] [facility] N17.9    Ureterolithiasis N20.1    Lactic acidosis E87.2    Infection due to ESBL-producing Escherichia coli A49.8, Z16.12    Bacteremia due to Gram-negative bacteria J94.97    Complicated UTI (urinary tract infection) N39.0    Hyponatremia E87.1    S/P radical cystoprostatectomy Z90.79, Z90.6    Poorly controlled type 2 diabetes mellitus ([facility] E11.65    Fever R50.9       Please note that this chart was generated using Dragon dictation software. Although every effort was made to ensure the accuracy of this automated transcription, some errors in transcription may have occurred inadvertently. If you may need any clarification, please do not hesitate to contact me through EPIC or at the phone number provided below with my electronic signature.   Any pictures or media included in this note were obtained after taking informed verbal consent from the patient and with their approval to include those in the patient's medical record.       Cynthia Siegel MD, MPH, CHRISTOPHER Wise  5/31/2022, 2:04 PM  [facility] Infectious Disease   [address] [place] [address], [address] [place] [address]  Office: [phone]  Fax: [phone]  Clinic days:  Tuesday & Thursday

## 2022-05-31 NOTE — PROGRESS NOTES
MD Leslye Glaser MD Jaynee Nieves, MD               Office: [phone]                      Fax: [phone]             6 [facility]                   NEPHROLOGY ICU  INPATIENT PROGRESS NOTE:     PATIENT NAME: Paige Johnson  : 1960  MRN: [id number]         Nephrology treatment plan update. Seen on HD today, tolerating well. 1L fluid removal  WBC count increasing, planning for CT without contrast to evaluate for infectious source    BUN and crea still elevated-HD again tomorrow  Follow serum Na    IV antibiotics for gram-negative sepsis.-On IV meropenem. - Blood cultures and -positive for E. coli sepsis. - Blood cultures done on -negative so far. High risk    Admitted for:  Ureterolithiasis [N20.1]  CHRISSY (acute kidney injury) ([facility] [address] [N17.9]  Septic shock ([facility] [address] [A41.9, R65.21]  Severe sepsis ([facility] [address] [A41.9, R65.20]    ICD-10-CM    1. CHRISSY (acute kidney injury) ([facility] [address]  N17.9    2. Ureterolithiasis  N20.1    3. Septic shock (HCC)  A41.9     R65.21          CHRISSY (on CKD: 3B): Oliguric  - BL Scr- 1.5 as off 22 ---> 8.0 on admission  -: Etiology of CHRISSY - presumed ATN + obstruction    - other differentials: unlikely GN / TI / TMA process  - UA : results reviewed: Showing large amount of blood, with significant proteinuria, with leukocytes, with white cells RBC high specific gravity  - Renal imaging: on on admission with CT scan: - Obstructed 12 mm right UPJ  - 22-right ureteral stent placement    History of prostate cancer    Associated problems:   - Volume status: appears euvolemic today  : BP: no need for tight control. BP higher but decreases with HD.   : Na: hyponatremia - acute-moderate range, likely due to renal failure.   Follow with HD.   - Azotemia: Prerenal severe, likely some uremic encephalopathy  - Electrolytes: K: Normal  - Acid-Base: Lactic acidosis high  - Anemia: Mild on chronic disease  - Hypoalbuminemia      Other major problems: Management per primary and other consulting teams.   //         Hospital Problems           Last Modified POA    * (Principal) Septic shock ([facility] [address] 5/27/2022 Yes    CHRISSY (acute kidney injury) ([facility] [address] 5/27/2022 Yes    Ureterolithiasis 5/27/2022 Yes    Lactic acidosis 5/27/2022 Yes    Infection due to ESBL-producing Escherichia coli 5/27/2022 Yes    Bacteremia due to Gram-negative bacteria 5/29/0525 Yes    Complicated UTI (urinary tract infection) 5/27/2022 Yes    Hyponatremia 5/27/2022 Yes    S/P radical cystoprostatectomy 5/27/2022 Yes    Poorly controlled type 2 diabetes mellitus ([facility] [address] 5/27/2022 Yes    Fever 5/29/2022 Yes          Thank you for allowing me to participate in this patient's care. Please do not hesitate to contact me anytime. We will follow along with you. Atul Cooper MD,  [facility] [address] [place] [address] [place]: [phone] or [email or web address]  Fax: [phone]        =======================================================================================   =======================================================================================  SUBJECTIVE-  Seen on HD, tolerating well  More awake  No acute distress    Tachycardia, BP higher    Past medical, Surgical, Social, Family medical history reviewed by me. MEDICATIONS: reviewed by me. Medications Prior to Admission:  No current facility-administered medications on file prior to encounter.      Current Outpatient Medications on File Prior to Encounter   Medication Sig Dispense Refill    sildenafil (REVATIO) 20 MG tablet Take 20 mg by mouth daily      sulfamethoxazole-trimethoprim (BACTRIM DS;SEPTRA DS) 800-160 MG per tablet Take 1 tablet by mouth 2 times daily      metFORMIN (GLUCOPHAGE) 1000 MG tablet Take 1,000 mg by mouth 2 times daily (with meals)       senna-docusate (PERICOLACE) 8.6-50 MG per tablet Take 1 tablet by mouth 2 times daily For constipation while on pain medication.  30 tablet 1    amLODIPine (NORVASC) 10 MG tablet Take 10 mg by mouth daily            Current Facility-Administered Medications:     QUEtiapine (SEROQUEL) tablet 25 mg, 25 mg, Oral, Nightly, Tamia Suarez MD, 25 mg at 05/30/22 1955    baclofen (LIORESAL) tablet 5 mg, 5 mg, Oral, BID PRN, Fausto Borrego MD    LORazepam (ATIVAN) tablet 0.5 mg, 0.5 mg, Oral, Q6H PRN, Tamia Suarez MD, 0.5 mg at 05/29/22 1249    insulin glargine (LANTUS) injection vial 8 Units, 8 Units, SubCUTAneous, Daily, Tamia Suarez MD, 8 Units at 05/31/22 0805    dextrose bolus 10% 125 mL, 125 mL, IntraVENous, PRN **OR** dextrose bolus 10% 250 mL, 250 mL, IntraVENous, PRN, Tamia Suarez MD    glucagon (rDNA) injection 1 mg, 1 mg, IntraMUSCular, PRN, Tamia Suarez MD    dextrose 5 % solution, 100 mL/hr, IntraVENous, PRN, Tamia Suarez MD    heparin (porcine) injection 3,200 Units, 3,200 Units, IntraCATHeter, PRN, Janelle Rinaldi MD    metoclopramide (REGLAN) injection 5 mg, 5 mg, IntraVENous, Q8H PRN, rAabella Davies MD, 5 mg at 05/31/22 0806    [COMPLETED] meropenem (MERREM) 1,000 mg in sodium chloride 0.9 % 100 mL IVPB (mini-bag), 1,000 mg, IntraVENous, Once, Stopped at 05/27/22 1137 **FOLLOWED BY** meropenem (MERREM) 1,000 mg in sodium chloride 0.9 % 100 mL IVPB (mini-bag), 1,000 mg, IntraVENous, Q24H, Kera Umanzor MD, Stopped at 05/30/22 1500    HYDROmorphone HCl PF (DILAUDID) injection 1 mg, 1 mg, IntraVENous, Q4H PRN, Tamia Suarez MD, 1 mg at 05/29/22 1022    sodium chloride flush 0.9 % injection 5-40 mL, 5-40 mL, IntraVENous, 2 times per day, Tamia Suarez MD, 10 mL at 05/31/22 0806    sodium chloride flush 0.9 % injection 5-40 mL, 5-40 mL, IntraVENous, PRN, Tamia Suarez MD    0.9 % sodium chloride infusion, , IntraVENous, PRN, Tamia Suarez MD, Stopped at 05/29/22 1628    heparin (porcine) injection 5,000 Units, 5,000 Units, SubCUTAneous, TID, Deepinder Junior Gongora MD, 5,000 Units at 05/30/22 1955    ondansetron (ZOFRAN-ODT) disintegrating tablet 4 mg, 4 mg, Oral, Q8H PRN **OR** ondansetron (ZOFRAN) injection 4 mg, 4 mg, IntraVENous, Q6H PRN, Elizabeth Linder MD, 4 mg at 05/28/22 1745    polyethylene glycol (GLYCOLAX) packet 17 g, 17 g, Oral, Daily PRN, Elizabeth Linder MD, 17 g at 05/29/22 1802    acetaminophen (TYLENOL) tablet 650 mg, 650 mg, Oral, Q6H PRN, 650 mg at 05/31/22 0806 **OR** acetaminophen (TYLENOL) suppository 650 mg, 650 mg, Rectal, Q6H PRN, Elizabeth Linder MD    insulin lispro (HUMALOG) injection vial 0-12 Units, 0-12 Units, SubCUTAneous, TID WC, Elizabeth Linder MD, 2 Units at 05/31/22 0805    insulin lispro (HUMALOG) injection vial 0-6 Units, 0-6 Units, SubCUTAneous, Nightly, Elizabeth Linder MD, 4 Units at 05/30/22 1954      REVIEW OF SYSTEMS:Review of systems not obtained due to patient factors - mental status          =======================================================================================     PHYSICAL EXAM:  Recent vital signs and recent I/Os reviewed by me.      Wt Readings from Last 3 Encounters:   05/31/22 164 lb 3.9 oz (74.5 kg)   05/16/22 148 lb (67.1 kg)   03/19/22 160 lb (72.6 kg)     BP Readings from Last 3 Encounters:   05/31/22 (!) 182/93   05/16/22 (!) 169/102   03/19/22 (!) 171/98     Patient Vitals for the past 24 hrs:   BP Temp Temp src Pulse Resp SpO2 Weight   05/31/22 1000 -- -- -- (!) 105 -- -- --   05/31/22 0900 -- -- -- (!) 115 -- -- --   05/31/22 0848 (!) 182/93 98.7 °F (37.1 °C) Temporal (!) 114 20 -- 164 lb 3.9 oz (74.5 kg)   05/31/22 0752 (!) 174/89 (!) 100.9 °F (38.3 °C) Temporal (!) 115 16 95 % --   05/31/22 0700 -- -- -- 98 -- 94 % --   05/31/22 0600 (!) 180/93 -- -- (!) 104 -- 96 % --   05/31/22 0400 (!) 168/87 98.1 °F (36.7 °C) Temporal 92 20 95 % 159 lb 2.8 oz (72.2 kg)   05/31/22 0000 -- -- -- 82 -- 95 % --   05/30/22 2200 -- 98.6 °F (37 °C) Temporal 94 -- 95 % --   05/30/22 2000 (!) 165/86 99.3 °F (37.4 °C) Temporal (!) 112 18 97 % --   05/30/22 1800 (!) 163/94 -- -- (!) 116 -- -- --   05/30/22 1600 (!) 169/88 99.8 °F (37.7 °C) Temporal 100 18 93 % --   05/30/22 1409 -- -- -- (!) 106 -- 91 % --   05/30/22 1200 (!) 157/86 99.6 °F (37.6 °C) Temporal 97 18 96 % --   05/30/22 1100 -- -- -- 98 18 94 % --       Intake/Output Summary (Last 24 hours) at 5/31/2022 1045  Last data filed at 5/31/2022 0600  Gross per 24 hour   Intake 184 ml   Output 155 ml   Net 29 ml       Physical Exam  Vitals reviewed. Constitutional:       General: He is not in acute distress. Appearance: Normal appearance. HENT:      Head: Normocephalic and atraumatic. Right Ear: External ear normal.      Left Ear: External ear normal.      Nose: Nose normal.      Mouth/Throat:      Mouth: Mucous membranes are dry. Eyes:      General: No scleral icterus. Conjunctiva/sclera: Conjunctivae normal.   Neck:      Vascular: No JVD. Cardiovascular:      Rate and Rhythm: Normal rate and regular rhythm. Heart sounds: S1 normal and S2 normal.   Pulmonary:      Effort: Respiratory distress not present. Breath sounds: Rhonchi present. Abdominal:      General: Bowel sounds are normal. There is no distension. Musculoskeletal:         General: No swelling or deformity. Cervical back: Normal range of motion and neck supple. Skin:     General: Skin is dry. Coloration: Skin is not jaundiced. Neurological:      Mental Status: He is disoriented. Comments: Unable to obtain as part of sedation     Psychiatric:      Comments: Unable to obtain as part of sedation                  =======================================================================================     DATA:  Diagnostic tests reviewed by me for today's visit:   (AS NEEDED FOR MY EVALUATION AND MANAGEMENT).        Recent Labs     05/29/22  0146 05/30/22  0500 05/31/22  0526   WBC 13.6* 15.9* 20.7*   HCT 25.9* 24.5* 28.2*    237 319     Iron Saturation:  No components found for: PERCENTFE  FERRITIN:  No results found for: FERRITIN  IRON:  No results found for: IRON  TIBC:  No results found for: TIBC    Recent Labs     05/29/22  0146 05/30/22  0500 05/31/22  0526   * 131* 131*   K 3.9 3.9 4.4   CL 93* 94* 92*   CO2 20* 24 22   * 76* 105*   CREATININE 8.6* 7.3* 9.4*     Recent Labs     05/29/22 0146 05/30/22  0500 05/31/22  0526   CALCIUM 8.3 8.5 8.9   PHOS  --  3.8  --      No results for input(s): PH, PCO2, PO2 in the last 72 hours.     Invalid input(s): Lacey Levin    ABG:  No results found for: PH, PCO2, PO2, HCO3, BE, THGB, TCO2, O2SAT  VBG:  No results found for: PHVEN, WPD1BXI, BEVEN, V7QOCRBN    LDH:  No results found for: LDH  Uric Acid:    Lab Results   Component Value Date    LABURIC 8.8 05/26/2022       PT/INR:    Lab Results   Component Value Date    PROTIME 12.9 05/02/2022    INR 1.14 05/02/2022     Warfarin PT/INR:  No components found for: Sushila Parkinson  PTT:    Lab Results   Component Value Date    APTT 34.5 05/02/2022   [APTT}  Last 3 Troponin:  No results found for: TROPONINI    U/A:    Lab Results   Component Value Date    COLORU Yellow 05/26/2022    PROTEINU >=300 05/26/2022    PHUR 7.5 05/26/2022    WBCUA 10-20 05/26/2022    RBCUA 5-10 05/26/2022    BACTERIA 2+ 05/26/2022    CLARITYU Clear 05/26/2022    SPECGRAV 1.020 05/26/2022    LEUKOCYTESUR LARGE 05/26/2022    UROBILINOGEN 0.2 05/26/2022    BILIRUBINUR SMALL 05/26/2022    BLOODU LARGE 05/26/2022    GLUCOSEU 500 05/26/2022     Microalbumen/Creatinine ratio:  No components found for: RUCREAT  24 Hour Urine for Protein:  No components found for: RAWUPRO, UHRS3, SZJZ22EM, UTV3  24 Hour Urine for Creatinine Clearance:  No components found for: CREAT4, UHRS10, UTV10  Urine Toxicology:  No components found for: IAMMENTA, IBARBIT, IBENZO, ICOCAINE, IMARTHC, IOPIATES, IPHENCYC    HgBA1c:  No results found for: LABA1C  RPR:  No results found for: RPR  HIV: No results found for: HIV  BRITTANY:  No results found for: ANATITER, BRITTANY  RF:  No results found for: RF  DSDNA:  No components found for: DNA  AMYLASE:  No results found for: AMYLASE  LIPASE:    Lab Results   Component Value Date    LIPASE 31.0 05/26/2022     Fibrinogen Level:  No components found for: FIB       BELOW MENTIONED RADIOLOGY STUDY RESULTS BY ME (AS NEEDED FOR MY EVALUATION AND MANAGEMENT). CT ABDOMEN PELVIS WO CONTRAST Additional Contrast? None    Result Date: 5/26/2022  EXAMINATION: CT OF THE ABDOMEN AND PELVIS WITHOUT CONTRAST 5/26/2022 1:36 pm TECHNIQUE: CT of the abdomen and pelvis was performed without the administration of intravenous contrast. Multiplanar reformatted images are provided for review. Automated exposure control, iterative reconstruction, and/or weight based adjustment of the mA/kV was utilized to reduce the radiation dose to as low as reasonably achievable. COMPARISON: 03/19/2022 HISTORY: ORDERING SYSTEM PROVIDED HISTORY: recent prostate surg - n/v TECHNOLOGIST PROVIDED HISTORY: Reason for exam:->recent prostate surg - n/v Additional Contrast?->None Reason for Exam: recent prostate surg - n/v Additional signs and symptoms: french breathing instructions provided, pt still not holding breath. Best scan possible. FINDINGS: Lower Chest: There is mild bibasilar dependent atelectasis. Organs: There is mild right hydronephrosis secondary to a 12 mm calculus lodged within the right ureteral vesicular junction. There is mild right perinephric stranding. The remainder of the solid abdominal organs are unremarkable. GI/Bowel: There is no bowel dilatation, wall thickening or obstruction. Pelvis: The bladder is decompressed by Baeza catheter and thus not evaluated. Postop changes of prostatectomy are present. There are multiple extraperitoneal pelvic gas collections within the surgical bed and pelvic sidewalls.  Peritoneum/Retroperitoneum: There is no free air, free fluid or intraperitoneal in phlegm a nat change. There is no adenopathy. Bones/Soft Tissues: There is no acute fracture or aggressive osseous lesion. Mildly obstructing 12 mm right UPJ Postsurgical pelvic changes without complicating feature.  RECOMMENDATIONS: Unavailable

## 2022-05-31 NOTE — PLAN OF CARE
Problem: Chronic Conditions and Co-morbidities  Goal: Patient's chronic conditions and co-morbidity symptoms are monitored and maintained or improved  5/30/2022 2005 by Sergei Strange RN  Outcome: Progressing  Flowsheets (Taken 5/30/2022 2000)  Care Plan - Patient's Chronic Conditions and Co-Morbidity Symptoms are Monitored and Maintained or Improved: Monitor and assess patient's chronic conditions and comorbid symptoms for stability, deterioration, or improvement  5/30/2022 1019 by Nikita Little RN  Outcome: Not Progressing     Problem: Discharge Planning  Goal: Discharge to home or other facility with appropriate resources  5/30/2022 2005 by Sergei Strange RN  Outcome: Progressing  Flowsheets (Taken 5/30/2022 2000)  Discharge to home or other facility with appropriate resources: Identify barriers to discharge with patient and caregiver  5/30/2022 1019 by Nikita Little RN  Outcome: Not Progressing     Problem: Pain  Goal: Verbalizes/displays adequate comfort level or baseline comfort level  5/30/2022 2005 by Sergei Strange RN  Outcome: Progressing  Flowsheets (Taken 5/30/2022 2000)  Verbalizes/displays adequate comfort level or baseline comfort level: Encourage patient to monitor pain and request assistance  5/30/2022 1019 by Nikita Little RN  Outcome: Adequate for Discharge     Problem: Skin/Tissue Integrity  Goal: Absence of new skin breakdown  Description: 1. Monitor for areas of redness and/or skin breakdown  2. Assess vascular access sites hourly  3. Every 4-6 hours minimum:  Change oxygen saturation probe site  4. Every 4-6 hours:  If on nasal continuous positive airway pressure, respiratory therapy assess nares and determine need for appliance change or resting period.   5/30/2022 2005 by Sergei Strange RN  Outcome: Progressing  5/30/2022 1019 by Nikita Little RN  Outcome: Progressing     Problem: Safety - Adult  Goal: Free from fall injury  5/30/2022 2005 by Sergei Strange RN  Outcome:

## 2022-05-31 NOTE — CONSULTS
Clinical Pharmacy Note: Pharmacy to Dose Aminoglycoside    Consult received from Dr. Duayne Lesch to dose gentamicin for the treatment of sepsis 2/2 ESBL e.coli in blood/urine. The patient is also on merrem. Aminoglycoside dosing method: Conventional Aminoglycoside Dosing **Pt on HD**    Assessment/Plan:  Patient on HD. Received HD today and HD ordered for tomorrow. Give 3 mg/kg (198.4 mg) gentamicin today as loading dose. Gent random ordered 6/1 at 0600 which will be a pre-HD level. Pharmacy will continue to monitor and adjust dosing accordingly. Weight: 162 lb 0.6 oz (73.5 kg)   Height: 5' 7\" (170.2 cm)  Lab Results   Component Value Date    CREATININE 9.4 05/31/2022    WBC 20.7 05/31/2022     Estimated Creatinine Clearance: 8 mL/min (A) (based on SCr of 9.4 mg/dL [facility])).      Dosing weight utilized: 66.1 kg ideal body weight       Thank you for the consult,    Peewee Pike, PharmD  Pharmacy Resident   [id number] Discussion with collaborating staff took place since patient's last visit

## 2022-05-31 NOTE — PROGRESS NOTES
Occupational Therapy  Adeola Srivastava    Patient currently off the floor for dialysis. Will attempt later as schedule allows. Thank you,  Kelin Avery.  [address] [facility], [address] [place] [address]

## 2022-06-01 LAB
A/G RATIO: 0.7 (ref 1.1–2.2)
ALBUMIN SERPL-MCNC: 2.4 G/DL (ref 3.4–5)
ALP BLD-CCNC: 290 U/L (ref 40–129)
ALT SERPL-CCNC: 37 U/L (ref 10–40)
ANION GAP SERPL CALCULATED.3IONS-SCNC: 13 MMOL/L (ref 3–16)
AST SERPL-CCNC: 64 U/L (ref 15–37)
BASOPHILS ABSOLUTE: 0 K/UL (ref 0–0.2)
BASOPHILS RELATIVE PERCENT: 0 %
BILIRUB SERPL-MCNC: 1.3 MG/DL (ref 0–1)
BUN BLDV-MCNC: 70 MG/DL (ref 7–20)
CALCIUM SERPL-MCNC: 8.5 MG/DL (ref 8.3–10.6)
CHLORIDE BLD-SCNC: 94 MMOL/L (ref 99–110)
CO2: 24 MMOL/L (ref 21–32)
CREAT SERPL-MCNC: 6.9 MG/DL (ref 0.8–1.3)
EOSINOPHILS ABSOLUTE: 0.2 K/UL (ref 0–0.6)
EOSINOPHILS RELATIVE PERCENT: 1 %
GENTAMICIN DOSE AMOUNT: NORMAL
GENTAMICIN RANDOM: 6.6 UG/ML
GFR AFRICAN AMERICAN: 10
GFR NON-AFRICAN AMERICAN: 8
GLUCOSE BLD-MCNC: 122 MG/DL (ref 70–99)
GLUCOSE BLD-MCNC: 160 MG/DL (ref 70–99)
GLUCOSE BLD-MCNC: 188 MG/DL (ref 70–99)
GLUCOSE BLD-MCNC: 201 MG/DL (ref 70–99)
GLUCOSE BLD-MCNC: 284 MG/DL (ref 70–99)
HCT VFR BLD CALC: 22.6 % (ref 40.5–52.5)
HEMATOLOGY PATH CONSULT: NO
HEMOGLOBIN: 7.6 G/DL (ref 13.5–17.5)
LYMPHOCYTES ABSOLUTE: 2.6 K/UL (ref 1–5.1)
LYMPHOCYTES RELATIVE PERCENT: 14 %
MCH RBC QN AUTO: 30.3 PG (ref 26–34)
MCHC RBC AUTO-ENTMCNC: 33.6 G/DL (ref 31–36)
MCV RBC AUTO: 90.2 FL (ref 80–100)
MONOCYTES ABSOLUTE: 0.2 K/UL (ref 0–1.3)
MONOCYTES RELATIVE PERCENT: 1 %
NEUTROPHILS ABSOLUTE: 15.6 K/UL (ref 1.7–7.7)
NEUTROPHILS RELATIVE PERCENT: 84 %
PDW BLD-RTO: 13.4 % (ref 12.4–15.4)
PERFORMED ON: ABNORMAL
PHOSPHORUS: 5.6 MG/DL (ref 2.5–4.9)
PLATELET # BLD: 337 K/UL (ref 135–450)
PLATELET SLIDE REVIEW: ADEQUATE
PMV BLD AUTO: 8.3 FL (ref 5–10.5)
POTASSIUM REFLEX MAGNESIUM: 4.5 MMOL/L (ref 3.5–5.1)
RBC # BLD: 2.5 M/UL (ref 4.2–5.9)
RBC # BLD: NORMAL 10*6/UL
SLIDE REVIEW: ABNORMAL
SODIUM BLD-SCNC: 131 MMOL/L (ref 136–145)
TOTAL PROTEIN: 5.8 G/DL (ref 6.4–8.2)
WBC # BLD: 18.6 K/UL (ref 4–11)

## 2022-06-01 PROCEDURE — 85025 COMPLETE CBC W/AUTO DIFF WBC: CPT

## 2022-06-01 PROCEDURE — 2580000003 HC RX 258: Performed by: INTERNAL MEDICINE

## 2022-06-01 PROCEDURE — 6360000002 HC RX W HCPCS: Performed by: INTERNAL MEDICINE

## 2022-06-01 PROCEDURE — 36415 COLL VENOUS BLD VENIPUNCTURE: CPT

## 2022-06-01 PROCEDURE — 80170 ASSAY OF GENTAMICIN: CPT

## 2022-06-01 PROCEDURE — 6370000000 HC RX 637 (ALT 250 FOR IP): Performed by: INTERNAL MEDICINE

## 2022-06-01 PROCEDURE — 99233 SBSQ HOSP IP/OBS HIGH 50: CPT | Performed by: INTERNAL MEDICINE

## 2022-06-01 PROCEDURE — 84100 ASSAY OF PHOSPHORUS: CPT

## 2022-06-01 PROCEDURE — 90935 HEMODIALYSIS ONE EVALUATION: CPT

## 2022-06-01 PROCEDURE — 80053 COMPREHEN METABOLIC PANEL: CPT

## 2022-06-01 PROCEDURE — 2060000000 HC ICU INTERMEDIATE R&B

## 2022-06-01 RX ORDER — AMLODIPINE BESYLATE 5 MG/1
5 TABLET ORAL DAILY
Status: DISCONTINUED | OUTPATIENT
Start: 2022-06-02 | End: 2022-06-06 | Stop reason: HOSPADM

## 2022-06-01 RX ADMIN — HEPARIN SODIUM 5000 UNITS: 5000 INJECTION INTRAVENOUS; SUBCUTANEOUS at 10:37

## 2022-06-01 RX ADMIN — MEROPENEM 1000 MG: 1 INJECTION, POWDER, FOR SOLUTION INTRAVENOUS at 12:09

## 2022-06-01 RX ADMIN — INSULIN GLARGINE 8 UNITS: 100 INJECTION, SOLUTION SUBCUTANEOUS at 10:37

## 2022-06-01 RX ADMIN — ONDANSETRON 4 MG: 2 INJECTION INTRAMUSCULAR; INTRAVENOUS at 19:17

## 2022-06-01 RX ADMIN — HEPARIN SODIUM 5000 UNITS: 5000 INJECTION INTRAVENOUS; SUBCUTANEOUS at 14:33

## 2022-06-01 RX ADMIN — HEPARIN SODIUM 5000 UNITS: 5000 INJECTION INTRAVENOUS; SUBCUTANEOUS at 21:36

## 2022-06-01 RX ADMIN — QUETIAPINE FUMARATE 25 MG: 25 TABLET ORAL at 21:36

## 2022-06-01 RX ADMIN — INSULIN LISPRO 4 UNITS: 100 INJECTION, SOLUTION INTRAVENOUS; SUBCUTANEOUS at 14:33

## 2022-06-01 RX ADMIN — Medication 10 ML: at 10:41

## 2022-06-01 RX ADMIN — INSULIN LISPRO 2 UNITS: 100 INJECTION, SOLUTION INTRAVENOUS; SUBCUTANEOUS at 10:37

## 2022-06-01 RX ADMIN — METOCLOPRAMIDE 5 MG: 5 INJECTION, SOLUTION INTRAMUSCULAR; INTRAVENOUS at 12:55

## 2022-06-01 RX ADMIN — Medication 10 ML: at 21:36

## 2022-06-01 ASSESSMENT — ENCOUNTER SYMPTOMS
DIARRHEA: 0
COUGH: 0
WHEEZING: 0
SHORTNESS OF BREATH: 0
TROUBLE SWALLOWING: 0
BACK PAIN: 0
SORE THROAT: 0
NAUSEA: 0
EYE DISCHARGE: 0
EYE REDNESS: 0
RHINORRHEA: 0
CONSTIPATION: 0
ABDOMINAL PAIN: 0

## 2022-06-01 ASSESSMENT — PAIN SCALES - GENERAL
PAINLEVEL_OUTOF10: 0
PAINLEVEL_OUTOF10: 0

## 2022-06-01 NOTE — PROGRESS NOTES
Urology Progress Note  [facility]    Provider: LIZ Esqueda CNP  Patient ID:  Admission Date: 2022 Name: Kerry Restrepo  OR Date: 2022 MRN: [id number]   Patient Location: [id number] : 1960  Attending: Burton Rodriguez MD Date of Service: 2022  PCP: Anu Alcaraz PA-C     Diagnoses:  1. CHRISSY (acute kidney injury) ([facility] [address]    2. Ureterolithiasis    3. Septic shock [facility])        Assessment/Plan:  59 yo M s/p RARP and BL PLND 5/15, final path pT3b, pN0. Continues on HD  WBC improved, spiking fevers  Repeat CT shows increasing fluid volume of recto-vesicular space- 3.5cm    Recc  Call out to IR at this time. Will rev case with IR and Dr. Otto Pugh and determine need for perc drain. The patient had a chance to ask questions which were answered. he understands the above plan. Subjective:   Kerry Restrepo is a 58 y.o. male. He was seen and examined this morning. Today we discussed increasing fluids in pelvis. Discussed need for possible percutaneous drainage. Explained the procedure in detail. Objective:   Vitals:  Vitals:    22 0400   BP: (!) 154/92   Pulse: 100   Resp: 18   Temp: 99.1 °F (37.3 °C)   SpO2: 94%       Intake/Output Summary (Last 24 hours) at 2022 9728  Last data filed at 2022 0505  Gross per 24 hour   Intake 640 ml   Output 1575 ml   Net -935 ml     Physical Exam:  Gen: Alert and oriented x3, no acute distress  CV: Regular rate   Resp: unlabored respirations  Abd: Soft, non-distended, non-tender, no masses  Ext: no peripheral edema noted, moves upper and lower extremities spontaneously  Skin: warmand well perfused, no rashes noted on the face, or arms.      Labs:  Lab Results   Component Value Date    WBC 18.6 (H) 2022    HGB 7.6 (L) 2022    HCT 22.6 (L) 2022    MCV 90.2 2022     2022     Lab Results   Component Value Date    CREATININE 6.9 (HH) 2022    BUN 70 (H) 2022     (L) 06/01/2022    K 4.5 06/01/2022    CL 94 (L) 06/01/2022    CO2 24 06/01/2022       LIZ Zarate - CHICHI   6/1/2022

## 2022-06-01 NOTE — PROGRESS NOTES
Clinical Pharmacy Note: Pharmacy to Dose Aminoglycoside    Aminoglycoside dosing method: Conventional Aminoglycoside Dosing    Assessment/Plan:  Random gentamicin level was 6.6 this AM.   Per documentation from nephrology, will plan to do HD again today. Pharmacy will order another random level for tomorrow AM.   No plans to re-dose today. Pharmacy will continue to monitor and adjust dosing accordingly. Weight: 166 lb 4.8 oz (75.4 kg)   Height: 5' 7\" (170.2 cm)  Lab Results   Component Value Date    CREATININE 6.9 06/01/2022    WBC 18.6 06/01/2022     Estimated Creatinine Clearance: 10 mL/min (A) (based on SCr of 6.9 mg/dL [facility] AT [facility])).        Thank you for the consult,    Jeffery Hawley, PharmD, [facility]

## 2022-06-01 NOTE — PROGRESS NOTES
Physician Progress Note      Fernanda Saunders  [facility] #:                  [id number]  :                       1960  ADMIT DATE:       2022 11:26 AM  DISCH DATE:  RESPONDING  PROVIDER #:        Becca Bartholomew MD          QUERY TEXT:    Pt admitted with severe sepsis. Pt noted to have confusion and agitation. If   possible, please document in the progress notes and discharge summary if you   are evaluating and / or treating any of the following: The medical record reflects the following:  Risk Factors: Sepsis due to UTI, CHRISSY, hyponatremia, lactic acidosis, fevers    Clinical Indicators: RN note , No head CT due to patient agitation. Bedside RN reports Richard Nelsone didn't seem to recognize him while down in   radiology  RN note , Pt is not  following simple commands at this time, unable to   give name or date of birth. Neprho , Hemodialysis treatment had to be terminated early as patient with   worsening confusion and agitation. Etiology of altered mental status   change/anxiety/agitation is unclear at this time  Progress note , icu delirium    Treatment: Serquel, Hemodialysis, Antibiotics, Nephrology consult, ID consult,   Urology consult, monitoring, supportive care    thank you,  Hobart Nyhan RN [email or web address]  Options provided:  -- Metabolic encephalopathy  -- Other - I will add my own diagnosis  -- Disagree - Not applicable / Not valid  -- Disagree - Clinically unable to determine / Unknown  -- Refer to Clinical Documentation Reviewer    PROVIDER RESPONSE TEXT:    This patient has metabolic encephalopathy. Query created by:  Rajinder Mendoza on 2022 9:30 AM      Electronically signed by:  Becca Bartholomew MD 2022 10:09 AM

## 2022-06-01 NOTE — PROGRESS NOTES
MD Jose Manuel Catalan MD Loni Moselle, MD               Office: [phone]                      Fax: [phone]             [facility]                   NEPHROLOGY ICU  INPATIENT PROGRESS NOTE:     PATIENT NAME: Mary Falk  : 1960  MRN: [id number]         Nephrology treatment plan update. HD done yesterday, solute status better  HD again today then tomorrow  WBC count decreasing  CT shows increased volume, fluid removal as tolerated with HD  Decrease Amlodipine to allow more fluid removal during HD  CASTRO with HD  Discussed with family renal risk and need for Gentamycin, understands discussion and agreeable to continue. Follow level, Pharmacy dosing. Follow serum Na and Phosp    IV antibiotics for gram-negative sepsis.-On IV meropenem. - Blood cultures and -positive for E. coli sepsis. - Blood cultures done on -negative so far. High risk. Updated wife. Admitted for:  Ureterolithiasis [N20.1]  CHRISSY (acute kidney injury) ([address] [N17.9]  Septic shock ([address] [A41.9, R65.21]  Severe sepsis ([address] [A41.9, R65.20]    ICD-10-CM    1. CHRISSY (acute kidney injury) ([address]  N17.9    2. Ureterolithiasis  N20.1    3. Septic shock (HCC)  A41.9     R65.21          CHRISSY (on CKD: 3B): Oliguric. On HD  - BL Scr- 1.5 as off 22 ---> 8.0 on admission  -: Etiology of CHRISSY - presumed ATN + obstruction    - other differentials: unlikely GN / TI / TMA process  - UA : results reviewed: Showing large amount of blood, with significant proteinuria, with leukocytes, with white cells RBC high specific gravity  - Renal imaging: on on admission with CT scan: - Obstructed 12 mm right UPJ  - 22-right ureteral stent placement    History of prostate cancer    Associated problems:   - Volume status: hypervolemic  : BP: no need for tight control. BP higher   : Na: hyponatremia - acute-moderate range, likely due to renal failure.   Follow with HD.   - Azotemia: Prerenal severe, likely some uremic encephalopathy  - Electrolytes: K: Normal  - Acid-Base: Lactic acidosis, better   - Anemia: lower, follow. - Hypoalbuminemia      Other major problems: Management per primary and other consulting teams.   //         Hospital Problems           Last Modified POA    * (Principal) Septic shock ([address] 5/27/2022 Yes    CHRISSY (acute kidney injury) ([facility] 5/27/2022 Yes    Ureterolithiasis 5/27/2022 Yes    Lactic acidosis 5/27/2022 Yes    Infection due to ESBL-producing Escherichia coli 5/27/2022 Yes    Bacteremia due to Gram-negative bacteria 4/18/5346 Yes    Complicated UTI (urinary tract infection) 5/27/2022 Yes    Hyponatremia 5/27/2022 Yes    S/P radical cystoprostatectomy 5/27/2022 Yes    Poorly controlled type 2 diabetes mellitus ([facility] 5/27/2022 Yes    Fever 5/29/2022 Yes          Thank you for allowing me to participate in this patient's care. Please do not hesitate to contact me anytime. We will follow along with you. Seun Gibson MD,  [facility] [address] [place] [address] [place]: [phone] or Via [employer]  Fax: [phone]        =======================================================================================   =======================================================================================  SUBJECTIVE-  More awake  No acute distress      Past medical, Surgical, Social, Family medical history reviewed by me. MEDICATIONS: reviewed by me. Medications Prior to Admission:  No current facility-administered medications on file prior to encounter.      Current Outpatient Medications on File Prior to Encounter   Medication Sig Dispense Refill    sildenafil (REVATIO) 20 MG tablet Take 20 mg by mouth daily      sulfamethoxazole-trimethoprim (BACTRIM DS;SEPTRA DS) 800-160 MG per tablet Take 1 tablet by mouth 2 times daily      metFORMIN (GLUCOPHAGE) 1000 MG tablet Take 1,000 mg by mouth 2 times daily (with meals)       senna-docusate (PERICOLACE) 8.6-50 MG per tablet Take 1 tablet by mouth 2 times daily For constipation while on pain medication.  30 tablet 1    amLODIPine (NORVASC) 10 MG tablet Take 10 mg by mouth daily            Current Facility-Administered Medications:     amLODIPine (NORVASC) tablet 10 mg, 10 mg, Oral, Daily, Porsha Chavez MD, 10 mg at 05/31/22 1613    hydrALAZINE (APRESOLINE) injection 10 mg, 10 mg, IntraVENous, Q6H PRN, Porsha Chavez MD    aminoglycoside intermittent dosing (placeholder), , Other, RX Placeholder, Soraida Loza MD    QUEtiapine (SEROQUEL) tablet 25 mg, 25 mg, Oral, Nightly, Porsha Chavez MD, 25 mg at 05/31/22 2020    baclofen (LIORESAL) tablet 5 mg, 5 mg, Oral, BID PRN, Karen Keane MD    LORazepam (ATIVAN) tablet 0.5 mg, 0.5 mg, Oral, Q6H PRN, Porsha Chavez MD, 0.5 mg at 05/29/22 1249    insulin glargine (LANTUS) injection vial 8 Units, 8 Units, SubCUTAneous, Daily, Porsha Chavez MD, 8 Units at 05/31/22 0805    dextrose bolus 10% 125 mL, 125 mL, IntraVENous, PRN **OR** dextrose bolus 10% 250 mL, 250 mL, IntraVENous, PRN, Porsha Chavez MD    glucagon (rDNA) injection 1 mg, 1 mg, IntraMUSCular, PRN, Porsha Chavez MD    dextrose 5 % solution, 100 mL/hr, IntraVENous, PRN, Porsha Chaevz MD    heparin (porcine) injection 3,200 Units, 3,200 Units, IntraCATHeter, PRN, Fatemeh Grigsby MD    metoclopramide (REGLAN) injection 5 mg, 5 mg, IntraVENous, Q8H PRN, Rosi Barajas MD, 5 mg at 05/31/22 1845    [COMPLETED] meropenem (MERREM) 1,000 mg in sodium chloride 0.9 % 100 mL IVPB (mini-bag), 1,000 mg, IntraVENous, Once, Stopped at 05/27/22 1137 **FOLLOWED BY** meropenem (MERREM) 1,000 mg in sodium chloride 0.9 % 100 mL IVPB (mini-bag), 1,000 mg, IntraVENous, Q24H, Neal Fraga MD, Stopped at 05/31/22 1806    HYDROmorphone HCl PF (DILAUDID) injection 1 mg, 1 mg, IntraVENous, Q4H PRN, Porsha Chavez MD, 1 mg at 05/29/22 1022    sodium chloride flush 0.9 % injection 5-40 mL, 5-40 mL, IntraVENous, 2 times per day, Connor Chiu MD, 10 mL at 05/31/22 2020    sodium chloride flush 0.9 % injection 5-40 mL, 5-40 mL, IntraVENous, PRN, Connor Chiu MD    0.9 % sodium chloride infusion, , IntraVENous, PRN, Connor Chiu MD, Stopped at 05/29/22 1628    heparin (porcine) injection 5,000 Units, 5,000 Units, SubCUTAneous, TID, Connor Chiu MD, 5,000 Units at 05/31/22 2020    ondansetron (ZOFRAN-ODT) disintegrating tablet 4 mg, 4 mg, Oral, Q8H PRN **OR** ondansetron (ZOFRAN) injection 4 mg, 4 mg, IntraVENous, Q6H PRN, Connor Chiu MD, 4 mg at 05/28/22 1745    polyethylene glycol (GLYCOLAX) packet 17 g, 17 g, Oral, Daily PRN, Connor Chiu MD, 17 g at 05/29/22 1802    acetaminophen (TYLENOL) tablet 650 mg, 650 mg, Oral, Q6H PRN, 650 mg at 05/31/22 2020 **OR** acetaminophen (TYLENOL) suppository 650 mg, 650 mg, Rectal, Q6H PRN, Connor Chiu MD    insulin lispro (HUMALOG) injection vial 0-12 Units, 0-12 Units, SubCUTAneous, TID WC, Connor Chiu MD, 2 Units at 05/31/22 1406    insulin lispro (HUMALOG) injection vial 0-6 Units, 0-6 Units, SubCUTAneous, Nightly, Connor Chiu MD, 4 Units at 05/31/22 2020      REVIEW OF SYSTEMS:Review of systems not obtained due to patient factors - mental status          =======================================================================================     PHYSICAL EXAM:  Recent vital signs and recent I/Os reviewed by me.      Wt Readings from Last 3 Encounters:   06/01/22 166 lb 4.8 oz (75.4 kg)   05/16/22 148 lb (67.1 kg)   03/19/22 160 lb (72.6 kg)     BP Readings from Last 3 Encounters:   06/01/22 (!) 154/92   05/16/22 (!) 169/102   03/19/22 (!) 171/98     Patient Vitals for the past 24 hrs:   BP Temp Temp src Pulse Resp SpO2 Weight   06/01/22 0400 (!) 154/92 99.1 °F (37.3 °C) Temporal 100 18 94 % 166 lb 4.8 oz (75.4 kg)   06/01/22 0000 (!) 148/82 98.5 °F (36.9 °C) Temporal 95 20 95 % -- 05/31/22 2000 (!) 153/82 (!) 101.1 °F (38.4 °C) Temporal (!) 103 20 96 % --   05/31/22 1210 (!) 187/101 97.7 °F (36.5 °C) Temporal 92 16 -- 162 lb 0.6 oz (73.5 kg)   05/31/22 1000 -- -- -- (!) 105 -- -- --       Intake/Output Summary (Last 24 hours) at 6/1/2022 0955  Last data filed at 6/1/2022 0505  Gross per 24 hour   Intake 640 ml   Output 1575 ml   Net -935 ml       Physical Exam  Vitals reviewed. Constitutional:       General: He is not in acute distress. Appearance: Normal appearance. HENT:      Head: Normocephalic and atraumatic. Right Ear: External ear normal.      Left Ear: External ear normal.      Nose: Nose normal.      Mouth/Throat:      Mouth: Mucous membranes are dry. Eyes:      General: No scleral icterus. Conjunctiva/sclera: Conjunctivae normal.   Neck:      Vascular: No JVD. Cardiovascular:      Rate and Rhythm: Normal rate and regular rhythm. Heart sounds: S1 normal and S2 normal.   Pulmonary:      Effort: Respiratory distress not present. Breath sounds: Rhonchi present. Abdominal:      General: Bowel sounds are normal. There is no distension. Musculoskeletal:         General: No swelling or deformity. Cervical back: Normal range of motion and neck supple. Skin:     General: Skin is dry. Coloration: Skin is not jaundiced. Neurological:      Mental Status: He is disoriented. Comments: Unable to obtain as part of sedation     Psychiatric:      Comments: Unable to obtain as part of sedation                  =======================================================================================     DATA:  Diagnostic tests reviewed by me for today's visit:   (AS NEEDED FOR MY EVALUATION AND MANAGEMENT).        Recent Labs     05/30/22  0500 05/31/22  0526 06/01/22  0603   WBC 15.9* 20.7* 18.6*   HCT 24.5* 28.2* 22.6*    319 337     Iron Saturation:  No components found for: PERCENTFE  FERRITIN:  No results found for: FERRITIN  IRON:  No results found for: IRON  TIBC:  No results found for: TIBC    Recent Labs     05/30/22  0500 05/31/22  0526 06/01/22  0602   * 131* 131*   K 3.9 4.4 4.5   CL 94* 92* 94*   CO2 24 22 24   BUN 76* 105* 70*   CREATININE 7.3* 9.4* 6.9*     Recent Labs     05/30/22  0500 05/31/22  0526 06/01/22  0602   CALCIUM 8.5 8.9 8.5   PHOS 3.8  --  5.6*     No results for input(s): PH, PCO2, PO2 in the last 72 hours.     Invalid input(s): [place]    ABG:  No results found for: PH, PCO2, PO2, HCO3, BE, THGB, TCO2, O2SAT  VBG:  No results found for: PHVEN, VFY9CZV, BEVEN, X6GTAGNW    LDH:  No results found for: LDH  Uric Acid:    Lab Results   Component Value Date    LABURIC 8.8 05/26/2022       PT/INR:    Lab Results   Component Value Date    PROTIME 12.9 05/02/2022    INR 1.14 05/02/2022     Warfarin PT/INR:  No components found for: Nikko Martinez  PTT:    Lab Results   Component Value Date    APTT 34.5 05/02/2022   [APTT}  Last 3 Troponin:  No results found for: TROPONINI    U/A:    Lab Results   Component Value Date    COLORU Yellow 05/26/2022    PROTEINU >=300 05/26/2022    PHUR 7.5 05/26/2022    WBCUA 10-20 05/26/2022    RBCUA 5-10 05/26/2022    BACTERIA 2+ 05/26/2022    CLARITYU Clear 05/26/2022    SPECGRAV 1.020 05/26/2022    LEUKOCYTESUR LARGE 05/26/2022    UROBILINOGEN 0.2 05/26/2022    BILIRUBINUR SMALL 05/26/2022    BLOODU LARGE 05/26/2022    GLUCOSEU 500 05/26/2022     Microalbumen/Creatinine ratio:  No components found for: RUCREAT  24 Hour Urine for Protein:  No components found for: RAWUPRO, UHRS3, UVDU00IG, UTV3  24 Hour Urine for Creatinine Clearance:  No components found for: CREAT4, UHRS10, UTV10  Urine Toxicology:  No components found for: IAMMENTA, IBARBIT, IBENZO, ICOCAINE, IMARTHC, IOPIATES, IPHENCYC    HgBA1c:  No results found for: LABA1C  RPR:  No results found for: RPR  HIV:  No results found for: HIV  BRITTANY:  No results found for: ANATITER, BRITTANY  RF:  No results found for: RF  DSDNA:  No components found for: DNA  AMYLASE:  No results found for: AMYLASE  LIPASE:    Lab Results   Component Value Date    LIPASE 31.0 05/26/2022     Fibrinogen Level:  No components found for: FIB       BELOW MENTIONED RADIOLOGY STUDY RESULTS BY ME (AS NEEDED FOR MY EVALUATION AND MANAGEMENT). CT ABDOMEN PELVIS WO CONTRAST Additional Contrast? None    Result Date: 5/26/2022  EXAMINATION: CT OF THE ABDOMEN AND PELVIS WITHOUT CONTRAST 5/26/2022 1:36 pm TECHNIQUE: CT of the abdomen and pelvis was performed without the administration of intravenous contrast. Multiplanar reformatted images are provided for review. Automated exposure control, iterative reconstruction, and/or weight based adjustment of the mA/kV was utilized to reduce the radiation dose to as low as reasonably achievable. COMPARISON: 03/19/2022 HISTORY: ORDERING SYSTEM PROVIDED HISTORY: recent prostate surg - n/v TECHNOLOGIST PROVIDED HISTORY: Reason for exam:->recent prostate surg - n/v Additional Contrast?->None Reason for Exam: recent prostate surg - n/v Additional signs and symptoms: french breathing instructions provided, pt still not holding breath. Best scan possible. FINDINGS: Lower Chest: There is mild bibasilar dependent atelectasis. Organs: There is mild right hydronephrosis secondary to a 12 mm calculus lodged within the right ureteral vesicular junction. There is mild right perinephric stranding. The remainder of the solid abdominal organs are unremarkable. GI/Bowel: There is no bowel dilatation, wall thickening or obstruction. Pelvis: The bladder is decompressed by Baeza catheter and thus not evaluated. Postop changes of prostatectomy are present. There are multiple extraperitoneal pelvic gas collections within the surgical bed and pelvic sidewalls. Peritoneum/Retroperitoneum: There is no free air, free fluid or intraperitoneal in phlegm a nat change. There is no adenopathy. Bones/Soft Tissues:  There is no acute fracture or aggressive osseous lesion. Mildly obstructing 12 mm right UPJ Postsurgical pelvic changes without complicating feature.  RECOMMENDATIONS: Unavailable

## 2022-06-01 NOTE — PROGRESS NOTES
Shift assessment complete; see flowsheet for details.  used. Patient alert and oriented x4 with periods of confusion. All medications administered per MAR. Patient remains very weak. Pierre in place and draining, but minimal output. Patient asking for pierre to be removed. Explained to patient the importance to monitor output due to poor kidney function. Patient agreed to leave in at this time. Bed alarm on.  Call light within reach

## 2022-06-01 NOTE — PROGRESS NOTES
Nutrition Note    RECOMMENDATIONS  1. PO Diet: Continue current diet   2. ONS: Modify to apple Ensure Clear BID  3. Nutrition Support: None      NUTRITION ASSESSMENT   Pt seen for LOS assessment. Pt is oriented to person only, spoke with family at bedside. Family reports pt ate well yesterday, consuming % of meals. However, pt apparently had BM in bed which required being cleaned up by nursing and is now hesitant to eat; consumed 26-50% of breakfast this morning. Pt with Nepro ordered TID but is only drinking about 50% x 1 daily. Agreeable to trial apple Ensure Clear instead.  Nutrition Related Findings: Oriented to person. Trace generalized edema. On HD.  Wounds: None   Nutrition Education:  Education not indicated    Nutrition Goals: PO intake 50% or greater,by next RD assessment     MALNUTRITION ASSESSMENT   Malnutrition Status: No malnutrition    NUTRITION DIAGNOSIS   · Inadequate protein-energy intake related to inadequate protein-energy intake as evidenced by other (comment) (PO intake not consistently at least 50% of meals and supplements)    CURRENT NUTRITION THERAPIES  ADULT DIET; Regular; 4 carb choices (60 gm/meal)  ADULT ORAL NUTRITION SUPPLEMENT; Breakfast, Lunch, Dinner; Renal Oral Supplement     PO Intake: 26-50%,% (variable)   PO Supplement Intake:26-50% (x 1 Nepro daily)    ANTHROPOMETRICS   Current Height: 5' 7\" (170.2 cm)   Current Weight: 166 lb 4.8 oz (75.4 kg)     Ideal Body Weight (IBW): 148 lbs  (67 kg)        BMI: 26    COMPARATIVE STANDARDS  Energy (kcal):  0730-4630     Protein (g):   grams       Fluid (mL/day):  9731-1727 mL    The patient will be monitored per nutrition standards of care. Consult dietitian if additional nutrition interventions are needed prior to RD reassessment.      Teddy Ross, [address], LD    Contact: 1-2030

## 2022-06-01 NOTE — PROGRESS NOTES
[facility]    6/1/2022 10:09 AM        Name: Mary Shipley . Admitted: 5/26/2022  Primary Care Provider: Alex Lee (Tel: [phone])          Subjective:    Much less confused this spiked fever yesterday started on gentamicin CT with 3.5 cm fluid collection  recto-vesicular .   Discussed with neurology will discuss with IR    Reviewed interval ancillary notes    Current Medications  [START ON 6/2/2022] amLODIPine (NORVASC) tablet 5 mg, Daily  epoetin gabe-epbx (RETACRIT) injection 10,000 Units, Once in dialysis  hydrALAZINE (APRESOLINE) injection 10 mg, Q6H PRN  aminoglycoside intermittent dosing (placeholder), RX Placeholder  QUEtiapine (SEROQUEL) tablet 25 mg, Nightly  baclofen (LIORESAL) tablet 5 mg, BID PRN  LORazepam (ATIVAN) tablet 0.5 mg, Q6H PRN  insulin glargine (LANTUS) injection vial 8 Units, Daily  dextrose bolus 10% 125 mL, PRN   Or  dextrose bolus 10% 250 mL, PRN  glucagon (rDNA) injection 1 mg, PRN  dextrose 5 % solution, PRN  heparin (porcine) injection 3,200 Units, PRN  metoclopramide (REGLAN) injection 5 mg, Q8H PRN  meropenem (MERREM) 1,000 mg in sodium chloride 0.9 % 100 mL IVPB (mini-bag), Q24H  HYDROmorphone HCl PF (DILAUDID) injection 1 mg, Q4H PRN  sodium chloride flush 0.9 % injection 5-40 mL, 2 times per day  sodium chloride flush 0.9 % injection 5-40 mL, PRN  0.9 % sodium chloride infusion, PRN  heparin (porcine) injection 5,000 Units, TID  ondansetron (ZOFRAN-ODT) disintegrating tablet 4 mg, Q8H PRN   Or  ondansetron (ZOFRAN) injection 4 mg, Q6H PRN  polyethylene glycol (GLYCOLAX) packet 17 g, Daily PRN  acetaminophen (TYLENOL) tablet 650 mg, Q6H PRN   Or  acetaminophen (TYLENOL) suppository 650 mg, Q6H PRN  insulin lispro (HUMALOG) injection vial 0-12 Units, TID WC  insulin lispro (HUMALOG) injection vial 0-6 Units, Nightly        Objective:  BP (!) 154/92 Pulse 100   Temp 99.1 °F (37.3 °C) (Temporal)   Resp 18   Ht 5' 7\" (1.702 m)   Wt 166 lb 4.8 oz (75.4 kg)   SpO2 94%   BMI 26.05 kg/m²     Intake/Output Summary (Last 24 hours) at 6/1/2022 1009  Last data filed at 6/1/2022 0505  Gross per 24 hour   Intake 640 ml   Output 1575 ml   Net -935 ml      Wt Readings from Last 3 Encounters:   06/01/22 166 lb 4.8 oz (75.4 kg)   05/16/22 148 lb (67.1 kg)   03/19/22 160 lb (72.6 kg)       General appearance:  Appears comfortable. AAOx2 not to time  HEENT: atraumatic, Pupils equal, muscous membranes moist, no masses appreciated  Cardiovascular: tachycardia no murmurs appreciated  Respiratory: CTAB no wheezing  Gastrointestinal: Abdomen soft, non-tender, BS+  EXT: no edema  Neurology: no gross focal deficts  Psychiatry: Appropriate affect. Not agitated  Skin: Warm, dry, no rashes appreciated    Labs and Tests:  CBC:   Recent Labs     05/30/22  0500 05/31/22  0526 06/01/22  0603   WBC 15.9* 20.7* 18.6*   HGB 8.4* 9.4* 7.6*    319 337     BMP:    Recent Labs     05/30/22  0500 05/31/22  0526 06/01/22  0602   * 131* 131*   K 3.9 4.4 4.5   CL 94* 92* 94*   CO2 24 22 24   BUN 76* 105* 70*   CREATININE 7.3* 9.4* 6.9*   GLUCOSE 154* 196* 188*     Hepatic:   Recent Labs     05/30/22  0500 05/31/22  0526 06/01/22  0602   AST 46* 70* 64*   ALT 30 44* 37   BILITOT 1.4* 1.4* 1.3*   ALKPHOS 284* 331* 290*     CT CHEST ABDOMEN PELVIS WO CONTRAST   Final Result   Chest-      1. Congestive failure pattern, including trace pericardial effusion, small   bilateral pleural effusions and interstitial-alveolar edema. Pneumonia is   considered less likely in the differential.   2. Ectasia of the ascending thoracic aorta, measuring 4.0 cm in diameter. ---      Abdomen and pelvis-      1. Status post hysterectomy. There is an air-fluid collection in the recto   vesicular space. Volume is similar, greater amount of fluid in the interval.   Sterility is indeterminate.    2. Extraperitoneal gas in the pelvis is mildly decreased in the interval.   3. Right ureteral stent remains in situ, traversing a 10 mm ureteropelvic   junction stone. There is minimal residual right pelvicaliectasis and miguel   ureteric edema. 4. Baeza catheter is in normal position with decompression of the bladder. 5. New, small amount of ascites. 6. Mild anasarca. CT HEAD WO CONTRAST   Final Result   Motion limited. No hemorrhage or mass identified      Moderate to severe periventricular and scattered frontal parietal white   matter disease, likely due to small-vessel ischemic change         IR TUNNELED CVC PLACE WO SQ PORT/PUMP > 5 YEARS   Final Result   Successful ultrasound and fluoroscopy guided tunneled catheter placement of a   right internal jugular vein 19 cm dual lumen tip to cuff dialysis catheter as   above. RECOMMENDATIONS:   The new dialysis catheter flushes and aspirates well and can be used   immediately. XR CHEST PORTABLE   Final Result   Mild cardiomegaly with pulmonary vascular congestion. FL RETROGRADE PYELOGRAM W WO KUB   Final Result   For documentation purposes only. See separate procedure report for more   information. CT ABDOMEN PELVIS WO CONTRAST Additional Contrast? None   Final Result   Mildly obstructing 12 mm right UPJ      Postsurgical pelvic changes without complicating feature. RECOMMENDATIONS:   Unavailable             Recent imaging reviewed    Problem List  Principal Problem:    Septic shock ([address]  Active Problems:    CHRISSY (acute kidney injury) ([address]    Ureterolithiasis    Lactic acidosis    Infection due to ESBL-producing Escherichia coli    Bacteremia due to Gram-negative bacteria    Complicated UTI (urinary tract infection)    Hyponatremia    S/P radical cystoprostatectomy    Poorly controlled type 2 diabetes mellitus ([address]    Fever  Resolved Problems:    * No resolved hospital problems.  *       Assessment/Plan:   Severe sepsis secondary to e coli bacteremia secondary to  UTI and right nephrolithiasis 12mm s/p right ureteral stent placement  iv meropenem, started gent 5/31 per id  - of recto-vesicular space- 3.5cm ir and urology to discuss possible drain        gómez secondary to above  -Still having poor urine output continue HD for now          dm2 with hyperglycemia : improved monitor    icu delirium: imrpoving     DVT prophylaxis heparin sub q   Code status  Full code    Jillian Dakins, MD   6/1/2022 10:09 AM

## 2022-06-01 NOTE — PROGRESS NOTES
Infectious Diseases   Progress Note      Admission Date: 5/26/2022  Hospital Day: Hospital Day: 7   Attending: Rita Johnson MD  Date of service: 6/1/2022     Chief complaint/ Reason for consult:     · Septic shock with leukocytosis, tachycardia, tachypnea and hypotension  · Severe lactic acidosis  · Gram-negative bacteremia with ESBL E. coli  · Complicated E. coli UTI    Microbiology:        I have reviewed allavailable micro lab data and cultures    · Blood culture (2/2) - collected on 5/26/2022 : ESBL E. Coli    Susceptibility      Escherichia coli (esbl) (2)    Antibiotic Interpretation Microscan  Method Status    ampicillin Resistant >=32 mcg/mL BACTERIAL SUSCEPTIBILITY PANEL BY RICA     ampicillin-sulbactam Sensitive 4 mcg/mL BACTERIAL SUSCEPTIBILITY PANEL BY RICA     ceFAZolin Resistant >=64 mcg/mL BACTERIAL SUSCEPTIBILITY PANEL BY RICA     cefepime Resistant   BACTERIAL SUSCEPTIBILITY PANEL BY RICA     cefTRIAXone Resistant >=64 mcg/mL BACTERIAL SUSCEPTIBILITY PANEL BY RICA     ciprofloxacin Resistant >=4 mcg/mL BACTERIAL SUSCEPTIBILITY PANEL BY RICA     ertapenem Sensitive <=0.12 mcg/mL BACTERIAL SUSCEPTIBILITY PANEL BY RICA     gentamicin Sensitive <=1 mcg/mL BACTERIAL SUSCEPTIBILITY PANEL BY RICA     levofloxacin Resistant >=8 mcg/mL BACTERIAL SUSCEPTIBILITY PANEL BY RICA     trimethoprim-sulfamethoxazole Resistant >=320 mcg/mL BACTERIAL SUSCEPTIBILITY PANEL BY RICA         · Urine culture  - collected on 5/26/2022: Greater than 100,000 CFU per mL of ESBL E. coli    Susceptibility      Escherichia coli (esbl) (1)    Antibiotic Interpretation Microscan  Method Status    ampicillin Resistant >=32 mcg/mL BACTERIAL SUSCEPTIBILITY PANEL BY RICA     ampicillin-sulbactam Sensitive 4 mcg/mL BACTERIAL SUSCEPTIBILITY PANEL BY RICA     ceFAZolin Resistant >=64 mcg/mL BACTERIAL SUSCEPTIBILITY PANEL BY RIAC     cefepime Resistant   BACTERIAL SUSCEPTIBILITY PANEL BY RICA     cefTRIAXone Resistant >=64 mcg/mL BACTERIAL SUSCEPTIBILITY PANEL BY RICA     ciprofloxacin Resistant >=4 mcg/mL BACTERIAL SUSCEPTIBILITY PANEL BY RICA     ertapenem Sensitive <=0.12 mcg/mL BACTERIAL SUSCEPTIBILITY PANEL BY RICA     gentamicin Sensitive <=1 mcg/mL BACTERIAL SUSCEPTIBILITY PANEL BY RICA     levofloxacin Resistant >=8 mcg/mL BACTERIAL SUSCEPTIBILITY PANEL BY RICA     nitrofurantoin Sensitive <=16 mcg/mL BACTERIAL SUSCEPTIBILITY PANEL BY RICA     trimethoprim-sulfamethoxazole Resistant >=320 mcg/mL BACTERIAL SUSCEPTIBILITY PANEL BY RICA         Antibiotics and immunizations:       Current antibiotics: All antibiotics and their doses were reviewed by me    Recent Abx Admin                   meropenem (MERREM) 1,000 mg in sodium chloride 0.9 % 100 mL IVPB (mini-bag) (mg) 1,000 mg New Bag 06/01/22 1209    gentamicin (GARAMYCIN) 198.4 mg in dextrose 5 % 100 mL IVPB (mg) 198.4 mg New Bag 05/31/22 1826                  Immunization History: All immunization history was reviewed by me today. There is no immunization history on file for this patient. Known drug allergies: All allergies were reviewed and updated    No Known Allergies    Social history:     Social History:  All social andepidemiologic history was reviewed and updated by me today as needed. · Tobacco use:   reports that he has never smoked. He has never used smokeless tobacco.  · Alcohol use:   reports no history of alcohol use. · Currently lives in: [facility]  ·  reports no history of drug use. COVID VACCINATION AND LAB RESULT RECORDS:     Internal Administration   First Dose      Second Dose           Last COVID Lab No results found for: SARS-COV-2, SARS-COV-2 RNA, SARS-COV-2, SARS-COV-2, SARS-COV-2 BY PCR, SARS-COV-2, SARS-COV-2, SARS-COV-2         Assessment:     The patient is a 58 y.o. old male who  has a past medical history of Diabetes mellitus ([address] and Hypertension.  with following problems:    · Septic shock with leukocytosis, tachycardia, tachypnea and hypotension-T-max was 1.1 yesterday  · Severe lactic acidosis-this is improved  · Gram-negative bacteremia with ESBL E. coli-failed on meropenem  · Complicated E. coli UTI-this was also ESBL   · History of robotic assisted laparoscopic radical prostatectomy with bilateral pelvic lymphadenectomy on 5/16/2022 -surgical pathology indicated acinar adenocarcinoma  · S/p cystoscopy and right ureteral stent placement for right ureteral stone  · Hyponatremia-being corrected  · Poorly controlled type 2 diabetes mellitus-maintaining good glycemic control  ·       Discussion:      I had to start patient on aminoglycosides yesterday as patient was clinically feeling on meropenem with rising white cell count and upgoing fever. The patient was started on IV gentamicin yesterday. He remains on dialysis. Serum creatinine is 6.9. The white cell count has shown some improvement and is 18,600 today. Interestingly, blood cultures from 5/27/2022 remain negative. He had a CT scan of head, chest abdomen and pelvis without contrast done yesterday. Patient has some bilateral lung congestion on the CT scan. There was an air-fluid level in the rectovesical space concerning for possibility of abscess. He has a right ureteral stent with residual right pelvicaliectasis    CT scan of the head showed small vessel ischemic disease    Plan:     Diagnostic Workup:      · Continue to follow  fever curve, WBC count and blood cultures. · Continue to monitor blood counts, liver and renal function.   · Follow-up on gentamicin peak and trough levels    Antimicrobials:    · I will continue both meropenem and gentamicin at this time for synergistic effect  · Continue IV meropenem 1 g every 24 hour  · Continue IV gentamicin at conventional dosing with target peak level of 7-10 and trough less than 1  · Contact isolation for ESBL  · We will follow up on the culture results and clinical progress and will make further recommendations accordingly. · Continue close vitals monitoring. · Maintain good glycemic control. · Fall precautions. · Aspiration precautions. · Continue to watch for new fever or diarrhea. · DVT prophylaxis. · Discussed all above with patient and RN. Drug Monitoring:    · Continue monitoring for antibiotic toxicity as follows: CBC, CMP   · Continue to watch for following: new or worsening fever, new hypotension, hives, lip swelling and redness or purulence at vascular access sites. I/v access Management:    · Continue to monitor i.v access sites for erythema, induration, discharge or tenderness. · As always, continue efforts to minimize tubes/lines/drains as clinically appropriate to reduce chances of line associated infections. Patient education and counseling:       · The patient was educated in detail about the side-effects of various antibiotics and things to watch for like new rashes, lip swelling, severe reaction, worsening diarrhea, break through fever etc.  · Discussed patient's condition and what to expect. All of the patient's questions were addressed in a satisfactory manner and patient verbalized understanding all instructions. Level of complexity of visit: High     Risk of Complications/Morbidity: High     · Illness(es)/ Infection present that pose threat to life/bodily function. · There is potential for severe exacerbation of infection/side effects of treatment. · Therapy requires intensive monitoring for antimicrobial agent toxicity. Thank you for involving me in the care of your patient. I will continue to follow. If you have anyadditional questions, please do not hesitate to contact me. Subjective: Interval history: Interval history was obtained from chart review and patient/ RN. Patient is now on both meropenem and gentamicin. He did have a fever up to 101.1 yesterday. He seems to be tolerating antibiotics okay.      REVIEW OF SYSTEMS:      Review of Systems 100    Resp: 20 20 18    Temp: (!) 101.1 °F (38.4 °C) 98.5 °F (36.9 °C) 99.1 °F (37.3 °C)    TempSrc: Temporal Temporal Temporal    SpO2: 96% 95% 94%    Weight:   166 lb 4.8 oz (75.4 kg)    Height:    5' 7\" (1.702 m)       Physical Exam  Vitals and nursing note reviewed. Constitutional:       Appearance: Normal appearance. He is well-developed. Comments: Appears tired   HENT:      Head: Normocephalic and atraumatic. Right Ear: External ear normal.      Left Ear: External ear normal.      Nose: Nose normal. No congestion or rhinorrhea. Mouth/Throat:      Mouth: Mucous membranes are moist.      Pharynx: No oropharyngeal exudate or posterior oropharyngeal erythema. Eyes:      General: No scleral icterus. Right eye: No discharge. Left eye: No discharge. Conjunctiva/sclera: Conjunctivae normal.      Pupils: Pupils are equal, round, and reactive to light. Cardiovascular:      Rate and Rhythm: Normal rate and regular rhythm. Pulses: Normal pulses. Heart sounds: No murmur heard. No friction rub. Pulmonary:      Effort: Pulmonary effort is normal. No respiratory distress. Breath sounds: Normal breath sounds. No stridor. No wheezing, rhonchi or rales. Abdominal:      General: Bowel sounds are normal.      Palpations: Abdomen is soft. Tenderness: There is no abdominal tenderness. There is no right CVA tenderness, left CVA tenderness, guarding or rebound. Musculoskeletal:         General: No swelling or tenderness. Normal range of motion. Cervical back: Normal range of motion and neck supple. No rigidity. No muscular tenderness. Lymphadenopathy:      Cervical: No cervical adenopathy. Skin:     General: Skin is warm and dry. Coloration: Skin is not jaundiced. Findings: No erythema or rash. Neurological:      General: No focal deficit present. Mental Status: He is alert and oriented to person, place, and time.  Mental status is at baseline. Motor: No abnormal muscle tone. Psychiatric:         Mood and Affect: Mood normal.         Behavior: Behavior normal.         Thought Content: Thought content normal.               Lines and drains: All vascular access sites are healthy with no local erythema, discharge or tenderness. Intake and output:    I/O last 3 completed shifts: In: 640 [P.O.:240]  Out: 1705 [Urine:305]    Lab Data:   All available labs and old records have been reviewed by me. CBC:  Recent Labs     05/30/22  0500 05/31/22  0526 06/01/22  0603   WBC 15.9* 20.7* 18.6*   RBC 2.73* 3.14* 2.50*   HGB 8.4* 9.4* 7.6*   HCT 24.5* 28.2* 22.6*    319 337   MCV 89.8 89.9 90.2   MCH 30.8 29.8 30.3   MCHC 34.3 33.1 33.6   RDW 12.9 13.5 13.4        BMP:  Recent Labs     05/30/22  0500 05/31/22  0526 06/01/22  0602   * 131* 131*   K 3.9 4.4 4.5   CL 94* 92* 94*   CO2 24 22 24   BUN 76* 105* 70*   CREATININE 7.3* 9.4* 6.9*   CALCIUM 8.5 8.9 8.5   GLUCOSE 154* 196* 188*        Hepatic Function Panel:   Lab Results   Component Value Date    ALKPHOS 290 06/01/2022    ALT 37 06/01/2022    AST 64 06/01/2022    PROT 5.8 06/01/2022    BILITOT 1.3 06/01/2022    LABALBU 2.4 06/01/2022       CPK:   Lab Results   Component Value Date    CKTOTAL 64 05/26/2022     ESR: No results found for: SEDRATE  CRP: No results found for: CRP        Imaging: All pertinent images and reports for the current visit were reviewed by me during this visit. I reviewed the chest x-ray/CT scan/MRI images and independently interpreted the findings and results today. CT CHEST ABDOMEN PELVIS WO CONTRAST   Final Result   Chest-      1. Congestive failure pattern, including trace pericardial effusion, small   bilateral pleural effusions and interstitial-alveolar edema. Pneumonia is   considered less likely in the differential.   2. Ectasia of the ascending thoracic aorta, measuring 4.0 cm in diameter. ---      Abdomen and pelvis-      1.  Status post hysterectomy. There is an air-fluid collection in the recto   vesicular space. Volume is similar, greater amount of fluid in the interval.   Sterility is indeterminate. 2. Extraperitoneal gas in the pelvis is mildly decreased in the interval.   3. Right ureteral stent remains in situ, traversing a 10 mm ureteropelvic   junction stone. There is minimal residual right pelvicaliectasis and miguel   ureteric edema. 4. Baeza catheter is in normal position with decompression of the bladder. 5. New, small amount of ascites. 6. Mild anasarca. CT HEAD WO CONTRAST   Final Result   Motion limited. No hemorrhage or mass identified      Moderate to severe periventricular and scattered frontal parietal white   matter disease, likely due to small-vessel ischemic change         IR TUNNELED CVC PLACE WO SQ PORT/PUMP > 5 YEARS   Final Result   Successful ultrasound and fluoroscopy guided tunneled catheter placement of a   right internal jugular vein 19 cm dual lumen tip to cuff dialysis catheter as   above. RECOMMENDATIONS:   The new dialysis catheter flushes and aspirates well and can be used   immediately. XR CHEST PORTABLE   Final Result   Mild cardiomegaly with pulmonary vascular congestion. FL RETROGRADE PYELOGRAM W WO KUB   Final Result   For documentation purposes only. See separate procedure report for more   information. CT ABDOMEN PELVIS WO CONTRAST Additional Contrast? None   Final Result   Mildly obstructing 12 mm right UPJ      Postsurgical pelvic changes without complicating feature. RECOMMENDATIONS:   Unavailable         CT DRAINAGE HEMATOMA/SEROMA/FLUID COLLECTION    (Results Pending)       Medications: All current and past medications were reviewed.      [START ON 6/2/2022] amLODIPine  5 mg Oral Daily    epoetin gabe-epbx  10,000 Units IntraVENous Once in dialysis    aminoglycoside intermittent dosing (placeholder)   Other RX Placeholder    QUEtiapine  25 mg Oral Nightly    insulin glargine  8 Units SubCUTAneous Daily    meropenem  1,000 mg IntraVENous Q24H    sodium chloride flush  5-40 mL IntraVENous 2 times per day    heparin (porcine)  5,000 Units SubCUTAneous TID    insulin lispro  0-12 Units SubCUTAneous TID WC    insulin lispro  0-6 Units SubCUTAneous Nightly        dextrose      sodium chloride Stopped (05/29/22 9628)       hydrALAZINE, baclofen, LORazepam, dextrose bolus **OR** dextrose bolus, glucagon (rDNA), dextrose, heparin (porcine), metoclopramide, HYDROmorphone, sodium chloride flush, sodium chloride, ondansetron **OR** ondansetron, polyethylene glycol, acetaminophen **OR** acetaminophen      Problem list:       Patient Active Problem List   Diagnosis Code    Prostate cancer ([facility] C61    Septic shock ([facility] A41.9, R65.21    CHRISSY (acute kidney injury) ([facility] N17.9    Ureterolithiasis N20.1    Lactic acidosis E87.2    Infection due to ESBL-producing Escherichia coli A49.8, Z16.12    Bacteremia due to Gram-negative bacteria T75.46    Complicated UTI (urinary tract infection) N39.0    Hyponatremia E87.1    S/P radical cystoprostatectomy Z90.79, Z90.6    Poorly controlled type 2 diabetes mellitus ([facility] E11.65    Fever R50.9       Please note that this chart was generated using Dragon dictation software. Although every effort was made to ensure the accuracy of this automated transcription, some errors in transcription may have occurred inadvertently. If you may need any clarification, please do not hesitate to contact me through EPIC or at the phone number provided below with my electronic signature. Any pictures or media included in this note were obtained after taking informed verbal consent from the patient and with their approval to include those in the patient's medical record.       Thelma Up MD, MPH, [facility], [facility]  6/1/2022, 2:40 PM  [facility]   [address] [place] [address] [address] [facility], [address] [facility]  Office: [phone]  Fax: [phone]  Clinic days:  Tuesday & Thursday

## 2022-06-02 LAB
A/G RATIO: 0.7 (ref 1.1–2.2)
ALBUMIN SERPL-MCNC: 2.6 G/DL (ref 3.4–5)
ALP BLD-CCNC: 280 U/L (ref 40–129)
ALT SERPL-CCNC: 33 U/L (ref 10–40)
ANION GAP SERPL CALCULATED.3IONS-SCNC: 12 MMOL/L (ref 3–16)
ANISOCYTOSIS: ABNORMAL
AST SERPL-CCNC: 53 U/L (ref 15–37)
BASOPHILS ABSOLUTE: 0 K/UL (ref 0–0.2)
BASOPHILS RELATIVE PERCENT: 0 %
BILIRUB SERPL-MCNC: 1.2 MG/DL (ref 0–1)
BUN BLDV-MCNC: 43 MG/DL (ref 7–20)
CALCIUM SERPL-MCNC: 8.6 MG/DL (ref 8.3–10.6)
CHLORIDE BLD-SCNC: 99 MMOL/L (ref 99–110)
CO2: 25 MMOL/L (ref 21–32)
CREAT SERPL-MCNC: 5.5 MG/DL (ref 0.8–1.3)
EOSINOPHILS ABSOLUTE: 0 K/UL (ref 0–0.6)
EOSINOPHILS RELATIVE PERCENT: 0 %
GENTAMICIN DOSE AMOUNT: NORMAL
GENTAMICIN RANDOM: 2.7 UG/ML
GFR AFRICAN AMERICAN: 13
GFR NON-AFRICAN AMERICAN: 11
GLUCOSE BLD-MCNC: 141 MG/DL (ref 70–99)
GLUCOSE BLD-MCNC: 142 MG/DL (ref 70–99)
GLUCOSE BLD-MCNC: 160 MG/DL (ref 70–99)
GLUCOSE BLD-MCNC: 182 MG/DL (ref 70–99)
HCT VFR BLD CALC: 22.4 % (ref 40.5–52.5)
HEMOGLOBIN: 7.4 G/DL (ref 13.5–17.5)
HYPOCHROMIA: ABNORMAL
LYMPHOCYTES ABSOLUTE: 0.9 K/UL (ref 1–5.1)
LYMPHOCYTES RELATIVE PERCENT: 5 %
MCH RBC QN AUTO: 29.9 PG (ref 26–34)
MCHC RBC AUTO-ENTMCNC: 33.1 G/DL (ref 31–36)
MCV RBC AUTO: 90.1 FL (ref 80–100)
MONOCYTES ABSOLUTE: 1.7 K/UL (ref 0–1.3)
MONOCYTES RELATIVE PERCENT: 10 %
NEUTROPHILS ABSOLUTE: 14.5 K/UL (ref 1.7–7.7)
NEUTROPHILS RELATIVE PERCENT: 85 %
PDW BLD-RTO: 13.4 % (ref 12.4–15.4)
PERFORMED ON: ABNORMAL
PHOSPHORUS: 5 MG/DL (ref 2.5–4.9)
PLATELET # BLD: 386 K/UL (ref 135–450)
PLATELET SLIDE REVIEW: ADEQUATE
PMV BLD AUTO: 8.1 FL (ref 5–10.5)
POTASSIUM REFLEX MAGNESIUM: 4.4 MMOL/L (ref 3.5–5.1)
RBC # BLD: 2.48 M/UL (ref 4.2–5.9)
SLIDE REVIEW: ABNORMAL
SODIUM BLD-SCNC: 136 MMOL/L (ref 136–145)
TOTAL PROTEIN: 6.1 G/DL (ref 6.4–8.2)
TOXIC GRANULATION: PRESENT
WBC # BLD: 17.1 K/UL (ref 4–11)

## 2022-06-02 PROCEDURE — 90935 HEMODIALYSIS ONE EVALUATION: CPT

## 2022-06-02 PROCEDURE — 99233 SBSQ HOSP IP/OBS HIGH 50: CPT | Performed by: INTERNAL MEDICINE

## 2022-06-02 PROCEDURE — 80053 COMPREHEN METABOLIC PANEL: CPT

## 2022-06-02 PROCEDURE — 85025 COMPLETE CBC W/AUTO DIFF WBC: CPT

## 2022-06-02 PROCEDURE — 84100 ASSAY OF PHOSPHORUS: CPT

## 2022-06-02 PROCEDURE — 80170 ASSAY OF GENTAMICIN: CPT

## 2022-06-02 PROCEDURE — 6360000002 HC RX W HCPCS: Performed by: INTERNAL MEDICINE

## 2022-06-02 PROCEDURE — 2580000003 HC RX 258: Performed by: INTERNAL MEDICINE

## 2022-06-02 PROCEDURE — 6370000000 HC RX 637 (ALT 250 FOR IP): Performed by: NURSE PRACTITIONER

## 2022-06-02 PROCEDURE — 36415 COLL VENOUS BLD VENIPUNCTURE: CPT

## 2022-06-02 PROCEDURE — 2060000000 HC ICU INTERMEDIATE R&B

## 2022-06-02 PROCEDURE — 6360000002 HC RX W HCPCS: Performed by: HOSPITALIST

## 2022-06-02 PROCEDURE — 6370000000 HC RX 637 (ALT 250 FOR IP): Performed by: INTERNAL MEDICINE

## 2022-06-02 RX ORDER — OXYBUTYNIN CHLORIDE 5 MG/1
15 TABLET, EXTENDED RELEASE ORAL NIGHTLY
Status: DISCONTINUED | OUTPATIENT
Start: 2022-06-02 | End: 2022-06-06 | Stop reason: HOSPADM

## 2022-06-02 RX ORDER — HYDRALAZINE HYDROCHLORIDE 20 MG/ML
10 INJECTION INTRAMUSCULAR; INTRAVENOUS EVERY 6 HOURS PRN
Status: DISCONTINUED | OUTPATIENT
Start: 2022-06-02 | End: 2022-06-06 | Stop reason: HOSPADM

## 2022-06-02 RX ORDER — ATROPA BELLADONNA AND OPIUM 16.2; 3 MG/1; MG/1
60 SUPPOSITORY RECTAL EVERY 8 HOURS PRN
Status: DISCONTINUED | OUTPATIENT
Start: 2022-06-02 | End: 2022-06-06 | Stop reason: HOSPADM

## 2022-06-02 RX ADMIN — HEPARIN SODIUM 5000 UNITS: 5000 INJECTION INTRAVENOUS; SUBCUTANEOUS at 15:32

## 2022-06-02 RX ADMIN — HEPARIN SODIUM 5000 UNITS: 5000 INJECTION INTRAVENOUS; SUBCUTANEOUS at 21:19

## 2022-06-02 RX ADMIN — OXYBUTYNIN CHLORIDE 15 MG: 5 TABLET, EXTENDED RELEASE ORAL at 21:19

## 2022-06-02 RX ADMIN — EPOETIN ALFA-EPBX 10000 UNITS: 10000 INJECTION, SOLUTION INTRAVENOUS; SUBCUTANEOUS at 09:50

## 2022-06-02 RX ADMIN — INSULIN LISPRO 2 UNITS: 100 INJECTION, SOLUTION INTRAVENOUS; SUBCUTANEOUS at 18:24

## 2022-06-02 RX ADMIN — MEROPENEM 1000 MG: 1 INJECTION, POWDER, FOR SOLUTION INTRAVENOUS at 12:04

## 2022-06-02 RX ADMIN — Medication 10 ML: at 12:09

## 2022-06-02 RX ADMIN — METOCLOPRAMIDE 5 MG: 5 INJECTION, SOLUTION INTRAMUSCULAR; INTRAVENOUS at 12:10

## 2022-06-02 RX ADMIN — HYDRALAZINE HYDROCHLORIDE 10 MG: 20 INJECTION INTRAMUSCULAR; INTRAVENOUS at 04:30

## 2022-06-02 RX ADMIN — Medication 10 ML: at 21:19

## 2022-06-02 RX ADMIN — LORAZEPAM 0.5 MG: 0.5 TABLET ORAL at 13:34

## 2022-06-02 RX ADMIN — QUETIAPINE FUMARATE 25 MG: 25 TABLET ORAL at 21:19

## 2022-06-02 RX ADMIN — INSULIN LISPRO 2 UNITS: 100 INJECTION, SOLUTION INTRAVENOUS; SUBCUTANEOUS at 12:09

## 2022-06-02 RX ADMIN — INSULIN GLARGINE 8 UNITS: 100 INJECTION, SOLUTION SUBCUTANEOUS at 12:09

## 2022-06-02 RX ADMIN — HEPARIN SODIUM 3200 UNITS: 1000 INJECTION INTRAVENOUS; SUBCUTANEOUS at 11:05

## 2022-06-02 RX ADMIN — ONDANSETRON 4 MG: 2 INJECTION INTRAMUSCULAR; INTRAVENOUS at 09:02

## 2022-06-02 ASSESSMENT — ENCOUNTER SYMPTOMS
CONSTIPATION: 0
DIARRHEA: 0
ABDOMINAL PAIN: 0
EYE REDNESS: 0
RHINORRHEA: 0
BACK PAIN: 0
TROUBLE SWALLOWING: 0
SORE THROAT: 0
WHEEZING: 0
SHORTNESS OF BREATH: 0
COUGH: 0
NAUSEA: 0
EYE DISCHARGE: 0

## 2022-06-02 ASSESSMENT — PAIN SCALES - GENERAL
PAINLEVEL_OUTOF10: 0

## 2022-06-02 NOTE — CARE COORDINATION
Discharge Planning:     (CM) called and spoke with patient's Daughter, Shahida Cade (on emergency contact list), to discuss [address] [facility]) referral choice from the [facility] that was provided to her for patient. Daughter reported that she corona texted her friend to see where she used to work, as that [facility] agency has positive reviews. Daughter asked that CM call her back later today so that she can get the name of the [facility] from her friend. CM agreed to this plan of action.       ANA Holliday, [facility] -   [phone]    Electronically signed by RAQUEL Crump on 6/2/2022 at 9:08 AM

## 2022-06-02 NOTE — PROGRESS NOTES
Infectious Diseases   Progress Note      Admission Date: 5/26/2022  Hospital Day: Hospital Day: 8   Attending: Connor Chiu MD  Date of service: 6/2/2022     Chief complaint/ Reason for consult:     · Septic shock with leukocytosis, tachycardia, tachypnea and hypotension  · Severe lactic acidosis  · Gram-negative bacteremia with ESBL E. coli  · Complicated E. coli UTI    Microbiology:        I have reviewed allavailable micro lab data and cultures    · Blood culture (2/2) - collected on 5/26/2022 : ESBL E. Coli    Susceptibility      Escherichia coli (esbl) (2)    Antibiotic Interpretation Microscan  Method Status    ampicillin Resistant >=32 mcg/mL BACTERIAL SUSCEPTIBILITY PANEL BY RICA     ampicillin-sulbactam Sensitive 4 mcg/mL BACTERIAL SUSCEPTIBILITY PANEL BY RICA     ceFAZolin Resistant >=64 mcg/mL BACTERIAL SUSCEPTIBILITY PANEL BY RICA     cefepime Resistant   BACTERIAL SUSCEPTIBILITY PANEL BY RICA     cefTRIAXone Resistant >=64 mcg/mL BACTERIAL SUSCEPTIBILITY PANEL BY RICA     ciprofloxacin Resistant >=4 mcg/mL BACTERIAL SUSCEPTIBILITY PANEL BY RICA     ertapenem Sensitive <=0.12 mcg/mL BACTERIAL SUSCEPTIBILITY PANEL BY RICA     gentamicin Sensitive <=1 mcg/mL BACTERIAL SUSCEPTIBILITY PANEL BY RICA     levofloxacin Resistant >=8 mcg/mL BACTERIAL SUSCEPTIBILITY PANEL BY RICA     trimethoprim-sulfamethoxazole Resistant >=320 mcg/mL BACTERIAL SUSCEPTIBILITY PANEL BY RICA         · Urine culture  - collected on 5/26/2022: Greater than 100,000 CFU per mL of ESBL E. coli    Susceptibility      Escherichia coli (esbl) (1)    Antibiotic Interpretation Microscan  Method Status    ampicillin Resistant >=32 mcg/mL BACTERIAL SUSCEPTIBILITY PANEL BY RICA     ampicillin-sulbactam Sensitive 4 mcg/mL BACTERIAL SUSCEPTIBILITY PANEL BY RICA     ceFAZolin Resistant >=64 mcg/mL BACTERIAL SUSCEPTIBILITY PANEL BY RICA     cefepime Resistant   BACTERIAL SUSCEPTIBILITY PANEL BY RICA     cefTRIAXone Resistant >=64 mcg/mL BACTERIAL SUSCEPTIBILITY PANEL BY RICA     ciprofloxacin Resistant >=4 mcg/mL BACTERIAL SUSCEPTIBILITY PANEL BY RICA     ertapenem Sensitive <=0.12 mcg/mL BACTERIAL SUSCEPTIBILITY PANEL BY RICA     gentamicin Sensitive <=1 mcg/mL BACTERIAL SUSCEPTIBILITY PANEL BY RICA     levofloxacin Resistant >=8 mcg/mL BACTERIAL SUSCEPTIBILITY PANEL BY RICA     nitrofurantoin Sensitive <=16 mcg/mL BACTERIAL SUSCEPTIBILITY PANEL BY RICA     trimethoprim-sulfamethoxazole Resistant >=320 mcg/mL BACTERIAL SUSCEPTIBILITY PANEL BY RICA         Antibiotics and immunizations:       Current antibiotics: All antibiotics and their doses were reviewed by me    Recent Abx Admin                   meropenem (MERREM) 1,000 mg in sodium chloride 0.9 % 100 mL IVPB (mini-bag) (mg) 1,000 mg New Bag 06/02/22 1204                  Immunization History: All immunization history was reviewed by me today. There is no immunization history on file for this patient. Known drug allergies: All allergies were reviewed and updated    No Known Allergies    Social history:     Social History:  All social andepidemiologic history was reviewed and updated by me today as needed. · Tobacco use:   reports that he has never smoked. He has never used smokeless tobacco.  · Alcohol use:   reports no history of alcohol use. · [facility]  ·  reports no history of drug use. COVID VACCINATION AND LAB RESULT RECORDS:     Internal Administration   First Dose      Second Dose           Last COVID Lab No results found for: SARS-COV-2, SARS-COV-2 RNA, SARS-COV-2, SARS-COV-2, SARS-COV-2 BY PCR, SARS-COV-2, SARS-COV-2, SARS-COV-2         Assessment:     The patient is a 58 y.o. old male who  has a past medical history of Diabetes mellitus ([address] and Hypertension.  with following problems:    · Septic shock with leukocytosis, tachycardia, tachypnea and hypotension-T-max was 99.3  · Severe lactic acidosis-resolved  · Gram-negative bacteremia with ESBL E. coli-failed on meropenem, improving on meropenem and gentamicin combination  · Complicated E. coli UTI-this was also ESBL   · History of robotic assisted laparoscopic radical prostatectomy with bilateral pelvic lymphadenectomy on 5/16/2022 -surgical pathology indicated acinar adenocarcinoma  · S/p cystoscopy and right ureteral stent placement for right ureteral stone  · Hyponatremia-being corrected  · Poorly controlled type 2 diabetes mellitus-maintain good glucose control  ·       Discussion:      The patient is on IV meropenem and IV gentamicin for synergistic combination. Is white cell count is improving after initiation of gentamicin at 17,100 today. Serum creatinine is 5.5 today. He seems to be tolerating antibiotics okay. Plan:     \Diagnostic Workup:      · Continue to follow  fever curve, WBC count and blood cultures. · Continue to monitor blood counts, liver and renal function. Antimicrobials:    · Will continue IV meropenem 1 g every 24 hour  · Continue IV gentamicin for synergistic effect  · Continue to monitor his vitals closely  · Contact isolation for ESBL  · Slow improvement is expected  · There is plan for next dialysis on Saturday  · We will follow up on the culture results and clinical progress and will make further recommendations accordingly. · Continue close vitals monitoring. · Maintain good glycemic control. · Fall precautions. · Aspiration precautions. · Continue to watch for new fever or diarrhea. · DVT prophylaxis. · Discussed all above with patient and RN. Drug Monitoring:    · Continue monitoring for antibiotic toxicity as follows: CBC, CMP, gentamicin peak and trough  · Continue to watch for following: new or worsening fever, new hypotension, hives, lip swelling and redness or purulence at vascular access sites. I/v access Management:    · Continue to monitor i.v access sites for erythema, induration, discharge or tenderness.    · As always, continue efforts to minimize tubes/lines/drains as clinically appropriate to reduce chances of line associated infections. Patient education and counseling:        · The patient was educated in detail about the side-effects of various antibiotics and things to watch for like new rashes, lip swelling, severe reaction, worsening diarrhea, break through fever etc.  · Discussed patient's condition and what to expect. All of the patient's questions were addressed in a satisfactory manner and patient verbalized understanding all instructions. Level of complexity of visit: High     Risk of Complications/Morbidity: High     · Illness(es)/ Infection present that pose threat to life/bodily function. · There is potential for severe exacerbation of infection/side effects of treatment. · Therapy requires intensive monitoring for antimicrobial agent toxicity. TIME SPENT TODAY:     - Spent over  36 minutes on visit (including interval history, physical exam, review of data including labs, cultures, imaging, development and implementation of treatment plan and coordination of complex care). More than 50 percent of this includes face-to-face time spent with the patient for counseling and coordination of care. Thank you for involving me in the care of your patient. I will continue to follow. If you have anyadditional questions, please do not hesitate to contact me. Subjective: Interval history: Interval history was obtained from chart review and patient/ RN. His T-max was 99.3 today. He remains on meropenem and gentamicin. Tolerating combination okay     REVIEW OF SYSTEMS:      Review of Systems   Constitutional: Positive for fatigue. Negative for chills, diaphoresis and fever. HENT: Negative for ear discharge, ear pain, rhinorrhea, sore throat and trouble swallowing. Eyes: Negative for discharge and redness. Respiratory: Negative for cough, shortness of breath and wheezing.     Cardiovascular: Negative for chest pain and leg swelling. Gastrointestinal: Negative for abdominal pain, constipation, diarrhea and nausea. Endocrine: Negative for polyuria. Genitourinary: Negative for dysuria, flank pain, frequency, hematuria and urgency. Musculoskeletal: Negative for back pain and myalgias. Skin: Negative for rash. Neurological: Negative for dizziness, seizures and headaches. Hematological: Does not bruise/bleed easily. Psychiatric/Behavioral: Negative for hallucinations and suicidal ideas. All other systems reviewed and are negative. Past Medical History: All past medical history reviewed today. Past Medical History:   Diagnosis Date    Diabetes mellitus ([facility] [address]     Hypertension        Past Surgical History: All past surgical history was reviewed today. Past Surgical History:   Procedure Laterality Date    CYSTOSCOPY Right 5/26/2022    CYSTOSCOPY, BILATERAL RETROGRADE PYELOGRAM, PLACEMENT OF RIGHT URETERAL STENT performed by Fabricio Tirado MD at [address] [place] [address] IR TUNNELED [address] 5 YEARS  5/28/2022    IR TUNNELED CATHETER PLACEMENT GREATER THAN 5 YEARS 5/28/2022 [facility] SPECIAL PROCEDURES    PROSTATECTOMY N/A 5/16/2022    ROBOTIC LAPAROSCOPIC RADICAL PROSTATECTOMY WITH BILATERAL LYMPH NODE DISSECTION AND BILATERAL NERVE SPARE performed by Danyel Dunbar MD at [address]       Family History: All family history was reviewed today. History reviewed. No pertinent family history. Objective:       PHYSICAL EXAM:      Vitals:   Vitals:    06/02/22 0400 06/02/22 0756 06/02/22 1107 06/02/22 1200   BP: (!) 182/86 (!) 164/90 (!) 154/90    Pulse: (!) 101 (!) 103 (!) 107 (!) 111   Resp: 18 20 24    Temp: 98.6 °F (37 °C) 99.3 °F (37.4 °C) 98.4 °F (36.9 °C)    TempSrc: Temporal      SpO2: 97%      Weight: 156 lb 1.4 oz (70.8 kg) 155 lb 3.3 oz (70.4 kg) 148 lb 13 oz (67.5 kg)    Height:           Physical Exam  Vitals and nursing note reviewed.    Constitutional:       Appearance: Normal appearance. He is well-developed. HENT:      Head: Normocephalic and atraumatic. Right Ear: External ear normal.      Left Ear: External ear normal.      Nose: Nose normal. No congestion or rhinorrhea. Mouth/Throat:      Mouth: Mucous membranes are moist.      Pharynx: No oropharyngeal exudate or posterior oropharyngeal erythema. Eyes:      General: No scleral icterus. Right eye: No discharge. Left eye: No discharge. Conjunctiva/sclera: Conjunctivae normal.      Pupils: Pupils are equal, round, and reactive to light. Cardiovascular:      Rate and Rhythm: Normal rate and regular rhythm. Pulses: Normal pulses. Heart sounds: No murmur heard. No friction rub. Pulmonary:      Effort: Pulmonary effort is normal. No respiratory distress. Breath sounds: Normal breath sounds. No stridor. No wheezing, rhonchi or rales. Abdominal:      General: Bowel sounds are normal.      Palpations: Abdomen is soft. Tenderness: There is no abdominal tenderness. There is no right CVA tenderness, left CVA tenderness, guarding or rebound. Musculoskeletal:         General: No swelling or tenderness. Normal range of motion. Cervical back: Normal range of motion and neck supple. No rigidity. No muscular tenderness. Lymphadenopathy:      Cervical: No cervical adenopathy. Skin:     General: Skin is warm and dry. Coloration: Skin is not jaundiced. Findings: No erythema or rash. Neurological:      General: No focal deficit present. Mental Status: He is alert and oriented to person, place, and time. Mental status is at baseline. Motor: No abnormal muscle tone. Psychiatric:         Mood and Affect: Mood normal.         Behavior: Behavior normal.         Thought Content: Thought content normal.          Lines and drains: All vascular access sites are healthy with no local erythema, discharge or tenderness.       Intake and output:    I/O last 3 completed shifts: In: 1219.2 [P.O.:640; I.V.:53.4; IV Piggyback:125.8]  Out: 3344 [Urine:825]    Lab Data:   All available labs and old records have been reviewed by me. CBC:  Recent Labs     05/31/22 0526 06/01/22  0603 06/02/22  0527   WBC 20.7* 18.6* 17.1*   RBC 3.14* 2.50* 2.48*   HGB 9.4* 7.6* 7.4*   HCT 28.2* 22.6* 22.4*    337 386   MCV 89.9 90.2 90.1   MCH 29.8 30.3 29.9   MCHC 33.1 33.6 33.1   RDW 13.5 13.4 13.4        BMP:  Recent Labs     05/31/22 0526 06/01/22  0602 06/02/22  0527   * 131* 136   K 4.4 4.5 4.4   CL 92* 94* 99   CO2 22 24 25   * 70* 43*   CREATININE 9.4* 6.9* 5.5*   CALCIUM 8.9 8.5 8.6   GLUCOSE 196* 188* 160*        Hepatic Function Panel:   Lab Results   Component Value Date    ALKPHOS 280 06/02/2022    ALT 33 06/02/2022    AST 53 06/02/2022    PROT 6.1 06/02/2022    BILITOT 1.2 06/02/2022    LABALBU 2.6 06/02/2022       CPK:   Lab Results   Component Value Date    CKTOTAL 64 05/26/2022     ESR: No results found for: SEDRATE  CRP: No results found for: CRP        Imaging: All pertinent images and reports for the current visit were reviewed by me during this visit. I reviewed the chest x-ray/CT scan/MRI images and independently interpreted the findings and results today. CT CHEST ABDOMEN PELVIS WO CONTRAST   Final Result   Chest-      1. Congestive failure pattern, including trace pericardial effusion, small   bilateral pleural effusions and interstitial-alveolar edema. Pneumonia is   considered less likely in the differential.   2. Ectasia of the ascending thoracic aorta, measuring 4.0 cm in diameter. ---      Abdomen and pelvis-      1. Status post hysterectomy. There is an air-fluid collection in the recto   vesicular space. Volume is similar, greater amount of fluid in the interval.   Sterility is indeterminate.    2. Extraperitoneal gas in the pelvis is mildly decreased in the interval.   3. Right ureteral stent remains in situ, traversing a 10 mm ureteropelvic   junction stone. There is minimal residual right pelvicaliectasis and miguel   ureteric edema. 4. Baeza catheter is in normal position with decompression of the bladder. 5. New, small amount of ascites. 6. Mild anasarca. CT HEAD WO CONTRAST   Final Result   Motion limited. No hemorrhage or mass identified      Moderate to severe periventricular and scattered frontal parietal white   matter disease, likely due to small-vessel ischemic change         IR TUNNELED CVC PLACE WO SQ PORT/PUMP > 5 YEARS   Final Result   Successful ultrasound and fluoroscopy guided tunneled catheter placement of a   right internal jugular vein 19 cm dual lumen tip to cuff dialysis catheter as   above. RECOMMENDATIONS:   The new dialysis catheter flushes and aspirates well and can be used   immediately. XR CHEST PORTABLE   Final Result   Mild cardiomegaly with pulmonary vascular congestion. FL RETROGRADE PYELOGRAM W WO KUB   Final Result   For documentation purposes only. See separate procedure report for more   information. CT ABDOMEN PELVIS WO CONTRAST Additional Contrast? None   Final Result   Mildly obstructing 12 mm right UPJ      Postsurgical pelvic changes without complicating feature. RECOMMENDATIONS:   Unavailable             Medications: All current and past medications were reviewed.      epoetin gabe-epbx  10,000 Units IntraVENous Once per day on Tue Thu Sat    oxybutynin  15 mg Oral Nightly    amLODIPine  5 mg Oral Daily    aminoglycoside intermittent dosing (placeholder)   Other RX Placeholder    QUEtiapine  25 mg Oral Nightly    insulin glargine  8 Units SubCUTAneous Daily    meropenem  1,000 mg IntraVENous Q24H    sodium chloride flush  5-40 mL IntraVENous 2 times per day    heparin (porcine)  5,000 Units SubCUTAneous TID    insulin lispro  0-12 Units SubCUTAneous TID WC    insulin lispro  0-6 Units SubCUTAneous Nightly        dextrose      sodium

## 2022-06-02 NOTE — CARE COORDINATION
Discharge Planning:     (CM) called patient's Daughter, Elio Bustamante (on emergency contact list), who asked that patient be referred to [facility] for [address] [facility]) services. Daughter agreeable to CM referring to [address] [facility]) or [facility] if [address] does not accept the patient's healthcare insurance. Daughter agreeable to patient being referred to [facility] for new dialysis (HD). CM called [address] ([phone]) and left a voicemail requesting a callback to provide referral information. CM provided callback information. CM faxed the patient's [facility] referral to [facility] at [phone].       ANA Segovia, [facility] -   [phone]    Electronically signed by RAQUEL Javier on 6/2/2022 at 2:15 PM

## 2022-06-02 NOTE — FLOWSHEET NOTE
06/02/22 0756 06/02/22 1107   Vital Signs   BP (!) 164/90 (!) 154/90   Temp 99.3 °F (37.4 °C) 98.4 °F (36.9 °C)   Heart Rate (!) 103 (!) 107   Resp 20 24     Treatment time: 3 hours    Net UF: 3 L    Pre weight: 70.4 kg (standing scale)  Post weight: 67.5 kg (standing scale)  EDW: TBD    Access used: RIJ HD tunneled cath  Access function: No problems    Medications or blood products given: Zofran 4 mg IVP. Retacrit 52938 units IVP. Regular outpatient schedule: TBD    Summary of response to treatment: 3 hour HD tx. Arrived to [address] [place] [address] with hiccups, had throughout entire tx. Sm emesis mid tx, Zofran 4 mg IVP given with relief. Hypertension at onset 172/89. Copy of dialysis treatment record placed in chart, to be scanned into EMR.     Report given to India Sutton RN

## 2022-06-02 NOTE — FLOWSHEET NOTE
06/01/22 1632 06/01/22 1720   Vital Signs   BP (!) 169/89 (!) 152/101   Temp 98.8 °F (37.1 °C) 98 °F (36.7 °C)   Resp 20 20   Weight 155 lb 10.3 oz (70.6 kg) 155 lb 10.3 oz (70.6 kg)   Weight Method Bed scale Bed scale     Treatment time: 3hr (off 12 minutes early, n/v)  Net UF: 2.1L ( Goal 2.5L)    Pre weight: 72.9kg  Post weight: 70.6kg  EDW: TBD    Access used:  [facility] [address]  Access function: Good    Medications or blood products given: Zofran 4 mg IV    Regular outpatient schedule: TBD    Summary of response to treatment: Last 12 minutes of tx, pt have nausea and small yellow clear emesis. Copy of dialysis treatment record placed in chart, to be scanned into EMR.

## 2022-06-02 NOTE — PROGRESS NOTES
[facility]    6/2/2022 10:14 AM        Name: Rocio Garcia .               Admitted: 5/26/2022  Primary Care Provider: Cintia Glass (Tel: [phone])          Subjective:    Patient lying in bed no chest pain shortness of breath no fevers overnight white count trending downward still poor urine output  Reviewed interval ancillary notes    Current Medications  hydrALAZINE (APRESOLINE) injection 10 mg, Q6H PRN  epoetin gabe-epbx (RETACRIT) injection 10,000 Units, Once per day on Tue Thu Sat  amLODIPine (NORVASC) tablet 5 mg, Daily  aminoglycoside intermittent dosing (placeholder), RX Placeholder  QUEtiapine (SEROQUEL) tablet 25 mg, Nightly  baclofen (LIORESAL) tablet 5 mg, BID PRN  LORazepam (ATIVAN) tablet 0.5 mg, Q6H PRN  insulin glargine (LANTUS) injection vial 8 Units, Daily  dextrose bolus 10% 125 mL, PRN   Or  dextrose bolus 10% 250 mL, PRN  glucagon (rDNA) injection 1 mg, PRN  dextrose 5 % solution, PRN  heparin (porcine) injection 3,200 Units, PRN  metoclopramide (REGLAN) injection 5 mg, Q8H PRN  meropenem (MERREM) 1,000 mg in sodium chloride 0.9 % 100 mL IVPB (mini-bag), Q24H  HYDROmorphone HCl PF (DILAUDID) injection 1 mg, Q4H PRN  sodium chloride flush 0.9 % injection 5-40 mL, 2 times per day  sodium chloride flush 0.9 % injection 5-40 mL, PRN  0.9 % sodium chloride infusion, PRN  heparin (porcine) injection 5,000 Units, TID  ondansetron (ZOFRAN-ODT) disintegrating tablet 4 mg, Q8H PRN   Or  ondansetron (ZOFRAN) injection 4 mg, Q6H PRN  polyethylene glycol (GLYCOLAX) packet 17 g, Daily PRN  acetaminophen (TYLENOL) tablet 650 mg, Q6H PRN   Or  acetaminophen (TYLENOL) suppository 650 mg, Q6H PRN  insulin lispro (HUMALOG) injection vial 0-12 Units, TID WC  insulin lispro (HUMALOG) injection vial 0-6 Units, Nightly        Objective:  BP (!) 164/90   Pulse (!) 103   Temp 99.3 °F (37.4 °C)   Resp 20   Ht 5' 7\" (1.702 m)   Wt 155 lb 3.3 oz (70.4 kg)   SpO2 97%   BMI 24.31 kg/m²     Intake/Output Summary (Last 24 hours) at 6/2/2022 1014  Last data filed at 6/2/2022 0434  Gross per 24 hour   Intake 779.18 ml   Output 3019 ml   Net -2239.82 ml      Wt Readings from Last 3 Encounters:   06/02/22 155 lb 3.3 oz (70.4 kg)   05/16/22 148 lb (67.1 kg)   03/19/22 160 lb (72.6 kg)       General appearance:  Appears comfortable. AAOx2 not to time  HEENT: atraumatic, Pupils equal, muscous membranes moist, no masses appreciated  Cardiovascular: tachycardia no murmurs appreciated  Respiratory: CTAB no wheezing  Gastrointestinal: Abdomen soft, non-tender, BS+  EXT: no edema  Neurology: no gross focal deficts  Psychiatry: Appropriate affect. Not agitated  Skin: Warm, dry, no rashes appreciated    Labs and Tests:  CBC:   Recent Labs     05/31/22  0526 06/01/22  0603 06/02/22  0527   WBC 20.7* 18.6* 17.1*   HGB 9.4* 7.6* 7.4*    337 386     BMP:    Recent Labs     05/31/22  0526 06/01/22  0602 06/02/22  0527   * 131* 136   K 4.4 4.5 4.4   CL 92* 94* 99   CO2 22 24 25   * 70* 43*   CREATININE 9.4* 6.9* 5.5*   GLUCOSE 196* 188* 160*     Hepatic:   Recent Labs     05/31/22  0526 06/01/22  0602 06/02/22  0527   AST 70* 64* 53*   ALT 44* 37 33   BILITOT 1.4* 1.3* 1.2*   ALKPHOS 331* 290* 280*     CT CHEST ABDOMEN PELVIS WO CONTRAST   Final Result   Chest-      1. Congestive failure pattern, including trace pericardial effusion, small   bilateral pleural effusions and interstitial-alveolar edema. Pneumonia is   considered less likely in the differential.   2. Ectasia of the ascending thoracic aorta, measuring 4.0 cm in diameter. ---      Abdomen and pelvis-      1. Status post hysterectomy. There is an air-fluid collection in the recto   vesicular space. Volume is similar, greater amount of fluid in the interval.   Sterility is indeterminate.    2. Extraperitoneal gas in the pelvis is mildly decreased in the interval.   3. Right ureteral stent remains in situ, traversing a 10 mm ureteropelvic   junction stone. There is minimal residual right pelvicaliectasis and miguel   ureteric edema. 4. Baeza catheter is in normal position with decompression of the bladder. 5. New, small amount of ascites. 6. Mild anasarca. CT HEAD WO CONTRAST   Final Result   Motion limited. No hemorrhage or mass identified      Moderate to severe periventricular and scattered frontal parietal white   matter disease, likely due to small-vessel ischemic change         IR TUNNELED CVC PLACE WO SQ PORT/PUMP > 5 YEARS   Final Result   Successful ultrasound and fluoroscopy guided tunneled catheter placement of a   right internal jugular vein 19 cm dual lumen tip to cuff dialysis catheter as   above. RECOMMENDATIONS:   The new dialysis catheter flushes and aspirates well and can be used   immediately. XR CHEST PORTABLE   Final Result   Mild cardiomegaly with pulmonary vascular congestion. FL RETROGRADE PYELOGRAM W WO KUB   Final Result   For documentation purposes only. See separate procedure report for more   information. CT ABDOMEN PELVIS WO CONTRAST Additional Contrast? None   Final Result   Mildly obstructing 12 mm right UPJ      Postsurgical pelvic changes without complicating feature. RECOMMENDATIONS:   Unavailable         CT DRAINAGE HEMATOMA/SEROMA/FLUID COLLECTION    (Results Pending)       Recent imaging reviewed    Problem List  Principal Problem:    Septic shock ([address]  Active Problems:    CHRISSY (acute kidney injury) ([address]    Ureterolithiasis    Lactic acidosis    Infection due to ESBL-producing Escherichia coli    Bacteremia due to Gram-negative bacteria    Complicated UTI (urinary tract infection)    Hyponatremia    S/P radical cystoprostatectomy    Poorly controlled type 2 diabetes mellitus ([address]    Fever  Resolved Problems:    * No resolved hospital problems.  *       Assessment/Plan: Severe sepsis secondary to e coli bacteremia secondary to  UTI and right nephrolithiasis 12mm s/p right ureteral stent placement  iv meropenem, started gent 5/31 per id  - of recto-vesicular space- 3.5cm ir consulted for possible drain  - wbc trending down       gómez secondary to above  -Still having poor urine output continue HD for now          dm2 with hyperglycemia : improved monitor    icu delirium: imrpoving     DVT prophylaxis heparin sub q   Code status  Full code    [place] Daven[place] MD   6/2/2022 10:14 AM

## 2022-06-02 NOTE — PROGRESS NOTES
Physical Therapy  Dilshad Vázquez  Pt on PT/OT caseloads. At this time, pt currently YESENIA to hemodialysis. PT will follow-up with pt as schedule allows.   Thank you,  Edie Buchanan, PT, DPT, [phone]

## 2022-06-02 NOTE — PROGRESS NOTES
MD Adi Valencia MD Rosabel Fraction, MD               Office: [phone]                      Fax: [phone]             [facility]                   NEPHROLOGY ICU  INPATIENT PROGRESS NOTE:     PATIENT NAME: Dany Garrett  : 1960  MRN: [id number]         Nephrology treatment plan update. HD again today, solute status much better. Attempt 3L fluid removal.  Verbalizing better today. With hiccups, decrease Qb to 300ml/min today. Urine output improving  Next HD Saturday. WBC count decreasing  CT shows increased volume, fluid removal as tolerated with HD, attempt 3L today. Decreased Amlodipine to allow more fluid removal during HD  CASTRO with HD  Discussed with family renal risk and need for Gentamycin, understands discussion and agreeable to continue. Follow level, Pharmacy dosing. Baeza management per Urology    Social work consult for outpt HD placement in case needed. Would need to follow over weekend. Follow serum Na and Phosp    IV antibiotics for gram-negative sepsis.-On IV meropenem. - Blood cultures and -positive for E. coli sepsis. - Blood cultures done on -negative so far. High risk. Updated wife and daughter. Admitted for:  Ureterolithiasis [N20.1]  CHRISSY (acute kidney injury) ([facility] [N17.9]  Septic shock ([facility] [A41.9, R65.21]  Severe sepsis ([facility] [A41.9, R65.20]    ICD-10-CM    1. CHRISSY (acute kidney injury) ([address]  N17.9    2. Ureterolithiasis  N20.1    3. Septic shock (HCC)  A41.9     R65.21          CHRISSY (on CKD: 3B):  Urine output better. On HD  - BL Scr- 1.5 as off 22 ---> 8.0 on admission  -: Etiology of CHRISSY - presumed ATN + obstruction    - other differentials: unlikely GN / TI / TMA process  - UA : results reviewed:  Showing large amount of blood, with significant proteinuria, with leukocytes, with white cells RBC high specific gravity  - Renal imaging: on on admission with CT scan: - Obstructed 12 mm right UPJ  - 5/26/22-right ureteral stent placement    History of prostate cancer    Associated problems:   - Volume status: hypervolemic  : BP: no need for tight control. BP higher   : Na: hyponatremia - acute-moderate range, likely due to renal failure. Follow with HD.   - Azotemia: Prerenal severe, likely some uremic encephalopathy  - Electrolytes: K: Normal  - Acid-Base: Lactic acidosis, better   - Anemia: lower, follow. - Hypoalbuminemia      Other major problems: Management per primary and other consulting teams.   //         Hospital Problems           Last Modified POA    * (Principal) Septic shock ([facility] 5/27/2022 Yes    CHRISSY (acute kidney injury) ([facility] 5/27/2022 Yes    Ureterolithiasis 5/27/2022 Yes    Lactic acidosis 5/27/2022 Yes    Infection due to ESBL-producing Escherichia coli 5/27/2022 Yes    Bacteremia due to Gram-negative bacteria 7/04/4811 Yes    Complicated UTI (urinary tract infection) 5/27/2022 Yes    Hyponatremia 5/27/2022 Yes    S/P radical cystoprostatectomy 5/27/2022 Yes    Poorly controlled type 2 diabetes mellitus ([facility] 5/27/2022 Yes    Fever 5/29/2022 Yes          Thank you for allowing me to participate in this patient's care. Please do not hesitate to contact me anytime. We will follow along with you. Regina Montemayor MD,  [facility] [address] [place] [address] [place]: [phone] or [employer]  Fax: [phone]        =======================================================================================   =======================================================================================  SUBJECTIVE-  More awake  No acute distress  Seen on HD, +hiccups      Past medical, Surgical, Social, Family medical history reviewed by me. MEDICATIONS: reviewed by me. Medications Prior to Admission:  No current facility-administered medications on file prior to encounter.      Current Outpatient Medications on File Prior to Encounter   Medication Sig Dispense Refill    sildenafil (REVATIO) 20 MG tablet Take 20 mg by mouth daily      metFORMIN (GLUCOPHAGE) 1000 MG tablet Take 1,000 mg by mouth 2 times daily (with meals)       senna-docusate (PERICOLACE) 8.6-50 MG per tablet Take 1 tablet by mouth 2 times daily For constipation while on pain medication.  30 tablet 1    amLODIPine (NORVASC) 10 MG tablet Take 10 mg by mouth daily            Current Facility-Administered Medications:     hydrALAZINE (APRESOLINE) injection 10 mg, 10 mg, IntraVENous, Q6H PRN, Thu Miguel MD, 10 mg at 06/02/22 0430    epoetin gabe-epbx (RETACRIT) injection 10,000 Units, 10,000 Units, IntraVENous, Once per day on Tue Thu Sat, Last Law MD, 10,000 Units at 06/02/22 0950    amLODIPine (NORVASC) tablet 5 mg, 5 mg, Oral, Daily, Last Law MD    aminoglycoside intermittent dosing (placeholder), , Other, RX Placeholder, Ha Aden MD    QUEtiapine (SEROQUEL) tablet 25 mg, 25 mg, Oral, Nightly, Manda Pradhan MD, 25 mg at 06/01/22 2136    baclofen (LIORESAL) tablet 5 mg, 5 mg, Oral, BID PRN, Era Michele MD    LORazepam (ATIVAN) tablet 0.5 mg, 0.5 mg, Oral, Q6H PRN, Manda Pradhan MD, 0.5 mg at 05/29/22 1249    insulin glargine (LANTUS) injection vial 8 Units, 8 Units, SubCUTAneous, Daily, Manda Pradhan MD, 8 Units at 06/01/22 1037    dextrose bolus 10% 125 mL, 125 mL, IntraVENous, PRN **OR** dextrose bolus 10% 250 mL, 250 mL, IntraVENous, PRN, Manda Pradhan MD    glucagon (rDNA) injection 1 mg, 1 mg, IntraMUSCular, PRN, Manda Pradhan MD    dextrose 5 % solution, 100 mL/hr, IntraVENous, PRN, Madna Pradhan MD    heparin (porcine) injection 3,200 Units, 3,200 Units, IntraCATHeter, PRN, Gege Hernandez MD    metoclopramide (REGLAN) injection 5 mg, 5 mg, IntraVENous, Q8H PRN, Trixie Moctezuma MD, 5 mg at 06/01/22 1255    [COMPLETED] meropenem (MERREM) 1,000 mg in sodium chloride 0.9 % 100 mL IVPB (mini-bag), 1,000 mg, IntraVENous, Once, Stopped at 05/27/22 1137 **FOLLOWED BY** meropenem (MERREM) 1,000 mg in sodium chloride 0.9 % 100 mL IVPB (mini-bag), 1,000 mg, IntraVENous, Q24H, Neal Fraga MD, Stopped at 06/01/22 1509    HYDROmorphone HCl PF (DILAUDID) injection 1 mg, 1 mg, IntraVENous, Q4H PRN, Manda Pradhan MD, 1 mg at 05/29/22 1022    sodium chloride flush 0.9 % injection 5-40 mL, 5-40 mL, IntraVENous, 2 times per day, Manda Pradhan MD, 10 mL at 06/01/22 2136    sodium chloride flush 0.9 % injection 5-40 mL, 5-40 mL, IntraVENous, PRN, Manda Pradhan MD    0.9 % sodium chloride infusion, , IntraVENous, PRN, Manda Pradhan MD, Stopped at 05/29/22 1628    heparin (porcine) injection 5,000 Units, 5,000 Units, SubCUTAneous, TID, Manda Pradhan MD, 5,000 Units at 06/01/22 2136    ondansetron (ZOFRAN-ODT) disintegrating tablet 4 mg, 4 mg, Oral, Q8H PRN **OR** ondansetron (ZOFRAN) injection 4 mg, 4 mg, IntraVENous, Q6H PRN, Manda Pradhan MD, 4 mg at 06/02/22 0902    polyethylene glycol (GLYCOLAX) packet 17 g, 17 g, Oral, Daily PRN, Manda Pradhan MD, 17 g at 05/29/22 1802    acetaminophen (TYLENOL) tablet 650 mg, 650 mg, Oral, Q6H PRN, 650 mg at 05/31/22 2020 **OR** acetaminophen (TYLENOL) suppository 650 mg, 650 mg, Rectal, Q6H PRN, Manda Pradhan MD    insulin lispro (HUMALOG) injection vial 0-12 Units, 0-12 Units, SubCUTAneous, TID WC, Manda Pradhan MD, 4 Units at 06/01/22 1433    insulin lispro (HUMALOG) injection vial 0-6 Units, 0-6 Units, SubCUTAneous, Nightly, Manda Pradhan MD, 4 Units at 05/31/22 2020      REVIEW OF SYSTEMS:Review of systems not obtained due to patient factors - mental status          =======================================================================================     PHYSICAL EXAM:  Recent vital signs and recent I/Os reviewed by me.      Wt Readings from Last 3 Encounters:   06/02/22 155 lb 3.3 oz (70.4 kg)   05/16/22 148 lb (67.1 kg)   03/19/22 160 lb (72.6 kg)     BP Readings from Last 3 Encounters:   06/02/22 (!) 164/90   05/16/22 (!) 169/102   03/19/22 (!) 171/98     Patient Vitals for the past 24 hrs:   BP Temp Temp src Pulse Resp SpO2 Height Weight   06/02/22 0756 (!) 164/90 99.3 °F (37.4 °C) -- (!) 103 20 -- -- 155 lb 3.3 oz (70.4 kg)   06/02/22 0400 (!) 182/86 98.6 °F (37 °C) Temporal (!) 101 18 97 % -- 156 lb 1.4 oz (70.8 kg)   06/02/22 0000 (!) 160/90 99.2 °F (37.3 °C) Temporal 100 18 98 % -- --   06/01/22 2000 (!) 181/88 99.6 °F (37.6 °C) Temporal 98 20 97 % -- --   06/01/22 1720 (!) 152/101 98 °F (36.7 °C) -- -- 20 -- -- 155 lb 10.3 oz (70.6 kg)   06/01/22 1632 (!) 169/89 98.8 °F (37.1 °C) -- -- 20 -- -- 155 lb 10.3 oz (70.6 kg)   06/01/22 1400 (!) 150/84 -- -- 100 18 96 % -- --   06/01/22 1212 (!) 163/86 98.5 °F (36.9 °C) Temporal 98 20 98 % -- --   06/01/22 1057 -- -- -- -- -- -- 5' 7\" (1.702 m) --       Intake/Output Summary (Last 24 hours) at 6/2/2022 1011  Last data filed at 6/2/2022 0434  Gross per 24 hour   Intake 779.18 ml   Output 3019 ml   Net -2239.82 ml       Physical Exam  Vitals reviewed. Constitutional:       General: He is not in acute distress. Appearance: Normal appearance. HENT:      Head: Normocephalic and atraumatic. Right Ear: External ear normal.      Left Ear: External ear normal.      Nose: Nose normal.      Mouth/Throat:      Mouth: Mucous membranes are dry. Eyes:      General: No scleral icterus. Conjunctiva/sclera: Conjunctivae normal.   Neck:      Vascular: No JVD. Cardiovascular:      Rate and Rhythm: Normal rate and regular rhythm. Heart sounds: S1 normal and S2 normal.   Pulmonary:      Effort: Respiratory distress not present. Breath sounds: Rhonchi present. Abdominal:      General: Bowel sounds are normal. There is no distension. Musculoskeletal:         General: No swelling or deformity. Cervical back: Normal range of motion and neck supple. Skin:     General: Skin is dry. Coloration: Skin is not jaundiced. Neurological:      Mental Status: He is oriented      Comments: Unable to obtain as part of sedation     Psychiatric:      Comments: Unable to obtain as part of sedation                  =======================================================================================     DATA:  Diagnostic tests reviewed by me for today's visit:   (AS NEEDED FOR MY EVALUATION AND MANAGEMENT). Recent Labs     05/31/22 0526 06/01/22 0603 06/02/22 0527   WBC 20.7* 18.6* 17.1*   HCT 28.2* 22.6* 22.4*    337 386     Iron Saturation:  No components found for: PERCENTFE  FERRITIN:  No results found for: FERRITIN  IRON:  No results found for: IRON  TIBC:  No results found for: TIBC    Recent Labs     05/31/22 0526 06/01/22 0602 06/02/22 0527   * 131* 136   K 4.4 4.5 4.4   CL 92* 94* 99   CO2 22 24 25   * 70* 43*   CREATININE 9.4* 6.9* 5.5*     Recent Labs     05/31/22 0526 06/01/22 0602 06/02/22 0527   CALCIUM 8.9 8.5 8.6   PHOS  --  5.6* 5.0*     No results for input(s): PH, PCO2, PO2 in the last 72 hours.     Invalid input(s): [place]    ABG:  No results found for: PH, PCO2, PO2, HCO3, BE, THGB, TCO2, O2SAT  VBG:  No results found for: PHVEN, QGB5CZP, BEVEN, R2QZRDYI    LDH:  No results found for: LDH  Uric Acid:    Lab Results   Component Value Date    LABURIC 8.8 05/26/2022       PT/INR:    Lab Results   Component Value Date    PROTIME 12.9 05/02/2022    INR 1.14 05/02/2022     Warfarin PT/INR:  No components found for: PTPATWAR, PTINRWAR  PTT:    Lab Results   Component Value Date    APTT 34.5 05/02/2022   [APTT}  Last 3 Troponin:  No results found for: TROPONINI    U/A:    Lab Results   Component Value Date    COLORU Yellow 05/26/2022    PROTEINU >=300 05/26/2022    PHUR 7.5 05/26/2022    WBCUA 10-20 05/26/2022    RBCUA 5-10 05/26/2022    BACTERIA 2+ 05/26/2022    CLARITYU Clear 05/26/2022    SPECGRAV 1.020 05/26/2022    LEUKOCYTESUR LARGE to a 12 mm calculus lodged within the right ureteral vesicular junction. There is mild right perinephric stranding. The remainder of the solid abdominal organs are unremarkable. GI/Bowel: There is no bowel dilatation, wall thickening or obstruction. Pelvis: The bladder is decompressed by Baeza catheter and thus not evaluated. Postop changes of prostatectomy are present. There are multiple extraperitoneal pelvic gas collections within the surgical bed and pelvic sidewalls. Peritoneum/Retroperitoneum: There is no free air, free fluid or intraperitoneal in phlegm a nat change. There is no adenopathy. Bones/Soft Tissues: There is no acute fracture or aggressive osseous lesion. Mildly obstructing 12 mm right UPJ Postsurgical pelvic changes without complicating feature.  RECOMMENDATIONS: Unavailable

## 2022-06-02 NOTE — PROGRESS NOTES
Pt catheter leaking and pt states he is feeling like he has to pee and wants to pee straight not through the catheter. Urologist called. Orders received.  Not to remove the pierre per urologist.

## 2022-06-02 NOTE — PLAN OF CARE
Problem: Chronic Conditions and Co-morbidities  Goal: Patient's chronic conditions and co-morbidity symptoms are monitored and maintained or improved  Outcome: Progressing     Problem: Discharge Planning  Goal: Discharge to home or other facility with appropriate resources  Outcome: Progressing     Problem: Pain  Goal: Verbalizes/displays adequate comfort level or baseline comfort level  Outcome: Progressing     Problem: Skin/Tissue Integrity  Goal: Absence of new skin breakdown  Description: 1. Monitor for areas of redness and/or skin breakdown  2. Assess vascular access sites hourly  3. Every 4-6 hours minimum:  Change oxygen saturation probe site  4. Every 4-6 hours:  If on nasal continuous positive airway pressure, respiratory therapy assess nares and determine need for appliance change or resting period.   Outcome: Progressing     Problem: Safety - Adult  Goal: Free from fall injury  Outcome: Progressing  Flowsheets (Taken 6/2/2022 0450)  Free From Fall Injury: Instruct family/caregiver on patient safety     Problem: ABCDS Injury Assessment  Goal: Absence of physical injury  Outcome: Progressing  Flowsheets (Taken 6/2/2022 0450)  Absence of Physical Injury: Implement safety measures based on patient assessment     Problem: Neurosensory - Adult  Goal: Achieves stable or improved neurological status  Outcome: Progressing  Goal: Achieves maximal functionality and self care  Outcome: Progressing     Problem: Cardiovascular - Adult  Goal: Maintains optimal cardiac output and hemodynamic stability  Outcome: Progressing  Goal: Absence of cardiac dysrhythmias or at baseline  Outcome: Progressing     Problem: Skin/Tissue Integrity - Adult  Goal: Skin integrity remains intact  Outcome: Progressing  Flowsheets (Taken 6/2/2022 0450)  Skin Integrity Remains Intact: Monitor for areas of redness and/or skin breakdown  Goal: Incisions, wounds, or drain sites healing without S/S of infection  Outcome: Progressing  Goal: Oral mucous membranes remain intact  Outcome: Progressing     Problem: Musculoskeletal - Adult  Goal: Return mobility to safest level of function  Outcome: Progressing  Goal: Maintain proper alignment of affected body part  Outcome: Progressing  Goal: Return ADL status to a safe level of function  Outcome: Progressing     Problem: Gastrointestinal - Adult  Goal: Maintains or returns to baseline bowel function  Outcome: Progressing  Goal: Maintains adequate nutritional intake  Outcome: Progressing     Problem: Genitourinary - Adult  Goal: Absence of urinary retention  Outcome: Progressing  Goal: Urinary catheter remains patent  Outcome: Progressing     Problem: Infection - Adult  Goal: Absence of infection at discharge  Outcome: Progressing     Problem: Metabolic/Fluid and Electrolytes - Adult  Goal: Electrolytes maintained within normal limits  Outcome: Progressing  Goal: Hemodynamic stability and optimal renal function maintained  Outcome: Progressing  Goal: Glucose maintained within prescribed range  Outcome: Progressing     Problem: Hematologic - Adult  Goal: Maintains hematologic stability  Outcome: Progressing     Problem: Nutrition Deficit:  Goal: Optimize nutritional status  Outcome: Progressing

## 2022-06-02 NOTE — PROGRESS NOTES
Assessment completed. VSS. Patient awake, alert, and in bed. Daughter at bedside. Medications given per MAR. Full bath provided and gown changed. Teeth brushed and helped patient shave. Baeza had some leaking. Balloon re-inflated and diaper placed to help keep skin dry. Safety and isolation precautions maintained. Call light within reach. Will continue to monitor.

## 2022-06-02 NOTE — PROGRESS NOTES
Urology Progress Note  [facility]    Provider: LIZ Castro CNP  Patient ID:  Admission Date: 2022 Name: Rocio Garcia  OR Date: 2022 MRN: [id number]   Patient Location: [id number] : 1960  Attending: Bud Tobias MD Date of Service: 2022  PCP: Nitin Rider PA-C     Diagnoses:  1. CHRISSY (acute kidney injury) ([facility] [address]    2. Ureterolithiasis    3. Septic shock [facility])        Assessment/Plan:  59 yo M s/p RARP and BL PLND 5/15, final path pT3b, pN0. Right 12mm ureteral stone s/p right stent insertion 2022  Continues on HD  WBC improving, spiking fevers yesterday  Repeat CT shows increasing fluid volume of recto-vesicular space- 3.5cm    Recc  Discussed case with IR, Dr. Kylah Crandall. Difficult location for percutaneous drain. May be amenable to drainage if increases in size. Clinically patient is improving, WBC has decreased to 17,000 and afebrile at this time. Hold on perc drain, monitor wbc and fever curve, repeat imaging if clinically worsening. The patient had a chance to ask questions which were answered. he understands the above plan. Subjective:   Rocio Garcia is a 58 y.o. male. He was seen and examined this morning. Today we discussed increasing fluids in pelvis. Discussed need for possible percutaneous drainage. Explained the procedure in detail.       Objective:   Vitals:  Vitals:    22 0756   BP: (!) 164/90   Pulse: (!) 103   Resp: 20   Temp: 99.3 °F (37.4 °C)   SpO2:        Intake/Output Summary (Last 24 hours) at 2022 0831  Last data filed at 2022 0434  Gross per 24 hour   Intake 779.18 ml   Output 3019 ml   Net -2239.82 ml     Physical Exam:  Gen: Alert and oriented x3, no acute distress  CV: Regular rate   Resp: unlabored respirations  Abd: Soft, non-distended, non-tender, no masses  Ext: no peripheral edema noted, moves upper and lower extremities spontaneously  Skin: warmand well perfused, no rashes noted on the face, or

## 2022-06-03 LAB
A/G RATIO: 0.7 (ref 1.1–2.2)
ALBUMIN SERPL-MCNC: 2.7 G/DL (ref 3.4–5)
ALP BLD-CCNC: 243 U/L (ref 40–129)
ALT SERPL-CCNC: 35 U/L (ref 10–40)
ANION GAP SERPL CALCULATED.3IONS-SCNC: 9 MMOL/L (ref 3–16)
ANISOCYTOSIS: ABNORMAL
AST SERPL-CCNC: 51 U/L (ref 15–37)
BANDED NEUTROPHILS RELATIVE PERCENT: 2 % (ref 0–7)
BASOPHILS ABSOLUTE: 0 K/UL (ref 0–0.2)
BASOPHILS RELATIVE PERCENT: 0 %
BILIRUB SERPL-MCNC: 0.8 MG/DL (ref 0–1)
BUN BLDV-MCNC: 33 MG/DL (ref 7–20)
CALCIUM SERPL-MCNC: 8.7 MG/DL (ref 8.3–10.6)
CHLORIDE BLD-SCNC: 96 MMOL/L (ref 99–110)
CO2: 27 MMOL/L (ref 21–32)
CREAT SERPL-MCNC: 5.6 MG/DL (ref 0.8–1.3)
EOSINOPHILS ABSOLUTE: 0.3 K/UL (ref 0–0.6)
EOSINOPHILS RELATIVE PERCENT: 2 %
GENTAMICIN DOSE AMOUNT: NORMAL
GENTAMICIN RANDOM: 1.4 UG/ML
GFR AFRICAN AMERICAN: 13
GFR NON-AFRICAN AMERICAN: 10
GLUCOSE BLD-MCNC: 180 MG/DL (ref 70–99)
GLUCOSE BLD-MCNC: 189 MG/DL (ref 70–99)
GLUCOSE BLD-MCNC: 208 MG/DL (ref 70–99)
GLUCOSE BLD-MCNC: 209 MG/DL (ref 70–99)
GLUCOSE BLD-MCNC: 245 MG/DL (ref 70–99)
HCT VFR BLD CALC: 21.5 % (ref 40.5–52.5)
HEMOGLOBIN: 7.2 G/DL (ref 13.5–17.5)
HYPOCHROMIA: ABNORMAL
LYMPHOCYTES ABSOLUTE: 1 K/UL (ref 1–5.1)
LYMPHOCYTES RELATIVE PERCENT: 7 %
MCH RBC QN AUTO: 30.5 PG (ref 26–34)
MCHC RBC AUTO-ENTMCNC: 33.5 G/DL (ref 31–36)
MCV RBC AUTO: 90.9 FL (ref 80–100)
METAMYELOCYTES RELATIVE PERCENT: 1 %
MONOCYTES ABSOLUTE: 1 K/UL (ref 0–1.3)
MONOCYTES RELATIVE PERCENT: 7 %
MYELOCYTE PERCENT: 1 %
NEUTROPHILS ABSOLUTE: 12.2 K/UL (ref 1.7–7.7)
NEUTROPHILS RELATIVE PERCENT: 80 %
PDW BLD-RTO: 13.4 % (ref 12.4–15.4)
PERFORMED ON: ABNORMAL
PLATELET # BLD: 414 K/UL (ref 135–450)
PMV BLD AUTO: 7.7 FL (ref 5–10.5)
POTASSIUM REFLEX MAGNESIUM: 4.6 MMOL/L (ref 3.5–5.1)
RBC # BLD: 2.36 M/UL (ref 4.2–5.9)
SODIUM BLD-SCNC: 132 MMOL/L (ref 136–145)
TARGET CELLS: ABNORMAL
TOTAL PROTEIN: 6.4 G/DL (ref 6.4–8.2)
WBC # BLD: 14.5 K/UL (ref 4–11)

## 2022-06-03 PROCEDURE — 99233 SBSQ HOSP IP/OBS HIGH 50: CPT | Performed by: INTERNAL MEDICINE

## 2022-06-03 PROCEDURE — 97530 THERAPEUTIC ACTIVITIES: CPT

## 2022-06-03 PROCEDURE — 36415 COLL VENOUS BLD VENIPUNCTURE: CPT

## 2022-06-03 PROCEDURE — 85025 COMPLETE CBC W/AUTO DIFF WBC: CPT

## 2022-06-03 PROCEDURE — 2580000003 HC RX 258: Performed by: INTERNAL MEDICINE

## 2022-06-03 PROCEDURE — 2060000000 HC ICU INTERMEDIATE R&B

## 2022-06-03 PROCEDURE — 80053 COMPREHEN METABOLIC PANEL: CPT

## 2022-06-03 PROCEDURE — 6370000000 HC RX 637 (ALT 250 FOR IP): Performed by: PEDIATRICS

## 2022-06-03 PROCEDURE — 6370000000 HC RX 637 (ALT 250 FOR IP): Performed by: INTERNAL MEDICINE

## 2022-06-03 PROCEDURE — 97116 GAIT TRAINING THERAPY: CPT

## 2022-06-03 PROCEDURE — 6360000002 HC RX W HCPCS: Performed by: INTERNAL MEDICINE

## 2022-06-03 PROCEDURE — 6370000000 HC RX 637 (ALT 250 FOR IP): Performed by: NURSE PRACTITIONER

## 2022-06-03 PROCEDURE — 80170 ASSAY OF GENTAMICIN: CPT

## 2022-06-03 RX ORDER — GENTAMICIN SULFATE 80 MG/100ML
80 INJECTION, SOLUTION INTRAVENOUS ONCE
Status: COMPLETED | OUTPATIENT
Start: 2022-06-04 | End: 2022-06-04

## 2022-06-03 RX ADMIN — AMLODIPINE BESYLATE 5 MG: 5 TABLET ORAL at 09:14

## 2022-06-03 RX ADMIN — HEPARIN SODIUM 5000 UNITS: 5000 INJECTION INTRAVENOUS; SUBCUTANEOUS at 14:26

## 2022-06-03 RX ADMIN — Medication 10 ML: at 20:20

## 2022-06-03 RX ADMIN — QUETIAPINE FUMARATE 25 MG: 25 TABLET ORAL at 20:21

## 2022-06-03 RX ADMIN — Medication 10 ML: at 09:14

## 2022-06-03 RX ADMIN — BACLOFEN 5 MG: 10 TABLET ORAL at 20:20

## 2022-06-03 RX ADMIN — INSULIN LISPRO 4 UNITS: 100 INJECTION, SOLUTION INTRAVENOUS; SUBCUTANEOUS at 12:09

## 2022-06-03 RX ADMIN — INSULIN GLARGINE 8 UNITS: 100 INJECTION, SOLUTION SUBCUTANEOUS at 09:14

## 2022-06-03 RX ADMIN — INSULIN LISPRO 2 UNITS: 100 INJECTION, SOLUTION INTRAVENOUS; SUBCUTANEOUS at 09:14

## 2022-06-03 RX ADMIN — HEPARIN SODIUM 5000 UNITS: 5000 INJECTION INTRAVENOUS; SUBCUTANEOUS at 20:20

## 2022-06-03 RX ADMIN — INSULIN LISPRO 2 UNITS: 100 INJECTION, SOLUTION INTRAVENOUS; SUBCUTANEOUS at 20:21

## 2022-06-03 RX ADMIN — MEROPENEM 1000 MG: 1 INJECTION, POWDER, FOR SOLUTION INTRAVENOUS at 11:48

## 2022-06-03 RX ADMIN — INSULIN LISPRO 2 UNITS: 100 INJECTION, SOLUTION INTRAVENOUS; SUBCUTANEOUS at 17:28

## 2022-06-03 RX ADMIN — HEPARIN SODIUM 5000 UNITS: 5000 INJECTION INTRAVENOUS; SUBCUTANEOUS at 09:13

## 2022-06-03 RX ADMIN — OXYBUTYNIN CHLORIDE 15 MG: 5 TABLET, EXTENDED RELEASE ORAL at 20:20

## 2022-06-03 ASSESSMENT — ENCOUNTER SYMPTOMS
TROUBLE SWALLOWING: 0
NAUSEA: 0
SORE THROAT: 0
SHORTNESS OF BREATH: 0
BACK PAIN: 0
ABDOMINAL PAIN: 0
DIARRHEA: 0
EYE DISCHARGE: 0
COUGH: 0
WHEEZING: 0
RHINORRHEA: 0
EYE REDNESS: 0
CONSTIPATION: 0

## 2022-06-03 ASSESSMENT — PAIN SCALES - GENERAL
PAINLEVEL_OUTOF10: 0
PAINLEVEL_OUTOF10: 0

## 2022-06-03 NOTE — PROGRESS NOTES
Lahoma Hatchet, MD Massie Anna, MD Rivka Barns, MD               Office: [phone]                      Fax: [phone]             [facility]                   NEPHROLOGY ICU  INPATIENT PROGRESS NOTE:     PATIENT NAME: Silke Kruse  : 1960  MRN: [id number]         Nephrology treatment plan update. HD again tomorrow at slower Qb, solute status much better. Still having hiccups today. Urine output lower today  WBC count decreasing  CT shows increased volume, fluid removal as tolerated with HD, attempt 3L today. Decreased Amlodipine to allow more fluid removal during HD  CASTRO with HD    Discussed with family renal risk and need for Gentamycin, understands discussion and agreeable to continue. Follow level, Pharmacy dosing. Baeza and stone management per Urology    Social work consult for outpt HD placement in case needed. Would need to follow over weekend. Follow serum Na and Phosp    IV antibiotics for gram-negative sepsis.-On IV meropenem. - Blood cultures and -positive for E. coli sepsis. - Blood cultures done on -negative so far. High risk. Updated wife and daughter. Admitted for:  Ureterolithiasis [N20.1]  CHRISSY (acute kidney injury) ([facility] [address] [N17.9]  Septic shock ([address] [A41.9, R65.21]  Severe sepsis ([address] [A41.9, R65.20]    ICD-10-CM    1. CHRISSY (acute kidney injury) ([address]  N17.9    2. Ureterolithiasis  N20.1    3. Septic shock (HCC)  A41.9     R65.21          CHRISSY (on CKD: 3B):  Urine output better. On HD  - BL Scr- 1.5 as off 22 ---> 8.0 on admission  -: Etiology of CHRISSY - presumed ATN + obstruction    - other differentials: unlikely GN / TI / TMA process  - UA : results reviewed:  Showing large amount of blood, with significant proteinuria, with leukocytes, with white cells RBC high specific gravity  - Renal imaging: on on admission with CT scan: - Obstructed 12 mm right UPJ  - 22-right ureteral stent placement    History of prostate cancer    Associated problems:   - Volume status: hypervolemic  : BP: fluid removal as tolerated. Amlodipine.   : Na: hyponatremia - acute-moderate range, likely due to renal failure. Follow with HD.   - Azotemia: Prerenal severe, likely some uremic encephalopathy  - Electrolytes: K: Normal  - Acid-Base: Lactic acidosis, better   - Anemia: lower, follow. - Hypoalbuminemia      Other major problems: Management per primary and other consulting teams.   //         Hospital Problems           Last Modified POA    * (Principal) Septic shock ([facility] [address] 5/27/2022 Yes    CHRISSY (acute kidney injury) ([facility] [address] 5/27/2022 Yes    Ureterolithiasis 5/27/2022 Yes    Lactic acidosis 5/27/2022 Yes    Infection due to ESBL-producing Escherichia coli 5/27/2022 Yes    Bacteremia due to Gram-negative bacteria 9/70/5904 Yes    Complicated UTI (urinary tract infection) 5/27/2022 Yes    Hyponatremia 5/27/2022 Yes    S/P radical cystoprostatectomy 5/27/2022 Yes    Poorly controlled type 2 diabetes mellitus ([facility] [address] 5/27/2022 Yes    Fever 5/29/2022 Yes          Thank you for allowing me to participate in this patient's care. Please do not hesitate to contact me anytime. We will follow along with you. Tyrese Felton MD,  [facility] [address] [place]: [phone] or [facility]  Fax: [phone]        =======================================================================================   =======================================================================================  SUBJECTIVE-  More awake  No acute distress  +hiccups  Wife at bedside    Past medical, Surgical, Social, Family medical history reviewed by me. MEDICATIONS: reviewed by me. Medications Prior to Admission:  No current facility-administered medications on file prior to encounter.      Current Outpatient Medications on File Prior to Encounter   Medication Sig Dispense Refill    sildenafil (REVATIO) 20 MG tablet Take 20 mg by mouth daily      metFORMIN (GLUCOPHAGE) 1000 MG tablet Take 1,000 mg by mouth 2 times daily (with meals)       senna-docusate (PERICOLACE) 8.6-50 MG per tablet Take 1 tablet by mouth 2 times daily For constipation while on pain medication.  30 tablet 1    amLODIPine (NORVASC) 10 MG tablet Take 10 mg by mouth daily            Current Facility-Administered Medications:     hydrALAZINE (APRESOLINE) injection 10 mg, 10 mg, IntraVENous, Q6H PRN, Thu Miguel MD, 10 mg at 06/02/22 0430    epoetin gabe-epbx (RETACRIT) injection 10,000 Units, 10,000 Units, IntraVENous, Once per day on Tue Thu Sat, Last Law MD, 10,000 Units at 06/02/22 0950    oxybutynin (DITROPAN-XL) extended release tablet 15 mg, 15 mg, Oral, Nightly, LIZ Ornelas - CNP, 15 mg at 06/02/22 2119    opium-belladonna (B&O SUPPRETTES) 16.2-30 MG suppository 60 mg, 60 mg, Rectal, Q8H PRN, LIZ Ornelas - CNP    amLODIPine (NORVASC) tablet 5 mg, 5 mg, Oral, Daily, Last Law MD, 5 mg at 06/03/22 0914    aminoglycoside intermittent dosing (placeholder), , Other, RX Placeholder, Ha Aden MD    QUEtiapine (SEROQUEL) tablet 25 mg, 25 mg, Oral, Nightly, Manda Pradhan MD, 25 mg at 06/02/22 2119    baclofen (LIORESAL) tablet 5 mg, 5 mg, Oral, BID PRN, Era Michele MD    LORazepam (ATIVAN) tablet 0.5 mg, 0.5 mg, Oral, Q6H PRN, Manda Pradhan MD, 0.5 mg at 06/02/22 1334    insulin glargine (LANTUS) injection vial 8 Units, 8 Units, SubCUTAneous, Daily, Manda Pradhan MD, 8 Units at 06/03/22 0914    dextrose bolus 10% 125 mL, 125 mL, IntraVENous, PRN **OR** dextrose bolus 10% 250 mL, 250 mL, IntraVENous, PRN, Manda Pradhan MD    glucagon (rDNA) injection 1 mg, 1 mg, IntraMUSCular, PRN, Manda Pradhan MD    dextrose 5 % solution, 100 mL/hr, IntraVENous, PRN, Manda Pradhan MD    heparin (porcine) injection 3,200 Units, 3,200 Units, IntraCATHeter, PRN, Gege Hernandez MD, 3,200 Units at 06/02/22 1105    metoclopramide (REGLAN) injection 5 mg, 5 mg, IntraVENous, Q8H PRN, Trixie Moctezuma MD, 5 mg at 06/02/22 1210    [COMPLETED] meropenem (MERREM) 1,000 mg in sodium chloride 0.9 % 100 mL IVPB (mini-bag), 1,000 mg, IntraVENous, Once, Stopped at 05/27/22 1137 **FOLLOWED BY** meropenem (MERREM) 1,000 mg in sodium chloride 0.9 % 100 mL IVPB (mini-bag), 1,000 mg, IntraVENous, Q24H, Ha Aden MD, Stopped at 06/02/22 1504    HYDROmorphone HCl PF (DILAUDID) injection 1 mg, 1 mg, IntraVENous, Q4H PRN, Manda Pradhan MD, 1 mg at 05/29/22 1022    sodium chloride flush 0.9 % injection 5-40 mL, 5-40 mL, IntraVENous, 2 times per day, Manda Pradhan MD, 10 mL at 06/03/22 0914    sodium chloride flush 0.9 % injection 5-40 mL, 5-40 mL, IntraVENous, PRN, Manda Pradhan MD    0.9 % sodium chloride infusion, , IntraVENous, PRN, Manda Pradhan MD, Stopped at 05/29/22 1628    heparin (porcine) injection 5,000 Units, 5,000 Units, SubCUTAneous, TID, Manda Pradhan MD, 5,000 Units at 06/03/22 0913    ondansetron (ZOFRAN-ODT) disintegrating tablet 4 mg, 4 mg, Oral, Q8H PRN **OR** ondansetron (ZOFRAN) injection 4 mg, 4 mg, IntraVENous, Q6H PRN, Manda Pradhan MD, 4 mg at 06/02/22 0902    polyethylene glycol (GLYCOLAX) packet 17 g, 17 g, Oral, Daily PRN, Manda Pradhan MD, 17 g at 05/29/22 1802    acetaminophen (TYLENOL) tablet 650 mg, 650 mg, Oral, Q6H PRN, 650 mg at 05/31/22 2020 **OR** acetaminophen (TYLENOL) suppository 650 mg, 650 mg, Rectal, Q6H PRN, Manda Pradhan MD    insulin lispro (HUMALOG) injection vial 0-12 Units, 0-12 Units, SubCUTAneous, TID WC, Manda Pradhan MD, 2 Units at 06/03/22 0914    insulin lispro (HUMALOG) injection vial 0-6 Units, 0-6 Units, SubCUTAneous, Nightly, Manda Pradhan MD, 4 Units at 05/31/22 2020      REVIEW OF SYSTEMS:Review of systems not obtained due to patient factors - mental status =======================================================================================     PHYSICAL EXAM:  Recent vital signs and recent I/Os reviewed by me. Wt Readings from Last 3 Encounters:   06/03/22 149 lb 7.6 oz (67.8 kg)   05/16/22 148 lb (67.1 kg)   03/19/22 160 lb (72.6 kg)     BP Readings from Last 3 Encounters:   06/03/22 (!) 149/77   05/16/22 (!) 169/102   03/19/22 (!) 171/98     Patient Vitals for the past 24 hrs:   BP Temp Temp src Pulse Resp SpO2 Weight   06/03/22 0400 (!) 149/77 98.4 °F (36.9 °C) Temporal 98 16 97 % 149 lb 7.6 oz (67.8 kg)   06/03/22 0000 136/82 97.9 °F (36.6 °C) Temporal 89 16 98 % --   06/02/22 2000 (!) 152/85 98.5 °F (36.9 °C) Temporal (!) 101 18 96 % --   06/02/22 1828 (!) 154/84 98 °F (36.7 °C) Temporal (!) 103 -- 97 % --   06/02/22 1509 (!) 141/82 -- -- (!) 106 -- -- --   06/02/22 1508 -- -- -- -- -- 98 % --   06/02/22 1200 135/81 98.2 °F (36.8 °C) Temporal (!) 111 -- 99 % --       Intake/Output Summary (Last 24 hours) at 6/3/2022 1116  Last data filed at 6/2/2022 2119  Gross per 24 hour   Intake 10 ml   Output 350 ml   Net -340 ml       Physical Exam  Vitals reviewed. Constitutional:       General: He is not in acute distress. Appearance: Normal appearance. HENT:      Head: Normocephalic and atraumatic. Right Ear: External ear normal.      Left Ear: External ear normal.      Nose: Nose normal.      Mouth/Throat:      Mouth: Mucous membranes are dry. Eyes:      General: No scleral icterus. Conjunctiva/sclera: Conjunctivae normal.   Neck:      Vascular: No JVD. Cardiovascular:      Rate and Rhythm: Normal rate and regular rhythm. Heart sounds: S1 normal and S2 normal.   Pulmonary:      Effort: Respiratory distress not present. Breath sounds: Rhonchi present. Abdominal:      General: Bowel sounds are normal. There is no distension. Musculoskeletal:         General: No swelling or deformity.       Cervical back: Normal range of motion and neck supple. Skin:     General: Skin is dry. Coloration: Skin is not jaundiced. Neurological:      Mental Status: He is oriented      Comments: Unable to obtain as part of sedation     Psychiatric:      Comments: Unable to obtain as part of sedation                  =======================================================================================     DATA:  Diagnostic tests reviewed by me for today's visit:   (AS NEEDED FOR MY EVALUATION AND MANAGEMENT). Recent Labs     06/01/22 0603 06/02/22 0527 06/03/22 0457   WBC 18.6* 17.1* 14.5*   HCT 22.6* 22.4* 21.5*    386 414     Iron Saturation:  No components found for: PERCENTFE  FERRITIN:  No results found for: FERRITIN  IRON:  No results found for: IRON  TIBC:  No results found for: TIBC    Recent Labs     06/01/22 0602 06/02/22 0527 06/03/22 0457   * 136 132*   K 4.5 4.4 4.6   CL 94* 99 96*   CO2 24 25 27   BUN 70* 43* 33*   CREATININE 6.9* 5.5* 5.6*     Recent Labs     06/01/22 0602 06/02/22 0527 06/03/22 0457   CALCIUM 8.5 8.6 8.7   PHOS 5.6* 5.0*  --      No results for input(s): PH, PCO2, PO2 in the last 72 hours.     Invalid input(s): Alpesh Gallardo    ABG:  No results found for: PH, PCO2, PO2, HCO3, BE, THGB, TCO2, O2SAT  VBG:  No results found for: PHVEN, GTV7VRJ, BEVEN, B6VPFGSU    LDH:  No results found for: LDH  Uric Acid:    Lab Results   Component Value Date    LABURIC 8.8 05/26/2022       PT/INR:    Lab Results   Component Value Date    PROTIME 12.9 05/02/2022    INR 1.14 05/02/2022     Warfarin PT/INR:  No components found for: PTPATWAR, PTINRWAR  PTT:    Lab Results   Component Value Date    APTT 34.5 05/02/2022   [APTT}  Last 3 Troponin:  No results found for: TROPONINI    U/A:    Lab Results   Component Value Date    COLORU Yellow 05/26/2022    PROTEINU >=300 05/26/2022    PHUR 7.5 05/26/2022    WBCUA 10-20 05/26/2022    RBCUA 5-10 05/26/2022    BACTERIA 2+ 05/26/2022    CLARITYU Clear 05/26/2022    SPECGRAV 1.020 05/26/2022    LEUKOCYTESUR LARGE 05/26/2022    UROBILINOGEN 0.2 05/26/2022    BILIRUBINUR SMALL 05/26/2022    BLOODU LARGE 05/26/2022    GLUCOSEU 500 05/26/2022     Microalbumen/Creatinine ratio:  No components found for: RUCREAT  24 Hour Urine for Protein:  No components found for: RAWUPRO, UHRS3, KVBR16ST, UTV3  24 Hour Urine for Creatinine Clearance:  No components found for: CREAT4, UHRS10, UTV10  Urine Toxicology:  No components found for: IAMMENTA, IBARBIT, IBENZO, ICOCAINE, IMARTHC, IOPIATES, IPHENCYC    HgBA1c:  No results found for: LABA1C  RPR:  No results found for: RPR  HIV:  No results found for: HIV  BRITTANY:  No results found for: ANATITER, BRITTANY  RF:  No results found for: RF  DSDNA:  No components found for: DNA  AMYLASE:  No results found for: AMYLASE  LIPASE:    Lab Results   Component Value Date    LIPASE 31.0 05/26/2022     Fibrinogen Level:  No components found for: FIB       BELOW MENTIONED RADIOLOGY STUDY RESULTS BY ME (AS NEEDED FOR MY EVALUATION AND MANAGEMENT). CT ABDOMEN PELVIS WO CONTRAST Additional Contrast? None    Result Date: 5/26/2022  EXAMINATION: CT OF THE ABDOMEN AND PELVIS WITHOUT CONTRAST 5/26/2022 1:36 pm TECHNIQUE: CT of the abdomen and pelvis was performed without the administration of intravenous contrast. Multiplanar reformatted images are provided for review. Automated exposure control, iterative reconstruction, and/or weight based adjustment of the mA/kV was utilized to reduce the radiation dose to as low as reasonably achievable. COMPARISON: 03/19/2022 HISTORY: ORDERING SYSTEM PROVIDED HISTORY: recent prostate surg - n/v TECHNOLOGIST PROVIDED HISTORY: Reason for exam:->recent prostate surg - n/v Additional Contrast?->None Reason for Exam: recent prostate surg - n/v Additional signs and symptoms: french breathing instructions provided, pt still not holding breath. Best scan possible.  FINDINGS: Lower Chest: There is mild bibasilar dependent atelectasis. Organs: There is mild right hydronephrosis secondary to a 12 mm calculus lodged within the right ureteral vesicular junction. There is mild right perinephric stranding. The remainder of the solid abdominal organs are unremarkable. GI/Bowel: There is no bowel dilatation, wall thickening or obstruction. Pelvis: The bladder is decompressed by Baeza catheter and thus not evaluated. Postop changes of prostatectomy are present. There are multiple extraperitoneal pelvic gas collections within the surgical bed and pelvic sidewalls. Peritoneum/Retroperitoneum: There is no free air, free fluid or intraperitoneal in phlegm a nat change. There is no adenopathy. Bones/Soft Tissues: There is no acute fracture or aggressive osseous lesion. Mildly obstructing 12 mm right UPJ Postsurgical pelvic changes without complicating feature.  RECOMMENDATIONS: Unavailable

## 2022-06-03 NOTE — PROGRESS NOTES
Urology Progress Note  [facility]    Provider: LIZ Augustin CNP  Patient ID:  Admission Date: 2022 Name: Mary Sheppard  OR Date: 2022 MRN: [id number]   Patient Location: [id number] : 1960  Attending: Jerod Patiño MD Date of Service: 6/3/2022  PCP: Merly Zeng PA-C     Diagnoses:  1. CHRISSY (acute kidney injury) ([facility] [address]    2. Ureterolithiasis    3. Septic shock [facility])        Assessment/Plan:  59 yo M s/p RARP and BL PLND 5/15, final path pT3b, pN0. Right 12mm ureteral stone s/p right stent insertion 2022  Continues on HD  WBC improving, spiking fevers yesterday  Repeat CT shows increasing fluid volume of recto-vesicular space- 3.5cm  ===  Better UOP  WBC and fever curve improving  Discussed case with Dr. Poornima Grimes around pierre noted. Repositioned pierre, immediate efflux of large amount of urine. Oxybutynin for bladder spasms. Fever curve and wbc improving  Hopefully avoid perc drain given difficult location- will continue to monitor for need for drainage         The patient had a chance to ask questions which were answered. he understands the above plan. Subjective:   Mary Sheppard is a 58 y.o. male. He was seen and examined this morning. Today we discussed increasing fluids in pelvis. Discussed need for possible percutaneous drainage. Explained the procedure in detail.       Objective:   Vitals:  Vitals:    22 0400   BP: (!) 149/77   Pulse: 98   Resp: 16   Temp: 98.4 °F (36.9 °C)   SpO2: 97%       Intake/Output Summary (Last 24 hours) at 6/3/2022 3262  Last data filed at 2022 2119  Gross per 24 hour   Intake 10 ml   Output 350 ml   Net -340 ml     Physical Exam:  Gen: Alert and oriented x3, no acute distress  CV: Regular rate   Resp: unlabored respirations  Abd: Soft, non-distended, non-tender, no masses  Ext: no peripheral edema noted, moves upper and lower extremities spontaneously  Skin: warmand well perfused, no rashes noted on the face, or arms.      Labs:  Lab Results   Component Value Date    WBC 14.5 (H) 06/03/2022    HGB 7.2 (L) 06/03/2022    HCT 21.5 (L) 06/03/2022    MCV 90.9 06/03/2022     06/03/2022     Lab Results   Component Value Date    CREATININE 5.6 (HH) 06/03/2022    BUN 33 (H) 06/03/2022     (L) 06/03/2022    K 4.6 06/03/2022    CL 96 (L) 06/03/2022    CO2 27 06/03/2022       LIZ Reed - CNP   6/3/2022

## 2022-06-03 NOTE — PROGRESS NOTES
Clinical Pharmacy Note: Pharmacy to Dose Aminoglycoside    Random gentamicin level is 1.4 today. Plans for HD tomorrow. Will re-dose gentamicin with 80 mg x1 tomorrow post HD. Pharmacy will order random level prior to next HD when nephrology notes plan for future HD.      Hayder Norris, PharmD, [facility]

## 2022-06-03 NOTE — PROGRESS NOTES
[facility]    6/3/2022 10:48 AM        Name: Hunter Paez . Admitted: 5/26/2022  Primary Care Provider: Geneva Vallejo (Tel: [phone])          Subjective:     In bed urine output is improving denies any chest pain fevers chills nausea vomiting  Reviewed interval ancillary notes    Current Medications  hydrALAZINE (APRESOLINE) injection 10 mg, Q6H PRN  epoetin gabe-epbx (RETACRIT) injection 10,000 Units, Once per day on Tue Thu Sat  oxybutynin (DITROPAN-XL) extended release tablet 15 mg, Nightly  opium-belladonna (B&O SUPPRETTES) 16.2-30 MG suppository 60 mg, Q8H PRN  amLODIPine (NORVASC) tablet 5 mg, Daily  aminoglycoside intermittent dosing (placeholder), RX Placeholder  QUEtiapine (SEROQUEL) tablet 25 mg, Nightly  baclofen (LIORESAL) tablet 5 mg, BID PRN  LORazepam (ATIVAN) tablet 0.5 mg, Q6H PRN  insulin glargine (LANTUS) injection vial 8 Units, Daily  dextrose bolus 10% 125 mL, PRN   Or  dextrose bolus 10% 250 mL, PRN  glucagon (rDNA) injection 1 mg, PRN  dextrose 5 % solution, PRN  heparin (porcine) injection 3,200 Units, PRN  metoclopramide (REGLAN) injection 5 mg, Q8H PRN  meropenem (MERREM) 1,000 mg in sodium chloride 0.9 % 100 mL IVPB (mini-bag), Q24H  HYDROmorphone HCl PF (DILAUDID) injection 1 mg, Q4H PRN  sodium chloride flush 0.9 % injection 5-40 mL, 2 times per day  sodium chloride flush 0.9 % injection 5-40 mL, PRN  0.9 % sodium chloride infusion, PRN  heparin (porcine) injection 5,000 Units, TID  ondansetron (ZOFRAN-ODT) disintegrating tablet 4 mg, Q8H PRN   Or  ondansetron (ZOFRAN) injection 4 mg, Q6H PRN  polyethylene glycol (GLYCOLAX) packet 17 g, Daily PRN  acetaminophen (TYLENOL) tablet 650 mg, Q6H PRN   Or  acetaminophen (TYLENOL) suppository 650 mg, Q6H PRN  insulin lispro (HUMALOG) injection vial 0-12 Units, TID WC  insulin lispro (HUMALOG) injection vial 0-6 Units, Nightly        Objective:  BP (!) 149/77   Pulse 98   Temp 98.4 °F (36.9 °C) (Temporal)   Resp 16   Ht 5' 7\" (1.702 m)   Wt 149 lb 7.6 oz (67.8 kg)   SpO2 97%   BMI 23.41 kg/m²     Intake/Output Summary (Last 24 hours) at 6/3/2022 1048  Last data filed at 6/2/2022 2119  Gross per 24 hour   Intake 10 ml   Output 350 ml   Net -340 ml      Wt Readings from Last 3 Encounters:   06/03/22 149 lb 7.6 oz (67.8 kg)   05/16/22 148 lb (67.1 kg)   03/19/22 160 lb (72.6 kg)       General appearance:  Appears comfortable. AAOx2 not to time  HEENT: atraumatic, Pupils equal, muscous membranes moist, no masses appreciated  Cardiovascular: tachycardia no murmurs appreciated  Respiratory: CTAB no wheezing  Gastrointestinal: Abdomen soft, non-tender, BS+  EXT: no edema  Neurology: no gross focal deficts  Psychiatry: Appropriate affect. Not agitated  Skin: Warm, dry, no rashes appreciated    Labs and Tests:  CBC:   Recent Labs     06/01/22  0603 06/02/22  0527 06/03/22  0457   WBC 18.6* 17.1* 14.5*   HGB 7.6* 7.4* 7.2*    386 414     BMP:    Recent Labs     06/01/22  0602 06/02/22  0527 06/03/22  0457   * 136 132*   K 4.5 4.4 4.6   CL 94* 99 96*   CO2 24 25 27   BUN 70* 43* 33*   CREATININE 6.9* 5.5* 5.6*   GLUCOSE 188* 160* 209*     Hepatic:   Recent Labs     06/01/22  0602 06/02/22  0527 06/03/22  0457   AST 64* 53* 51*   ALT 37 33 35   BILITOT 1.3* 1.2* 0.8   ALKPHOS 290* 280* 243*     CT CHEST ABDOMEN PELVIS WO CONTRAST   Final Result   Chest-      1. Congestive failure pattern, including trace pericardial effusion, small   bilateral pleural effusions and interstitial-alveolar edema. Pneumonia is   considered less likely in the differential.   2. Ectasia of the ascending thoracic aorta, measuring 4.0 cm in diameter. ---      Abdomen and pelvis-      1. Status post hysterectomy. There is an air-fluid collection in the recto   vesicular space.   Volume is similar, greater amount of fluid in the interval. Sterility is indeterminate. 2. Extraperitoneal gas in the pelvis is mildly decreased in the interval.   3. Right ureteral stent remains in situ, traversing a 10 mm ureteropelvic   junction stone. There is minimal residual right pelvicaliectasis and miguel   ureteric edema. 4. Baeza catheter is in normal position with decompression of the bladder. 5. New, small amount of ascites. 6. Mild anasarca. CT HEAD WO CONTRAST   Final Result   Motion limited. No hemorrhage or mass identified      Moderate to severe periventricular and scattered frontal parietal white   matter disease, likely due to small-vessel ischemic change         IR TUNNELED CVC PLACE WO SQ PORT/PUMP > 5 YEARS   Final Result   Successful ultrasound and fluoroscopy guided tunneled catheter placement of a   right internal jugular vein 19 cm dual lumen tip to cuff dialysis catheter as   above. RECOMMENDATIONS:   The new dialysis catheter flushes and aspirates well and can be used   immediately. XR CHEST PORTABLE   Final Result   Mild cardiomegaly with pulmonary vascular congestion. FL RETROGRADE PYELOGRAM W WO KUB   Final Result   For documentation purposes only. See separate procedure report for more   information. CT ABDOMEN PELVIS WO CONTRAST Additional Contrast? None   Final Result   Mildly obstructing 12 mm right UPJ      Postsurgical pelvic changes without complicating feature. RECOMMENDATIONS:   Unavailable             Recent imaging reviewed    Problem List  Principal Problem:    Septic shock ([address]  Active Problems:    CHRISSY (acute kidney injury) ([address]    Ureterolithiasis    Lactic acidosis    Infection due to ESBL-producing Escherichia coli    Bacteremia due to Gram-negative bacteria    Complicated UTI (urinary tract infection)    Hyponatremia    S/P radical cystoprostatectomy    Poorly controlled type 2 diabetes mellitus ([address]    Fever  Resolved Problems:    * No resolved hospital problems.  * Assessment/Plan:   Severe sepsis secondary to e coli bacteremia secondary to  UTI and right nephrolithiasis 12mm s/p right ureteral stent placement  iv meropenem, started gent 5/31 per id  - of recto-vesicular space- 3.5cm ir consulted for possible drain, non candidate per ir  - wbc trending down, nofever repeat labs in am       gómez secondary to above  -uo slowly imrpvoing dialysis on sat          dm2 with hyperglycemia : improved monitor    icu delirium: iresolved     DVT prophylaxis heparin sub q   Code status  Full code    Neida Fonseca MD   6/3/2022 10:48 AM

## 2022-06-03 NOTE — DISCHARGE INSTR - COC
Continuity of Care Form    Patient Name: Mary Shipley   :  1960  MRN:  [id number]    Admit date:  2022  Discharge date:  22    Code Status Order: Full Code   Advance Directives:      Admitting Physician:  Tamia Suarez MD  PCP: Monica Valdez PA-C    Discharging Nurse: [facility] Unit/Room#: Q3K-1780/1960-91  Discharging Unit Phone Number: [phone]    Emergency Contact:   Extended Emergency Contact Information  Primary Emergency Contact: desiree shaikh  Home Phone: [phone]  Relation: Child  Secondary Emergency Contact: juan browning  Home Phone: [phone]  Relation: Child    Past Surgical History:  Past Surgical History:   Procedure Laterality Date    CYSTOSCOPY Right 2022    CYSTOSCOPY, BILATERAL RETROGRADE PYELOGRAM, PLACEMENT OF RIGHT URETERAL STENT performed by Richmond Colin MD at [facility]    IR TUNNELED Shania Age 5 YEARS  2022    IR TUNNELED CATHETER PLACEMENT GREATER THAN 5 YEARS 2022 [facility] PROCEDURES    PROSTATECTOMY N/A 2022    ROBOTIC LAPAROSCOPIC RADICAL PROSTATECTOMY WITH BILATERAL LYMPH NODE DISSECTION AND BILATERAL NERVE SPARE performed by Felicia Buerger, MD at [facility]       Immunization History: There is no immunization history on file for this patient.     Active Problems:  Patient Active Problem List   Diagnosis Code    Prostate cancer ([facility] C61    Septic shock ([facility] A41.9, R65.21    CHRISSY (acute kidney injury) ([facility] N17.9    Ureterolithiasis N20.1    Lactic acidosis E87.2    Infection due to ESBL-producing Escherichia coli A49.8, Z16.12    Bacteremia due to Gram-negative bacteria P84.84    Complicated UTI (urinary tract infection) N39.0    Hyponatremia E87.1    S/P radical cystoprostatectomy Z90.79, Z90.6    Poorly controlled type 2 diabetes mellitus ([facility] E11.65    Fever R50.9       Isolation/Infection:   Isolation            Contact          Patient Infection Status       Infection Onset Added Last Indicated Last Indicated By Review Planned Expiration Resolved Resolved By    ESBL (Extended Spectrum Beta Lactamase) 05/26/22 05/28/22 05/26/22 Culture, Urine                Nurse Assessment:  Last Vital Signs: BP (!) 141/92   Pulse (!) 109   Temp 98.3 °F (36.8 °C) (Temporal)   Resp 18   Ht 5' 7\" (1.702 m)   Wt 149 lb 7.6 oz (67.8 kg)   SpO2 95%   BMI 23.41 kg/m²     Last documented pain score (0-10 scale): Pain Level: 0  Last Weight:   Wt Readings from Last 1 Encounters:   06/03/22 149 lb 7.6 oz (67.8 kg)     Mental Status:  oriented and alert    IV Access:  - Dialysis Catheter  - site  right, insertion date: 5/28/22    Nursing Mobility/ADLs:  Walking   Independent  Transfer  Assisted  Bathing  Assisted  Dressing  Assisted  Toileting  Independent  Feeding  Independent  Med Admin  Assisted  Med Delivery   whole    Wound Care Documentation and Therapy:  Incision 05/16/22 Abdomen Anterior (Active)   Dressing Status Other (Comment) 06/03/22 0000   Dressing/Treatment Surgical glue 06/03/22 0000   Closure Surgical glue 06/03/22 0000   Margins Approximated 06/03/22 0000   Incision Assessment Dry 06/03/22 0000   Drainage Amount None 06/03/22 0000   Odor None 06/03/22 0000   Anuja-incision Assessment Warm; Intact 06/03/22 0000   Number of days: 18        Elimination:  Continence: Bowel: Yes  Bladder: Yes  Urinary Catheter: Insertion date : 5/28/22   Colostomy/Ileostomy/Ileal Conduit: {YES / HI:[id number]}       Date of Last BM: 6/4/22    Intake/Output Summary (Last 24 hours) at 6/3/2022 1333  Last data filed at 6/2/2022 2119  Gross per 24 hour   Intake 10 ml   Output 350 ml   Net -340 ml     I/O last 3 completed shifts:   In: 189.2 [I.V.:63.4; IV Piggyback:125.8]  Out: 850 [Urine:850]    Safety Concerns:     None    Impairments/Disabilities:      Language Barrier - [place]    Nutrition Therapy:  Current Nutrition Therapy:   - Oral Diet:  Carb Control 4 carbs/meal (1800kcals/day)    Routes of Feeding: Oral  Liquids: Thin Liquids  Daily Fluid Restriction: no  Last Modified Barium Swallow with Video (Video Swallowing Test): not done    Treatments at the Time of Hospital Discharge:   Respiratory Treatments: none  Oxygen Therapy:  is not on home oxygen therapy. Ventilator:    - No ventilator support    Rehab Therapies: ***  Weight Bearing Status/Restrictions: No weight bearing restrictions  Other Medical Equipment (for information only, NOT a DME order):  walker  Other Treatments: ***    Patient's personal belongings (please select all that are sent with patient):  None    RN SIGNATURE:  Electronically signed by Elena Dalal RN on 6/6/22 at 5:39 PM EDT    CASE MANAGEMENT/SOCIAL WORK SECTION    Inpatient Status Date: 5/26/2022    Readmission Risk Assessment Score:  Readmission Risk              Risk of Unplanned Readmission:  27           Discharging to Facility/ [address]  Phone [phone]  Fax [phone]      Dialysis Facility (if applicable)     [facility]   [address] [address]  Phone: [phone]   Fax: [phone]  Patient Schedule: Tuesday/Thursday/Saturday at 12:00pm    / signature: Electronically signed by Ivey Gosselin, MSW on 6/6/2022 at 11:44 AM      PHYSICIAN SECTION    Prognosis: Good    Condition at Discharge: Stable    Rehab Potential (if transferring to Rehab): Good    Recommended Labs or Other Treatments After Discharge:     Physician Certification: I certify the above information and transfer of Natasha Espinoza  is necessary for the continuing treatment of the diagnosis listed and that he requires Home Care for less 30 days.      Update Admission H&P: No change in H&P    PHYSICIAN SIGNATURE:  Electronically signed by Shivani Thomas MD on 6/6/22 at 5:57 PM EDT

## 2022-06-03 NOTE — CARE COORDINATION
Discharge Planning:     (CHERRIE) received a call from [phone], the number believed to be [facility]). The individual stated that he does not have a [facility] but that he could refer to one, if needed. Per previous conversations with patient's Daughter, CM called [address] [facility]) and [address] [place] who both stated they could not service the patient. CM called and left a voicemail for [facility] ([phone]) providing patient's referral information. CM requested a callback informing whether they can service the referral.    CM called patient's Daughter, Elyssa Hurt (on emergency contact list), and informed of above information.         [employer] GARCÍA, ANA, [facility] -   [phone]    Electronically signed by RAQUEL Fitzpatrick on 6/3/2022 at 10:41 AM

## 2022-06-03 NOTE — PROGRESS NOTES
Infectious Diseases   Progress Note      Admission Date: 5/26/2022  Hospital Day: Hospital Day: 9   Attending: Gwynda Baumgarten, MD  Date of service: 6/3/2022     Chief complaint/ Reason for consult:     · Septic shock with leukocytosis, tachycardia, tachypnea and hypotension  · Severe lactic acidosis  · Gram-negative bacteremia with ESBL E. coli  · Complicated E. coli UTI    Microbiology:        I have reviewed allavailable micro lab data and cultures    · Blood culture (2/2) - collected on 5/26/2022 : ESBL E. Coli    Susceptibility      Escherichia coli (esbl) (2)    Antibiotic Interpretation Microscan  Method Status    ampicillin Resistant >=32 mcg/mL BACTERIAL SUSCEPTIBILITY PANEL BY RICA     ampicillin-sulbactam Sensitive 4 mcg/mL BACTERIAL SUSCEPTIBILITY PANEL BY RICA     ceFAZolin Resistant >=64 mcg/mL BACTERIAL SUSCEPTIBILITY PANEL BY RICA     cefepime Resistant   BACTERIAL SUSCEPTIBILITY PANEL BY RICA     cefTRIAXone Resistant >=64 mcg/mL BACTERIAL SUSCEPTIBILITY PANEL BY RICA     ciprofloxacin Resistant >=4 mcg/mL BACTERIAL SUSCEPTIBILITY PANEL BY RICA     ertapenem Sensitive <=0.12 mcg/mL BACTERIAL SUSCEPTIBILITY PANEL BY RICA     gentamicin Sensitive <=1 mcg/mL BACTERIAL SUSCEPTIBILITY PANEL BY RICA     levofloxacin Resistant >=8 mcg/mL BACTERIAL SUSCEPTIBILITY PANEL BY RICA     trimethoprim-sulfamethoxazole Resistant >=320 mcg/mL BACTERIAL SUSCEPTIBILITY PANEL BY RICA         · Urine culture  - collected on 5/26/2022: Greater than 100,000 CFU per mL of ESBL E. coli    Susceptibility      Escherichia coli (esbl) (1)    Antibiotic Interpretation Microscan  Method Status    ampicillin Resistant >=32 mcg/mL BACTERIAL SUSCEPTIBILITY PANEL BY RICA     ampicillin-sulbactam Sensitive 4 mcg/mL BACTERIAL SUSCEPTIBILITY PANEL BY RICA     ceFAZolin Resistant >=64 mcg/mL BACTERIAL SUSCEPTIBILITY PANEL BY RICA     cefepime Resistant   BACTERIAL SUSCEPTIBILITY PANEL BY RICA     cefTRIAXone Resistant >=64 mcg/mL BACTERIAL SUSCEPTIBILITY PANEL BY RICA     ciprofloxacin Resistant >=4 mcg/mL BACTERIAL SUSCEPTIBILITY PANEL BY RICA     ertapenem Sensitive <=0.12 mcg/mL BACTERIAL SUSCEPTIBILITY PANEL BY RICA     gentamicin Sensitive <=1 mcg/mL BACTERIAL SUSCEPTIBILITY PANEL BY RICA     levofloxacin Resistant >=8 mcg/mL BACTERIAL SUSCEPTIBILITY PANEL BY RICA     nitrofurantoin Sensitive <=16 mcg/mL BACTERIAL SUSCEPTIBILITY PANEL BY RICA     trimethoprim-sulfamethoxazole Resistant >=320 mcg/mL BACTERIAL SUSCEPTIBILITY PANEL BY RICA         Antibiotics and immunizations:       Current antibiotics: All antibiotics and their doses were reviewed by me    Recent Abx Admin                   meropenem (MERREM) 1,000 mg in sodium chloride 0.9 % 100 mL IVPB (mini-bag) (mg) 1,000 mg New Bag 06/03/22 1148                  Immunization History: All immunization history was reviewed by me today. There is no immunization history on file for this patient. Known drug allergies: All allergies were reviewed and updated    No Known Allergies    Social history:     Social History:  All social andepidemiologic history was reviewed and updated by me today as needed. · Tobacco use:   reports that he has never smoked. He has never used smokeless tobacco.  · Alcohol use:   reports no history of alcohol use. · [facility]  ·  reports no history of drug use. COVID VACCINATION AND LAB RESULT RECORDS:     Internal Administration   First Dose      Second Dose           Last COVID Lab No results found for: SARS-COV-2, SARS-COV-2 RNA, SARS-COV-2, SARS-COV-2, SARS-COV-2 BY PCR, SARS-COV-2, SARS-COV-2, SARS-COV-2         Assessment:     The patient is a 58 y.o. old male who  has a past medical history of Diabetes mellitus ([address] and Hypertension.  with following problems:    · Septic shock with leukocytosis, tachycardia, tachypnea and hypotension-improving after addition of gentamicin  · Severe lactic acidosis-resolved  · Gram-negative bacteremia with ESBL E. coli-failed on meropenem alone  · Complicated E. coli UTI-this was also ESBL   · History of robotic assisted laparoscopic radical prostatectomy with bilateral pelvic lymphadenectomy on 5/16/2022 -surgical pathology indicated acinar adenocarcinoma  · S/p cystoscopy and right ureteral stent placement for right ureteral stone  · Hyponatremia-being corrected  · Poorly controlled type 2 diabetes mellitus-counseling done  ·       Discussion:      The patient is afebrile. White cell count is improving and is 14,500 today. 2 sets of blood culture from 5/21/2022 remain negative. Serum creatinine is 5.6 today. The patient is tolerating antibiotics okay. Plan:     Diagnostic Workup:    · Continue to follow  fever curve, WBC count and blood cultures. · Continue to monitor blood counts, liver and renal function. Antimicrobials:    · Will continue IV meropenem 1 g every 8 hours  · Continue IV gentamicin for synergy  · Hemodialysis per nephrology. · Nephrology note reviewed. If the patient will need dialysis long-term, IV meropenem can be stopped and patient can just remain on IV gentamicin until 6/14/2022 with dialysis. Okay for nephrology to dose gentamicin after discharge  · Recommend oral probiotic twice daily while on antibiotics  · Continue close vitals monitoring. · Maintain good glycemic control. · Fall precautions. · Aspiration precautions. · Continue to watch for new fever or diarrhea. · DVT prophylaxis. · Discussed all above with patient and RN. Drug Monitoring:    · Continue monitoring for antibiotic toxicity as follows: CBC, CMP   · Continue to watch for following: new or worsening fever, new hypotension, hives, lip swelling and redness or purulence at vascular access sites. I/v access Management:    · Continue to monitor i.v access sites for erythema, induration, discharge or tenderness.    · As always, continue efforts to minimize tubes/lines/drains as clinically appropriate to reduce chances of line associated infections. Patient education and counseling:        · The patient was educated in detail about the side-effects of various antibiotics and things to watch for like new rashes, lip swelling, severe reaction, worsening diarrhea, break through fever etc.  · Discussed patient's condition and what to expect. All of the patient's questions were addressed in a satisfactory manner and patient verbalized understanding all instructions. Diabetes mellitus education and counseling:    Patient was educated in detail about the importance of keeping diabetes under control to improve the cure rate of infection and prevent future infections. Patient was advised to check blood glucose level regularly and to stay compliant with the diabetes medications. Patient was advised to the trim the toe nails carefully, wear shoes or slippers at all times and check both feet everyday before going to bed to look for any cuts, blisters, swelling or redness. Importance of washing the feet everyday with soap and water and keeping them dry, and seeking prompt medical attention in case of a non-healing wound or ulcer on the feet was also highlighted. Level of complexity of visit: High     TIME SPENT TODAY:     - Spent over  36 minutes on visit (including interval history, physical exam, review of data including labs, cultures, imaging, development and implementation of treatment plan and coordination of complex care). More than 50 percent of this includes face-to-face time spent with the patient for counseling and coordination of care. Thank you for involving me in the care of your patient. I will continue to follow. If you have anyadditional questions, please do not hesitate to contact me. Subjective: Interval history: Interval history was obtained from chart review and patient/ RN. The patient is afebrile.   He is tolerating antibiotics okay. No diarrhea     REVIEW OF SYSTEMS:      Review of Systems   Constitutional: Negative for chills, diaphoresis and fever. HENT: Negative for ear discharge, ear pain, rhinorrhea, sore throat and trouble swallowing. Eyes: Negative for discharge and redness. Respiratory: Negative for cough, shortness of breath and wheezing. Cardiovascular: Negative for chest pain and leg swelling. Gastrointestinal: Negative for abdominal pain, constipation, diarrhea and nausea. Endocrine: Negative for polyuria. Genitourinary: Negative for dysuria, flank pain, frequency, hematuria and urgency. Musculoskeletal: Negative for back pain and myalgias. Skin: Negative for rash. Neurological: Negative for dizziness, seizures and headaches. Hematological: Does not bruise/bleed easily. Psychiatric/Behavioral: Negative for hallucinations and suicidal ideas. All other systems reviewed and are negative. Past Medical History: All past medical history reviewed today. Past Medical History:   Diagnosis Date    Diabetes mellitus ([facility] [address]     Hypertension        Past Surgical History: All past surgical history was reviewed today. Past Surgical History:   Procedure Laterality Date    CYSTOSCOPY Right 5/26/2022    CYSTOSCOPY, BILATERAL RETROGRADE PYELOGRAM, PLACEMENT OF RIGHT URETERAL STENT performed by Andreia Meyers MD at [facility] IR TUNNELED [address] 5 YEARS  5/28/2022    IR TUNNELED CATHETER PLACEMENT GREATER THAN 5 YEARS 5/28/2022 [facility] SPECIAL PROCEDURES    PROSTATECTOMY N/A 5/16/2022    ROBOTIC LAPAROSCOPIC RADICAL PROSTATECTOMY WITH BILATERAL LYMPH NODE DISSECTION AND BILATERAL NERVE SPARE performed by Jay Dumont MD at [address]       Family History: All family history was reviewed today. History reviewed. No pertinent family history.     Objective:       PHYSICAL EXAM:      Vitals:   Vitals:    06/03/22 0000 06/03/22 0400 06/03/22 0933 06/03/22 1214   BP: 136/82 (!) 149/77 (!) 157/79 (!) 141/92   Pulse: 89 98 95 (!) 109   Resp: 16 16 18    Temp: 97.9 °F (36.6 °C) 98.4 °F (36.9 °C) 98 °F (36.7 °C) 98.3 °F (36.8 °C)   TempSrc: Temporal Temporal Temporal Temporal   SpO2: 98% 97% 98% 95%   Weight:  149 lb 7.6 oz (67.8 kg)     Height:           Physical Exam  Vitals and nursing note reviewed. Constitutional:       Appearance: Normal appearance. He is well-developed. HENT:      Head: Normocephalic and atraumatic. Right Ear: External ear normal.      Left Ear: External ear normal.      Nose: Nose normal. No congestion or rhinorrhea. Mouth/Throat:      Mouth: Mucous membranes are moist.      Pharynx: No oropharyngeal exudate or posterior oropharyngeal erythema. Eyes:      General: No scleral icterus. Right eye: No discharge. Left eye: No discharge. Conjunctiva/sclera: Conjunctivae normal.      Pupils: Pupils are equal, round, and reactive to light. Cardiovascular:      Rate and Rhythm: Normal rate and regular rhythm. Pulses: Normal pulses. Heart sounds: No murmur heard. No friction rub. Pulmonary:      Effort: Pulmonary effort is normal. No respiratory distress. Breath sounds: Normal breath sounds. No stridor. No wheezing, rhonchi or rales. Abdominal:      General: Bowel sounds are normal.      Palpations: Abdomen is soft. Tenderness: There is no abdominal tenderness. There is no right CVA tenderness, left CVA tenderness, guarding or rebound. Musculoskeletal:         General: No swelling or tenderness. Normal range of motion. Cervical back: Normal range of motion and neck supple. No rigidity. No muscular tenderness. Lymphadenopathy:      Cervical: No cervical adenopathy. Skin:     General: Skin is warm and dry. Coloration: Skin is not jaundiced. Findings: No erythema or rash. Neurological:      General: No focal deficit present.       Mental Status: He is alert and oriented to person, place, and time. Mental status is at baseline. Motor: No abnormal muscle tone. Psychiatric:         Mood and Affect: Mood normal.         Behavior: Behavior normal.         Thought Content: Thought content normal.      *    Lines and drains: All vascular access sites are healthy with no local erythema, discharge or tenderness. Intake and output:    I/O last 3 completed shifts: In: 189.2 [I.V.:63.4; IV Piggyback:125.8]  Out: 850 [Urine:850]    Lab Data:   All available labs and old records have been reviewed by me. CBC:  Recent Labs     06/01/22  0603 06/02/22 0527 06/03/22 0457   WBC 18.6* 17.1* 14.5*   RBC 2.50* 2.48* 2.36*   HGB 7.6* 7.4* 7.2*   HCT 22.6* 22.4* 21.5*    386 414   MCV 90.2 90.1 90.9   MCH 30.3 29.9 30.5   MCHC 33.6 33.1 33.5   RDW 13.4 13.4 13.4   BANDSPCT  --   --  2        BMP:  Recent Labs     06/01/22  0602 06/02/22 0527 06/03/22 0457   * 136 132*   K 4.5 4.4 4.6   CL 94* 99 96*   CO2 24 25 27   BUN 70* 43* 33*   CREATININE 6.9* 5.5* 5.6*   CALCIUM 8.5 8.6 8.7   GLUCOSE 188* 160* 209*        Hepatic Function Panel:   Lab Results   Component Value Date    ALKPHOS 243 06/03/2022    ALT 35 06/03/2022    AST 51 06/03/2022    PROT 6.4 06/03/2022    BILITOT 0.8 06/03/2022    LABALBU 2.7 06/03/2022       CPK:   Lab Results   Component Value Date    CKTOTAL 64 05/26/2022     ESR: No results found for: SEDRATE  CRP: No results found for: CRP        Imaging: All pertinent images and reports for the current visit were reviewed by me during this visit. I reviewed the chest x-ray/CT scan/MRI images and independently interpreted the findings and results today. CT CHEST ABDOMEN PELVIS WO CONTRAST   Final Result   Chest-      1. Congestive failure pattern, including trace pericardial effusion, small   bilateral pleural effusions and interstitial-alveolar edema.   Pneumonia is   considered less likely in the differential.   2. Ectasia of the ascending thoracic aorta, measuring 4.0 cm in diameter. ---      Abdomen and pelvis-      1. Status post hysterectomy. There is an air-fluid collection in the recto   vesicular space. Volume is similar, greater amount of fluid in the interval.   Sterility is indeterminate. 2. Extraperitoneal gas in the pelvis is mildly decreased in the interval.   3. Right ureteral stent remains in situ, traversing a 10 mm ureteropelvic   junction stone. There is minimal residual right pelvicaliectasis and miguel   ureteric edema. 4. Baeza catheter is in normal position with decompression of the bladder. 5. New, small amount of ascites. 6. Mild anasarca. CT HEAD WO CONTRAST   Final Result   Motion limited. No hemorrhage or mass identified      Moderate to severe periventricular and scattered frontal parietal white   matter disease, likely due to small-vessel ischemic change         IR TUNNELED CVC PLACE WO SQ PORT/PUMP > 5 YEARS   Final Result   Successful ultrasound and fluoroscopy guided tunneled catheter placement of a   right internal jugular vein 19 cm dual lumen tip to cuff dialysis catheter as   above. RECOMMENDATIONS:   The new dialysis catheter flushes and aspirates well and can be used   immediately. XR CHEST PORTABLE   Final Result   Mild cardiomegaly with pulmonary vascular congestion. FL RETROGRADE PYELOGRAM W WO KUB   Final Result   For documentation purposes only. See separate procedure report for more   information. CT ABDOMEN PELVIS WO CONTRAST Additional Contrast? None   Final Result   Mildly obstructing 12 mm right UPJ      Postsurgical pelvic changes without complicating feature. RECOMMENDATIONS:   Unavailable             Medications: All current and past medications were reviewed.      [START ON 6/4/2022] gentamicin  80 mg IntraVENous Once    epoetin gabe-epbx  10,000 Units IntraVENous Once per day on Tue Thu Sat    oxybutynin  15 mg Oral Nightly    amLODIPine  5 mg Oral Daily    aminoglycoside intermittent dosing (placeholder)   Other RX Placeholder    QUEtiapine  25 mg Oral Nightly    insulin glargine  8 Units SubCUTAneous Daily    meropenem  1,000 mg IntraVENous Q24H    sodium chloride flush  5-40 mL IntraVENous 2 times per day    heparin (porcine)  5,000 Units SubCUTAneous TID    insulin lispro  0-12 Units SubCUTAneous TID WC    insulin lispro  0-6 Units SubCUTAneous Nightly        dextrose      sodium chloride Stopped (05/29/22 2478)       hydrALAZINE, opium-belladonna, baclofen, LORazepam, dextrose bolus **OR** dextrose bolus, glucagon (rDNA), dextrose, heparin (porcine), metoclopramide, HYDROmorphone, sodium chloride flush, sodium chloride, ondansetron **OR** ondansetron, polyethylene glycol, acetaminophen **OR** acetaminophen      Problem list:       Patient Active Problem List   Diagnosis Code    [facility] C61    Septic shock ([facility] A41.9, R65.21    CHRISSY (acute kidney injury) ([facility] N17.9    Ureterolithiasis N20.1    Lactic acidosis E87.2    Infection due to ESBL-producing Escherichia coli A49.8, Z16.12    Bacteremia due to Gram-negative bacteria Q33.85    Complicated UTI (urinary tract infection) N39.0    Hyponatremia E87.1    S/P radical cystoprostatectomy Z90.79, Z90.6    Poorly controlled type 2 diabetes mellitus ([facility] [address] E11.65    Fever R50.9    Diabetes education, encounter for Z71.89       Please note that this chart was generated using Dragon dictation software. Although every effort was made to ensure the accuracy of this automated transcription, some errors in transcription may have occurred inadvertently. If you may need any clarification, please do not hesitate to contact me through EPIC or at the phone number provided below with my electronic signature.   Any pictures or media included in this note were obtained after taking informed verbal consent from the patient and with their approval to include those in the patient's medical record.       Bradford Lindo MD, MPH, [facility], FIDSA  6/3/2022, 4:00 PM  [facility] Infectious Disease   [address] [place] [address], [facility] [address], [address] [place] [address]  Office: [phone]  Fax: [phone]  Clinic days:  Tuesday & Thursday

## 2022-06-03 NOTE — PLAN OF CARE
Problem: Chronic Conditions and Co-morbidities  Goal: Patient's chronic conditions and co-morbidity symptoms are monitored and maintained or improved  Outcome: Progressing     Problem: Discharge Planning  Goal: Discharge to home or other facility with appropriate resources  Outcome: Progressing     Problem: Pain  Goal: Verbalizes/displays adequate comfort level or baseline comfort level  Outcome: Progressing     Problem: Skin/Tissue Integrity  Goal: Absence of new skin breakdown  Description: 1. Monitor for areas of redness and/or skin breakdown  2. Assess vascular access sites hourly  3. Every 4-6 hours minimum:  Change oxygen saturation probe site  4. Every 4-6 hours:  If on nasal continuous positive airway pressure, respiratory therapy assess nares and determine need for appliance change or resting period.   Outcome: Progressing     Problem: Safety - Adult  Goal: Free from fall injury  Outcome: Progressing     Problem: ABCDS Injury Assessment  Goal: Absence of physical injury  Outcome: Progressing     Problem: Neurosensory - Adult  Goal: Achieves stable or improved neurological status  Outcome: Progressing  Goal: Achieves maximal functionality and self care  Outcome: Progressing     Problem: Cardiovascular - Adult  Goal: Maintains optimal cardiac output and hemodynamic stability  Outcome: Progressing  Goal: Absence of cardiac dysrhythmias or at baseline  Outcome: Progressing     Problem: Skin/Tissue Integrity - Adult  Goal: Skin integrity remains intact  Outcome: Progressing  Goal: Incisions, wounds, or drain sites healing without S/S of infection  Outcome: Progressing  Goal: Oral mucous membranes remain intact  Outcome: Progressing     Problem: Musculoskeletal - Adult  Goal: Return mobility to safest level of function  Outcome: Progressing  Goal: Maintain proper alignment of affected body part  Outcome: Progressing  Goal: Return ADL status to a safe level of function  Outcome: Progressing     Problem: Gastrointestinal - Adult  Goal: Maintains or returns to baseline bowel function  Outcome: Progressing  Goal: Maintains adequate nutritional intake  Outcome: Progressing     Problem: Genitourinary - Adult  Goal: Absence of urinary retention  Outcome: Progressing  Goal: Urinary catheter remains patent  Outcome: Progressing     Problem: Infection - Adult  Goal: Absence of infection at discharge  Outcome: Progressing     Problem: Metabolic/Fluid and Electrolytes - Adult  Goal: Electrolytes maintained within normal limits  Outcome: Progressing  Goal: Hemodynamic stability and optimal renal function maintained  Outcome: Progressing  Goal: Glucose maintained within prescribed range  Outcome: Progressing     Problem: Hematologic - Adult  Goal: Maintains hematologic stability  Outcome: Progressing     Problem: Nutrition Deficit:  Goal: Optimize nutritional status  Outcome: Progressing

## 2022-06-03 NOTE — PLAN OF CARE
Problem: Chronic Conditions and Co-morbidities  Goal: Patient's chronic conditions and co-morbidity symptoms are monitored and maintained or improved  Outcome: Progressing     Problem: Discharge Planning  Goal: Discharge to home or other facility with appropriate resources  Outcome: Progressing     Problem: Pain  Goal: Verbalizes/displays adequate comfort level or baseline comfort level  Outcome: Progressing     Problem: Skin/Tissue Integrity  Goal: Absence of new skin breakdown  Description: 1. Monitor for areas of redness and/or skin breakdown  2. Assess vascular access sites hourly  3. Every 4-6 hours minimum:  Change oxygen saturation probe site  4. Every 4-6 hours:  If on nasal continuous positive airway pressure, respiratory therapy assess nares and determine need for appliance change or resting period.   Outcome: Progressing     Problem: Safety - Adult  Goal: Free from fall injury  Outcome: Progressing  Flowsheets (Taken 6/2/2022 2152)  Free From Fall Injury: Instruct family/caregiver on patient safety     Problem: ABCDS Injury Assessment  Goal: Absence of physical injury  Outcome: Progressing  Flowsheets (Taken 6/2/2022 2152)  Absence of Physical Injury: Implement safety measures based on patient assessment     Problem: Neurosensory - Adult  Goal: Achieves stable or improved neurological status  Outcome: Progressing  Goal: Achieves maximal functionality and self care  Outcome: Progressing     Problem: Cardiovascular - Adult  Goal: Maintains optimal cardiac output and hemodynamic stability  Outcome: Progressing  Goal: Absence of cardiac dysrhythmias or at baseline  Outcome: Progressing     Problem: Skin/Tissue Integrity - Adult  Goal: Skin integrity remains intact  Outcome: Progressing  Flowsheets  Taken 6/2/2022 2152  Skin Integrity Remains Intact: Monitor for areas of redness and/or skin breakdown  Taken 6/2/2022 2000  Skin Integrity Remains Intact: Monitor for areas of redness and/or skin breakdown  Goal: Incisions, wounds, or drain sites healing without S/S of infection  Outcome: Progressing  Goal: Oral mucous membranes remain intact  Outcome: Progressing     Problem: Musculoskeletal - Adult  Goal: Return mobility to safest level of function  Outcome: Progressing  Flowsheets (Taken 6/2/2022 2000)  Return Mobility to Safest Level of Function: Assist with transfers and ambulation using safe patient handling equipment as needed  Goal: Maintain proper alignment of affected body part  Outcome: Progressing  Goal: Return ADL status to a safe level of function  Outcome: Progressing     Problem: Gastrointestinal - Adult  Goal: Maintains or returns to baseline bowel function  Outcome: Progressing  Goal: Maintains adequate nutritional intake  Outcome: Progressing     Problem: Genitourinary - Adult  Goal: Absence of urinary retention  Outcome: Progressing  Goal: Urinary catheter remains patent  Outcome: Progressing     Problem: Infection - Adult  Goal: Absence of infection at discharge  Outcome: Progressing     Problem: Metabolic/Fluid and Electrolytes - Adult  Goal: Electrolytes maintained within normal limits  Outcome: Progressing  Goal: Hemodynamic stability and optimal renal function maintained  Outcome: Progressing  Goal: Glucose maintained within prescribed range  Outcome: Progressing     Problem: Hematologic - Adult  Goal: Maintains hematologic stability  Outcome: Progressing     Problem: Nutrition Deficit:  Goal: Optimize nutritional status  Outcome: Progressing

## 2022-06-03 NOTE — PROGRESS NOTES
2-3 sessions per week of Physical Therapy at d/c to increase the patient's independence. At this time, this patient demonstrates the endurance and safety to discharge home with home health PT and a follow up treatment frequency of 2-3x/wk. Please see assessment section for further patient specific details. If patient discharges prior to next session this note will serve as a discharge summary. Please see below for the latest assessment towards goals. DME Required For Discharge: rolling walker  Precautions/Restrictions: high fall risk, contact precautions, Isolation precautions, up as tolerated  Weight Bearing Restrictions: no restrictions  [] Right Upper Extremity  [] Left Upper Extremity [] Right Lower Extremity  [] Left Lower Extremity     Required Braces/Orthotics: no braces required   [] Right  [] Left  Positional Restrictions:no positional restrictions    Pre-Admission Information   Lives With: spouse                  Type of Home: house  Home Layout: two level, bedroom/bathroom upstairs  Home Access: 1 step to enter without rails   Bathroom Layout: tub/shower unit, walk in shower  Toilet Height: standard height  Home Equipment: no prior equipment  Transfer Assistance: Independent without use of device  Ambulation Assistance:Independent without use of device  ADL Assistance: independent with all ADL's  IADL Assistance: independent with homemaking tasks  Hobbies: get outside  Recent Falls: no falls      Subjective  General: Patient denied pain. Reports frustration about pierre leaking. Agreeable to therapy stating \"I need to move\". Spouse present to assist with language barrier. Pain: 0/10  Pain Interventions: not applicable       Functional Mobility  Bed Mobility  Bed mobility not completed on this date. Comments:  Transfers  Sit to stand transfer: stand by assistance  Stand to sit transfer: stand by assistance  Comments:  From chair x 3-4 during session.  Able to complete transfers without use of walker, but requesting walker for ambulation. Ambulation  Surface:level surface  Assistive Device: rolling walker  Assistance: supervision  Distance: 540'  Gait Mechanics: flexed posture  Comments:  Patient performed 2 reps of ~20' ambulation in which he carried walker. Declined offer for therapist to remove walker - language barrier noted. Stair Mobility  Stair mobility not completed on this date. Comments:    Balance  Static Sitting Balance: good: independent with functional balance in unsupported position  Dynamic Sitting Balance: good: independent with functional balance in unsupported position  Static Standing Balance: fair (+): maintains balance at SBA/supervision without use of UE support  Dynamic Standing Balance: fair (-): maintains balance at supervision with use of UE support  Comments:    Other Therapeutic Interventions  Baeza noted to be leaking upon initial stand. Returned to seated, patient able to change socks. Donned brief with assistance and warm wipes provided. Performed standing squats with UE support on walker x 10    Functional Outcomes  AM-PAC Inpatient Mobility Raw Score : 18              Cognition  WFL  Orientation:    alert and oriented x 4  Command Following:   [facility]    Education  Barriers To Learning: language  Patient Education: patient educated on goals, PT role and benefits, plan of care, general safety, functional mobility training, proper use of assistive device/equipment, energy conservation, discharge recommendations  Learning Assessment:  patient verbalizes and demonstrates understanding    Assessment  Activity Tolerance: Patient limited by weakness/endurance. Impairments Requiring Therapeutic Intervention: decreased functional mobility, decreased strength, decreased balance  Prognosis: good  Clinical Assessment: Patient not at baseline function and would benefit from skilled PT to address above deficits and facilitate return to baseline function.  Improvement noted from previous assessment with decreased assistance needed for transfers and ambulation. Improved endurance as well, but continues to need RW for ambulation. Safety Interventions: patient left in chair, chair alarm in place, call light within reach, gait belt, patient at risk for falls, nurse notified and family/caregiver present    Plan  Frequency: 3-5 x/per week  Current Treatment Recommendations: strengthening, balance training, functional mobility training, transfer training, gait training, stair training, endurance training, patient/caregiver education, safety education and equipment evaluation/education    Goals  Patient Goals: Return home. Short Term Goals:  Time Frame: Discharge. Patient will complete bed mobility at modified independent   Patient will complete transfers at [facility]   Patient will ambulate 270 ft with use of LRAD at [facility]  Patient will ascend/descend 12 stairs with (L) ascending handrail at contact guard assistance   No goals met this treatment.       Therapy Session Time      Individual Group Co-treatment   Time In 1026       Time Out 1106       Minutes 40         Timed Code Treatment Minutes:  40 Minutes  Total Treatment Minutes:  40       Electronically Signed By:  Isra Preston, PT, DPT [id number]

## 2022-06-04 LAB
A/G RATIO: 0.7 (ref 1.1–2.2)
ALBUMIN SERPL-MCNC: 2.6 G/DL (ref 3.4–5)
ALP BLD-CCNC: 210 U/L (ref 40–129)
ALT SERPL-CCNC: 32 U/L (ref 10–40)
ANION GAP SERPL CALCULATED.3IONS-SCNC: 13 MMOL/L (ref 3–16)
AST SERPL-CCNC: 41 U/L (ref 15–37)
BASOPHILS ABSOLUTE: 0.1 K/UL (ref 0–0.2)
BASOPHILS RELATIVE PERCENT: 1 %
BILIRUB SERPL-MCNC: 0.5 MG/DL (ref 0–1)
BLOOD CULTURE, ROUTINE: NORMAL
BUN BLDV-MCNC: 44 MG/DL (ref 7–20)
CALCIUM SERPL-MCNC: 8.5 MG/DL (ref 8.3–10.6)
CHLORIDE BLD-SCNC: 97 MMOL/L (ref 99–110)
CO2: 25 MMOL/L (ref 21–32)
CREAT SERPL-MCNC: 7.1 MG/DL (ref 0.8–1.3)
CULTURE, BLOOD 2: NORMAL
EOSINOPHILS ABSOLUTE: 0.1 K/UL (ref 0–0.6)
EOSINOPHILS RELATIVE PERCENT: 1 %
GFR AFRICAN AMERICAN: 10
GFR NON-AFRICAN AMERICAN: 8
GLUCOSE BLD-MCNC: 154 MG/DL (ref 70–99)
GLUCOSE BLD-MCNC: 201 MG/DL (ref 70–99)
GLUCOSE BLD-MCNC: 285 MG/DL (ref 70–99)
GLUCOSE BLD-MCNC: 45 MG/DL (ref 70–99)
GLUCOSE BLD-MCNC: 96 MG/DL (ref 70–99)
HCT VFR BLD CALC: 20.6 % (ref 40.5–52.5)
HCT VFR BLD CALC: 23.5 % (ref 40.5–52.5)
HEMATOLOGY PATH CONSULT: NO
HEMOGLOBIN: 6.8 G/DL (ref 13.5–17.5)
HEMOGLOBIN: 7.7 G/DL (ref 13.5–17.5)
LYMPHOCYTES ABSOLUTE: 1.8 K/UL (ref 1–5.1)
LYMPHOCYTES RELATIVE PERCENT: 15 %
MCH RBC QN AUTO: 30.5 PG (ref 26–34)
MCHC RBC AUTO-ENTMCNC: 33.1 G/DL (ref 31–36)
MCV RBC AUTO: 92.3 FL (ref 80–100)
MONOCYTES ABSOLUTE: 0.1 K/UL (ref 0–1.3)
MONOCYTES RELATIVE PERCENT: 1 %
NEUTROPHILS ABSOLUTE: 9.7 K/UL (ref 1.7–7.7)
NEUTROPHILS RELATIVE PERCENT: 82 %
PDW BLD-RTO: 13.4 % (ref 12.4–15.4)
PERFORMED ON: ABNORMAL
PERFORMED ON: NORMAL
PLATELET # BLD: 403 K/UL (ref 135–450)
PLATELET SLIDE REVIEW: ADEQUATE
PMV BLD AUTO: 8.2 FL (ref 5–10.5)
POTASSIUM REFLEX MAGNESIUM: 4.5 MMOL/L (ref 3.5–5.1)
RBC # BLD: 2.23 M/UL (ref 4.2–5.9)
RBC # BLD: NORMAL 10*6/UL
SLIDE REVIEW: ABNORMAL
SODIUM BLD-SCNC: 135 MMOL/L (ref 136–145)
TOTAL PROTEIN: 6.3 G/DL (ref 6.4–8.2)
WBC # BLD: 11.8 K/UL (ref 4–11)

## 2022-06-04 PROCEDURE — 80053 COMPREHEN METABOLIC PANEL: CPT

## 2022-06-04 PROCEDURE — 85025 COMPLETE CBC W/AUTO DIFF WBC: CPT

## 2022-06-04 PROCEDURE — 85018 HEMOGLOBIN: CPT

## 2022-06-04 PROCEDURE — 6360000002 HC RX W HCPCS

## 2022-06-04 PROCEDURE — 2060000000 HC ICU INTERMEDIATE R&B

## 2022-06-04 PROCEDURE — 2580000003 HC RX 258: Performed by: INTERNAL MEDICINE

## 2022-06-04 PROCEDURE — 6360000002 HC RX W HCPCS: Performed by: INTERNAL MEDICINE

## 2022-06-04 PROCEDURE — 90935 HEMODIALYSIS ONE EVALUATION: CPT

## 2022-06-04 PROCEDURE — 6370000000 HC RX 637 (ALT 250 FOR IP): Performed by: NURSE PRACTITIONER

## 2022-06-04 PROCEDURE — 85014 HEMATOCRIT: CPT

## 2022-06-04 PROCEDURE — 36415 COLL VENOUS BLD VENIPUNCTURE: CPT

## 2022-06-04 PROCEDURE — 6370000000 HC RX 637 (ALT 250 FOR IP): Performed by: INTERNAL MEDICINE

## 2022-06-04 RX ADMIN — INSULIN LISPRO 6 UNITS: 100 INJECTION, SOLUTION INTRAVENOUS; SUBCUTANEOUS at 17:53

## 2022-06-04 RX ADMIN — INSULIN LISPRO 4 UNITS: 100 INJECTION, SOLUTION INTRAVENOUS; SUBCUTANEOUS at 09:29

## 2022-06-04 RX ADMIN — EPOETIN ALFA-EPBX 10000 UNITS: 10000 INJECTION, SOLUTION INTRAVENOUS; SUBCUTANEOUS at 12:30

## 2022-06-04 RX ADMIN — AMLODIPINE BESYLATE 5 MG: 5 TABLET ORAL at 09:26

## 2022-06-04 RX ADMIN — QUETIAPINE FUMARATE 25 MG: 25 TABLET ORAL at 21:05

## 2022-06-04 RX ADMIN — GENTAMICIN SULFATE 80 MG: 80 INJECTION, SOLUTION INTRAVENOUS at 13:30

## 2022-06-04 RX ADMIN — MEROPENEM 1000 MG: 1 INJECTION, POWDER, FOR SOLUTION INTRAVENOUS at 10:40

## 2022-06-04 RX ADMIN — INSULIN GLARGINE 8 UNITS: 100 INJECTION, SOLUTION SUBCUTANEOUS at 09:29

## 2022-06-04 RX ADMIN — HEPARIN SODIUM 5000 UNITS: 5000 INJECTION INTRAVENOUS; SUBCUTANEOUS at 16:01

## 2022-06-04 RX ADMIN — OXYBUTYNIN CHLORIDE 15 MG: 5 TABLET, EXTENDED RELEASE ORAL at 21:05

## 2022-06-04 RX ADMIN — HEPARIN SODIUM 5000 UNITS: 5000 INJECTION INTRAVENOUS; SUBCUTANEOUS at 21:05

## 2022-06-04 RX ADMIN — HEPARIN SODIUM 5000 UNITS: 5000 INJECTION INTRAVENOUS; SUBCUTANEOUS at 09:26

## 2022-06-04 RX ADMIN — Medication 10 ML: at 21:06

## 2022-06-04 RX ADMIN — Medication 10 ML: at 09:26

## 2022-06-04 ASSESSMENT — PAIN SCALES - GENERAL
PAINLEVEL_OUTOF10: 0

## 2022-06-04 NOTE — PROGRESS NOTES
Urology Progress Note  [facility]    Provider: Nidia Garcia MD  Patient ID:  Admission Date: 2022 Name: Mychal Dillard  OR Date: 2022 MRN: [id number]   Patient Location: [id number] : [id number]  Attending: Thuy Spencer MD Date of Service: 2022  PCP: Penny Hoskins PA-C     Diagnoses:  1. CHRISSY (acute kidney injury) ([facility] [address]    2. Ureterolithiasis    3. Septic shock [facility])        Assessment/Plan:  59 yo M s/p RARP and BL PLND 5/15, final path pT3b, pN0. Right 12mm ureteral stone s/p right stent insertion 2022  Continues on HD  WBC improving  Repeat CT shows increasing fluid volume of recto-vesicular space- 3.5cm - diff to access on discussion with IR prev  ===  Cont UOP  WBC and fever curve improving    Recc  Continue pierre catheter  -Leaking around pierre noted. Repositioned pierre, immediate efflux of large amount of urine. Oxybutynin for bladder spasms. Fever curve and wbc improving  Hopefully avoid perc drain given difficult location- will continue to monitor for need for drainage     The patient had a chance to ask questions which were answered. he understands the above plan. Subjective:   Mychal Dillard is a 58 y.o. male. He was seen and examined this morning. Today we discussed increasing fluids in pelvis. Discussed need for possible percutaneous drainage. Explained the procedure in detail.       Objective:   Vitals:  Vitals:    22 0400   BP: (!) 140/81   Pulse: (!) 103   Resp: 16   Temp: 98.8 °F (37.1 °C)   SpO2: 97%       Intake/Output Summary (Last 24 hours) at 2022 0859  Last data filed at 2022 0420  Gross per 24 hour   Intake 420 ml   Output 1325 ml   Net -905 ml     Physical Exam:  Gen: Alert and oriented x3, no acute distress  CV: Regular rate   Resp: unlabored respirations  Abd: Soft, non-distended, non-tender, no masses  Ext: no peripheral edema noted, moves upper and lower extremities spontaneously  Skin: warmand well perfused, no rashes noted on the face, or arms.    [facility]    Labs:  Lab Results   Component Value Date    WBC 11.8 (H) 06/04/2022    HGB 7.7 (L) 06/04/2022    HCT 23.5 (L) 06/04/2022    MCV 92.3 06/04/2022     06/04/2022     Lab Results   Component Value Date    CREATININE 7.1 (HH) 06/04/2022    BUN 44 (H) 06/04/2022     (L) 06/04/2022    K 4.5 06/04/2022    CL 97 (L) 06/04/2022    CO2 25 06/04/2022       Pema Jones MD   6/4/2022

## 2022-06-04 NOTE — PROGRESS NOTES
MD Rambo Gomez MD Butler Roller, MD               Office: [phone]                      Fax: [phone]             0 [facility]                   NEPHROLOGY ICU  INPATIENT PROGRESS NOTE:     PATIENT NAME: Roxie Kendall  : 1960  MRN: [id number]         Nephrology treatment plan update. HD today at slower Qb, solute status much better. Hiccups better today. Urine output better today  WBC count decreasing  CT shows increased volume. Neg 3.2L since admission  BP at goal  CASTRO with HD    Discussed with family renal risk and need for Gentamycin, understands discussion and agreeable to continue. Follow level, Pharmacy dosing. Baeza and stone management per Urology    Social work consult for outpt HD placement in case needed. Would need to follow over weekend. Follow serum Na and Phosp-better    IV antibiotics for gram-negative sepsis.-On IV meropenem. - Blood cultures and -positive for E. coli sepsis. - Blood cultures done on -negative so far. Admitted for:  Ureterolithiasis [N20.1]  CHRISSY (acute kidney injury) ([facility] [N17.9]  Septic shock ([facility] [A41.9, R65.21]  Severe sepsis ([facility] [A41.9, R65.20]    ICD-10-CM    1. CHRISSY (acute kidney injury) ([facility]  N17.9    2. Ureterolithiasis  N20.1    3. Septic shock (HCC)  A41.9     R65.21          CHRISSY (on CKD: 3B):  Urine output better. On HD  - BL Scr- 1.5 as off 22 ---> 8.0 on admission  -: Etiology of CHRISSY - presumed ATN + obstruction    - other differentials: unlikely GN / TI / TMA process  - UA : results reviewed: Showing large amount of blood, with significant proteinuria, with leukocytes, with white cells RBC high specific gravity  - Renal imaging: on on admission with CT scan: - Obstructed 12 mm right UPJ  - 22-right ureteral stent placement    History of prostate cancer    Associated problems:   - Volume status: hypervolemic, better  : BP: fluid removal as tolerated. Amlodipine.   : Na: hyponatremia - acute-moderate range, likely due to renal failure. Follow with HD.   - Azotemia: Prerenal severe, likely some uremic encephalopathy  - Electrolytes: K: Normal  - Acid-Base: Lactic acidosis, better   - Anemia: lower, follow. - Hypoalbuminemia      Other major problems: Management per primary and other consulting teams.   //         Hospital Problems           Last Modified POA    * (Principal) Septic shock ([facility] 5/27/2022 Yes    CHRISSY (acute kidney injury) ([facility] 5/27/2022 Yes    Ureterolithiasis 5/27/2022 Yes    Lactic acidosis 5/27/2022 Yes    Infection due to ESBL-producing Escherichia coli 5/27/2022 Yes    Bacteremia due to Gram-negative bacteria 1/39/2198 Yes    Complicated UTI (urinary tract infection) 5/27/2022 Yes    Hyponatremia 5/27/2022 Yes    S/P radical cystoprostatectomy 5/27/2022 Yes    Poorly controlled type 2 diabetes mellitus ([facility] 5/27/2022 Yes    Fever 5/29/2022 Yes    Diabetes education, encounter for 6/3/2022 Yes          Thank you for allowing me to participate in this patient's care. Please do not hesitate to contact me anytime. We will follow along with you. Ora Charles MD,  [facility] [address] [place] [address] [place]: [phone] or [address]  Fax: [phone]        =======================================================================================   =======================================================================================  SUBJECTIVE-  More awake  No acute distress      Past medical, Surgical, Social, Family medical history reviewed by me. MEDICATIONS: reviewed by me. Medications Prior to Admission:  No current facility-administered medications on file prior to encounter.      Current Outpatient Medications on File Prior to Encounter   Medication Sig Dispense Refill    sildenafil (REVATIO) 20 MG tablet Take 20 mg by mouth daily      metFORMIN (GLUCOPHAGE) 1000 MG tablet Take 1,000 mg by mouth 2 times daily (with meals)       senna-docusate (PERICOLACE) 8.6-50 MG per tablet Take 1 tablet by mouth 2 times daily For constipation while on pain medication.  30 tablet 1    amLODIPine (NORVASC) 10 MG tablet Take 10 mg by mouth daily            Current Facility-Administered Medications:     gentamicin (GARAMYCIN) IVPB 80 mg, 80 mg, IntraVENous, Once, Ronnell Sharma MD    hydrALAZINE (APRESOLINE) injection 10 mg, 10 mg, IntraVENous, Q6H PRN, Duane Crane, MD, 10 mg at 06/02/22 0430    epoetin gabe-epbx (RETACRIT) injection 10,000 Units, 10,000 Units, IntraVENous, Once per day on Tue Thu Sat, Ora Charles MD, 10,000 Units at 06/02/22 0950    oxybutynin (DITROPAN-XL) extended release tablet 15 mg, 15 mg, Oral, Nightly, Primus Gallus, APRN - CNP, 15 mg at 06/03/22 2020    opium-belladonna (B&O SUPPRETTES) 16.2-30 MG suppository 60 mg, 60 mg, Rectal, Q8H PRN, Primus Gallus, APRN - CNP    amLODIPine (NORVASC) tablet 5 mg, 5 mg, Oral, Daily, Ora Charles MD, 5 mg at 06/03/22 0914    aminoglycoside intermittent dosing (placeholder), , Other, RX Placeholder, Ronnell Sharma MD    QUEtiapine (SEROQUEL) tablet 25 mg, 25 mg, Oral, Nightly, Melanie Flores MD, 25 mg at 06/03/22 2021    baclofen (LIORESAL) tablet 5 mg, 5 mg, Oral, BID PRN, Eddy Harmon MD, 5 mg at 06/03/22 2020    LORazepam (ATIVAN) tablet 0.5 mg, 0.5 mg, Oral, Q6H PRN, Melanie Flores MD, 0.5 mg at 06/02/22 1334    insulin glargine (LANTUS) injection vial 8 Units, 8 Units, SubCUTAneous, Daily, Melanie Flores MD, 8 Units at 06/03/22 0914    dextrose bolus 10% 125 mL, 125 mL, IntraVENous, PRN **OR** dextrose bolus 10% 250 mL, 250 mL, IntraVENous, PRN, Melanie Flores MD    glucagon (rDNA) injection 1 mg, 1 mg, IntraMUSCular, PRN, Melanie Flores MD    dextrose 5 % solution, 100 mL/hr, IntraVENous, PRN, Melanie Flores MD    heparin (porcine) injection 3,200 Units, 3,200 Units, IntraCATHeter, PRN, Shannon Mc MD, 3,200 Units at 06/02/22 1105    metoclopramide (REGLAN) injection 5 mg, 5 mg, IntraVENous, Q8H PRN, Parrish Ross MD, 5 mg at 06/02/22 1210    [COMPLETED] meropenem (MERREM) 1,000 mg in sodium chloride 0.9 % 100 mL IVPB (mini-bag), 1,000 mg, IntraVENous, Once, Stopped at 05/27/22 1137 **FOLLOWED BY** meropenem (MERREM) 1,000 mg in sodium chloride 0.9 % 100 mL IVPB (mini-bag), 1,000 mg, IntraVENous, Q24H, Vi Ibrahim MD, Stopped at 06/03/22 1447    HYDROmorphone HCl PF (DILAUDID) injection 1 mg, 1 mg, IntraVENous, Q4H PRN, Cassie Man MD, 1 mg at 05/29/22 1022    sodium chloride flush 0.9 % injection 5-40 mL, 5-40 mL, IntraVENous, 2 times per day, Cassie Man MD, 10 mL at 06/03/22 2020    sodium chloride flush 0.9 % injection 5-40 mL, 5-40 mL, IntraVENous, PRN, Cassie Man MD    0.9 % sodium chloride infusion, , IntraVENous, PRN, Cassie Man MD, Stopped at 05/29/22 1628    heparin (porcine) injection 5,000 Units, 5,000 Units, SubCUTAneous, TID, Cassie Man MD, 5,000 Units at 06/03/22 2020    ondansetron (ZOFRAN-ODT) disintegrating tablet 4 mg, 4 mg, Oral, Q8H PRN **OR** ondansetron (ZOFRAN) injection 4 mg, 4 mg, IntraVENous, Q6H PRN, Cassie Man MD, 4 mg at 06/02/22 0902    polyethylene glycol (GLYCOLAX) packet 17 g, 17 g, Oral, Daily PRN, Cassie Man MD, 17 g at 05/29/22 1802    acetaminophen (TYLENOL) tablet 650 mg, 650 mg, Oral, Q6H PRN, 650 mg at 05/31/22 2020 **OR** acetaminophen (TYLENOL) suppository 650 mg, 650 mg, Rectal, Q6H PRN, Cassie Man MD    insulin lispro (HUMALOG) injection vial 0-12 Units, 0-12 Units, SubCUTAneous, TID WC, Cassie Man MD, 2 Units at 06/03/22 1728    insulin lispro (HUMALOG) injection vial 0-6 Units, 0-6 Units, SubCUTAneous, Nightly, Cassie Man MD, 2 Units at 06/03/22 2021      REVIEW OF SYSTEMS:Review of systems not obtained due to patient factors - mental status =======================================================================================     PHYSICAL EXAM:  Recent vital signs and recent I/Os reviewed by me. Wt Readings from Last 3 Encounters:   06/04/22 150 lb 9.2 oz (68.3 kg)   05/16/22 148 lb (67.1 kg)   03/19/22 160 lb (72.6 kg)     BP Readings from Last 3 Encounters:   06/04/22 (!) 140/81   05/16/22 (!) 169/102   03/19/22 (!) 171/98     Patient Vitals for the past 24 hrs:   BP Temp Temp src Pulse Resp SpO2 Weight   06/04/22 0400 (!) 140/81 98.8 °F (37.1 °C) Temporal (!) 103 16 97 % 150 lb 9.2 oz (68.3 kg)   06/04/22 0000 (!) 146/80 98.5 °F (36.9 °C) Temporal 96 -- 94 % --   06/03/22 2000 (!) 160/68 98.9 °F (37.2 °C) Temporal (!) 105 20 96 % --   06/03/22 1600 (!) 149/79 98.9 °F (37.2 °C) Temporal (!) 102 18 95 % --   06/03/22 1214 (!) 141/92 98.3 °F (36.8 °C) Temporal (!) 109 -- 95 % --   06/03/22 0933 (!) 157/79 98 °F (36.7 °C) Temporal 95 18 98 % --       Intake/Output Summary (Last 24 hours) at 6/4/2022 0903  Last data filed at 6/4/2022 0420  Gross per 24 hour   Intake 420 ml   Output 1325 ml   Net -905 ml       Physical Exam  Vitals reviewed. Constitutional:       General: He is not in acute distress. Appearance: Normal appearance. HENT:      Head: Normocephalic and atraumatic. Right Ear: External ear normal.      Left Ear: External ear normal.      Nose: Nose normal.      Mouth/Throat:      Mouth: Mucous membranes are dry. Eyes:      General: No scleral icterus. Conjunctiva/sclera: Conjunctivae normal.   Neck:      Vascular: No JVD. Cardiovascular:      Rate and Rhythm: Normal rate and regular rhythm. Heart sounds: S1 normal and S2 normal.   Pulmonary:      Effort: Respiratory distress not present. Breath sounds: Rhonchi present. Abdominal:      General: Bowel sounds are normal. There is no distension. Musculoskeletal:         General: No swelling or deformity.       Cervical back: Normal range of motion and neck supple. Skin:     General: Skin is dry. Coloration: Skin is not jaundiced. Neurological:      Mental Status: He is oriented      Comments: Unable to obtain as part of sedation     Psychiatric:      Comments: Unable to obtain as part of sedation                  =======================================================================================     DATA:  Diagnostic tests reviewed by me for today's visit:   (AS NEEDED FOR MY EVALUATION AND MANAGEMENT). Recent Labs     06/02/22 0527 06/03/22 0457 06/04/22 0427 06/04/22 0757   WBC 17.1* 14.5* 11.8*  --    HCT 22.4* 21.5* 20.6* 23.5*    414 403  --      Iron Saturation:  No components found for: PERCENTFE  FERRITIN:  No results found for: FERRITIN  IRON:  No results found for: IRON  TIBC:  No results found for: TIBC    Recent Labs     06/02/22 0527 06/03/22 0457 06/04/22 0427    132* 135*   K 4.4 4.6 4.5   CL 99 96* 97*   CO2 25 27 25   BUN 43* 33* 44*   CREATININE 5.5* 5.6* 7.1*     Recent Labs     06/02/22 0527 06/03/22 0457 06/04/22 0427   CALCIUM 8.6 8.7 8.5   PHOS 5.0*  --   --      No results for input(s): PH, PCO2, PO2 in the last 72 hours.     Invalid input(s): Kali Condon    ABG:  No results found for: PH, PCO2, PO2, HCO3, BE, THGB, TCO2, O2SAT  VBG:  No results found for: PHVEN, DRI6NLI, BEVEN, U1UWIAMQ    LDH:  No results found for: LDH  Uric Acid:    Lab Results   Component Value Date    LABURIC 8.8 05/26/2022       PT/INR:    Lab Results   Component Value Date    PROTIME 12.9 05/02/2022    INR 1.14 05/02/2022     Warfarin PT/INR:  No components found for: PTPATWAR, PTINRWAR  PTT:    Lab Results   Component Value Date    APTT 34.5 05/02/2022   [APTT}  Last 3 Troponin:  No results found for: TROPONINI    U/A:    Lab Results   Component Value Date    COLORU Yellow 05/26/2022    PROTEINU >=300 05/26/2022    PHUR 7.5 05/26/2022    WBCUA 10-20 05/26/2022    RBCUA 5-10 05/26/2022    BACTERIA 2+ 05/26/2022 CLARITYU Clear 05/26/2022    SPECGRAV 1.020 05/26/2022    LEUKOCYTESUR LARGE 05/26/2022    UROBILINOGEN 0.2 05/26/2022    BILIRUBINUR SMALL 05/26/2022    BLOODU LARGE 05/26/2022    GLUCOSEU 500 05/26/2022     Microalbumen/Creatinine ratio:  No components found for: RUCREAT  24 Hour Urine for Protein:  No components found for: RAWUPRO, UHRS3, YFFM23GL, UTV3  24 Hour Urine for Creatinine Clearance:  No components found for: CREAT4, UHRS10, UTV10  Urine Toxicology:  No components found for: IAMMENTA, IBARBIT, IBENZO, ICOCAINE, IMARTHC, IOPIATES, IPHENCYC    HgBA1c:  No results found for: LABA1C  RPR:  No results found for: RPR  HIV:  No results found for: HIV  BRITTANY:  No results found for: ANATITER, BRITTANY  RF:  No results found for: RF  DSDNA:  No components found for: DNA  AMYLASE:  No results found for: AMYLASE  LIPASE:    Lab Results   Component Value Date    LIPASE 31.0 05/26/2022     Fibrinogen Level:  No components found for: FIB       BELOW MENTIONED RADIOLOGY STUDY RESULTS BY ME (AS NEEDED FOR MY EVALUATION AND MANAGEMENT). CT ABDOMEN PELVIS WO CONTRAST Additional Contrast? None    Result Date: 5/26/2022  EXAMINATION: CT OF THE ABDOMEN AND PELVIS WITHOUT CONTRAST 5/26/2022 1:36 pm TECHNIQUE: CT of the abdomen and pelvis was performed without the administration of intravenous contrast. Multiplanar reformatted images are provided for review. Automated exposure control, iterative reconstruction, and/or weight based adjustment of the mA/kV was utilized to reduce the radiation dose to as low as reasonably achievable. COMPARISON: 03/19/2022 HISTORY: ORDERING SYSTEM PROVIDED HISTORY: recent prostate surg - n/v TECHNOLOGIST PROVIDED HISTORY: Reason for exam:->recent prostate surg - n/v Additional Contrast?->None Reason for Exam: recent prostate surg - n/v Additional signs and symptoms: french breathing instructions provided, pt still not holding breath. Best scan possible.  FINDINGS: Lower Chest: There is mild bibasilar dependent atelectasis. Organs: There is mild right hydronephrosis secondary to a 12 mm calculus lodged within the right ureteral vesicular junction. There is mild right perinephric stranding. The remainder of the solid abdominal organs are unremarkable. GI/Bowel: There is no bowel dilatation, wall thickening or obstruction. Pelvis: The bladder is decompressed by Baeza catheter and thus not evaluated. Postop changes of prostatectomy are present. There are multiple extraperitoneal pelvic gas collections within the surgical bed and pelvic sidewalls. Peritoneum/Retroperitoneum: There is no free air, free fluid or intraperitoneal in phlegm a nat change. There is no adenopathy. Bones/Soft Tissues: There is no acute fracture or aggressive osseous lesion. Mildly obstructing 12 mm right UPJ Postsurgical pelvic changes without complicating feature.  RECOMMENDATIONS: Unavailable

## 2022-06-04 NOTE — PLAN OF CARE
Problem: Chronic Conditions and Co-morbidities  Goal: Patient's chronic conditions and co-morbidity symptoms are monitored and maintained or improved  6/4/2022 0418 by Kael Dykes RN  Outcome: Not Progressing  6/3/2022 1842 by Radha Allen RN  Outcome: Progressing     Problem: Discharge Planning  Goal: Discharge to home or other facility with appropriate resources  6/4/2022 0418 by Kael Dykes RN  Outcome: Not Progressing  6/3/2022 1842 by Radha Allen RN  Outcome: Progressing     Problem: Genitourinary - Adult  Goal: Absence of urinary retention  6/4/2022 0418 by Kael Dykes RN  Outcome: Not Progressing  6/3/2022 1842 by Radha Allen RN  Outcome: Progressing     Problem: Nutrition Deficit:  Goal: Optimize nutritional status  6/4/2022 0418 by Kael Dykes RN  Outcome: Not Progressing  6/3/2022 1842 by Radha Allen RN  Outcome: Progressing     Problem: Pain  Goal: Verbalizes/displays adequate comfort level or baseline comfort level  6/4/2022 0418 by Kael Dykes RN  Outcome: Progressing  6/3/2022 1842 by Radha Allen RN  Outcome: Progressing     Problem: Skin/Tissue Integrity  Goal: Absence of new skin breakdown  Description: 1. Monitor for areas of redness and/or skin breakdown  2. Assess vascular access sites hourly  3. Every 4-6 hours minimum:  Change oxygen saturation probe site  4. Every 4-6 hours:  If on nasal continuous positive airway pressure, respiratory therapy assess nares and determine need for appliance change or resting period.   6/4/2022 0418 by Kael Dykes RN  Outcome: Progressing  6/3/2022 1842 by Radha Allen RN  Outcome: Progressing     Problem: Safety - Adult  Goal: Free from fall injury  6/4/2022 0418 by Kael Dykes RN  Outcome: Progressing  6/3/2022 1842 by Radha Allen RN  Outcome: Progressing     Problem: ABCDS Injury Assessment  Goal: Absence of physical injury  6/4/2022 0418 by Kael Dykes RN  Outcome: Progressing  6/3/2022 1842 by Samreen Henry Lori Gilbert RN  Outcome: Progressing     Problem: Neurosensory - Adult  Goal: Achieves stable or improved neurological status  6/4/2022 0418 by Rosalba Arboleda RN  Outcome: Progressing  6/3/2022 1842 by Wendee Bloch, RN  Outcome: Progressing     Problem: Neurosensory - Adult  Goal: Achieves maximal functionality and self care  6/4/2022 0418 by Rosalba Arboleda RN  Outcome: Progressing  6/3/2022 1842 by Wendee Bloch, RN  Outcome: Progressing     Problem: Cardiovascular - Adult  Goal: Maintains optimal cardiac output and hemodynamic stability  6/4/2022 0418 by Rosalba Arboleda RN  Outcome: Progressing  6/3/2022 1842 by Wendee Bloch, RN  Outcome: Progressing     Problem: Cardiovascular - Adult  Goal: Absence of cardiac dysrhythmias or at baseline  6/4/2022 0418 by Rosalba Arboleda RN  Outcome: Progressing  6/3/2022 1842 by Wendee Bloch, RN  Outcome: Progressing     Problem: Skin/Tissue Integrity - Adult  Goal: Skin integrity remains intact  6/4/2022 0418 by Rosalba Arboleda RN  Outcome: Progressing  Flowsheets (Taken 6/3/2022 1843 by Wendee Bloch, RN)  Skin Integrity Remains Intact: Monitor for areas of redness and/or skin breakdown  6/3/2022 1842 by Wendee Bloch, RN  Outcome: Progressing     Problem: Skin/Tissue Integrity - Adult  Goal: Skin integrity remains intact  6/3/2022 1842 by Wendee Bloch, RN  Outcome: Progressing     Problem: Skin/Tissue Integrity - Adult  Goal: Incisions, wounds, or drain sites healing without S/S of infection  6/4/2022 0418 by Rosalba Arboleda RN  Outcome: Progressing     Problem: Musculoskeletal - Adult  Goal: Return mobility to safest level of function  6/4/2022 0418 by Rosalba Arboleda RN  Outcome: Progressing  6/3/2022 1842 by Wendee Bloch, RN  Outcome: Progressing     Problem: Musculoskeletal - Adult  Goal: Maintain proper alignment of affected body part  6/4/2022 0418 by Rosalba Arboleda RN  Outcome: Progressing  6/3/2022 1842 by Wendee Bloch, RN  Outcome: Progressing     Problem: Gastrointestinal - Adult  Goal: Maintains or returns to baseline bowel function  6/3/2022 1842 by Jessica Ramos RN  Outcome: Progressing     Problem: Musculoskeletal - Adult  Goal: Return ADL status to a safe level of function  6/4/2022 0418 by Ev Wilkins RN  Outcome: Progressing  6/3/2022 1842 by Jessica Ramos RN  Outcome: Progressing     Problem: Gastrointestinal - Adult  Goal: Maintains adequate nutritional intake  6/3/2022 1842 by Jessica Ramos RN  Outcome: Progressing     Problem: Infection - Adult  Goal: Absence of infection at discharge  6/3/2022 1842 by Jessica Ramos RN  Outcome: Progressing

## 2022-06-04 NOTE — PROGRESS NOTES
[facility]    6/4/2022 10:49 AM        Name: Kurtis Kiser .               Admitted: 5/26/2022  Primary Care Provider: Sara Ingram (Tel: [phone])          Subjective:    Feeling more energetic today feels much better no fever no chills no nausea vomiting hiccups have resolved urine output is improving  Reviewed interval ancillary notes    Current Medications  gentamicin (GARAMYCIN) IVPB 80 mg, Once  hydrALAZINE (APRESOLINE) injection 10 mg, Q6H PRN  epoetin gabe-epbx (RETACRIT) injection 10,000 Units, Once per day on Tue Thu Sat  oxybutynin (DITROPAN-XL) extended release tablet 15 mg, Nightly  opium-belladonna (B&O SUPPRETTES) 16.2-30 MG suppository 60 mg, Q8H PRN  amLODIPine (NORVASC) tablet 5 mg, Daily  aminoglycoside intermittent dosing (placeholder), RX Placeholder  QUEtiapine (SEROQUEL) tablet 25 mg, Nightly  baclofen (LIORESAL) tablet 5 mg, BID PRN  LORazepam (ATIVAN) tablet 0.5 mg, Q6H PRN  insulin glargine (LANTUS) injection vial 8 Units, Daily  dextrose bolus 10% 125 mL, PRN   Or  dextrose bolus 10% 250 mL, PRN  glucagon (rDNA) injection 1 mg, PRN  dextrose 5 % solution, PRN  heparin (porcine) injection 3,200 Units, PRN  metoclopramide (REGLAN) injection 5 mg, Q8H PRN  meropenem (MERREM) 1,000 mg in sodium chloride 0.9 % 100 mL IVPB (mini-bag), Q24H  HYDROmorphone HCl PF (DILAUDID) injection 1 mg, Q4H PRN  sodium chloride flush 0.9 % injection 5-40 mL, 2 times per day  sodium chloride flush 0.9 % injection 5-40 mL, PRN  0.9 % sodium chloride infusion, PRN  heparin (porcine) injection 5,000 Units, TID  ondansetron (ZOFRAN-ODT) disintegrating tablet 4 mg, Q8H PRN   Or  ondansetron (ZOFRAN) injection 4 mg, Q6H PRN  polyethylene glycol (GLYCOLAX) packet 17 g, Daily PRN  acetaminophen (TYLENOL) tablet 650 mg, Q6H PRN   Or  acetaminophen (TYLENOL) suppository 650 mg, Q6H PRN  insulin lispro (HUMALOG) injection vial 0-12 Units, TID WC  insulin lispro (HUMALOG) injection vial 0-6 Units, Nightly        Objective:  BP (!) 140/93   Pulse (!) 104   Temp 98.1 °F (36.7 °C) (Temporal)   Resp 16   Ht 5' 7\" (1.702 m)   Wt 150 lb 9.2 oz (68.3 kg)   SpO2 97%   BMI 23.58 kg/m²     Intake/Output Summary (Last 24 hours) at 6/4/2022 1049  Last data filed at 6/4/2022 0420  Gross per 24 hour   Intake 420 ml   Output 1325 ml   Net -905 ml      Wt Readings from Last 3 Encounters:   06/04/22 150 lb 9.2 oz (68.3 kg)   05/16/22 148 lb (67.1 kg)   03/19/22 160 lb (72.6 kg)       General appearance:  Appears comfortable. AAOx2 not to time  HEENT: atraumatic, Pupils equal, muscous membranes moist, no masses appreciated  Cardiovascular: tachycardia no murmurs appreciated  Respiratory: CTAB no wheezing  Gastrointestinal: Abdomen soft, non-tender, BS+  EXT: no edema  Neurology: no gross focal deficts  Psychiatry: Appropriate affect. Not agitated  Skin: Warm, dry, no rashes appreciated    Labs and Tests:  CBC:   Recent Labs     06/02/22 0527 06/02/22 0527 06/03/22 0457 06/04/22 0427 06/04/22  0757   WBC 17.1*  --  14.5* 11.8*  --    HGB 7.4*   < > 7.2* 6.8* 7.7*     --  414 403  --     < > = values in this interval not displayed. BMP:    Recent Labs     06/02/22 0527 06/03/22 0457 06/04/22 0427    132* 135*   K 4.4 4.6 4.5   CL 99 96* 97*   CO2 25 27 25   BUN 43* 33* 44*   CREATININE 5.5* 5.6* 7.1*   GLUCOSE 160* 209* 154*     Hepatic:   Recent Labs     06/02/22 0527 06/03/22 0457 06/04/22 0427   AST 53* 51* 41*   ALT 33 35 32   BILITOT 1.2* 0.8 0.5   ALKPHOS 280* 243* 210*     CT CHEST ABDOMEN PELVIS WO CONTRAST   Final Result   Chest-      1. Congestive failure pattern, including trace pericardial effusion, small   bilateral pleural effusions and interstitial-alveolar edema.   Pneumonia is   considered less likely in the differential.   2. Ectasia of the ascending thoracic aorta, measuring 4.0 cm in diameter. ---      Abdomen and pelvis-      1. Status post hysterectomy. There is an air-fluid collection in the recto   vesicular space. Volume is similar, greater amount of fluid in the interval.   Sterility is indeterminate. 2. Extraperitoneal gas in the pelvis is mildly decreased in the interval.   3. Right ureteral stent remains in situ, traversing a 10 mm ureteropelvic   junction stone. There is minimal residual right pelvicaliectasis and miguel   ureteric edema. 4. Baeza catheter is in normal position with decompression of the bladder. 5. New, small amount of ascites. 6. Mild anasarca. CT HEAD WO CONTRAST   Final Result   Motion limited. No hemorrhage or mass identified      Moderate to severe periventricular and scattered frontal parietal white   matter disease, likely due to small-vessel ischemic change         IR TUNNELED CVC PLACE WO SQ PORT/PUMP > 5 YEARS   Final Result   Successful ultrasound and fluoroscopy guided tunneled catheter placement of a   right internal jugular vein 19 cm dual lumen tip to cuff dialysis catheter as   above. RECOMMENDATIONS:   The new dialysis catheter flushes and aspirates well and can be used   immediately. XR CHEST PORTABLE   Final Result   Mild cardiomegaly with pulmonary vascular congestion. FL RETROGRADE PYELOGRAM W WO KUB   Final Result   For documentation purposes only. See separate procedure report for more   information. CT ABDOMEN PELVIS WO CONTRAST Additional Contrast? None   Final Result   Mildly obstructing 12 mm right UPJ      Postsurgical pelvic changes without complicating feature.       RECOMMENDATIONS:   Unavailable             Recent imaging reviewed    Problem List  Principal Problem:    Septic shock ([address]  Active Problems:    CHRISSY (acute kidney injury) ([address]    Ureterolithiasis    Lactic acidosis    Infection due to ESBL-producing Escherichia coli    Bacteremia due to Gram-negative bacteria    Complicated UTI (urinary tract infection)    Hyponatremia    S/P radical cystoprostatectomy    Poorly controlled type 2 diabetes mellitus ([facility] [address]    Fever    Diabetes education, encounter for  Resolved Problems:    * No resolved hospital problems.  *       Assessment/Plan:   Severe sepsis secondary to e coli bacteremia secondary to  UTI and right nephrolithiasis 12mm s/p right ureteral stent placement  iv meropenem, started gent 5/31 per id  - of recto-vesicular space- 3.5cm ir consulted for possible drain, non candidate per ir  - wbc normalized continue to monitor       gómez secondary to above  -hd today uo improving monitor closely          dm2 with hyperglycemia : improved monitor    icu delirium: resolved     DVT prophylaxis heparin sub q   Code status  Full code    Manda Pradhan MD   6/4/2022 10:49 AM

## 2022-06-04 NOTE — PROGRESS NOTES
Shift assessment complete; see flowsheet for details. All medications administered per MAR. VSS. Patient alert and oriented x4. Denies pain. Persistent hiccups. Baclofen given. Baeza in place and draining. Patient sitting up in chair eating dinner. States no other needs at this time. Will continue to monitor.

## 2022-06-04 NOTE — PROGRESS NOTES
Treatment time: 3 hours  Net UF: 1000 ml     Pre weight: 68.3 kg  Post weight: 67.3 kg  EDW: CHRISSY kg  Crit Line Used: Yes Ending Profile: A  Refill Present: No    Access used: R TDC    Access function: Well with  ml/min     Medications or blood products given: Retacrit, Gentamycin and Merrem     Regular outpatient schedule: CHRISSY     Summary of response to treatment: Patient tolerated treatment well and without any complications. Patient remained stable throughout entire treatment and upon exiting the dialysis suite via transport. Report given to Santa Freeman RN and copy of dialysis treatment record placed in chart, to be scanned into EMR.

## 2022-06-04 NOTE — PLAN OF CARE
Problem: Chronic Conditions and Co-morbidities  Goal: Patient's chronic conditions and co-morbidity symptoms are monitored and maintained or improved  6/4/2022 1355 by Shashi Joyner RN  Outcome: Progressing  6/4/2022 0418 by Vianey Hollis RN  Outcome: Not Progressing     Problem: Discharge Planning  Goal: Discharge to home or other facility with appropriate resources  6/4/2022 1355 by Shashi Joyner RN  Outcome: Progressing  6/4/2022 0418 by Vianey Hollis RN  Outcome: Not Progressing     Problem: Pain  Goal: Verbalizes/displays adequate comfort level or baseline comfort level  6/4/2022 1355 by Shashi Joyner RN  Outcome: Progressing  6/4/2022 0418 by Vianey Hollis RN  Outcome: Progressing     Problem: Skin/Tissue Integrity  Goal: Absence of new skin breakdown  Description: 1. Monitor for areas of redness and/or skin breakdown  2. Assess vascular access sites hourly  3. Every 4-6 hours minimum:  Change oxygen saturation probe site  4. Every 4-6 hours:  If on nasal continuous positive airway pressure, respiratory therapy assess nares and determine need for appliance change or resting period.   6/4/2022 1355 by Shashi Joyner RN  Outcome: Progressing  6/4/2022 0418 by Vianey Hollis RN  Outcome: Progressing     Problem: Safety - Adult  Goal: Free from fall injury  6/4/2022 1355 by Shashi Joyner RN  Outcome: Progressing  6/4/2022 0418 by Vianey Hollis RN  Outcome: Progressing     Problem: ABCDS Injury Assessment  Goal: Absence of physical injury  6/4/2022 1355 by Shashi Joyner RN  Outcome: Progressing  6/4/2022 0418 by Vianey Hollis RN  Outcome: Progressing     Problem: Neurosensory - Adult  Goal: Achieves stable or improved neurological status  6/4/2022 1355 by Shashi Joyner RN  Outcome: Progressing  6/4/2022 0418 by Vianey Hollis RN  Outcome: Progressing  Goal: Achieves maximal functionality and self care  6/4/2022 1355 by Shashi Joyner RN  Outcome: Progressing  6/4/2022 0418 by Vianey Hollis RN  Outcome: Progressing     Problem: Cardiovascular - Adult  Goal: Maintains optimal cardiac output and hemodynamic stability  6/4/2022 1355 by Mindy Pelaez RN  Outcome: Progressing  6/4/2022 0418 by Chong Terrell RN  Outcome: Progressing  Goal: Absence of cardiac dysrhythmias or at baseline  6/4/2022 1355 by Mindy Pelaez RN  Outcome: Progressing  6/4/2022 0418 by Chong Terrell RN  Outcome: Progressing     Problem: Skin/Tissue Integrity - Adult  Goal: Skin integrity remains intact  6/4/2022 1355 by Mindy Pelaez RN  Outcome: Progressing  6/4/2022 0418 by Chong Terrell RN  Outcome: Progressing  Flowsheets (Taken 6/3/2022 1843 by Mindy Pelaez RN)  Skin Integrity Remains Intact: Monitor for areas of redness and/or skin breakdown  Goal: Incisions, wounds, or drain sites healing without S/S of infection  6/4/2022 1355 by Mindy Pelaez RN  Outcome: Progressing  6/4/2022 0418 by Chong Terrell RN  Outcome: Progressing  Goal: Oral mucous membranes remain intact  6/4/2022 1355 by Mindy Pelaez RN  Outcome: Progressing  6/4/2022 0418 by Chong Terrell RN  Outcome: Progressing     Problem: Musculoskeletal - Adult  Goal: Return mobility to safest level of function  6/4/2022 1355 by Mindy Pelaez RN  Outcome: Progressing  6/4/2022 0418 by Chong Terrell RN  Outcome: Progressing  Goal: Maintain proper alignment of affected body part  6/4/2022 1355 by Mindy Pelaez RN  Outcome: Progressing  6/4/2022 0418 by Chong Terrell RN  Outcome: Progressing  Goal: Return ADL status to a safe level of function  6/4/2022 1355 by Mindy Pelaez RN  Outcome: Progressing  6/4/2022 0418 by Chong Terrell RN  Outcome: Progressing     Problem: Gastrointestinal - Adult  Goal: Maintains or returns to baseline bowel function  Outcome: Progressing  Goal: Maintains adequate nutritional intake  Outcome: Progressing     Problem: Genitourinary - Adult  Goal: Absence of urinary retention  6/4/2022 1355 by Mindy Pelaez RN  Outcome: Progressing  6/4/2022 0418 by Bethany Lin RN  Outcome: Not Progressing  Goal: Urinary catheter remains patent  6/4/2022 1355 by Santa Freeman RN  Outcome: Progressing  6/4/2022 0418 by Bethany Lin RN  Outcome: Progressing     Problem: Infection - Adult  Goal: Absence of infection at discharge  Outcome: Progressing     Problem: Metabolic/Fluid and Electrolytes - Adult  Goal: Electrolytes maintained within normal limits  6/4/2022 1355 by Santa Freeman RN  Outcome: Progressing  6/4/2022 0418 by Bethany Lin RN  Outcome: Progressing  Goal: Hemodynamic stability and optimal renal function maintained  6/4/2022 1355 by Santa Freeman RN  Outcome: Progressing  6/4/2022 0418 by Bethany Lin RN  Outcome: Progressing  Goal: Glucose maintained within prescribed range  6/4/2022 1355 by Santa Freeman RN  Outcome: Progressing  6/4/2022 0418 by Bethany Lin RN  Outcome: Progressing     Problem: Hematologic - Adult  Goal: Maintains hematologic stability  6/4/2022 1355 by Santa Freeman RN  Outcome: Progressing  6/4/2022 0418 by Bethany Lin RN  Outcome: Progressing     Problem: Nutrition Deficit:  Goal: Optimize nutritional status  6/4/2022 1355 by Santa Freeman RN  Outcome: Progressing  6/4/2022 0418 by Bethany Lin RN  Outcome: Not Progressing

## 2022-06-05 LAB
A/G RATIO: 0.7 (ref 1.1–2.2)
ALBUMIN SERPL-MCNC: 2.6 G/DL (ref 3.4–5)
ALP BLD-CCNC: 191 U/L (ref 40–129)
ALT SERPL-CCNC: 28 U/L (ref 10–40)
ANION GAP SERPL CALCULATED.3IONS-SCNC: 11 MMOL/L (ref 3–16)
ANION GAP SERPL CALCULATED.3IONS-SCNC: 9 MMOL/L (ref 3–16)
AST SERPL-CCNC: 34 U/L (ref 15–37)
BASOPHILS ABSOLUTE: 0.1 K/UL (ref 0–0.2)
BASOPHILS RELATIVE PERCENT: 1.1 %
BILIRUB SERPL-MCNC: 0.4 MG/DL (ref 0–1)
BUN BLDV-MCNC: 29 MG/DL (ref 7–20)
BUN BLDV-MCNC: 31 MG/DL (ref 7–20)
CALCIUM SERPL-MCNC: 8.5 MG/DL (ref 8.3–10.6)
CALCIUM SERPL-MCNC: 8.8 MG/DL (ref 8.3–10.6)
CHLORIDE BLD-SCNC: 97 MMOL/L (ref 99–110)
CHLORIDE BLD-SCNC: 98 MMOL/L (ref 99–110)
CO2: 27 MMOL/L (ref 21–32)
CO2: 28 MMOL/L (ref 21–32)
CREAT SERPL-MCNC: 5.3 MG/DL (ref 0.8–1.3)
CREAT SERPL-MCNC: 5.4 MG/DL (ref 0.8–1.3)
EOSINOPHILS ABSOLUTE: 0.1 K/UL (ref 0–0.6)
EOSINOPHILS RELATIVE PERCENT: 1.1 %
GFR AFRICAN AMERICAN: 13
GFR AFRICAN AMERICAN: 13
GFR NON-AFRICAN AMERICAN: 11
GFR NON-AFRICAN AMERICAN: 11
GLUCOSE BLD-MCNC: 139 MG/DL (ref 70–99)
GLUCOSE BLD-MCNC: 165 MG/DL (ref 70–99)
GLUCOSE BLD-MCNC: 171 MG/DL (ref 70–99)
GLUCOSE BLD-MCNC: 197 MG/DL (ref 70–99)
GLUCOSE BLD-MCNC: 198 MG/DL (ref 70–99)
GLUCOSE BLD-MCNC: 215 MG/DL (ref 70–99)
GLUCOSE BLD-MCNC: 277 MG/DL (ref 70–99)
HCT VFR BLD CALC: 20 % (ref 40.5–52.5)
HCT VFR BLD CALC: 21.6 % (ref 40.5–52.5)
HEMOGLOBIN: 6.7 G/DL (ref 13.5–17.5)
HEMOGLOBIN: 7.2 G/DL (ref 13.5–17.5)
LYMPHOCYTES ABSOLUTE: 1.3 K/UL (ref 1–5.1)
LYMPHOCYTES RELATIVE PERCENT: 10.5 %
MCH RBC QN AUTO: 30.2 PG (ref 26–34)
MCHC RBC AUTO-ENTMCNC: 33.2 G/DL (ref 31–36)
MCV RBC AUTO: 91 FL (ref 80–100)
MONOCYTES ABSOLUTE: 1.4 K/UL (ref 0–1.3)
MONOCYTES RELATIVE PERCENT: 11.8 %
NEUTROPHILS ABSOLUTE: 9.1 K/UL (ref 1.7–7.7)
NEUTROPHILS RELATIVE PERCENT: 75.5 %
PDW BLD-RTO: 13.2 % (ref 12.4–15.4)
PERFORMED ON: ABNORMAL
PLATELET # BLD: 437 K/UL (ref 135–450)
PMV BLD AUTO: 7.7 FL (ref 5–10.5)
POTASSIUM REFLEX MAGNESIUM: 4.5 MMOL/L (ref 3.5–5.1)
POTASSIUM SERPL-SCNC: 4.4 MMOL/L (ref 3.5–5.1)
RBC # BLD: 2.2 M/UL (ref 4.2–5.9)
SODIUM BLD-SCNC: 135 MMOL/L (ref 136–145)
SODIUM BLD-SCNC: 135 MMOL/L (ref 136–145)
TOTAL PROTEIN: 6.2 G/DL (ref 6.4–8.2)
WBC # BLD: 12.1 K/UL (ref 4–11)

## 2022-06-05 PROCEDURE — 6370000000 HC RX 637 (ALT 250 FOR IP): Performed by: NURSE PRACTITIONER

## 2022-06-05 PROCEDURE — 6360000002 HC RX W HCPCS: Performed by: INTERNAL MEDICINE

## 2022-06-05 PROCEDURE — 6370000000 HC RX 637 (ALT 250 FOR IP): Performed by: INTERNAL MEDICINE

## 2022-06-05 PROCEDURE — 6370000000 HC RX 637 (ALT 250 FOR IP): Performed by: PEDIATRICS

## 2022-06-05 PROCEDURE — 85018 HEMOGLOBIN: CPT

## 2022-06-05 PROCEDURE — 2580000003 HC RX 258: Performed by: INTERNAL MEDICINE

## 2022-06-05 PROCEDURE — 80053 COMPREHEN METABOLIC PANEL: CPT

## 2022-06-05 PROCEDURE — 85025 COMPLETE CBC W/AUTO DIFF WBC: CPT

## 2022-06-05 PROCEDURE — 85014 HEMATOCRIT: CPT

## 2022-06-05 PROCEDURE — 36415 COLL VENOUS BLD VENIPUNCTURE: CPT

## 2022-06-05 PROCEDURE — 2060000000 HC ICU INTERMEDIATE R&B

## 2022-06-05 RX ADMIN — INSULIN GLARGINE 8 UNITS: 100 INJECTION, SOLUTION SUBCUTANEOUS at 09:49

## 2022-06-05 RX ADMIN — HEPARIN SODIUM 5000 UNITS: 5000 INJECTION INTRAVENOUS; SUBCUTANEOUS at 09:48

## 2022-06-05 RX ADMIN — BACLOFEN 5 MG: 10 TABLET ORAL at 10:04

## 2022-06-05 RX ADMIN — OXYBUTYNIN CHLORIDE 15 MG: 5 TABLET, EXTENDED RELEASE ORAL at 20:24

## 2022-06-05 RX ADMIN — Medication 10 ML: at 09:49

## 2022-06-05 RX ADMIN — INSULIN LISPRO 2 UNITS: 100 INJECTION, SOLUTION INTRAVENOUS; SUBCUTANEOUS at 09:50

## 2022-06-05 RX ADMIN — QUETIAPINE FUMARATE 25 MG: 25 TABLET ORAL at 20:24

## 2022-06-05 RX ADMIN — INSULIN LISPRO 6 UNITS: 100 INJECTION, SOLUTION INTRAVENOUS; SUBCUTANEOUS at 17:03

## 2022-06-05 RX ADMIN — MEROPENEM 1000 MG: 1 INJECTION, POWDER, FOR SOLUTION INTRAVENOUS at 10:07

## 2022-06-05 RX ADMIN — HEPARIN SODIUM 5000 UNITS: 5000 INJECTION INTRAVENOUS; SUBCUTANEOUS at 20:25

## 2022-06-05 RX ADMIN — AMLODIPINE BESYLATE 5 MG: 5 TABLET ORAL at 09:48

## 2022-06-05 RX ADMIN — HEPARIN SODIUM 5000 UNITS: 5000 INJECTION INTRAVENOUS; SUBCUTANEOUS at 15:04

## 2022-06-05 RX ADMIN — Medication 20 ML: at 20:24

## 2022-06-05 RX ADMIN — INSULIN LISPRO 2 UNITS: 100 INJECTION, SOLUTION INTRAVENOUS; SUBCUTANEOUS at 13:17

## 2022-06-05 ASSESSMENT — PAIN SCALES - WONG BAKER
WONGBAKER_NUMERICALRESPONSE: 0

## 2022-06-05 ASSESSMENT — PAIN SCALES - GENERAL
PAINLEVEL_OUTOF10: 0
PAINLEVEL_OUTOF10: 0

## 2022-06-05 NOTE — PROGRESS NOTES
Shift assessment complete; see flowsheet for details. VSS. Patient alert and oriented x4. Glucose low, but able to recover with juice and pudding. Patient asymptomatic due to hypoglycemia. Patient states he has a decreased appetite, but agrees to eat food daughter brought from home. Family and upset due to not being updated during the day. POC  Discussed and all questions answered. Baeza remains in place. Bed alarm on.  Will continue to monitor

## 2022-06-05 NOTE — PROGRESS NOTES
Urology Progress Note  [facility]    Provider: Andra Champion MD  Patient ID:  Admission Date: 2022 Name: Lisa Rivera  OR Date: 2022 MRN: [id number]   Patient Location: [id number] : [id number]  Attending: Jazmine Christine MD Date of Service: 2022  PCP: Tena Early PA-C     Diagnoses:  1. CHRISSY (acute kidney injury) ([facility] [address]    2. Ureterolithiasis    3. Septic shock [facility])        Assessment/Plan:  59 yo M s/p RARP and BL PLND 5/15, final path pT3b, pN0. Right 12mm ureteral stone s/p right stent insertion 2022  Continues on HD  WBC improving  Repeat CT shows increasing fluid volume of recto-vesicular space- 3.5cm - diff to access on discussion with IR prev  ===  Cont UOP  WBC and fever curve improving    Recc  Continue pierre catheter  -Leaking around pierre noted. Repositioned pierre, immediate efflux of large amount of urine. Oxybutynin for bladder spasms. Fever curve and wbc improving  Hopefully avoid perc drain given difficult location- will continue to monitor for need for drainage     The patient had a chance to ask questions which were answered. he understands the above plan. Subjective:   Lisa Rivera is a 58 y.o. male. He was seen and examined this morning. Today we discussed increasing fluids in pelvis. Discussed need for possible percutaneous drainage. Explained the procedure in detail.       Objective:   Vitals:  Vitals:    22 0400   BP: 136/68   Pulse: 96   Resp: 14   Temp: 99.2 °F (37.3 °C)   SpO2: 95%       Intake/Output Summary (Last 24 hours) at 2022 0916  Last data filed at 2022 3228  Gross per 24 hour   Intake 791.82 ml   Output 1550 ml   Net -758.18 ml     Physical Exam:  Gen: Alert and oriented x3, no acute distress  CV: Regular rate   Resp: unlabored respirations  Abd: Soft, non-distended, non-tender, no masses  Ext: no peripheral edema noted, moves upper and lower extremities spontaneously  Skin: warmand well perfused, no rashes noted on the face, or arms.    [facility]    Labs:  Lab Results   Component Value Date    WBC 12.1 (H) 06/05/2022    HGB 7.2 (L) 06/05/2022    HCT 21.6 (L) 06/05/2022    MCV 91.0 06/05/2022     06/05/2022     Lab Results   Component Value Date    CREATININE 5.3 (HH) 06/05/2022    BUN 31 (H) 06/05/2022     (L) 06/05/2022    K 4.4 06/05/2022    CL 97 (L) 06/05/2022    CO2 27 06/05/2022       Aga Means MD   6/5/2022

## 2022-06-05 NOTE — PROGRESS NOTES
[facility]    6/5/2022 1:07 PM        Name: Mychal Dillard .               Admitted: 5/26/2022  Primary Care Provider: Jaylon Gibbs (Tel: [phone])          Subjective:    Sitting in chair no chest pain fever or chills  Reviewed interval ancillary notes    Current Medications  hydrALAZINE (APRESOLINE) injection 10 mg, Q6H PRN  epoetin gabe-epbx (RETACRIT) injection 10,000 Units, Once per day on Tue Thu Sat  oxybutynin (DITROPAN-XL) extended release tablet 15 mg, Nightly  opium-belladonna (B&O SUPPRETTES) 16.2-30 MG suppository 60 mg, Q8H PRN  amLODIPine (NORVASC) tablet 5 mg, Daily  aminoglycoside intermittent dosing (placeholder), RX Placeholder  QUEtiapine (SEROQUEL) tablet 25 mg, Nightly  baclofen (LIORESAL) tablet 5 mg, BID PRN  LORazepam (ATIVAN) tablet 0.5 mg, Q6H PRN  insulin glargine (LANTUS) injection vial 8 Units, Daily  dextrose bolus 10% 125 mL, PRN   Or  dextrose bolus 10% 250 mL, PRN  glucagon (rDNA) injection 1 mg, PRN  dextrose 5 % solution, PRN  heparin (porcine) injection 3,200 Units, PRN  metoclopramide (REGLAN) injection 5 mg, Q8H PRN  meropenem (MERREM) 1,000 mg in sodium chloride 0.9 % 100 mL IVPB (mini-bag), Q24H  HYDROmorphone HCl PF (DILAUDID) injection 1 mg, Q4H PRN  sodium chloride flush 0.9 % injection 5-40 mL, 2 times per day  sodium chloride flush 0.9 % injection 5-40 mL, PRN  0.9 % sodium chloride infusion, PRN  heparin (porcine) injection 5,000 Units, TID  ondansetron (ZOFRAN-ODT) disintegrating tablet 4 mg, Q8H PRN   Or  ondansetron (ZOFRAN) injection 4 mg, Q6H PRN  polyethylene glycol (GLYCOLAX) packet 17 g, Daily PRN  acetaminophen (TYLENOL) tablet 650 mg, Q6H PRN   Or  acetaminophen (TYLENOL) suppository 650 mg, Q6H PRN  insulin lispro (HUMALOG) injection vial 0-12 Units, TID WC  insulin lispro (HUMALOG) injection vial 0-6 Units, Nightly        Objective:  BP (!) 157/74   Pulse 98   Temp 98.3 °F (36.8 °C) (Temporal)   Resp 18   Ht 5' 7\" (1.702 m)   Wt 154 lb 5.2 oz (70 kg)   SpO2 98%   BMI 24.17 kg/m²     Intake/Output Summary (Last 24 hours) at 6/5/2022 1307  Last data filed at 6/5/2022 3816  Gross per 24 hour   Intake 791.82 ml   Output 1550 ml   Net -758.18 ml      Wt Readings from Last 3 Encounters:   06/05/22 154 lb 5.2 oz (70 kg)   05/16/22 148 lb (67.1 kg)   03/19/22 160 lb (72.6 kg)       General appearance:  Appears comfortable. AAOx3  HEENT: atraumatic, Pupils equal, muscous membranes moist, no masses appreciated  Cardiovascular: tachycardia no murmurs appreciated  Respiratory: CTAB no wheezing  Gastrointestinal: Abdomen soft, non-tender, BS+  EXT: no edema  Neurology: no gross focal deficts  Psychiatry: Appropriate affect. Not agitated  Skin: Warm, dry, no rashes appreciated    Labs and Tests:  CBC:   Recent Labs     06/03/22 0457 06/03/22 0457 06/04/22 0427 06/04/22 0427 06/04/22  0757 06/05/22  0521 06/05/22  0740   WBC 14.5*  --  11.8*  --   --  12.1*  --    HGB 7.2*   < > 6.8*   < > 7.7* 6.7* 7.2*     --  403  --   --  437  --     < > = values in this interval not displayed. BMP:    Recent Labs     06/04/22 0427 06/05/22  0521 06/05/22  0740   * 135* 135*   K 4.5 4.5 4.4   CL 97* 98* 97*   CO2 25 28 27   BUN 44* 29* 31*   CREATININE 7.1* 5.4* 5.3*   GLUCOSE 154* 171* 165*     Hepatic:   Recent Labs     06/03/22 0457 06/04/22 0427 06/05/22  0521   AST 51* 41* 34   ALT 35 32 28   BILITOT 0.8 0.5 0.4   ALKPHOS 243* 210* 191*     CT CHEST ABDOMEN PELVIS WO CONTRAST   Final Result   Chest-      1. Congestive failure pattern, including trace pericardial effusion, small   bilateral pleural effusions and interstitial-alveolar edema. Pneumonia is   considered less likely in the differential.   2. Ectasia of the ascending thoracic aorta, measuring 4.0 cm in diameter. ---      Abdomen and pelvis-      1. Status post hysterectomy.   There is an air-fluid collection in the recto   vesicular space. Volume is similar, greater amount of fluid in the interval.   Sterility is indeterminate. 2. Extraperitoneal gas in the pelvis is mildly decreased in the interval.   3. Right ureteral stent remains in situ, traversing a 10 mm ureteropelvic   junction stone. There is minimal residual right pelvicaliectasis and miguel   ureteric edema. 4. Baeza catheter is in normal position with decompression of the bladder. 5. New, small amount of ascites. 6. Mild anasarca. CT HEAD WO CONTRAST   Final Result   Motion limited. No hemorrhage or mass identified      Moderate to severe periventricular and scattered frontal parietal white   matter disease, likely due to small-vessel ischemic change         IR TUNNELED CVC PLACE WO SQ PORT/PUMP > 5 YEARS   Final Result   Successful ultrasound and fluoroscopy guided tunneled catheter placement of a   right internal jugular vein 19 cm dual lumen tip to cuff dialysis catheter as   above. RECOMMENDATIONS:   The new dialysis catheter flushes and aspirates well and can be used   immediately. XR CHEST PORTABLE   Final Result   Mild cardiomegaly with pulmonary vascular congestion. FL RETROGRADE PYELOGRAM W WO KUB   Final Result   For documentation purposes only. See separate procedure report for more   information. CT ABDOMEN PELVIS WO CONTRAST Additional Contrast? None   Final Result   Mildly obstructing 12 mm right UPJ      Postsurgical pelvic changes without complicating feature.       RECOMMENDATIONS:   Unavailable             Recent imaging reviewed    Problem List  Principal Problem:    Septic shock ([address]  Active Problems:    CHRISSY (acute kidney injury) ([address]    Ureterolithiasis    Lactic acidosis    Infection due to ESBL-producing Escherichia coli    Bacteremia due to Gram-negative bacteria    Complicated UTI (urinary tract infection)    Hyponatremia    S/P radical cystoprostatectomy Poorly controlled type 2 diabetes mellitus ([facility] [address]    Fever    Diabetes education, encounter for  Resolved Problems:    * No resolved hospital problems.  *       Assessment/Plan:   Severe sepsis secondary to e coli bacteremia secondary to  UTI and right nephrolithiasis 12mm s/p right ureteral stent placement  iv meropenem, started gent 5/31 per id  - of recto-vesicular space- 3.5cm ir consulted for possible drain, non candidate per ir  - repeat cbc in am slightly leukocytosis today       gómez secondary to above  -uo improving repeat labs monitor closely no hd today          dm2 with hyperglycemia : improved monitor    icu delirium: resolved     DVT prophylaxis heparin sub q   Code status  Full code    Lake Davenport MD   6/5/2022 1:07 PM

## 2022-06-05 NOTE — PROGRESS NOTES
MD Jonathan Wood MD Fredy Pereyra, MD               Office: [phone]                      Fax: [phone]             [facility]                   NEPHROLOGY ICU  INPATIENT PROGRESS NOTE:     PATIENT NAME: Jagdeep Means  : 1960  MRN: [id number]         Nephrology treatment plan update. HD done yesterday at slower Qb, solute status much better. Hiccups better. Urine output better today   WBC count decreasing  CT shows increased volume. Neg 4L since admission  BP at goal  CASTRO with HD    Discussed with family renal risk and need for Gentamycin, understands discussion and agreeable to continue. Follow level, Pharmacy dosing. Baeza and stone management per Urology    Social work consult for outpt HD placement in case needed. Would need to follow this Tuesday. Follow serum Na and Phosp-better    IV antibiotics for gram-negative sepsis.-On IV meropenem. - Blood cultures and -positive for E. coli sepsis. - Blood cultures done on -negative     Admitted for:  Ureterolithiasis [N20.1]  CHRISSY (acute kidney injury) ([facility] [N17.9]  Septic shock ([facility] [address] [A41.9, R65.21]  Severe sepsis ([address] [A41.9, R65.20]    ICD-10-CM    1. CHRISSY (acute kidney injury) ([address]  N17.9    2. Ureterolithiasis  N20.1    3. Septic shock (HCC)  A41.9     R65.21          CHRISSY (on CKD: 3B):  Urine output better. On HD  - BL Scr- 1.5 as off 22 ---> 8.0 on admission  -: Etiology of CHRISSY - presumed ATN + obstruction    - other differentials: unlikely GN / TI / TMA process  - UA : results reviewed: Showing large amount of blood, with significant proteinuria, with leukocytes, with white cells RBC high specific gravity  - Renal imaging: on on admission with CT scan: - Obstructed 12 mm right UPJ  - 22-right ureteral stent placement    History of prostate cancer    Associated problems:   - Volume status: hypervolemic, better  : BP: fluid removal as tolerated. mg by mouth 2 times daily (with meals)       senna-docusate (PERICOLACE) 8.6-50 MG per tablet Take 1 tablet by mouth 2 times daily For constipation while on pain medication.  30 tablet 1    amLODIPine (NORVASC) 10 MG tablet Take 10 mg by mouth daily            Current Facility-Administered Medications:     hydrALAZINE (APRESOLINE) injection 10 mg, 10 mg, IntraVENous, Q6H PRN, Della Roa MD, 10 mg at 06/02/22 0430    epoetin gabe-epbx (RETACRIT) injection 10,000 Units, 10,000 Units, IntraVENous, Once per day on Tue Thu Sat, Lin Luevano MD, 10,000 Units at 06/04/22 1230    oxybutynin (DITROPAN-XL) extended release tablet 15 mg, 15 mg, Oral, Nightly, LIZ Garzon - CNP, 15 mg at 06/04/22 2105    opium-belladonna (B&O SUPPRETTES) 16.2-30 MG suppository 60 mg, 60 mg, Rectal, Q8H PRN, LIZ Garzon - CNP    amLODIPine (NORVASC) tablet 5 mg, 5 mg, Oral, Daily, Lin Luevano MD, 5 mg at 06/05/22 0948    aminoglycoside intermittent dosing (placeholder), , Other, RX Placeholder, Re Wang MD    QUEtiapine (SEROQUEL) tablet 25 mg, 25 mg, Oral, Nightly, Clarence Briseno MD, 25 mg at 06/04/22 2105    baclofen (LIORESAL) tablet 5 mg, 5 mg, Oral, BID PRN, Javad Adam MD, 5 mg at 06/05/22 1004    LORazepam (ATIVAN) tablet 0.5 mg, 0.5 mg, Oral, Q6H PRN, Clarence Briseno MD, 0.5 mg at 06/02/22 1334    insulin glargine (LANTUS) injection vial 8 Units, 8 Units, SubCUTAneous, Daily, Clarence Briseno MD, 8 Units at 06/05/22 0949    dextrose bolus 10% 125 mL, 125 mL, IntraVENous, PRN **OR** dextrose bolus 10% 250 mL, 250 mL, IntraVENous, PRN, Clarence Briseno MD    glucagon (rDNA) injection 1 mg, 1 mg, IntraMUSCular, PRN, Clarence Briseno MD    dextrose 5 % solution, 100 mL/hr, IntraVENous, PRN, Clarence Briseno MD    heparin (porcine) injection 3,200 Units, 3,200 Units, IntraCATHeter, PRN, Kassandra Abbasi MD, 3,200 Units at 06/02/22 1105    metoclopramide (REGLAN) injection 5 mg, 5 mg, IntraVENous, Q8H PRN, Trixie Moctezuma MD, 5 mg at 06/02/22 1210    [COMPLETED] meropenem (MERREM) 1,000 mg in sodium chloride 0.9 % 100 mL IVPB (mini-bag), 1,000 mg, IntraVENous, Once, Stopped at 05/27/22 1137 **FOLLOWED BY** meropenem (MERREM) 1,000 mg in sodium chloride 0.9 % 100 mL IVPB (mini-bag), 1,000 mg, IntraVENous, Q24H, Ha Aden MD, Last Rate: 33.3 mL/hr at 06/05/22 1007, 1,000 mg at 06/05/22 1007    HYDROmorphone HCl PF (DILAUDID) injection 1 mg, 1 mg, IntraVENous, Q4H PRN, Manda Pradhan MD, 1 mg at 05/29/22 1022    sodium chloride flush 0.9 % injection 5-40 mL, 5-40 mL, IntraVENous, 2 times per day, Manda Pradhan MD, 10 mL at 06/05/22 0949    sodium chloride flush 0.9 % injection 5-40 mL, 5-40 mL, IntraVENous, PRN, Manda Pradhan MD    0.9 % sodium chloride infusion, , IntraVENous, PRN, Manda Pradhan MD, Stopped at 05/29/22 1628    heparin (porcine) injection 5,000 Units, 5,000 Units, SubCUTAneous, TID, Manda Pradhan MD, 5,000 Units at 06/05/22 0948    ondansetron (ZOFRAN-ODT) disintegrating tablet 4 mg, 4 mg, Oral, Q8H PRN **OR** ondansetron (ZOFRAN) injection 4 mg, 4 mg, IntraVENous, Q6H PRN, Manda Pradhan MD, 4 mg at 06/02/22 0902    polyethylene glycol (GLYCOLAX) packet 17 g, 17 g, Oral, Daily PRN, Manda Pradhan MD, 17 g at 05/29/22 1802    acetaminophen (TYLENOL) tablet 650 mg, 650 mg, Oral, Q6H PRN, 650 mg at 05/31/22 2020 **OR** acetaminophen (TYLENOL) suppository 650 mg, 650 mg, Rectal, Q6H PRN, Manda Pradhan MD    insulin lispro (HUMALOG) injection vial 0-12 Units, 0-12 Units, SubCUTAneous, TID WC, Manda Pradhan MD, 2 Units at 06/05/22 0950    insulin lispro (HUMALOG) injection vial 0-6 Units, 0-6 Units, SubCUTAneous, Nightly, Manda Pradhan MD, 2 Units at 06/03/22 2021      REVIEW OF SYSTEMS:Review of systems not obtained due to patient factors - mental status =======================================================================================     PHYSICAL EXAM:  Recent vital signs and recent I/Os reviewed by me. Wt Readings from Last 3 Encounters:   06/05/22 154 lb 5.2 oz (70 kg)   05/16/22 148 lb (67.1 kg)   03/19/22 160 lb (72.6 kg)     BP Readings from Last 3 Encounters:   06/05/22 129/72   05/16/22 (!) 169/102   03/19/22 (!) 171/98     Patient Vitals for the past 24 hrs:   BP Temp Temp src Pulse Resp SpO2 Height Weight   06/05/22 0945 129/72 98.8 °F (37.1 °C) Temporal 100 16 98 % -- --   06/05/22 0400 136/68 99.2 °F (37.3 °C) Temporal 96 14 95 % -- 154 lb 5.2 oz (70 kg)   06/05/22 0000 (!) 145/70 98.4 °F (36.9 °C) Temporal 96 14 94 % -- --   06/04/22 2031 138/77 98.6 °F (37 °C) Temporal (!) 104 16 96 % -- --   06/04/22 2000 -- -- -- (!) 101 -- -- -- --   06/04/22 1545 132/71 99.1 °F (37.3 °C) Temporal (!) 107 16 98 % -- --   06/04/22 1410 (!) 156/88 97.1 °F (36.2 °C) -- 95 16 97 % 5' 7\" (1.702 m) 148 lb 5.9 oz (67.3 kg)   06/04/22 1055 (!) 171/92 96.8 °F (36 °C) -- 95 16 97 % 5' 7\" (1.702 m) 150 lb 9.2 oz (68.3 kg)       Intake/Output Summary (Last 24 hours) at 6/5/2022 1046  Last data filed at 6/5/2022 0812  Gross per 24 hour   Intake 791.82 ml   Output 1550 ml   Net -758.18 ml       Physical Exam  Vitals reviewed. Constitutional:       General: He is not in acute distress. Appearance: Normal appearance. HENT:      Head: Normocephalic and atraumatic. Right Ear: External ear normal.      Left Ear: External ear normal.      Nose: Nose normal.      Mouth/Throat:      Mouth: Mucous membranes are dry. Eyes:      General: No scleral icterus. Conjunctiva/sclera: Conjunctivae normal.   Neck:      Vascular: No JVD. Cardiovascular:      Rate and Rhythm: Normal rate and regular rhythm. Heart sounds: S1 normal and S2 normal.   Pulmonary:      Effort: Respiratory distress not present. Breath sounds: Rhonchi present.    Abdominal: No results found for: TROPONINI    U/A:    Lab Results   Component Value Date    COLORU Yellow 05/26/2022    PROTEINU >=300 05/26/2022    PHUR 7.5 05/26/2022    WBCUA 10-20 05/26/2022    RBCUA 5-10 05/26/2022    BACTERIA 2+ 05/26/2022    CLARITYU Clear 05/26/2022    SPECGRAV 1.020 05/26/2022    LEUKOCYTESUR LARGE 05/26/2022    UROBILINOGEN 0.2 05/26/2022    BILIRUBINUR SMALL 05/26/2022    BLOODU LARGE 05/26/2022    GLUCOSEU 500 05/26/2022     Microalbumen/Creatinine ratio:  No components found for: RUCREAT  24 Hour Urine for Protein:  No components found for: RAWUPRO, UHRS3, GRAE35LF, UTV3  24 Hour Urine for Creatinine Clearance:  No components found for: CREAT4, UHRS10, UTV10  Urine Toxicology:  No components found for: IAMMENTA, IBARBIT, IBENZO, ICOCAINE, IMARTHC, IOPIATES, IPHENCYC    HgBA1c:  No results found for: LABA1C  RPR:  No results found for: RPR  HIV:  No results found for: HIV  BRITTANY:  No results found for: ANATITER, BRITTANY  RF:  No results found for: RF  DSDNA:  No components found for: DNA  AMYLASE:  No results found for: AMYLASE  LIPASE:    Lab Results   Component Value Date    LIPASE 31.0 05/26/2022     Fibrinogen Level:  No components found for: FIB       BELOW MENTIONED RADIOLOGY STUDY RESULTS BY ME (AS NEEDED FOR MY EVALUATION AND MANAGEMENT). CT ABDOMEN PELVIS WO CONTRAST Additional Contrast? None    Result Date: 5/26/2022  EXAMINATION: CT OF THE ABDOMEN AND PELVIS WITHOUT CONTRAST 5/26/2022 1:36 pm TECHNIQUE: CT of the abdomen and pelvis was performed without the administration of intravenous contrast. Multiplanar reformatted images are provided for review. Automated exposure control, iterative reconstruction, and/or weight based adjustment of the mA/kV was utilized to reduce the radiation dose to as low as reasonably achievable.  COMPARISON: 03/19/2022 HISTORY: ORDERING SYSTEM PROVIDED HISTORY: recent prostate surg - n/v TECHNOLOGIST PROVIDED HISTORY: Reason for exam:->recent prostate surg - n/v Additional Contrast?->None Reason for Exam: recent prostate surg - n/v Additional signs and symptoms: [place] breathing instructions provided, pt still not holding breath. Best scan possible. FINDINGS: Lower Chest: There is mild bibasilar dependent atelectasis. Organs: There is mild right hydronephrosis secondary to a 12 mm calculus lodged within the right ureteral vesicular junction. There is mild right perinephric stranding. The remainder of the solid abdominal organs are unremarkable. GI/Bowel: There is no bowel dilatation, wall thickening or obstruction. Pelvis: The bladder is decompressed by Baeza catheter and thus not evaluated. Postop changes of prostatectomy are present. There are multiple extraperitoneal pelvic gas collections within the surgical bed and pelvic sidewalls. Peritoneum/Retroperitoneum: There is no free air, free fluid or intraperitoneal in phlegm a nat change. There is no adenopathy. Bones/Soft Tissues: There is no acute fracture or aggressive osseous lesion. Mildly obstructing 12 mm right UPJ Postsurgical pelvic changes without complicating feature.  RECOMMENDATIONS: Unavailable

## 2022-06-06 VITALS
RESPIRATION RATE: 18 BRPM | TEMPERATURE: 98.2 F | WEIGHT: 155.2 LBS | SYSTOLIC BLOOD PRESSURE: 136 MMHG | HEIGHT: 67 IN | OXYGEN SATURATION: 97 % | DIASTOLIC BLOOD PRESSURE: 74 MMHG | BODY MASS INDEX: 24.36 KG/M2 | HEART RATE: 95 BPM

## 2022-06-06 LAB
A/G RATIO: 0.7 (ref 1.1–2.2)
ALBUMIN SERPL-MCNC: 2.7 G/DL (ref 3.4–5)
ALP BLD-CCNC: 174 U/L (ref 40–129)
ALT SERPL-CCNC: 25 U/L (ref 10–40)
ANION GAP SERPL CALCULATED.3IONS-SCNC: 14 MMOL/L (ref 3–16)
ANISOCYTOSIS: ABNORMAL
AST SERPL-CCNC: 26 U/L (ref 15–37)
BASOPHILS ABSOLUTE: 0 K/UL (ref 0–0.2)
BASOPHILS RELATIVE PERCENT: 0 %
BILIRUB SERPL-MCNC: 0.6 MG/DL (ref 0–1)
BUN BLDV-MCNC: 43 MG/DL (ref 7–20)
CALCIUM SERPL-MCNC: 8.8 MG/DL (ref 8.3–10.6)
CHLORIDE BLD-SCNC: 97 MMOL/L (ref 99–110)
CO2: 24 MMOL/L (ref 21–32)
CREAT SERPL-MCNC: 6.1 MG/DL (ref 0.8–1.3)
EOSINOPHILS ABSOLUTE: 0.1 K/UL (ref 0–0.6)
EOSINOPHILS RELATIVE PERCENT: 1 %
GFR AFRICAN AMERICAN: 11
GFR NON-AFRICAN AMERICAN: 9
GLUCOSE BLD-MCNC: 163 MG/DL (ref 70–99)
GLUCOSE BLD-MCNC: 174 MG/DL (ref 70–99)
GLUCOSE BLD-MCNC: 191 MG/DL (ref 70–99)
GLUCOSE BLD-MCNC: 264 MG/DL (ref 70–99)
HCT VFR BLD CALC: 21.3 % (ref 40.5–52.5)
HEMOGLOBIN: 7.1 G/DL (ref 13.5–17.5)
LYMPHOCYTES ABSOLUTE: 1.4 K/UL (ref 1–5.1)
LYMPHOCYTES RELATIVE PERCENT: 13 %
MCH RBC QN AUTO: 30.8 PG (ref 26–34)
MCHC RBC AUTO-ENTMCNC: 33.5 G/DL (ref 31–36)
MCV RBC AUTO: 91.9 FL (ref 80–100)
MONOCYTES ABSOLUTE: 1.4 K/UL (ref 0–1.3)
MONOCYTES RELATIVE PERCENT: 13 %
NEUTROPHILS ABSOLUTE: 7.8 K/UL (ref 1.7–7.7)
NEUTROPHILS RELATIVE PERCENT: 72 %
PDW BLD-RTO: 13.8 % (ref 12.4–15.4)
PERFORMED ON: ABNORMAL
PLATELET # BLD: 516 K/UL (ref 135–450)
PLATELET SLIDE REVIEW: ABNORMAL
PMV BLD AUTO: 7.6 FL (ref 5–10.5)
POTASSIUM REFLEX MAGNESIUM: 4.2 MMOL/L (ref 3.5–5.1)
PROMYELOCYTES PERCENT: 1 %
RBC # BLD: 2.32 M/UL (ref 4.2–5.9)
REASON FOR REJECTION: NORMAL
REJECTED TEST: NORMAL
SLIDE REVIEW: ABNORMAL
SODIUM BLD-SCNC: 135 MMOL/L (ref 136–145)
TOTAL PROTEIN: 6.4 G/DL (ref 6.4–8.2)
WBC # BLD: 10.7 K/UL (ref 4–11)

## 2022-06-06 PROCEDURE — 36415 COLL VENOUS BLD VENIPUNCTURE: CPT

## 2022-06-06 PROCEDURE — 6360000002 HC RX W HCPCS: Performed by: INTERNAL MEDICINE

## 2022-06-06 PROCEDURE — 2580000003 HC RX 258: Performed by: INTERNAL MEDICINE

## 2022-06-06 PROCEDURE — 6370000000 HC RX 637 (ALT 250 FOR IP): Performed by: INTERNAL MEDICINE

## 2022-06-06 PROCEDURE — 99232 SBSQ HOSP IP/OBS MODERATE 35: CPT | Performed by: INTERNAL MEDICINE

## 2022-06-06 PROCEDURE — 80053 COMPREHEN METABOLIC PANEL: CPT

## 2022-06-06 PROCEDURE — 85025 COMPLETE CBC W/AUTO DIFF WBC: CPT

## 2022-06-06 RX ORDER — INSULIN LISPRO 100 [IU]/ML
INJECTION, SOLUTION INTRAVENOUS; SUBCUTANEOUS
Qty: 5 PEN | Refills: 0 | Status: SHIPPED | OUTPATIENT
Start: 2022-06-06

## 2022-06-06 RX ORDER — INSULIN LISPRO 100 [IU]/ML
0-12 INJECTION, SOLUTION INTRAVENOUS; SUBCUTANEOUS
Qty: 3 EACH | Refills: 0 | Status: SHIPPED
Start: 2022-06-06 | End: 2022-06-06 | Stop reason: HOSPADM

## 2022-06-06 RX ORDER — INSULIN GLARGINE 100 [IU]/ML
8 INJECTION, SOLUTION SUBCUTANEOUS DAILY
Qty: 10 ML | Refills: 3 | Status: SHIPPED
Start: 2022-06-07 | End: 2022-06-06 | Stop reason: HOSPADM

## 2022-06-06 RX ORDER — INSULIN GLARGINE 100 [IU]/ML
8 INJECTION, SOLUTION SUBCUTANEOUS NIGHTLY
Qty: 5 PEN | Refills: 0 | Status: SHIPPED | OUTPATIENT
Start: 2022-06-06

## 2022-06-06 RX ORDER — AMLODIPINE BESYLATE 5 MG/1
5 TABLET ORAL DAILY
Qty: 30 TABLET | Refills: 3 | Status: SHIPPED | OUTPATIENT
Start: 2022-06-07

## 2022-06-06 RX ORDER — LANCETS 30 GAUGE
1 EACH MISCELLANEOUS 2 TIMES DAILY
Qty: 100 EACH | Refills: 5 | Status: SHIPPED | OUTPATIENT
Start: 2022-06-06

## 2022-06-06 RX ORDER — GLUCOSAMINE HCL/CHONDROITIN SU 500-400 MG
CAPSULE ORAL
Qty: 100 STRIP | Refills: 1 | Status: SHIPPED | OUTPATIENT
Start: 2022-06-06

## 2022-06-06 RX ADMIN — MEROPENEM 1000 MG: 1 INJECTION, POWDER, FOR SOLUTION INTRAVENOUS at 11:28

## 2022-06-06 RX ADMIN — ACETAMINOPHEN 650 MG: 325 TABLET ORAL at 04:30

## 2022-06-06 RX ADMIN — INSULIN LISPRO 6 UNITS: 100 INJECTION, SOLUTION INTRAVENOUS; SUBCUTANEOUS at 12:46

## 2022-06-06 RX ADMIN — Medication 10 ML: at 09:07

## 2022-06-06 RX ADMIN — INSULIN LISPRO 2 UNITS: 100 INJECTION, SOLUTION INTRAVENOUS; SUBCUTANEOUS at 18:12

## 2022-06-06 RX ADMIN — INSULIN LISPRO 2 UNITS: 100 INJECTION, SOLUTION INTRAVENOUS; SUBCUTANEOUS at 09:08

## 2022-06-06 RX ADMIN — AMLODIPINE BESYLATE 5 MG: 5 TABLET ORAL at 09:06

## 2022-06-06 RX ADMIN — HEPARIN SODIUM 5000 UNITS: 5000 INJECTION INTRAVENOUS; SUBCUTANEOUS at 09:06

## 2022-06-06 RX ADMIN — INSULIN GLARGINE 8 UNITS: 100 INJECTION, SOLUTION SUBCUTANEOUS at 09:08

## 2022-06-06 ASSESSMENT — PAIN SCALES - WONG BAKER
WONGBAKER_NUMERICALRESPONSE: 0

## 2022-06-06 ASSESSMENT — PAIN SCALES - GENERAL
PAINLEVEL_OUTOF10: 0
PAINLEVEL_OUTOF10: 4
PAINLEVEL_OUTOF10: 4

## 2022-06-06 ASSESSMENT — PAIN DESCRIPTION - DESCRIPTORS
DESCRIPTORS: ACHING
DESCRIPTORS: ACHING

## 2022-06-06 ASSESSMENT — ENCOUNTER SYMPTOMS
ABDOMINAL PAIN: 0
EYE DISCHARGE: 0
TROUBLE SWALLOWING: 0
RHINORRHEA: 0
COUGH: 0
SORE THROAT: 0
DIARRHEA: 0
WHEEZING: 0
SHORTNESS OF BREATH: 0
EYE REDNESS: 0
NAUSEA: 0
BACK PAIN: 0
CONSTIPATION: 0

## 2022-06-06 ASSESSMENT — PAIN DESCRIPTION - PAIN TYPE: TYPE: CHRONIC PAIN

## 2022-06-06 ASSESSMENT — PAIN - FUNCTIONAL ASSESSMENT
PAIN_FUNCTIONAL_ASSESSMENT: ACTIVITIES ARE NOT PREVENTED
PAIN_FUNCTIONAL_ASSESSMENT: ACTIVITIES ARE NOT PREVENTED

## 2022-06-06 ASSESSMENT — PAIN DESCRIPTION - ORIENTATION
ORIENTATION: INNER
ORIENTATION: OTHER (COMMENT)

## 2022-06-06 ASSESSMENT — PAIN DESCRIPTION - ONSET: ONSET: ON-GOING

## 2022-06-06 ASSESSMENT — PAIN DESCRIPTION - LOCATION
LOCATION: OTHER (COMMENT)
LOCATION: OTHER (COMMENT)

## 2022-06-06 ASSESSMENT — PAIN DESCRIPTION - FREQUENCY: FREQUENCY: CONTINUOUS

## 2022-06-06 NOTE — PROGRESS NOTES
Esaw Layer, MD Martine Rho, MD Levander Gowers, MD               Office: [phone]                      Fax: [phone]             [facility]                   NEPHROLOGY ICU  INPATIENT PROGRESS NOTE:     PATIENT NAME: Mychal Dillard  : 1960  MRN: [id number]         Nephrology treatment plan update. HD done Sat at slower Qb, solute status much better. Hiccups better. Urine output better but poor solute clearance    CT shows increased volume. Neg 4.9L since admission  BP at goal  CASTRO with HD    Updated wife and daughter about continued need for HD. They are also aware of risks and benefits of Gentamycin use and agreeable to plan. Baeza and stone management per Urology    He would need outpt HD placement    Follow serum Na and Phosp-better    IV antibiotics for gram-negative sepsis.-On IV meropenem. - Blood cultures and -positive for E. coli sepsis. - Blood cultures done on -negative     Admitted for:  Ureterolithiasis [N20.1]  CHRISSY (acute kidney injury) ([facility] [address] [N17.9]  Septic shock ([facility] [address] [A41.9, R65.21]  Severe sepsis ([address] [A41.9, R65.20]    ICD-10-CM    1. CHRISSY (acute kidney injury) ([address]  N17.9    2. Ureterolithiasis  N20.1    3. Septic shock (HCC)  A41.9     R65.21          CHRISSY (on CKD: 3B):  Urine output better. On HD  - BL Scr- 1.5 as off 22 ---> 8.0 on admission  -: Etiology of CHRISSY - presumed ATN + obstruction    - other differentials: unlikely GN / TI / TMA process  - UA : results reviewed: Showing large amount of blood, with significant proteinuria, with leukocytes, with white cells RBC high specific gravity  - Renal imaging: on on admission with CT scan: - Obstructed 12 mm right UPJ  - 22-right ureteral stent placement    History of prostate cancer    Associated problems:   - Volume status: hypervolemic, better  : BP: fluid removal as tolerated.   Amlodipine.   : Na: hyponatremia - acute-moderate range, likely due to renal failure. Follow with HD. Better. - Azotemia: Prerenal severe, likely some uremic encephalopathy  - Electrolytes: K: Normal  - Acid-Base: Lactic acidosis, better   - Anemia: lower, follow. - Hypoalbuminemia      Other major problems: Management per primary and other consulting teams.   //         Hospital Problems           Last Modified POA    * (Principal) Septic shock ([facility] [address] 5/27/2022 Yes    CHRISSY (acute kidney injury) ([facility] [address] 5/27/2022 Yes    Ureterolithiasis 5/27/2022 Yes    Lactic acidosis 5/27/2022 Yes    Infection due to ESBL-producing Escherichia coli 5/27/2022 Yes    Bacteremia due to Gram-negative bacteria 4/40/2399 Yes    Complicated UTI (urinary tract infection) 5/27/2022 Yes    Hyponatremia 5/27/2022 Yes    S/P radical cystoprostatectomy 5/27/2022 Yes    Poorly controlled type 2 diabetes mellitus ([facility] [address] 5/27/2022 Yes    Fever 5/29/2022 Yes    Diabetes education, encounter for 6/3/2022 Yes          Thank you for allowing me to participate in this patient's care. Please do not hesitate to contact me anytime. We will follow along with you. Lisset Wallace MD,  [facility] [address] [place] [address] [place]: [phone] or [address]  Fax: [phone]        =======================================================================================   =======================================================================================  SUBJECTIVE-  More awake  No acute distress  GI symptoms better  Wife at bedside      Past medical, Surgical, Social, Family medical history reviewed by me. MEDICATIONS: reviewed by me. Medications Prior to Admission:  No current facility-administered medications on file prior to encounter.      Current Outpatient Medications on File Prior to Encounter   Medication Sig Dispense Refill    sildenafil (REVATIO) 20 MG tablet Take 20 mg by mouth daily      metFORMIN (GLUCOPHAGE) 1000 MG tablet Take 1,000 mg by mouth 2 times daily (with meals)       senna-docusate (PERICOLACE) 8.6-50 MG per tablet Take 1 tablet by mouth 2 times daily For constipation while on pain medication.  30 tablet 1    amLODIPine (NORVASC) 10 MG tablet Take 10 mg by mouth daily            Current Facility-Administered Medications:     hydrALAZINE (APRESOLINE) injection 10 mg, 10 mg, IntraVENous, Q6H PRN, Nomi Steen MD, 10 mg at 06/02/22 0430    epoetin gabe-epbx (RETACRIT) injection 10,000 Units, 10,000 Units, IntraVENous, Once per day on Tue Thu Sat, Shazia Leiva MD, 10,000 Units at 06/04/22 1230    oxybutynin (DITROPAN-XL) extended release tablet 15 mg, 15 mg, Oral, Nightly, LIZ Augustin - CNP, 15 mg at 06/05/22 2024    opium-belladonna (B&O SUPPRETTES) 16.2-30 MG suppository 60 mg, 60 mg, Rectal, Q8H PRN, LIZ Augustin - CNP    amLODIPine (NORVASC) tablet 5 mg, 5 mg, Oral, Daily, Shazia Leiva MD, 5 mg at 06/06/22 0906    aminoglycoside intermittent dosing (placeholder), , Other, RX Placeholder, Padilla Harrison MD    QUEtiapine (SEROQUEL) tablet 25 mg, 25 mg, Oral, Nightly, Jerod Patiño MD, 25 mg at 06/05/22 2024    baclofen (LIORESAL) tablet 5 mg, 5 mg, Oral, BID PRN, Sandoval Higginbotham MD, 5 mg at 06/05/22 1004    LORazepam (ATIVAN) tablet 0.5 mg, 0.5 mg, Oral, Q6H PRN, Jerod Patiño MD, 0.5 mg at 06/02/22 1334    insulin glargine (LANTUS) injection vial 8 Units, 8 Units, SubCUTAneous, Daily, Jerod Patiño MD, 8 Units at 06/06/22 0908    dextrose bolus 10% 125 mL, 125 mL, IntraVENous, PRN **OR** dextrose bolus 10% 250 mL, 250 mL, IntraVENous, PRN, Jerod Patiño MD    glucagon (rDNA) injection 1 mg, 1 mg, IntraMUSCular, PRN, Jerod Patiño MD    dextrose 5 % solution, 100 mL/hr, IntraVENous, PRN, Jerod Patiño MD    heparin (porcine) injection 3,200 Units, 3,200 Units, IntraCATHeter, PRN, Etienne Luther MD, 3,200 Units at 06/02/22 1105    metoclopramide (REGLAN) injection 5 mg, 5 mg, IntraVENous, Q8H PRN, Oneyda Luis MD, 5 mg at 06/02/22 1210    [COMPLETED] meropenem (MERREM) 1,000 mg in sodium chloride 0.9 % 100 mL IVPB (mini-bag), 1,000 mg, IntraVENous, Once, Stopped at 05/27/22 1137 **FOLLOWED BY** meropenem (MERREM) 1,000 mg in sodium chloride 0.9 % 100 mL IVPB (mini-bag), 1,000 mg, IntraVENous, Q24H, Neal Fraga MD, Last Rate: 33.3 mL/hr at 06/06/22 1128, 1,000 mg at 06/06/22 1128    HYDROmorphone HCl PF (DILAUDID) injection 1 mg, 1 mg, IntraVENous, Q4H PRN, Connor Chiu MD, 1 mg at 05/29/22 1022    sodium chloride flush 0.9 % injection 5-40 mL, 5-40 mL, IntraVENous, 2 times per day, Connor Chiu MD, 10 mL at 06/06/22 0907    sodium chloride flush 0.9 % injection 5-40 mL, 5-40 mL, IntraVENous, PRN, Connor Chiu MD    0.9 % sodium chloride infusion, , IntraVENous, PRN, Connor Chiu MD, Stopped at 05/29/22 1628    heparin (porcine) injection 5,000 Units, 5,000 Units, SubCUTAneous, TID, Connor Chiu MD, 5,000 Units at 06/06/22 0906    ondansetron (ZOFRAN-ODT) disintegrating tablet 4 mg, 4 mg, Oral, Q8H PRN **OR** ondansetron (ZOFRAN) injection 4 mg, 4 mg, IntraVENous, Q6H PRN, Connor Chiu MD, 4 mg at 06/02/22 0902    polyethylene glycol (GLYCOLAX) packet 17 g, 17 g, Oral, Daily PRN, Connor Chiu MD, 17 g at 05/29/22 1802    acetaminophen (TYLENOL) tablet 650 mg, 650 mg, Oral, Q6H PRN, 650 mg at 06/06/22 0430 **OR** acetaminophen (TYLENOL) suppository 650 mg, 650 mg, Rectal, Q6H PRN, Connor Chiu MD    insulin lispro (HUMALOG) injection vial 0-12 Units, 0-12 Units, SubCUTAneous, TID WC, Connor Chiu MD, 2 Units at 06/06/22 0908    insulin lispro (HUMALOG) injection vial 0-6 Units, 0-6 Units, SubCUTAneous, Nightly, Connor Chiu MD, 2 Units at 06/03/22 2021      REVIEW OF SYSTEMS:Review of systems not obtained due to patient factors - mental status          ======================================================================================= PHYSICAL EXAM:  Recent vital signs and recent I/Os reviewed by me. Wt Readings from Last 3 Encounters:   06/06/22 155 lb 3.3 oz (70.4 kg)   05/16/22 148 lb (67.1 kg)   03/19/22 160 lb (72.6 kg)     BP Readings from Last 3 Encounters:   06/06/22 136/74   05/16/22 (!) 169/102   03/19/22 (!) 171/98     Patient Vitals for the past 24 hrs:   BP Temp Temp src Pulse Resp SpO2 Weight   06/06/22 1115 136/74 98.2 °F (36.8 °C) Temporal 90 18 -- --   06/06/22 0900 123/80 98.3 °F (36.8 °C) Temporal (!) 101 18 97 % --   06/06/22 0530 -- -- -- 92 -- 97 % --   06/06/22 0430 -- -- -- -- -- -- 155 lb 3.3 oz (70.4 kg)   06/06/22 0400 132/71 99.3 °F (37.4 °C) Temporal 100 -- 98 % --   06/06/22 0021 124/62 99 °F (37.2 °C) Temporal 99 18 99 % --   06/06/22 0000 -- -- -- 94 -- -- --   06/05/22 2014 (!) 147/73 98.8 °F (37.1 °C) Temporal 99 20 97 % --   06/05/22 2000 -- -- -- 99 -- -- --   06/05/22 1705 (!) 142/81 98.1 °F (36.7 °C) Temporal 94 18 98 % --   06/05/22 1245 (!) 157/74 98.3 °F (36.8 °C) Temporal 98 18 98 % --       Intake/Output Summary (Last 24 hours) at 6/6/2022 1131  Last data filed at 6/6/2022 0400  Gross per 24 hour   Intake 668 ml   Output 1625 ml   Net -957 ml       Physical Exam  Vitals reviewed. Constitutional:       General: He is not in acute distress. Appearance: Normal appearance. HENT:      Head: Normocephalic and atraumatic. Right Ear: External ear normal.      Left Ear: External ear normal.      Nose: Nose normal.      Mouth/Throat:      Mouth: Mucous membranes are dry. Eyes:      General: No scleral icterus. Conjunctiva/sclera: Conjunctivae normal.   Neck:      Vascular: No JVD. Cardiovascular:      Rate and Rhythm: Normal rate and regular rhythm. Heart sounds: S1 normal and S2 normal.   Pulmonary:      Effort: Respiratory distress not present. Breath sounds: Rhonchi present. Abdominal:      General: Bowel sounds are normal. There is no distension.    Musculoskeletal: General: No swelling or deformity. Cervical back: Normal range of motion and neck supple. Skin:     General: Skin is dry. Coloration: Skin is not jaundiced. Neurological:      Mental Status: He is oriented      Comments: Unable to obtain as part of sedation     Psychiatric:      Comments: Unable to obtain as part of sedation                  =======================================================================================     DATA:  Diagnostic tests reviewed by me for today's visit:   (AS NEEDED FOR MY EVALUATION AND MANAGEMENT). Recent Labs     06/04/22 0427 06/04/22 0757 06/05/22 0521 06/05/22  0740 06/06/22  0847   WBC 11.8*  --  12.1*  --  10.7   HCT 20.6* 23.5* 20.0* 21.6* 21.3*     --  437  --  516*     Iron Saturation:  No components found for: PERCENTFE  FERRITIN:  No results found for: FERRITIN  IRON:  No results found for: IRON  TIBC:  No results found for: TIBC    Recent Labs     06/04/22 0427 06/05/22 0521 06/05/22  0740 06/06/22  0507   * 135* 135* 135*   K 4.5 4.5 4.4 4.2   CL 97* 98* 97* 97*   CO2 25 28 27 24   BUN 44* 29* 31* 43*   CREATININE 7.1* 5.4* 5.3* 6.1*     Recent Labs     06/04/22 0427 06/05/22  0521 06/05/22  0740 06/06/22  0507   CALCIUM 8.5 8.5 8.8 8.8     No results for input(s): PH, PCO2, PO2 in the last 72 hours.     Invalid input(s): Lacey Stage    ABG:  No results found for: PH, PCO2, PO2, HCO3, BE, THGB, TCO2, O2SAT  VBG:  No results found for: PHVEN, LSA7PFZ, BEVEN, O3KCCSIG    LDH:  No results found for: LDH  Uric Acid:    Lab Results   Component Value Date    LABURIC 8.8 05/26/2022       PT/INR:    Lab Results   Component Value Date    PROTIME 12.9 05/02/2022    INR 1.14 05/02/2022     Warfarin PT/INR:  No components found for: PTPATWAR, PTINRWAR  PTT:    Lab Results   Component Value Date    APTT 34.5 05/02/2022   [APTT}  Last 3 Troponin:  No results found for: TROPONINI    U/A:    Lab Results   Component Value Date COLORU Yellow 05/26/2022    PROTEINU >=300 05/26/2022    PHUR 7.5 05/26/2022    WBCUA 10-20 05/26/2022    RBCUA 5-10 05/26/2022    BACTERIA 2+ 05/26/2022    CLARITYU Clear 05/26/2022    SPECGRAV 1.020 05/26/2022    LEUKOCYTESUR LARGE 05/26/2022    UROBILINOGEN 0.2 05/26/2022    BILIRUBINUR SMALL 05/26/2022    BLOODU LARGE 05/26/2022    GLUCOSEU 500 05/26/2022     Microalbumen/Creatinine ratio:  No components found for: RUCREAT  24 Hour Urine for Protein:  No components found for: RAWUPRO, UHRS3, JKTJ77NZ, UTV3  24 Hour Urine for Creatinine Clearance:  No components found for: CREAT4, UHRS10, UTV10  Urine Toxicology:  No components found for: IAMMENTA, IBARBIT, IBENZO, ICOCAINE, IMARTHC, IOPIATES, IPHENCYC    HgBA1c:  No results found for: LABA1C  RPR:  No results found for: RPR  HIV:  No results found for: HIV  BRITTANY:  No results found for: ANATITER, BRITTANY  RF:  No results found for: RF  DSDNA:  No components found for: DNA  AMYLASE:  No results found for: AMYLASE  LIPASE:    Lab Results   Component Value Date    LIPASE 31.0 05/26/2022     Fibrinogen Level:  No components found for: FIB       BELOW MENTIONED RADIOLOGY STUDY RESULTS BY ME (AS NEEDED FOR MY EVALUATION AND MANAGEMENT). CT ABDOMEN PELVIS WO CONTRAST Additional Contrast? None    Result Date: 5/26/2022  EXAMINATION: CT OF THE ABDOMEN AND PELVIS WITHOUT CONTRAST 5/26/2022 1:36 pm TECHNIQUE: CT of the abdomen and pelvis was performed without the administration of intravenous contrast. Multiplanar reformatted images are provided for review. Automated exposure control, iterative reconstruction, and/or weight based adjustment of the mA/kV was utilized to reduce the radiation dose to as low as reasonably achievable.  COMPARISON: 03/19/2022 HISTORY: ORDERING SYSTEM PROVIDED HISTORY: recent prostate surg - n/v TECHNOLOGIST PROVIDED HISTORY: Reason for exam:->recent prostate surg - n/v Additional Contrast?->None Reason for Exam: recent prostate surg - n/v Additional signs and symptoms: Kosovan breathing instructions provided, pt still not holding breath. Best scan possible. FINDINGS: Lower Chest: There is mild bibasilar dependent atelectasis. Organs: There is mild right hydronephrosis secondary to a 12 mm calculus lodged within the right ureteral vesicular junction. There is mild right perinephric stranding. The remainder of the solid abdominal organs are unremarkable. GI/Bowel: There is no bowel dilatation, wall thickening or obstruction. Pelvis: The bladder is decompressed by Baeza catheter and thus not evaluated. Postop changes of prostatectomy are present. There are multiple extraperitoneal pelvic gas collections within the surgical bed and pelvic sidewalls. Peritoneum/Retroperitoneum: There is no free air, free fluid or intraperitoneal in phlegm a nat change. There is no adenopathy. Bones/Soft Tissues: There is no acute fracture or aggressive osseous lesion. Mildly obstructing 12 mm right UPJ Postsurgical pelvic changes without complicating feature.  RECOMMENDATIONS: Unavailable

## 2022-06-06 NOTE — DISCHARGE INSTR - OTHER ORDERS
INSULINE Dose:   If <139 -- No Insulin   140-199--- 1 Unit   200-249 -- 2 Units   250-299-- 3 Units   300-349-- 4 Units   350-400-- 5 Units   Above 400 6 Units

## 2022-06-06 NOTE — CARE COORDINATION
ELIZABETH received a call from pt's RN stating that patient has a discharge order but no HHC orders placed. ELIZABETH contacted hospitalist via perfect serve asking to place [place] [address] orders. ELIZABETH did not receive a response and asked another hospitalist to complete this. [address] orders were placed. [facility] [address] will be pt's agency. Pt will receive IV Gentamicin tomorrow at dialysis [facility]. All needs met per case management. Discharge Plan:  New HD at [facility] starting tomorrow w/IV Gentamicin. [facility] for [facility] care.     Electronically signed by RAQUEL Rod, STEPHANIE on 6/6/2022 at 7:29 PM

## 2022-06-06 NOTE — PLAN OF CARE
Problem: Chronic Conditions and Co-morbidities  Goal: Patient's chronic conditions and co-morbidity symptoms are monitored and maintained or improved  6/5/2022 2233 by Fabricio Lance RN  Outcome: Progressing  Flowsheets (Taken 6/5/2022 2000)  Care Plan - Patient's Chronic Conditions and Co-Morbidity Symptoms are Monitored and Maintained or Improved: Monitor and assess patient's chronic conditions and comorbid symptoms for stability, deterioration, or improvement  6/5/2022 1625 by Erik Newman RN  Outcome: Progressing     Problem: Discharge Planning  Goal: Discharge to home or other facility with appropriate resources  6/5/2022 2233 by Fabricio Lance RN  Outcome: Progressing  Flowsheets (Taken 6/5/2022 2000)  Discharge to home or other facility with appropriate resources: Identify barriers to discharge with patient and caregiver  6/5/2022 1625 by Erik Newman RN  Outcome: Progressing     Problem: Pain  Goal: Verbalizes/displays adequate comfort level or baseline comfort level  6/5/2022 2233 by Fabricio Lance RN  Outcome: Progressing  6/5/2022 1625 by Erik Newman RN  Outcome: Progressing     Problem: Skin/Tissue Integrity  Goal: Absence of new skin breakdown  Description: 1. Monitor for areas of redness and/or skin breakdown  2. Assess vascular access sites hourly  3. Every 4-6 hours minimum:  Change oxygen saturation probe site  4. Every 4-6 hours:  If on nasal continuous positive airway pressure, respiratory therapy assess nares and determine need for appliance change or resting period.   6/5/2022 2233 by Fabricio Lance RN  Outcome: Progressing  6/5/2022 1625 by Erik Newman RN  Outcome: Progressing     Problem: Safety - Adult  Goal: Free from fall injury  6/5/2022 2233 by Fabricio Lance RN  Outcome: Progressing  6/5/2022 1625 by Erik Newman RN  Outcome: Progressing     Problem: ABCDS Injury Assessment  Goal: Absence of physical injury  6/5/2022 2233 by Fabricio Lance RN  Outcome: Progressing  6/5/2022 1625 by Kg Bradford RN  Outcome: Progressing     Problem: Neurosensory - Adult  Goal: Achieves stable or improved neurological status  6/5/2022 2233 by Fly Briceño RN  Outcome: Progressing  6/5/2022 1625 by Kg Bradford RN  Outcome: Progressing     Problem: Cardiovascular - Adult  Goal: Maintains optimal cardiac output and hemodynamic stability  6/5/2022 2233 by Fly Briceño RN  Outcome: Progressing  6/5/2022 1625 by Kg Bradford RN  Outcome: Progressing     Problem: Skin/Tissue Integrity - Adult  Goal: Skin integrity remains intact  Recent Flowsheet Documentation  Taken 6/5/2022 1626 by Kg Brdaford RN  Skin Integrity Remains Intact: Monitor for areas of redness and/or skin breakdown  6/5/2022 1625 by Kg Bradford RN  Outcome: Progressing     Problem: Musculoskeletal - Adult  Goal: Return mobility to safest level of function  6/5/2022 1625 by Kg Bradford RN  Outcome: Progressing     Problem: Gastrointestinal - Adult  Goal: Maintains or returns to baseline bowel function  6/5/2022 1625 by Kg Bradford RN  Outcome: Progressing     Problem: Genitourinary - Adult  Goal: Absence of urinary retention  6/5/2022 2233 by Fly Briceño RN  Outcome: Progressing  6/5/2022 1625 by Kg Bradford RN  Outcome: Progressing     Problem: Infection - Adult  Goal: Absence of infection at discharge  6/5/2022 2233 by Fly Briceño RN  Outcome: Progressing  Flowsheets (Taken 6/5/2022 2000)  Absence of infection at discharge: Assess and monitor for signs and symptoms of infection  6/5/2022 1625 by Kg Bradford RN  Outcome: Progressing     Problem: Metabolic/Fluid and Electrolytes - Adult  Goal: Electrolytes maintained within normal limits  Recent Flowsheet Documentation  Taken 6/5/2022 2000 by Fly Briceño RN  Electrolytes maintained within normal limits: Monitor labs and assess patient for signs and symptoms of electrolyte imbalances  6/5/2022 1625 by Kg Bradford RN  Outcome: Progressing     Problem: Hematologic - Adult  Goal: Maintains hematologic stability  Recent Flowsheet Documentation  Taken 6/5/2022 2000 by Kera Adam RN  Maintains hematologic stability: Assess for signs and symptoms of bleeding or hemorrhage  6/5/2022 1625 by Alcides Goff RN  Outcome: Progressing     Problem: Nutrition Deficit:  Goal: Optimize nutritional status  6/5/2022 2233 by Kera Adam RN  Outcome: Progressing  6/5/2022 1625 by Alcides Goff RN  Outcome: Progressing

## 2022-06-06 NOTE — DISCHARGE INSTR - DIET

## 2022-06-06 NOTE — CARE COORDINATION
Discharge Planning:     (CHERRIE) called and spoke with [facility]) intake staff ([phone]) who accepted patient's referral information for discharge today and confirmed that she will pull the patient's information from Epic. ANA Espinal, [facility]  [employer] -   [phone]    Electronically signed by RAQUEL Gastelum on 6/6/2022 at 11:38 AM        Update at 65:  CHERRIE called [facility] ([phone]) and spoke with dialysis staff, Alissa Zimmerman, to inform that Infectious Disease Physician has stated that the patient will need IV Gentamicin until 6/14/2022 with dialysis. Alissa Zimmerman asked that CHERRIE fax this information to the attention of [address] [place] [address] Nurse, Nish, at [phone]. Facesheet and Infectious Disease note from 6/3/2022 faxed, per request.      ANA Espinal [facility]  [employer] -   [phone]    Electronically signed by RAQUEL Gastelum on 6/6/2022 at 12:00 PM        Update at [id number]:  CHERRIE spoke with the patient's Daughter who reported that, while patient is receiving dialysis for 2 months, he will be staying with her at her address as follows:    [address] [facility], [address]    Daughter reported that she can transport the patient to dialysis on Saturdays. CM educated Daughter on calling patient's Medicaid insurance provider to schedule transportation for the other days. CM educated on Daughter requesting a different dialysis facility once patient has started outpatient dialysis, if needed. Daughter verbalized understanding. CM called [facility]) and spoke with intake staff, India Morrissey, and provided above listed address for patient. Dinorah confirmed that they will still be able to service the patient for [place] [address] services and confirmed that will pull the patient's information from [facility].         ANA Espinal, [facility]  [employer] -   [phone]    Electronically signed by Royden Boeck RAQUEL Novak on 6/6/2022 at 4:11 PM

## 2022-06-06 NOTE — PROGRESS NOTES
For last three days patient's hgb has been called by lab as less than 7. On 6/4 AM CBC   04:27 am hgb/hct called as 6.8/20.6.   07:57 am hgb/hct result 7.7/23.5    On 6/5 AM CBC  05:21 am hgb/hct called as 6.7/20.0  07:40 am hgb/hct result 7.2/21.6    On 6/6 AM CBC  This morning Lab called low Hgb less than 6.8 range  Asked lab to redraw specimen.

## 2022-06-06 NOTE — PROGRESS NOTES
[address] [place] [address] PROGRESS NOTE    6/6/2022 9:15 AM        Name: Paula Jacinto .               Admitted: 5/26/2022  Primary Care Provider: Elmo Burden (Tel: [phone])          Subjective:    Making good urine output 1.6 L yesterday denies any fevers chills chest pain shortness of  Reviewed interval ancillary notes    Current Medications  hydrALAZINE (APRESOLINE) injection 10 mg, Q6H PRN  epoetin gabe-epbx (RETACRIT) injection 10,000 Units, Once per day on Tue Thu Sat  oxybutynin (DITROPAN-XL) extended release tablet 15 mg, Nightly  opium-belladonna (B&O SUPPRETTES) 16.2-30 MG suppository 60 mg, Q8H PRN  amLODIPine (NORVASC) tablet 5 mg, Daily  aminoglycoside intermittent dosing (placeholder), RX Placeholder  QUEtiapine (SEROQUEL) tablet 25 mg, Nightly  baclofen (LIORESAL) tablet 5 mg, BID PRN  LORazepam (ATIVAN) tablet 0.5 mg, Q6H PRN  insulin glargine (LANTUS) injection vial 8 Units, Daily  dextrose bolus 10% 125 mL, PRN   Or  dextrose bolus 10% 250 mL, PRN  glucagon (rDNA) injection 1 mg, PRN  dextrose 5 % solution, PRN  heparin (porcine) injection 3,200 Units, PRN  metoclopramide (REGLAN) injection 5 mg, Q8H PRN  meropenem (MERREM) 1,000 mg in sodium chloride 0.9 % 100 mL IVPB (mini-bag), Q24H  HYDROmorphone HCl PF (DILAUDID) injection 1 mg, Q4H PRN  sodium chloride flush 0.9 % injection 5-40 mL, 2 times per day  sodium chloride flush 0.9 % injection 5-40 mL, PRN  0.9 % sodium chloride infusion, PRN  heparin (porcine) injection 5,000 Units, TID  ondansetron (ZOFRAN-ODT) disintegrating tablet 4 mg, Q8H PRN   Or  ondansetron (ZOFRAN) injection 4 mg, Q6H PRN  polyethylene glycol (GLYCOLAX) packet 17 g, Daily PRN  acetaminophen (TYLENOL) tablet 650 mg, Q6H PRN   Or  acetaminophen (TYLENOL) suppository 650 mg, Q6H PRN  insulin lispro (HUMALOG) injection vial 0-12 Units, TID WC  insulin lispro (HUMALOG) injection vial 0-6 Units, Nightly        Objective:  /80   Pulse (!) 101   Temp 98.3 °F (36.8 °C) (Temporal)   Resp 18   Ht 5' 7\" (1.702 m)   Wt 155 lb 3.3 oz (70.4 kg)   SpO2 97%   BMI 24.31 kg/m²     Intake/Output Summary (Last 24 hours) at 6/6/2022 0915  Last data filed at 6/6/2022 0400  Gross per 24 hour   Intake 668 ml   Output 1625 ml   Net -957 ml      Wt Readings from Last 3 Encounters:   06/06/22 155 lb 3.3 oz (70.4 kg)   05/16/22 148 lb (67.1 kg)   03/19/22 160 lb (72.6 kg)       General appearance:  Appears comfortable. AAOx3  HEENT: atraumatic, Pupils equal, muscous membranes moist, no masses appreciated  Cardiovascular: tachycardia no murmurs appreciated  Respiratory: CTAB no wheezing  Gastrointestinal: Abdomen soft, non-tender, BS+  EXT: no edema  Neurology: no gross focal deficts  Psychiatry: Appropriate affect. Not agitated  Skin: Warm, dry, no rashes appreciated    Labs and Tests:  CBC:   Recent Labs     06/04/22 0427 06/04/22 0427 06/04/22  0757 06/05/22  0521 06/05/22  0740   WBC 11.8*  --   --  12.1*  --    HGB 6.8*   < > 7.7* 6.7* 7.2*     --   --  437  --     < > = values in this interval not displayed. BMP:    Recent Labs     06/05/22 0521 06/05/22  0740 06/06/22  0507   * 135* 135*   K 4.5 4.4 4.2   CL 98* 97* 97*   CO2 28 27 24   BUN 29* 31* 43*   CREATININE 5.4* 5.3* 6.1*   GLUCOSE 171* 165* 174*     Hepatic:   Recent Labs     06/04/22 0427 06/05/22  0521 06/06/22  0507   AST 41* 34 26   ALT 32 28 25   BILITOT 0.5 0.4 0.6   ALKPHOS 210* 191* 174*     CT CHEST ABDOMEN PELVIS WO CONTRAST   Final Result   Chest-      1. Congestive failure pattern, including trace pericardial effusion, small   bilateral pleural effusions and interstitial-alveolar edema. Pneumonia is   considered less likely in the differential.   2. Ectasia of the ascending thoracic aorta, measuring 4.0 cm in diameter. ---      Abdomen and pelvis-      1. Status post hysterectomy.   There is an air-fluid collection in the recto   vesicular space. Volume is similar, greater amount of fluid in the interval.   Sterility is indeterminate. 2. Extraperitoneal gas in the pelvis is mildly decreased in the interval.   3. Right ureteral stent remains in situ, traversing a 10 mm ureteropelvic   junction stone. There is minimal residual right pelvicaliectasis and miguel   ureteric edema. 4. Baeza catheter is in normal position with decompression of the bladder. 5. New, small amount of ascites. 6. Mild anasarca. CT HEAD WO CONTRAST   Final Result   Motion limited. No hemorrhage or mass identified      Moderate to severe periventricular and scattered frontal parietal white   matter disease, likely due to small-vessel ischemic change         IR TUNNELED CVC PLACE WO SQ PORT/PUMP > 5 YEARS   Final Result   Successful ultrasound and fluoroscopy guided tunneled catheter placement of a   right internal jugular vein 19 cm dual lumen tip to cuff dialysis catheter as   above. RECOMMENDATIONS:   The new dialysis catheter flushes and aspirates well and can be used   immediately. XR CHEST PORTABLE   Final Result   Mild cardiomegaly with pulmonary vascular congestion. FL RETROGRADE PYELOGRAM W WO KUB   Final Result   For documentation purposes only. See separate procedure report for more   information. CT ABDOMEN PELVIS WO CONTRAST Additional Contrast? None   Final Result   Mildly obstructing 12 mm right UPJ      Postsurgical pelvic changes without complicating feature.       RECOMMENDATIONS:   Unavailable             Recent imaging reviewed    Problem List  Principal Problem:    Septic shock ([address]  Active Problems:    CHRISSY (acute kidney injury) ([address]    Ureterolithiasis    Lactic acidosis    Infection due to ESBL-producing Escherichia coli    Bacteremia due to Gram-negative bacteria    Complicated UTI (urinary tract infection)    Hyponatremia    S/P radical cystoprostatectomy    Poorly controlled type 2 diabetes mellitus ([facility]    Fever    Diabetes education, encounter for  Resolved Problems:    * No resolved hospital problems.  *       Assessment/Plan:   Severe sepsis secondary to e coli bacteremia secondary to  UTI and right nephrolithiasis 12mm s/p right ureteral stent placement  iv meropenem, started gent 5/31 per id  - of recto-vesicular space- 3.5cm ir consulted for possible drain, non candidate per ir  -repeat cbc pending will fu hgb was 6.8 will get repeat if below 7 transfuse 1 unit prbcs       gómez secondary to above  -uo improved repeat bmp in am and follow up       dm2 with hyperglycemia : improved monitor    icu delirium: resolved     DVT prophylaxis heparin sub q   Code status  Full code    Geena Baig MD   6/6/2022 9:15 AM

## 2022-06-06 NOTE — PROGRESS NOTES
[facility] Education Nurse  Consult Note       NAME:  Mojgan Zavaleta  MEDICAL RECORD NUMBER:  [id number]  AGE: 58 y.o. GENDER: male  : 1960  TODAY'S DATE:  2022    Subjective:     Reason for Educator consult ---  Evaluation and Assessment:    Mojgan Zavaleta is a 58 y.o. male referred by:   [] Physician  [x] Nursing  [] Other:      Patient doesn't speak Reema Pham, daughter at bedside & is translating. The patient does not have a glucometer at home and therefore will need one upon discharge. Pt states he has used one in the past.  Reviewed S/S of hyper- and hypoglycemia and the proper treatment of hyper- and hypoglycemia. Given handouts. Discussed following up with PCP. Discussed medications including insulin. Patient given booklet of written material regarding diabetes titled \" Getting started\". Discussed basal bolus insulin regimen and reviewed handout. Provided insulin pen instructions. Pt v/u. the patient able to return demonstration with practice pen & pad on second attempt with verbal cues & mimes. PAST MEDICAL HISTORY      Diagnosis Date    Diabetes mellitus ([address]     Hypertension        PAST SURGICAL HISTORY  Past Surgical History:   Procedure Laterality Date    CYSTOSCOPY Right 2022    CYSTOSCOPY, BILATERAL RETROGRADE PYELOGRAM, PLACEMENT OF RIGHT URETERAL STENT performed by Magan Jose MD at [address] IR TUNNELED [address] 5 YEARS  2022    IR TUNNELED CATHETER PLACEMENT GREATER THAN 5 YEARS 2022 [facility] SPECIAL PROCEDURES    PROSTATECTOMY N/A 2022    ROBOTIC LAPAROSCOPIC RADICAL PROSTATECTOMY WITH BILATERAL LYMPH NODE DISSECTION AND BILATERAL NERVE SPARE performed by Amber Odonnell MD at [address]  History reviewed. No pertinent family history.     SOCIAL HISTORY  Social History     Tobacco Use    Smoking status: Never Smoker    Smokeless tobacco: Never Used   Vaping Use    Vaping Use: Never used Substance Use Topics    Alcohol use: Never    Drug use: Never       ALLERGIES  No Known Allergies    MEDICATIONS  No current facility-administered medications on file prior to encounter. Current Outpatient Medications on File Prior to Encounter   Medication Sig Dispense Refill    senna-docusate (PERICOLACE) 8.6-50 MG per tablet Take 1 tablet by mouth 2 times daily For constipation while on pain medication.  30 tablet 1       Objective:     LABS    CBC:   Lab Results   Component Value Date    WBC 10.7 06/06/2022    RBC 2.32 06/06/2022    HGB 7.1 06/06/2022    HCT 21.3 06/06/2022    MCV 91.9 06/06/2022    MCH 30.8 06/06/2022    MCHC 33.5 06/06/2022    RDW 13.8 06/06/2022     06/06/2022    MPV 7.6 06/06/2022     CMP:    Lab Results   Component Value Date     06/06/2022    K 4.2 06/06/2022    CL 97 06/06/2022    CO2 24 06/06/2022    BUN 43 06/06/2022    CREATININE 6.1 06/06/2022    GFRAA 11 06/06/2022    AGRATIO 0.7 06/06/2022    LABGLOM 9 06/06/2022    GLUCOSE 174 06/06/2022    PROT 6.4 06/06/2022    LABALBU 2.7 06/06/2022    CALCIUM 8.8 06/06/2022    BILITOT 0.6 06/06/2022    ALKPHOS 174 06/06/2022    AST 26 06/06/2022    ALT 25 06/06/2022       HgBA1c:  No results found for: LABA1C    This patient's last creatinine was   Recent Labs     06/06/22  0507   CREATININE 6.1*        Recent Blood sugars have been   Lab Results   Component Value Date    POCGLU 191 06/06/2022    POCGLU 264 06/06/2022    POCGLU 163 06/06/2022    POCGLU 139 06/05/2022    POCGLU 277 06/05/2022    POCGLU 198 06/05/2022    POCGLU 197 06/05/2022    POCGLU 215 06/05/2022     Lab Results   Component Value Date    GLUCOSE 174 06/06/2022    GLUCOSE 165 06/05/2022    GLUCOSE 171 06/05/2022    GLUCOSE 154 06/04/2022    GLUCOSE 209 06/03/2022    GLUCOSE 160 06/02/2022            Assessment:     Patient Active Problem List   Diagnosis    [facility]    Septic shock ([facility]    [facility]IS[facility]acute kidney injury) ([facility]    Ureterolithiasis    Lactic acidosis    Infection due to ESBL-producing Escherichia coli    Bacteremia due to Gram-negative bacteria    Complicated UTI (urinary tract infection)    Hyponatremia    S/P radical cystoprostatectomy    Poorly controlled type 2 diabetes mellitus ([facility])    Fever    Diabetes education, encounter for       Plan:     Plan of Care:   Diabetes Type 2 un-controlled  Lipids-- unknown & untreated  Renal- creatinine today 6.1, eGFR 11  ACE/ARB: no  DVT prophylaxis: on Lovenox  Current DM tx: basal & medium correction scale    Current control unknown. the patient & daughter both state they are overwhelmed with all of this last minute education. Requesting to stay & get more education tomorrow prior to discharge. Floor RN notified. Discharge Plan:  Patient will need insulin for home which will be new:  basal and correctional insulin therapy  Patient to f/u with PCP. Instructed to log blood sugars and take blood sugar log to his f/u appointment. Michelle Constantino RN, BSN, 1 [facility].

## 2022-06-06 NOTE — PROGRESS NOTES
Nursing reassessment completed. Resting in bed. Temp 99. Shaaron Money HR 99.  RR 18-20. /62 (79). O2 sat 99% on RA. Resting in bed. C/o some generalized discomfort. Repositioned and provided blanket and emotional support. States his depends is wet and his pad is wet. Checked both his adult diaper and his pad and they are dry. His family also checked and found them dry and explained this to him. Baeza patent draining perfecto hazy urine to bedside drain. Alert to self, place, and reoriented time/day. SR up x3. Call light in reach. Bed at lowest position. Wheels locked. Bed alarm engaged. Asked pt and family to call nurse for all needs.

## 2022-06-06 NOTE — PROGRESS NOTES
Infectious Diseases   Progress Note      Admission Date: 5/26/2022  Hospital Day: Hospital Day: 12   Attending: Halley Joy MD  Date of service: 6/6/2022     Chief complaint/ Reason for consult:     · Septic shock with leukocytosis, tachycardia, tachypnea and hypotension  · Severe lactic acidosis  · Gram-negative bacteremia with ESBL E. coli  · Complicated E. coli UTI    Microbiology:        I have reviewed allavailable micro lab data and cultures    · Blood culture (2/2) - collected on 5/26/2022 : ESBL E. Coli    Susceptibility      Escherichia coli (esbl) (2)    Antibiotic Interpretation Microscan  Method Status    ampicillin Resistant >=32 mcg/mL BACTERIAL SUSCEPTIBILITY PANEL BY RICA     ampicillin-sulbactam Sensitive 4 mcg/mL BACTERIAL SUSCEPTIBILITY PANEL BY RICA     ceFAZolin Resistant >=64 mcg/mL BACTERIAL SUSCEPTIBILITY PANEL BY RICA     cefepime Resistant   BACTERIAL SUSCEPTIBILITY PANEL BY RICA     cefTRIAXone Resistant >=64 mcg/mL BACTERIAL SUSCEPTIBILITY PANEL BY RICA     ciprofloxacin Resistant >=4 mcg/mL BACTERIAL SUSCEPTIBILITY PANEL BY RICA     ertapenem Sensitive <=0.12 mcg/mL BACTERIAL SUSCEPTIBILITY PANEL BY RICA     gentamicin Sensitive <=1 mcg/mL BACTERIAL SUSCEPTIBILITY PANEL BY RICA     levofloxacin Resistant >=8 mcg/mL BACTERIAL SUSCEPTIBILITY PANEL BY RICA     trimethoprim-sulfamethoxazole Resistant >=320 mcg/mL BACTERIAL SUSCEPTIBILITY PANEL BY RICA         · Urine culture  - collected on 5/26/2022: Greater than 100,000 CFU per mL of ESBL E. coli    Susceptibility      Escherichia coli (esbl) (1)    Antibiotic Interpretation Microscan  Method Status    ampicillin Resistant >=32 mcg/mL BACTERIAL SUSCEPTIBILITY PANEL BY RICA     ampicillin-sulbactam Sensitive 4 mcg/mL BACTERIAL SUSCEPTIBILITY PANEL BY RICA     ceFAZolin Resistant >=64 mcg/mL BACTERIAL SUSCEPTIBILITY PANEL BY RICA     cefepime Resistant   BACTERIAL SUSCEPTIBILITY PANEL BY RICA     cefTRIAXone Resistant >=64 mcg/mL BACTERIAL coli-failed on meropenem, responded to gentamicin  · Complicated E. coli UTI-this was also ESBL   · History of robotic assisted laparoscopic radical prostatectomy with bilateral pelvic lymphadenectomy on 5/16/2022 -surgical pathology indicated acinar adenocarcinoma  · S/p cystoscopy and right ureteral stent placement for right ureteral stone  · Hyponatremia-being corrected  · Poorly controlled type 2 diabetes mellitus-maintain good glycemic control  ·       Discussion:      The patient is afebrile. He is responding nicely to IV gentamicin. White cell count has normalized and is 10,700 today. Blood cultures from 5/31/2022 remain negative. Serum creatinine is 6.1. The patient will need outpatient hemodialysis placement per nephrology. Plan:     Diagnostic Workup:      · Continue to follow  fever curve, WBC count and blood cultures. · Continue to monitor blood counts, liver and renal function. Antimicrobials:    · I will stop IV meropenem today  · Continue IV gentamicin with dialysis  · Planned stop date for gentamicin will be 6/14/2022  · Okay for nephrology to manage gentamicin as an outpatient  · Continue close vitals monitoring. · Contact isolation for ESBL  · Maintain good glycemic control. · Fall precautions. · Aspiration precautions. · Continue to watch for new fever or diarrhea. · DVT prophylaxis. · Discussed all above with patient and RN. Drug Monitoring:    · Continue monitoring for antibiotic toxicity as follows: CBC, CMP   · Continue to watch for following: new or worsening fever, new hypotension, hives, lip swelling and redness or purulence at vascular access sites. I/v access Management:    · Continue to monitor i.v access sites for erythema, induration, discharge or tenderness. · As always, continue efforts to minimize tubes/lines/drains as clinically appropriate to reduce chances of line associated infections.     Patient education and counseling:        · The patient was educated in detail about the side-effects of various antibiotics and things to watch for like new rashes, lip swelling, severe reaction, worsening diarrhea, break through fever etc.  · Discussed patient's condition and what to expect. All of the patient's questions were addressed in a satisfactory manner and patient verbalized understanding all instructions. Thanks for allowing me to participate in your patient's care. I will sign off today, but will be available to answer any further questions or concerns that may arise during patient's stay in the hospital.            Subjective: Interval history: Interval history was obtained from chart review and patient/ RN. He is afebrile. He is tolerating antibiotics okay. No diarrhea     REVIEW OF SYSTEMS:      Review of Systems   Constitutional: Negative for chills, diaphoresis and fever. HENT: Negative for ear discharge, ear pain, rhinorrhea, sore throat and trouble swallowing. Eyes: Negative for discharge and redness. Respiratory: Negative for cough, shortness of breath and wheezing. Cardiovascular: Negative for chest pain and leg swelling. Gastrointestinal: Negative for abdominal pain, constipation, diarrhea and nausea. Endocrine: Negative for polyuria. Genitourinary: Negative for dysuria, flank pain, frequency, hematuria and urgency. Musculoskeletal: Negative for back pain and myalgias. Skin: Negative for rash. Neurological: Negative for dizziness, seizures and headaches. Hematological: Does not bruise/bleed easily. Psychiatric/Behavioral: Negative for hallucinations and suicidal ideas. All other systems reviewed and are negative. Past Medical History: All past medical history reviewed today. Past Medical History:   Diagnosis Date    Diabetes mellitus ([facility]     Hypertension        Past Surgical History: All past surgical history was reviewed today.     Past Surgical History:   Procedure Laterality Date    CYSTOSCOPY Right 5/26/2022    CYSTOSCOPY, BILATERAL RETROGRADE PYELOGRAM, PLACEMENT OF RIGHT URETERAL STENT performed by Lucero Ramirez MD at [address] IR TUNNELED [address] 5 YEARS  5/28/2022    IR TUNNELED CATHETER PLACEMENT GREATER THAN 5 YEARS 5/28/2022 [facility] SPECIAL PROCEDURES    PROSTATECTOMY N/A 5/16/2022    ROBOTIC LAPAROSCOPIC RADICAL PROSTATECTOMY WITH BILATERAL LYMPH NODE DISSECTION AND BILATERAL NERVE SPARE performed by Alejandra Jones MD at [address]       Family History: All family history was reviewed today. History reviewed. No pertinent family history. Objective:       PHYSICAL EXAM:      Vitals:   Vitals:    06/06/22 0430 06/06/22 0530 06/06/22 0900 06/06/22 1115   BP:   123/80 136/74   Pulse:  92 (!) 101 90   Resp:   18 18   Temp:   98.3 °F (36.8 °C) 98.2 °F (36.8 °C)   TempSrc:   Temporal Temporal   SpO2:  97% 97%    Weight: 155 lb 3.3 oz (70.4 kg)      Height:           Physical Exam  Vitals and nursing note reviewed. Constitutional:       Appearance: Normal appearance. He is well-developed. HENT:      Head: Normocephalic and atraumatic. Right Ear: External ear normal.      Left Ear: External ear normal.      Nose: Nose normal. No congestion or rhinorrhea. Mouth/Throat:      Mouth: Mucous membranes are moist.      Pharynx: No oropharyngeal exudate or posterior oropharyngeal erythema. Eyes:      General: No scleral icterus. Right eye: No discharge. Left eye: No discharge. Conjunctiva/sclera: Conjunctivae normal.      Pupils: Pupils are equal, round, and reactive to light. Cardiovascular:      Rate and Rhythm: Normal rate and regular rhythm. Pulses: Normal pulses. Heart sounds: No murmur heard. No friction rub. Pulmonary:      Effort: Pulmonary effort is normal. No respiratory distress. Breath sounds: Normal breath sounds. No stridor. No wheezing, rhonchi or rales.    Abdominal:      General: Bowel sounds are 06/06/2022    ALT 25 06/06/2022    AST 26 06/06/2022    PROT 6.4 06/06/2022    BILITOT 0.6 06/06/2022    LABALBU 2.7 06/06/2022       CPK:   Lab Results   Component Value Date    CKTOTAL 64 05/26/2022     ESR: No results found for: SEDRATE  CRP: No results found for: CRP        Imaging: All pertinent images and reports for the current visit were reviewed by me during this visit. I reviewed the chest x-ray/CT scan/MRI images and independently interpreted the findings and results today. CT CHEST ABDOMEN PELVIS WO CONTRAST   Final Result   Chest-      1. Congestive failure pattern, including trace pericardial effusion, small   bilateral pleural effusions and interstitial-alveolar edema. Pneumonia is   considered less likely in the differential.   2. Ectasia of the ascending thoracic aorta, measuring 4.0 cm in diameter. ---      Abdomen and pelvis-      1. Status post hysterectomy. There is an air-fluid collection in the recto   vesicular space. Volume is similar, greater amount of fluid in the interval.   Sterility is indeterminate. 2. Extraperitoneal gas in the pelvis is mildly decreased in the interval.   3. Right ureteral stent remains in situ, traversing a 10 mm ureteropelvic   junction stone. There is minimal residual right pelvicaliectasis and miguel   ureteric edema. 4. Baeza catheter is in normal position with decompression of the bladder. 5. New, small amount of ascites. 6. Mild anasarca. CT HEAD WO CONTRAST   Final Result   Motion limited. No hemorrhage or mass identified      Moderate to severe periventricular and scattered frontal parietal white   matter disease, likely due to small-vessel ischemic change         IR TUNNELED CVC PLACE WO SQ PORT/PUMP > 5 YEARS   Final Result   Successful ultrasound and fluoroscopy guided tunneled catheter placement of a   right internal jugular vein 19 cm dual lumen tip to cuff dialysis catheter as   above.       RECOMMENDATIONS:   The new dialysis catheter flushes and aspirates well and can be used   immediately. XR CHEST PORTABLE   Final Result   Mild cardiomegaly with pulmonary vascular congestion. FL RETROGRADE PYELOGRAM W WO KUB   Final Result   For documentation purposes only. See separate procedure report for more   information. CT ABDOMEN PELVIS WO CONTRAST Additional Contrast? None   Final Result   Mildly obstructing 12 mm right UPJ      Postsurgical pelvic changes without complicating feature. RECOMMENDATIONS:   Unavailable             Medications: All current and past medications were reviewed.      epoetin gabe-epbx  10,000 Units IntraVENous Once per day on Tue Thu Sat    oxybutynin  15 mg Oral Nightly    amLODIPine  5 mg Oral Daily    aminoglycoside intermittent dosing (placeholder)   Other RX Placeholder    QUEtiapine  25 mg Oral Nightly    insulin glargine  8 Units SubCUTAneous Daily    meropenem  1,000 mg IntraVENous Q24H    sodium chloride flush  5-40 mL IntraVENous 2 times per day    heparin (porcine)  5,000 Units SubCUTAneous TID    insulin lispro  0-12 Units SubCUTAneous TID WC    insulin lispro  0-6 Units SubCUTAneous Nightly        dextrose      sodium chloride Stopped (05/29/22 4738)       hydrALAZINE, opium-belladonna, baclofen, LORazepam, dextrose bolus **OR** dextrose bolus, glucagon (rDNA), dextrose, heparin (porcine), metoclopramide, HYDROmorphone, sodium chloride flush, sodium chloride, ondansetron **OR** ondansetron, polyethylene glycol, acetaminophen **OR** acetaminophen      Problem list:       Patient Active Problem List   Diagnosis Code    Prostate cancer ([facility] C61    Septic shock ([facility] A41.9, R65.21    CHRISSY (acute kidney injury) ([facility] N17.9    Ureterolithiasis N20.1    Lactic acidosis E87.2    Infection due to ESBL-producing Escherichia coli A49.8, Z16.12    Bacteremia due to Gram-negative bacteria B80.11    Complicated UTI (urinary tract infection) N39.0    Hyponatremia E87.1    S/P radical cystoprostatectomy Z90.79, Z90.6    Poorly controlled type 2 diabetes mellitus ([facility] E11.65    Fever R50.9    Diabetes education, encounter for Z71.89       Please note that this chart was generated using Dragon dictation software. Although every effort was made to ensure the accuracy of this automated transcription, some errors in transcription may have occurred inadvertently. If you may need any clarification, please do not hesitate to contact me through EPIC or at the phone number provided below with my electronic signature. Any pictures or media included in this note were obtained after taking informed verbal consent from the patient and with their approval to include those in the patient's medical record.       Efrain Moya MD, MPH, FACP, [facility]  6/6/2022, 1:22 PM  [facility] Infectious Disease   [address] [place] [address], [address] [facility], [address] [place] [address]  Office: [phone]  Fax: [phone]  Clinic days:  Tuesday & Thursday

## 2022-06-06 NOTE — PROGRESS NOTES
Urology Progress Note  [facility]    Provider: LIZ Lewis CNP  Patient ID:  Admission Date: 2022 Name: Mary Shipley  OR Date: 2022 MRN: [id number]   Patient Location: [id number] : [id number]  Attending: Tamia Suarez MD Date of Service: 2022  PCP: Monica Valdez PA-C     Diagnoses:  1. CHRISSY (acute kidney injury) ([facility]    2. Ureterolithiasis    3. Septic shock [facility])        Assessment/Plan:  59 yo M s/p RARP and BL PLND 5/15, final path pT3b, pN0. Right 12mm ureteral stone s/p right stent insertion 2022  Continues on HD  WBC improving  Repeat CT shows increasing fluid volume of recto-vesicular space- 3.5cm - diff to access on discussion with IR prev  ===  Great UOP  WBC and fever curve improving    Recc  Continue pierre catheter, leaking around pierre noted, continue ditropan and reposition as needed  Fever curve and wbc improving  Hopefully avoid perc drain given difficult location- Disc with IR         The patient had a chance to ask questions which were answered. he understands the above plan. Subjective:   Mary Shipley is a 58 y.o. male. He was seen and examined this morning. Today we discussed increasing fluids in pelvis. Discussed need for possible percutaneous drainage. Explained the procedure in detail. Objective:   Vitals:  Vitals:    22 0900   BP: 123/80   Pulse: (!) 101   Resp: 18   Temp: 98.3 °F (36.8 °C)   SpO2: 97%       Intake/Output Summary (Last 24 hours) at 2022 0931  Last data filed at 2022 0400  Gross per 24 hour   Intake 668 ml   Output 1625 ml   Net -957 ml     Physical Exam:  Gen: Alert and oriented x3, no acute distress  CV: Regular rate   Resp: unlabored respirations  Abd: Soft, non-distended, non-tender, no masses  Ext: no peripheral edema noted, moves upper and lower extremities spontaneously  Skin: warmand well perfused, no rashes noted on the face, or arms.    Pierre cyu    Labs:  Lab Results   Component Value Date    WBC 12.1 (H) 06/05/2022    HGB 7.2 (L) 06/05/2022    HCT 21.6 (L) 06/05/2022    MCV 91.0 06/05/2022     06/05/2022     Lab Results   Component Value Date    CREATININE 6.1 ([place]) 06/06/2022    BUN 43 (H) 06/06/2022     (L) 06/06/2022    K 4.2 06/06/2022    CL 97 (L) 06/06/2022    CO2 24 06/06/2022       Alissa Pinot, APRN - CNP   6/6/2022

## 2022-06-06 NOTE — PROGRESS NOTES
Nursing assessment completed. See complex flowseet for specific data. Temp 98.8. SR 99.  RR 20. /73 (91). 97% on RA. Sitting up in chair eating dinner. Denies pain. Hiccups present. Taking po well. Prefers bottled water. LS CTA diminished. Skin warm natural dry and intact. ABD soft +BS. Pierre some leaking into adult depends but there is pierre drainage in bedside drain. 500 cc yellow cloudy hazy urine in bedside drain and pierre patent. Asking for bath tonight. Takes pills whole with water. No acute distress at this time. In chair with call light within reach and his wife sitting right next to him. Chair alarm present. At 20:11 pm his POC Glucose 139 and no sliding scale insulin due at this time.

## 2022-06-07 NOTE — PLAN OF CARE
Problem: Chronic Conditions and Co-morbidities  Goal: Patient's chronic conditions and co-morbidity symptoms are monitored and maintained or improved  Outcome: Adequate for Discharge     Problem: Discharge Planning  Goal: Discharge to home or other facility with appropriate resources  Outcome: Adequate for Discharge     Problem: Pain  Goal: Verbalizes/displays adequate comfort level or baseline comfort level  Outcome: Adequate for Discharge     Problem: Skin/Tissue Integrity  Goal: Absence of new skin breakdown  Description: 1. Monitor for areas of redness and/or skin breakdown  2. Assess vascular access sites hourly  3. Every 4-6 hours minimum:  Change oxygen saturation probe site  4. Every 4-6 hours:  If on nasal continuous positive airway pressure, respiratory therapy assess nares and determine need for appliance change or resting period.   Outcome: Adequate for Discharge     Problem: Safety - Adult  Goal: Free from fall injury  Outcome: Adequate for Discharge     Problem: ABCDS Injury Assessment  Goal: Absence of physical injury  Outcome: Adequate for Discharge     Problem: Neurosensory - Adult  Goal: Achieves stable or improved neurological status  Outcome: Adequate for Discharge  Goal: Achieves maximal functionality and self care  Outcome: Adequate for Discharge     Problem: Cardiovascular - Adult  Goal: Maintains optimal cardiac output and hemodynamic stability  Outcome: Adequate for Discharge  Goal: Absence of cardiac dysrhythmias or at baseline  Outcome: Adequate for Discharge     Problem: Skin/Tissue Integrity - Adult  Goal: Skin integrity remains intact  Outcome: Adequate for Discharge  Flowsheets (Taken 6/6/2022 0900)  Skin Integrity Remains Intact: Monitor for areas of redness and/or skin breakdown  Goal: Incisions, wounds, or drain sites healing without S/S of infection  Outcome: Adequate for Discharge  Goal: Oral mucous membranes remain intact  Outcome: Adequate for Discharge     Problem: Musculoskeletal - Adult  Goal: Return mobility to safest level of function  Outcome: Adequate for Discharge  Goal: Maintain proper alignment of affected body part  Outcome: Adequate for Discharge  Goal: Return ADL status to a safe level of function  Outcome: Adequate for Discharge     Problem: Gastrointestinal - Adult  Goal: Maintains or returns to baseline bowel function  Outcome: Adequate for Discharge  Goal: Maintains adequate nutritional intake  Outcome: Adequate for Discharge     Problem: Genitourinary - Adult  Goal: Absence of urinary retention  Outcome: Adequate for Discharge  Goal: Urinary catheter remains patent  Outcome: Adequate for Discharge     Problem: Infection - Adult  Goal: Absence of infection at discharge  Outcome: Adequate for Discharge     Problem: Metabolic/Fluid and Electrolytes - Adult  Goal: Electrolytes maintained within normal limits  Outcome: Adequate for Discharge  Goal: Hemodynamic stability and optimal renal function maintained  Outcome: Adequate for Discharge  Goal: Glucose maintained within prescribed range  Outcome: Adequate for Discharge     Problem: Hematologic - Adult  Goal: Maintains hematologic stability  Outcome: Adequate for Discharge     Problem: Nutrition Deficit:  Goal: Optimize nutritional status  Outcome: Adequate for Discharge

## 2022-06-07 NOTE — PROGRESS NOTES
Patient discharged with daughter. All education was completed with daughter present. diabetes educator did education on insulin since pt had not been on it prior. Baeza catheter was educated on. Baeza bag was changed and tried to reposition to prevent it from leaking.

## 2022-06-08 NOTE — DISCHARGE SUMMARY
Hospital Medicine Discharge Summary    Patient: Manjinder Castro     Gender: male  : 1960   Age: 58 y.o. MRN: [id number]    Admitting Physician: Manda Pradhan MD  Discharge Physician: Manda Pradhan MD     Code Status: Prior     Admit Date: 2022   Discharge Date: 2022      Disposition:  Home  Time spent arranging discharge: 35 minutes    Discharge Diagnoses:  Severe sepsis secondary to e coli bacteremia secondary to  UTI and right nephrolithiasis 12mm s/p right ureteral stent placement    chrissy secondary to above  dm2 with hyperglycemia   :     Condition at Discharge:  Stable    Hospital Course:   Admitted to hospital with severe sepsis secondary to E. coli bacteremia secondary to UTI and right nephrolithiasis patient 12 mm right ureteral stent placed. Repeat blood cultures were negative patient was started on meropenem and gentamicin was discharged on a course of gentamicin with dialysis per ID. Patient did have CHRISSY/ ATN, that was slow to improve patient was started on dialysis and will continue outpatient dialysis with hopefully improvement in renal function. Discharge Exam:    /74   Pulse 95   Temp 98.2 °F (36.8 °C) (Temporal)   Resp 18   Ht 5' 7\" (1.702 m)   Wt 155 lb 3.3 oz (70.4 kg)   SpO2 97%   BMI 24.31 kg/m²   General appearance:  Appears comfortable. AAOx3  HEENT: atraumatic, Pupils equal, muscous membranes moist, no masses appreciated  Cardiovascular: Regular rate and rhythm no murmurs appreciated  Respiratory: CTAB no wheezing  Gastrointestinal: Abdomen soft, non-tender, BS+  EXT: no edema  Neurology: no gross focal deficts  Psychiatry: Appropriate affect.  Not agitated  Skin: Warm, dry, no rashes appreciated    Discharge Medications:   Discharge Medication List as of 2022  6:54 PM      START taking these medications    Details   Lancets MISC 2 TIMES DAILY Starting Mon 2022, Disp-100 each, R-5, Normal      blood glucose monitor kit and supplies Dispense sufficient amount for indicated testing frequency plus additional to accommodate PRN testing needs. Dispense all needed supplies to include: monitor, strips, lancing device, lancets, control solutions, alcohol swabs., Disp-1 kit, R-0, Normal      blood glucose monitor strips Test3 times a day & as needed for symptoms of irregular blood glucose. Dispense sufficient amount for indicated testing frequency plus additional to accommodate PRN testing needs. , Disp-100 strip, R-1, Normal      insulin glargine (LANTUS SOLOSTAR) 100 UNIT/ML injection pen Inject 8 Units into the skin nightly, Disp-5 pen, R-0Normal      insulin lispro, 1 Unit Dial, (HUMALOG KWIKPEN) 100 UNIT/ML SOPN Glucose: Dose: If <139   - No Insulin 140-199   --- 1 Unit 200-249   --  2  Units 250-299           3 Units  300-349           4 Units 350-400           5 Units  Above 400       6 Units, Disp-5 pen, R-0Normal      Insulin Pen Needle 29G X 12MM MISC DAILY Starting Mon 6/6/2022, Disp-100 each, R-3, Normal           Discharge Medication List as of 6/6/2022  6:54 PM      CONTINUE these medications which have CHANGED    Details   amLODIPine (NORVASC) 5 MG tablet Take 1 tablet by mouth daily, Disp-30 tablet, R-3Normal           Discharge Medication List as of 6/6/2022  6:54 PM      CONTINUE these medications which have NOT CHANGED    Details   senna-docusate (PERICOLACE) 8.6-50 MG per tablet Take 1 tablet by mouth 2 times daily For constipation while on pain medication. , Disp-30 tablet, R-1Print           Discharge Medication List as of 6/6/2022  6:54 PM      STOP taking these medications       insulin glargine (LANTUS) 100 UNIT/ML injection vial Comments:   Reason for Stopping:         insulin lispro (HUMALOG) 100 UNIT/ML SOLN injection vial Comments:   Reason for Stopping:         sildenafil (REVATIO) 20 MG tablet Comments:   Reason for Stopping:         sulfamethoxazole-trimethoprim (BACTRIM DS;SEPTRA DS) 800-160 MG per tablet Comments: Reason for Stopping:         metFORMIN (GLUCOPHAGE) 1000 MG tablet Comments:   Reason for Stopping:         UNABLE TO FIND Comments:   Reason for Stopping:               Labs: For convenience and continuity at follow-up the following most recent labs are provided:    Lab Results   Component Value Date    WBC 10.7 06/06/2022    HGB 7.1 06/06/2022    HCT 21.3 06/06/2022    MCV 91.9 06/06/2022     06/06/2022     06/06/2022    K 4.2 06/06/2022    CL 97 06/06/2022    CO2 24 06/06/2022    BUN 43 06/06/2022    CREATININE 6.1 06/06/2022    CALCIUM 8.8 06/06/2022    PHOS 5.0 06/02/2022    ALKPHOS 174 06/06/2022    ALT 25 06/06/2022    AST 26 06/06/2022    BILITOT 0.6 06/06/2022    LABALBU 2.7 06/06/2022     Lab Results   Component Value Date    INR 1.14 (H) 05/02/2022       Radiology:  CT ABDOMEN PELVIS WO CONTRAST Additional Contrast? None    Result Date: 5/26/2022  EXAMINATION: CT OF THE ABDOMEN AND PELVIS WITHOUT CONTRAST 5/26/2022 1:36 pm TECHNIQUE: CT of the abdomen and pelvis was performed without the administration of intravenous contrast. Multiplanar reformatted images are provided for review. Automated exposure control, iterative reconstruction, and/or weight based adjustment of the mA/kV was utilized to reduce the radiation dose to as low as reasonably achievable. COMPARISON: 03/19/2022 HISTORY: ORDERING SYSTEM PROVIDED HISTORY: recent prostate surg - n/v TECHNOLOGIST PROVIDED HISTORY: Reason for exam:->recent prostate surg - n/v Additional Contrast?->None Reason for Exam: recent prostate surg - n/v Additional signs and symptoms: [place] breathing instructions provided, pt still not holding breath. Best scan possible. FINDINGS: Lower Chest: There is mild bibasilar dependent atelectasis. Organs: There is mild right hydronephrosis secondary to a 12 mm calculus lodged within the right ureteral vesicular junction. There is mild right perinephric stranding.   The remainder of the solid abdominal organs are unremarkable. GI/Bowel: There is no bowel dilatation, wall thickening or obstruction. Pelvis: The bladder is decompressed by Baeza catheter and thus not evaluated. Postop changes of prostatectomy are present. There are multiple extraperitoneal pelvic gas collections within the surgical bed and pelvic sidewalls. Peritoneum/Retroperitoneum: There is no free air, free fluid or intraperitoneal in phlegm a nat change. There is no adenopathy. Bones/Soft Tissues: There is no acute fracture or aggressive osseous lesion. Mildly obstructing 12 mm right UPJ Postsurgical pelvic changes without complicating feature. RECOMMENDATIONS: Unavailable     CT HEAD WO CONTRAST    Result Date: 5/31/2022  EXAMINATION: CT OF THE HEAD WITHOUT CONTRAST  5/29/2022 9:14 pm TECHNIQUE: CT of the head was performed without the administration of intravenous contrast. Automated exposure control, iterative reconstruction, and/or weight based adjustment of the mA/kV was utilized to reduce the radiation dose to as low as reasonably achievable. COMPARISON: None. HISTORY: ORDERING SYSTEM PROVIDED HISTORY: AMS TECHNOLOGIST PROVIDED HISTORY: Reason for exam:->AMS Has a \"code stroke\" or \"stroke alert\" been called? ->No Reason for Exam: AMS FINDINGS: Motion artifact degrades the study. This decreases sensitivity and specificity. BRAIN/VENTRICLES: Ventricles are midline in position. No intracerebral masses are identified. No mass effect. No midline shift. No acute intracranial hemorrhage is seen. Moderate to severe periventricular hypodensity is seen with moderate patchy and confluent hypodensity seen throughout the frontal parietal white matter ORBITS: The visualized portion of the orbits demonstrate no acute abnormality. SINUSES: No significant mastoid opacification noted. Mild mucosal thickening is seen in the ethmoid air cells. No air-fluid levels noted SOFT TISSUES/SKULL:  No acute abnormality of the visualized skull or soft tissues. Motion limited. No hemorrhage or mass identified Moderate to severe periventricular and scattered frontal parietal white matter disease, likely due to small-vessel ischemic change     FL RETROGRADE PYELOGRAM W WO KUB    Result Date: 5/26/2022  EXAMINATION: SPOT FLUOROSCOPIC IMAGES 5/26/2022 5:21 pm TECHNIQUE: Fluoroscopy was provided by the radiology department for procedure. Radiologist was not present during examination. FLUOROSCOPY DOSE AND TYPE OR TIME AND EXPOSURES: 0.2 minutes fluoroscopy time Dose area product: 0.9169 Gy cm squared Cumulative air kerma: 2.0860 mGy HISTORY: ORDERING SYSTEM PROVIDED HISTORY: right TECHNOLOGIST PROVIDED HISTORY: Reason for exam:->right Intraprocedural imaging. FINDINGS: 12 fluoroscopic images demonstrate bilateral retrograde pyelogram and right ureteral stent insertion. For documentation purposes only. See separate procedure report for more information. XR CHEST PORTABLE    Result Date: 5/27/2022  EXAMINATION: ONE XRAY VIEW OF THE CHEST 5/27/2022 3:32 pm COMPARISON: Chest x-ray dated 21 April 2022 HISTORY: ORDERING SYSTEM PROVIDED HISTORY: dyspnea TECHNOLOGIST PROVIDED HISTORY: Reason for exam:->dyspnea Reason for Exam: dyspnea FINDINGS: Mild cardiomegaly. There is pulmonary vascular congestion. No pneumothorax or pleural effusion. Mild cardiomegaly with pulmonary vascular congestion. IR TUNNELED CVC PLACE WO SQ PORT/PUMP > 5 YEARS    Result Date: 5/31/2022  PROCEDURE: ULTRASOUND GUIDED VASCULAR ACCESS. FLUOROSCOPY GUIDED PLACEMENT OF A TUNNELED CATHETER. MODERATE CONSCIOUS SEDATION 5/28/2022.  HISTORY: ORDERING SYSTEM PROVIDED HISTORY: Placement of tunneled hemodialysis catheter for urgent hemodialysis treatment TECHNOLOGIST PROVIDED HISTORY: Discussed with and appreciate help from Dr. Sushma Salas Reason for exam:->Placement of tunneled hemodialysis catheter for urgent hemodialysis treatment How many lumens are being requested?->2 What side should this line be placed?->Right What site is the preferred site? ->Internal Jugular SEDATION: 0.5 mg of Versed and 25 mcg of fentanyl administered intravenously by the radiology nursing staff and supervised by Dr. Haja Franklin: 1 minute with a single fluoroscopic image saved. TECHNIQUE: Informed consent was obtained after a detailed explanation of the procedure including risks, benefits, and alternatives. A hospital  system was used for informed consent. All aspects of maximum sterile barrier technique were used including washing hands with conventional soap and water or with alcohol-based hand rubs (ABHR), skin preparation, cap, mask, sterile gown, sterile gloves, and sterile full body drape. Local anesthesia was achieved with lidocaine. A micropuncture needle was used to access the right internal jugular vein using ultrasound guidance. An ultrasound image demonstrating patency of the vein with needle tip located within it. An image was obtained and stored in PACs. A 0.035 guidewire was used to place a peel-away sheath. A subcutaneous tunnel was created to the infraclavicular region and a tunneled 19 cm dual lumen tip to cuff catheter was pulled through the subcutaneous tunnel to the venotomy site and advanced through the peel-away sheath under fluoroscopic guidance to the right atrium. The catheter flushed easily and there was a good blood return. The catheter was sutured to the skin. The catheter was locked with heparinized saline. The patient tolerated the procedure well and there were no immediate complications. FINDINGS: Fluoroscopic image demonstrates the tip of the catheter in the right proximal atrium. Successful ultrasound and fluoroscopy guided tunneled catheter placement of a right internal jugular vein 19 cm dual lumen tip to cuff dialysis catheter as above.  RECOMMENDATIONS: The new dialysis catheter flushes and aspirates well and can be used immediately. CT CHEST ABDOMEN PELVIS WO CONTRAST    Result Date: 5/31/2022  EXAMINATION: CT OF THE CHEST, ABDOMEN, AND PELVIS WITHOUT CONTRAST 5/31/2022 4:40 pm TECHNIQUE: CT of the chest, abdomen and pelvis was performed without the administration of intravenous contrast. Multiplanar reformatted images are provided for review. Automated exposure control, iterative reconstruction, and/or weight based adjustment of the mA/kV was utilized to reduce the radiation dose to as low as reasonably achievable. COMPARISON: None HISTORY: ORDERING SYSTEM PROVIDED HISTORY: sepsis TECHNOLOGIST PROVIDED HISTORY: Reason for exam:->sepsis Additional Contrast?->None Reason for Exam: sepsis Additional signs and symptoms: pt unable to control his breathing, ams FINDINGS: Chest: Mediastinum: Right jugular venous catheter terminates at the superior cavoatrial junction. The ascending thoracic aorta measures 4.0 cm in diameter. Aorta is mildly tortuous. There is a trace pericardial effusion. No mediastinal or hilar adenopathy. Small sliding hiatal hernia. Lungs/pleura: There are small dependent bilateral pleural effusions. Interlobular septal thickening is noted in the apices. There is passive atelectasis in both lower lobes. Mild degree of perihilar ground-glass opacity is present. Soft Tissues/Bones: No acute osseous abnormality is identified. Chest wall is unremarkable. Abdomen/Pelvis: Organs: The liver is homogeneous and normal in attenuation. No gallbladder stones are identified. Pancreas and spleen are unremarkable. There is thickening of the adrenal glands, without discrete nodule. The kidneys are symmetrical in size. A 10 mm stone is at the ureteropelvic junction. A right ureteral stent has been placed in the interval, with decreased pelvicaliectasis. There is mild edema around the right ureter. GI/Bowel: There is a small hiatal hernia. No dilated loops of small bowel are identified.   There is no focal mural thickening of the colon. No appendicitis is evident. Pelvis: There is a air-fluid collection in the recto vesicular space measuring 3.4 x 3.5 cm, not significantly changed in volume, but with greater amount of fluid extraperitoneal gas is again noted along the pelvic floor and both sidewalls, decreased in amount in the interval.  Baeza catheter and right ureteral stent are normal positions, with decompression of the urinary bladder. Peritoneum/Retroperitoneum: A small amount of free fluid has developed, primarily in the anterior pelvis. No aneurysm or adenopathy. Bones/Soft Tissues: L5-S1 mild anterolisthesis. Abdominal wall subcutaneous edema is present. Chest- 1. Congestive failure pattern, including trace pericardial effusion, small bilateral pleural effusions and interstitial-alveolar edema. Pneumonia is considered less likely in the differential. 2. Ectasia of the ascending thoracic aorta, measuring 4.0 cm in diameter. --- Abdomen and pelvis- 1. Status post hysterectomy. There is an air-fluid collection in the recto vesicular space. Volume is similar, greater amount of fluid in the interval. Sterility is indeterminate. 2. Extraperitoneal gas in the pelvis is mildly decreased in the interval. 3. Right ureteral stent remains in situ, traversing a 10 mm ureteropelvic junction stone. There is minimal residual right pelvicaliectasis and miguel ureteric edema. 4. Baeza catheter is in normal position with decompression of the bladder. 5. New, small amount of ascites. 6. Mild anasarca.          Signed:    Iqra Glez MD   6/8/2022 [phone]

## 2022-07-11 NOTE — PROGRESS NOTES
Name_______________________________________Printed:____________________  Date and time of surgery__7/13/2022________0945______________Arrival Time:______0815__________   1. The instructions given regarding when and if a patient needs to stop oral intake prior to surgery varies. Follow the specific instructions you were given                  ___Nothing to eat or to drink after Midnight the night before.                   ____Carbo loading or ERAS instructions will be given to select patients-if you have been given those instructions -please do the following                           The evening before your surgery after dinner before midnight drink 40 ounces of gatorade. If you are diabetic use sugar free. The morning of surgery drink 40 ounces of water. This needs to be finished 3 hours prior to your surgery start time. 2. Take the following pills with a small sip of water on the morning of surgery____amlodipine_______________________________________________                  Do not take blood pressure medications ending in pril or sartan the anais prior to surgery or the morning of surgery_   3. Aspirin, Ibuprofen, Advil, Naproxen, Vitamin E and other Anti-inflammatory products and supplements should be stopped for 5 -7days before surgery or as directed by your physician. 4. Check with your Doctor regarding stopping Plavix, Coumadin,Eliquis, Lovenox,Effient,Pradaxa,Xarelto, Fragmin or other blood thinners and follow their instructions. 5. Do not smoke, and do not drink any alcoholic beverages 24 hours prior to surgery. This includes NA Beer. Refrain from the usage of any recreational drugs. 6. You may brush your teeth and gargle the morning of surgery. DO NOT SWALLOW WATER   7. You MUST make arrangements for a responsible adult to stay on site while you are here and take you home after your surgery. You will not be allowed to leave alone or drive yourself home.   It is strongly suggested someone stay with you the first 24 hrs. Your surgery will be cancelled if you do not have a ride home. 8. A parent/legal guardian must accompany a child scheduled for surgery and plan to stay at the hospital until the child is discharged. Please do not bring other children with you. 9. Please wear simple, loose fitting clothing to the hospital.  Mikala Conroy not bring valuables (money, credit cards, checkbooks, etc.) Do not wear any makeup (including no eye makeup) or nail polish on your fingers or toes. 10. DO NOT wear any jewelry or piercings on day of surgery. All body piercing jewelry must be removed. 11. If you have ___dentures, they will be removed before going to the OR; we will provide you a container. If you wear ___contact lenses or ___glasses, they will be removed; please bring a case for them. 12. Please see your family doctor/pediatrician for a history & physical and/or concerning medications. Bring any test results/reports from your physician's office. PCP__________________Phone___________H&P Appt. Date________             13 If you  have a Living Will and Durable Power of  for Healthcare, please bring in a copy. 15. Notify your Surgeon if you develop any illness between now and surgery  time, cough, cold, fever, sore throat, nausea, vomiting, etc.  Please notify your surgeon if you experience dizziness, shortness of breath or blurred vision between now & the time of your surgery             15. DO NOT shave your operative site 96 hours prior to surgery. For face & neck surgery, men may use an electric razor 48 hours prior to surgery. 16. Shower the night before or morning of surgery using an antibacterial soap or as you have been instructed. 17. To provide excellent care visitors will be limited to one in the room at any given time. 18.  Please bring picture ID and insurance card.              19.  Visit our web site for additional information:  [facility]/patient-eprep              20.During flu season no children under the age of 15 are permitted in the hospital for the safety of all patients. 21. If you take a long acting insulin in the evening only  take half of your usual  dose the night  before your procedure              22. If you use a c-pap please bring DOS if staying overnight,             23.For your convenience [facility] has a pharmacy on site to fill your prescriptions. 24. If you use oxygen and have a portable tank please bring it  with you the DOS             25. Bring a complete list of all your medications with name and dose include any supplements. 26. Other [facility] interpretor #[id number]__________________________________________   *Please call pre admission testing if you any further questions   Sadia Galeana   [address]    [place] [phone]  [phone]   [address] [place] [address]       VISITOR POLICY(subject to change)    Current policy is 2 visitors per patient. No children. A mask is required. Visiting hours are 8a-8p. Overnight visitors will be at the discretion of the nurse. All above information reviewed with patient in person or by phone. Patient verbalizes understanding. All questions and concerns addressed.                                                                                                  Patient/Rep_______daughter_____________                                                                                                                                    PRE OP INSTRUCTIONS

## 2022-07-13 ENCOUNTER — ANESTHESIA (OUTPATIENT)
Dept: OPERATING ROOM | Age: 62
End: 2022-07-13
Payer: MEDICAID

## 2022-07-13 ENCOUNTER — ANESTHESIA EVENT (OUTPATIENT)
Dept: OPERATING ROOM | Age: 62
End: 2022-07-13
Payer: MEDICAID

## 2022-07-13 ENCOUNTER — HOSPITAL ENCOUNTER (OUTPATIENT)
Age: 62
Setting detail: OUTPATIENT SURGERY
Discharge: HOME | End: 2022-07-13
Attending: UROLOGY | Admitting: UROLOGY
Payer: MEDICAID

## 2022-07-13 ENCOUNTER — APPOINTMENT (OUTPATIENT)
Dept: GENERAL RADIOLOGY | Age: 62
End: 2022-07-13
Attending: UROLOGY
Payer: MEDICAID

## 2022-07-13 VITALS
RESPIRATION RATE: 16 BRPM | OXYGEN SATURATION: 95 % | HEART RATE: 93 BPM | SYSTOLIC BLOOD PRESSURE: 169 MMHG | TEMPERATURE: 97 F | BODY MASS INDEX: 22.33 KG/M2 | DIASTOLIC BLOOD PRESSURE: 113 MMHG | WEIGHT: 142.6 LBS

## 2022-07-13 DIAGNOSIS — N20.0 CALCULUS OF KIDNEY: ICD-10-CM

## 2022-07-13 LAB
ANION GAP SERPL CALCULATED.3IONS-SCNC: 10 MMOL/L (ref 3–16)
BUN BLDV-MCNC: 28 MG/DL (ref 7–20)
CALCIUM SERPL-MCNC: 9.9 MG/DL (ref 8.3–10.6)
CHLORIDE BLD-SCNC: 92 MMOL/L (ref 99–110)
CO2: 33 MMOL/L (ref 21–32)
CREAT SERPL-MCNC: 3.1 MG/DL (ref 0.8–1.3)
GFR AFRICAN AMERICAN: 25
GFR NON-AFRICAN AMERICAN: 20
GLUCOSE BLD-MCNC: 136 MG/DL (ref 70–99)
GLUCOSE BLD-MCNC: 146 MG/DL (ref 70–99)
GLUCOSE BLD-MCNC: 163 MG/DL (ref 70–99)
HCT VFR BLD CALC: 39.4 % (ref 40.5–52.5)
HEMOGLOBIN: 12.9 G/DL (ref 13.5–17.5)
MCH RBC QN AUTO: 30.7 PG (ref 26–34)
MCHC RBC AUTO-ENTMCNC: 32.7 G/DL (ref 31–36)
MCV RBC AUTO: 93.7 FL (ref 80–100)
PDW BLD-RTO: 13.9 % (ref 12.4–15.4)
PERFORMED ON: ABNORMAL
PERFORMED ON: ABNORMAL
PLATELET # BLD: 217 K/UL (ref 135–450)
PMV BLD AUTO: 7.7 FL (ref 5–10.5)
POTASSIUM SERPL-SCNC: 4.3 MMOL/L (ref 3.5–5.1)
RBC # BLD: 4.21 M/UL (ref 4.2–5.9)
SODIUM BLD-SCNC: 135 MMOL/L (ref 136–145)
WBC # BLD: 5.2 K/UL (ref 4–11)

## 2022-07-13 PROCEDURE — 2709999900 HC NON-CHARGEABLE SUPPLY: Performed by: UROLOGY

## 2022-07-13 PROCEDURE — C1894 INTRO/SHEATH, NON-LASER: HCPCS | Performed by: UROLOGY

## 2022-07-13 PROCEDURE — 3600000004 HC SURGERY LEVEL 4 BASE: Performed by: UROLOGY

## 2022-07-13 PROCEDURE — 6360000002 HC RX W HCPCS: Performed by: NURSE ANESTHETIST, CERTIFIED REGISTERED

## 2022-07-13 PROCEDURE — 6360000002 HC RX W HCPCS

## 2022-07-13 PROCEDURE — 3600000014 HC SURGERY LEVEL 4 ADDTL 15MIN: Performed by: UROLOGY

## 2022-07-13 PROCEDURE — 6360000002 HC RX W HCPCS: Performed by: ANESTHESIOLOGY

## 2022-07-13 PROCEDURE — 74420 UROGRAPHY RTRGR +-KUB: CPT

## 2022-07-13 PROCEDURE — 6360000002 HC RX W HCPCS: Performed by: UROLOGY

## 2022-07-13 PROCEDURE — 7100000000 HC PACU RECOVERY - FIRST 15 MIN: Performed by: UROLOGY

## 2022-07-13 PROCEDURE — 6360000004 HC RX CONTRAST MEDICATION: Performed by: UROLOGY

## 2022-07-13 PROCEDURE — 6370000000 HC RX 637 (ALT 250 FOR IP): Performed by: ANESTHESIOLOGY

## 2022-07-13 PROCEDURE — 2580000003 HC RX 258: Performed by: UROLOGY

## 2022-07-13 PROCEDURE — 2580000003 HC RX 258: Performed by: NURSE ANESTHETIST, CERTIFIED REGISTERED

## 2022-07-13 PROCEDURE — 36415 COLL VENOUS BLD VENIPUNCTURE: CPT

## 2022-07-13 PROCEDURE — 7100000011 HC PHASE II RECOVERY - ADDTL 15 MIN: Performed by: UROLOGY

## 2022-07-13 PROCEDURE — 2500000003 HC RX 250 WO HCPCS: Performed by: NURSE ANESTHETIST, CERTIFIED REGISTERED

## 2022-07-13 PROCEDURE — 3700000001 HC ADD 15 MINUTES (ANESTHESIA): Performed by: UROLOGY

## 2022-07-13 PROCEDURE — 80048 BASIC METABOLIC PNL TOTAL CA: CPT

## 2022-07-13 PROCEDURE — 82365 CALCULUS SPECTROSCOPY: CPT

## 2022-07-13 PROCEDURE — 7100000010 HC PHASE II RECOVERY - FIRST 15 MIN: Performed by: UROLOGY

## 2022-07-13 PROCEDURE — 3700000000 HC ANESTHESIA ATTENDED CARE: Performed by: UROLOGY

## 2022-07-13 PROCEDURE — 7100000001 HC PACU RECOVERY - ADDTL 15 MIN: Performed by: UROLOGY

## 2022-07-13 PROCEDURE — C1769 GUIDE WIRE: HCPCS | Performed by: UROLOGY

## 2022-07-13 PROCEDURE — 2720000010 HC SURG SUPPLY STERILE: Performed by: UROLOGY

## 2022-07-13 PROCEDURE — 85027 COMPLETE CBC AUTOMATED: CPT

## 2022-07-13 PROCEDURE — C2617 STENT, NON-COR, TEM W/O DEL: HCPCS | Performed by: UROLOGY

## 2022-07-13 DEVICE — STENT URET 6FR L26CM PERCFLX HYDR+ TAPR TIP GRAD
Type: IMPLANTABLE DEVICE | Site: URETER | Status: NON-FUNCTIONAL
Removed: 2022-08-18

## 2022-07-13 RX ORDER — LIDOCAINE HYDROCHLORIDE 20 MG/ML
INJECTION, SOLUTION EPIDURAL; INFILTRATION; INTRACAUDAL; PERINEURAL PRN
Status: DISCONTINUED | OUTPATIENT
Start: 2022-07-13 | End: 2022-07-13 | Stop reason: SDUPTHER

## 2022-07-13 RX ORDER — OXYBUTYNIN CHLORIDE 5 MG/1
5 TABLET ORAL 3 TIMES DAILY PRN
Qty: 21 TABLET | Refills: 0 | Status: SHIPPED | OUTPATIENT
Start: 2022-07-13 | End: 2022-08-19

## 2022-07-13 RX ORDER — MEPERIDINE HYDROCHLORIDE 25 MG/ML
12.5 INJECTION INTRAMUSCULAR; INTRAVENOUS; SUBCUTANEOUS EVERY 5 MIN PRN
Status: DISCONTINUED | OUTPATIENT
Start: 2022-07-13 | End: 2022-07-13 | Stop reason: HOSPADM

## 2022-07-13 RX ORDER — SODIUM CHLORIDE 0.9 % (FLUSH) 0.9 %
5-40 SYRINGE (ML) INJECTION PRN
Status: DISCONTINUED | OUTPATIENT
Start: 2022-07-13 | End: 2022-07-13 | Stop reason: HOSPADM

## 2022-07-13 RX ORDER — HYDROMORPHONE HCL 110MG/55ML
PATIENT CONTROLLED ANALGESIA SYRINGE INTRAVENOUS
Status: COMPLETED
Start: 2022-07-13 | End: 2022-07-13

## 2022-07-13 RX ORDER — FENTANYL CITRATE 50 UG/ML
INJECTION, SOLUTION INTRAMUSCULAR; INTRAVENOUS PRN
Status: DISCONTINUED | OUTPATIENT
Start: 2022-07-13 | End: 2022-07-13 | Stop reason: SDUPTHER

## 2022-07-13 RX ORDER — ONDANSETRON 2 MG/ML
4 INJECTION INTRAMUSCULAR; INTRAVENOUS
Status: DISCONTINUED | OUTPATIENT
Start: 2022-07-13 | End: 2022-07-13 | Stop reason: HOSPADM

## 2022-07-13 RX ORDER — PROPOFOL 10 MG/ML
INJECTION, EMULSION INTRAVENOUS PRN
Status: DISCONTINUED | OUTPATIENT
Start: 2022-07-13 | End: 2022-07-13 | Stop reason: SDUPTHER

## 2022-07-13 RX ORDER — SODIUM CHLORIDE 9 MG/ML
INJECTION, SOLUTION INTRAVENOUS CONTINUOUS PRN
Status: DISCONTINUED | OUTPATIENT
Start: 2022-07-13 | End: 2022-07-13 | Stop reason: SDUPTHER

## 2022-07-13 RX ORDER — PHENYLEPHRINE HYDROCHLORIDE 10 MG/ML
INJECTION INTRAVENOUS PRN
Status: DISCONTINUED | OUTPATIENT
Start: 2022-07-13 | End: 2022-07-13 | Stop reason: SDUPTHER

## 2022-07-13 RX ORDER — AMOXICILLIN 250 MG
1 CAPSULE ORAL 2 TIMES DAILY
Qty: 30 TABLET | Refills: 1 | Status: SHIPPED | OUTPATIENT
Start: 2022-07-13 | End: 2022-08-12

## 2022-07-13 RX ORDER — SODIUM CHLORIDE 9 MG/ML
INJECTION, SOLUTION INTRAVENOUS CONTINUOUS
Status: DISCONTINUED | OUTPATIENT
Start: 2022-07-13 | End: 2022-07-13 | Stop reason: HOSPADM

## 2022-07-13 RX ORDER — HYDROMORPHONE HCL 110MG/55ML
0.5 PATIENT CONTROLLED ANALGESIA SYRINGE INTRAVENOUS EVERY 5 MIN PRN
Status: DISCONTINUED | OUTPATIENT
Start: 2022-07-13 | End: 2022-07-13 | Stop reason: HOSPADM

## 2022-07-13 RX ORDER — LIDOCAINE HYDROCHLORIDE 10 MG/ML
0.5 INJECTION, SOLUTION EPIDURAL; INFILTRATION; INTRACAUDAL; PERINEURAL ONCE
Status: DISCONTINUED | OUTPATIENT
Start: 2022-07-13 | End: 2022-07-13 | Stop reason: HOSPADM

## 2022-07-13 RX ORDER — SODIUM CHLORIDE 9 MG/ML
INJECTION, SOLUTION INTRAVENOUS PRN
Status: DISCONTINUED | OUTPATIENT
Start: 2022-07-13 | End: 2022-07-13 | Stop reason: HOSPADM

## 2022-07-13 RX ORDER — MAGNESIUM HYDROXIDE 1200 MG/15ML
LIQUID ORAL
Status: COMPLETED | OUTPATIENT
Start: 2022-07-13 | End: 2022-07-13

## 2022-07-13 RX ORDER — ONDANSETRON 2 MG/ML
INJECTION INTRAMUSCULAR; INTRAVENOUS PRN
Status: DISCONTINUED | OUTPATIENT
Start: 2022-07-13 | End: 2022-07-13 | Stop reason: SDUPTHER

## 2022-07-13 RX ORDER — HYDROCODONE BITARTRATE AND ACETAMINOPHEN 5; 325 MG/1; MG/1
1 TABLET ORAL EVERY 6 HOURS PRN
Qty: 20 TABLET | Refills: 0 | Status: SHIPPED | OUTPATIENT
Start: 2022-07-13 | End: 2022-07-18

## 2022-07-13 RX ORDER — SUCCINYLCHOLINE CHLORIDE 20 MG/ML
INJECTION INTRAMUSCULAR; INTRAVENOUS PRN
Status: DISCONTINUED | OUTPATIENT
Start: 2022-07-13 | End: 2022-07-13 | Stop reason: SDUPTHER

## 2022-07-13 RX ORDER — MIDAZOLAM HYDROCHLORIDE 1 MG/ML
INJECTION INTRAMUSCULAR; INTRAVENOUS PRN
Status: DISCONTINUED | OUTPATIENT
Start: 2022-07-13 | End: 2022-07-13 | Stop reason: SDUPTHER

## 2022-07-13 RX ORDER — TAMSULOSIN HYDROCHLORIDE 0.4 MG/1
0.4 CAPSULE ORAL DAILY
Qty: 30 CAPSULE | Refills: 0 | Status: SHIPPED | OUTPATIENT
Start: 2022-07-13 | End: 2022-08-19

## 2022-07-13 RX ORDER — ESMOLOL HYDROCHLORIDE 10 MG/ML
INJECTION INTRAVENOUS PRN
Status: DISCONTINUED | OUTPATIENT
Start: 2022-07-13 | End: 2022-07-13 | Stop reason: SDUPTHER

## 2022-07-13 RX ORDER — SODIUM CHLORIDE 0.9 % (FLUSH) 0.9 %
5-40 SYRINGE (ML) INJECTION EVERY 12 HOURS SCHEDULED
Status: DISCONTINUED | OUTPATIENT
Start: 2022-07-13 | End: 2022-07-13 | Stop reason: HOSPADM

## 2022-07-13 RX ORDER — GLYCOPYRROLATE 0.2 MG/ML
INJECTION INTRAMUSCULAR; INTRAVENOUS PRN
Status: DISCONTINUED | OUTPATIENT
Start: 2022-07-13 | End: 2022-07-13 | Stop reason: SDUPTHER

## 2022-07-13 RX ORDER — HYDROCODONE BITARTRATE AND ACETAMINOPHEN 5; 325 MG/1; MG/1
1 TABLET ORAL ONCE
Status: COMPLETED | OUTPATIENT
Start: 2022-07-13 | End: 2022-07-13

## 2022-07-13 RX ORDER — VECURONIUM BROMIDE 1 MG/ML
INJECTION, POWDER, LYOPHILIZED, FOR SOLUTION INTRAVENOUS PRN
Status: DISCONTINUED | OUTPATIENT
Start: 2022-07-13 | End: 2022-07-13 | Stop reason: SDUPTHER

## 2022-07-13 RX ORDER — CIPROFLOXACIN 2 MG/ML
400 INJECTION, SOLUTION INTRAVENOUS
Status: COMPLETED | OUTPATIENT
Start: 2022-07-13 | End: 2022-07-13

## 2022-07-13 RX ORDER — PHENAZOPYRIDINE HYDROCHLORIDE 100 MG/1
100 TABLET, FILM COATED ORAL 3 TIMES DAILY PRN
Qty: 9 TABLET | Refills: 0 | Status: SHIPPED | OUTPATIENT
Start: 2022-07-13 | End: 2022-07-16

## 2022-07-13 RX ORDER — DEXAMETHASONE SODIUM PHOSPHATE 4 MG/ML
INJECTION, SOLUTION INTRA-ARTICULAR; INTRALESIONAL; INTRAMUSCULAR; INTRAVENOUS; SOFT TISSUE PRN
Status: DISCONTINUED | OUTPATIENT
Start: 2022-07-13 | End: 2022-07-13 | Stop reason: SDUPTHER

## 2022-07-13 RX ORDER — LABETALOL HYDROCHLORIDE 5 MG/ML
INJECTION, SOLUTION INTRAVENOUS PRN
Status: DISCONTINUED | OUTPATIENT
Start: 2022-07-13 | End: 2022-07-13 | Stop reason: SDUPTHER

## 2022-07-13 RX ADMIN — GLYCOPYRROLATE 0.2 MG: 0.2 INJECTION INTRAMUSCULAR; INTRAVENOUS at 10:22

## 2022-07-13 RX ADMIN — FENTANYL CITRATE 50 MCG: 50 INJECTION, SOLUTION INTRAMUSCULAR; INTRAVENOUS at 10:30

## 2022-07-13 RX ADMIN — LABETALOL HYDROCHLORIDE 50 MG: 5 INJECTION, SOLUTION INTRAVENOUS at 11:23

## 2022-07-13 RX ADMIN — HYDROCODONE BITARTRATE AND ACETAMINOPHEN 1 TABLET: 5; 325 TABLET ORAL at 14:20

## 2022-07-13 RX ADMIN — HYDROMORPHONE HYDROCHLORIDE 0.5 MG: 2 INJECTION INTRAMUSCULAR; INTRAVENOUS; SUBCUTANEOUS at 12:36

## 2022-07-13 RX ADMIN — FENTANYL CITRATE 50 MCG: 50 INJECTION, SOLUTION INTRAMUSCULAR; INTRAVENOUS at 10:35

## 2022-07-13 RX ADMIN — PHENYLEPHRINE HYDROCHLORIDE 100 MCG: 10 INJECTION INTRAVENOUS at 10:49

## 2022-07-13 RX ADMIN — SODIUM CHLORIDE: 9 INJECTION, SOLUTION INTRAVENOUS at 10:25

## 2022-07-13 RX ADMIN — HYDROMORPHONE HYDROCHLORIDE 0.5 MG: 2 INJECTION INTRAMUSCULAR; INTRAVENOUS; SUBCUTANEOUS at 12:08

## 2022-07-13 RX ADMIN — SUGAMMADEX 200 MG: 100 INJECTION, SOLUTION INTRAVENOUS at 11:21

## 2022-07-13 RX ADMIN — VECURONIUM BROMIDE 2 MG: 1 INJECTION, POWDER, LYOPHILIZED, FOR SOLUTION INTRAVENOUS at 10:48

## 2022-07-13 RX ADMIN — DEXAMETHASONE SODIUM PHOSPHATE 4 MG: 4 INJECTION, SOLUTION INTRAMUSCULAR; INTRAVENOUS at 10:38

## 2022-07-13 RX ADMIN — CIPROFLOXACIN 400 MG: 2 INJECTION, SOLUTION INTRAVENOUS at 10:19

## 2022-07-13 RX ADMIN — ONDANSETRON 4 MG: 2 INJECTION INTRAMUSCULAR; INTRAVENOUS at 10:38

## 2022-07-13 RX ADMIN — ESMOLOL HYDROCHLORIDE 70 MG: 10 INJECTION, SOLUTION INTRAVENOUS at 11:22

## 2022-07-13 RX ADMIN — PROPOFOL 100 MG: 10 INJECTION, EMULSION INTRAVENOUS at 10:30

## 2022-07-13 RX ADMIN — ESMOLOL HYDROCHLORIDE 20 MG: 10 INJECTION, SOLUTION INTRAVENOUS at 10:34

## 2022-07-13 RX ADMIN — MIDAZOLAM 2 MG: 1 INJECTION INTRAMUSCULAR; INTRAVENOUS at 10:22

## 2022-07-13 RX ADMIN — SUCCINYLCHOLINE CHLORIDE 100 MG: 20 INJECTION, SOLUTION INTRAMUSCULAR; INTRAVENOUS at 10:30

## 2022-07-13 RX ADMIN — ESMOLOL HYDROCHLORIDE 10 MG: 10 INJECTION, SOLUTION INTRAVENOUS at 10:58

## 2022-07-13 RX ADMIN — VECURONIUM BROMIDE 5 MG: 1 INJECTION, POWDER, LYOPHILIZED, FOR SOLUTION INTRAVENOUS at 10:38

## 2022-07-13 RX ADMIN — LIDOCAINE HYDROCHLORIDE 100 MG: 20 INJECTION, SOLUTION EPIDURAL; INFILTRATION; INTRACAUDAL; PERINEURAL at 10:30

## 2022-07-13 ASSESSMENT — PAIN SCALES - GENERAL
PAINLEVEL_OUTOF10: 1
PAINLEVEL_OUTOF10: 4
PAINLEVEL_OUTOF10: 2
PAINLEVEL_OUTOF10: 8

## 2022-07-13 ASSESSMENT — PAIN DESCRIPTION - DESCRIPTORS: DESCRIPTORS: ACHING;SORE

## 2022-07-13 ASSESSMENT — PAIN DESCRIPTION - FREQUENCY: FREQUENCY: CONTINUOUS

## 2022-07-13 ASSESSMENT — PAIN DESCRIPTION - ORIENTATION: ORIENTATION: RIGHT

## 2022-07-13 ASSESSMENT — PAIN DESCRIPTION - ONSET: ONSET: ON-GOING

## 2022-07-13 ASSESSMENT — PAIN DESCRIPTION - LOCATION: LOCATION: FLANK;PELVIS

## 2022-07-13 ASSESSMENT — PAIN - FUNCTIONAL ASSESSMENT: PAIN_FUNCTIONAL_ASSESSMENT: 0-10

## 2022-07-13 NOTE — PROGRESS NOTES
Pt states he is feeling better and is ready to change clothes to go home. Interpretor states pt's son has left. Pt states pt's son does not have his phone. Notified charge nurse Laura DEY and Barbara in waiting room called pt's daughter. Pt's daughter states pt's son has her car and she will try to call him.

## 2022-07-13 NOTE — PROGRESS NOTES
Pt arrived to phase 2 via cart from PACU. Alert and oriented to room, call light, and POC. Pt rates pain as \"1\" on pain scale. Rayn Salome interpretor present.

## 2022-07-13 NOTE — PROGRESS NOTES
CLINICAL PHARMACY NOTE: MEDS TO BEDS    Total # of Prescriptions Filled: 5   The following medications were delivered to the patient:  · Hydrocodone-Apap 5-325 mg Tabs  · Phenazopyridine 100 mg tabs  · Oxybutynin Chloride 5 mg tabs  · Tamsulosin 0.4 mg caps  · Stimulannt laxative 8.6-50 mg tabs    Additional Documentation:    Patient Signed for his prescriptions

## 2022-07-13 NOTE — ANESTHESIA POSTPROCEDURE EVALUATION
Department of Anesthesiology  Postprocedure Note    Patient: Yifan West  MRN: [id number]  YOB: 1960  Date of evaluation: 7/13/2022      Procedure Summary     Date: 07/13/22 Room / Location: [address]    Anesthesia Start: 1025 Anesthesia Stop: 5246    Procedure: CYSTOSCOPY WITH RIGHT URETEROSCOPY WITH HOLMIUM LASER LITHOTRIPSY, STONE MANIPULATION, STONE BASKET,  RIGHT STENT PLACEMENT-[facility] #[id number] (Right Ureter) Diagnosis:       Calculus of kidney      (Calculus of kidney [N20.0])    Surgeons: Reece Rollins MD Responsible Provider: Evelyn Leong MD    Anesthesia Type: general ASA Status: 3          Anesthesia Type: No value filed.     Sim Phase I: Sim Score: 8    Sim Phase II:        Anesthesia Post Evaluation    Patient location during evaluation: PACU  Patient participation: complete - patient participated  Level of consciousness: awake  Airway patency: patent  Nausea & Vomiting: no vomiting and no nausea  Complications: no  Cardiovascular status: hemodynamically stable  Respiratory status: acceptable  Hydration status: stable  Multimodal analgesia pain management approach

## 2022-07-13 NOTE — PROGRESS NOTES
Pt's son arrived, daughter not coming. This nurse assisted pt to start changing clothes, pt c/o pain. Pt is c/o stomach pain and unable to rate pain at this time.  Called anesthesia and received order for oral pain medication

## 2022-07-13 NOTE — PROGRESS NOTES
Pt states pain level is much better and states he is comfortable. Assisted pt with changing and he ernestine activity well. IV removed tip intact and ernestine well.

## 2022-07-13 NOTE — OP NOTE
Urology Operative Report  [facility]    Provider: Suzi Hobson MD MD Patient ID:  Admission Date: 2022 Name: Stanislaw Moss  OR Date: 2022  MRN: [id number]   Patient Location: OR/NONE : 1960  Attending: Suzi Hobson MD Date of Service: 2022  PCP: Abad Mendoza PA-C     Date of Operation: 2022    Preoperative Diagnosis: RIGHT side 1.2 cm stone with prior stent placement in setting of renal failure and prostate cancer status post prostatectomy    Postoperative Diagnosis: same    Procedure:    1. Right ureteroscopy with holmium laser lithotripsy and basket stone extraction with right retrograde pyelogram and right ureteral stent exchange (6 x 26 double-J stent with string attached)  2. Fluoroscopy <1hr MD time    Surgeon:   Suzi Hobson MD, JONELLE    Anesthesia: General LMA anesthesia    Indications: Stanislaw Moss is a 58 y.o. male who presents for the above named surgery. Informed consent was obtained and the risks, benefits, and details of the procedure were explained to the patient who elected to proceed. Details of Procedure: The patient was brought to the operating room and placed in the supine position on the operating room table. SCDs were placed on the lower extremities. Following induction of anesthesia the patient was positioned in a lithotomy position, all pressure points were padded, and the genitals were prepped and draped in the usual sterile fashion. A routine timeout was performed, confirming the patient, procedure, site, risk of fire, patient allergies and confirming that preoperative antibiotics had been administered prior to beginning. A 21 fr rigid cystoscope was advance via a normal appearing urethra into the bladder. The vesicourethral anastomosis from his prior prostatectomy was intact. The bladder was inspected and there were no suspicious lesions, stones or diverticula seen.  Attention was turned to the right ureteral orifice and the previously placed ureteral stent was grasped and removed to the urethral meatus. A 0.035 sensor wire was advanced via the lumen of the stent and positioned within the collecting system under control of fluoroscopy. Over the wire a 13/15 by 46 cm ureteral access sheath was positioned within the ureter. A flexible ureteroscopy was advanced until the stone was identified. A 200nm laser fiber was used to perform lithotripsy until all stone burden was fragmented. A dusting technique was primarily used for the great majority the stone. Multiple small stone fragments were grasped and removed using a 0-tip nitinol basket for stone analysis. This was continued until all stone burden was removed except for sand-like stone fragments. The collecting system was then inspected systematically and no residual stone burden was seen. Retrograde pyelogram showed no significant dilation of the collecting system or other abnormalities. The working wire was replaced into the collecting system under vision. The ureter was inspected on removal of the scope and access sheath and no additional stone burden was seen. The cystoscope was back-loaded over the wire and a stent was advanced under control of fluoroscopy with good curl in the renal pelvis and the urinary bladder. A string was left attached to the distal stent and brought out through the urethral meatus. The bladder was emptied and the scope removed. At the end of the procedure all counts were correct. The patient tolerated the procedure well and was transported to the PACU in stable condition.     Findings: Large right-sided stone pushed into the renal pelvis and upper pole after prior stent placement, dusted in its entirety with removal of small fragments for stone analysis, vesicourethral anastomosis is intact, otherwise cystourethroscopy and ureteroscopy unremarkable, right retrograde pyelogram showed minimal dilation of collecting system and no extravasation, filling defects, or other abnormalities    Estimated Blood Loss: minimal                  Drains: 6fr x 26 cm right right ureteral stent (string)          Specimens: stone for analysis    Complications: none apparent           Disposition:  PACU - hemodynamically stable.      Plan: stent out within 1 week, followup 6 weeks with renal ultrasound, continue nephrology follow-up and close urologic follow-up for management of stone disease and prostate cancer           Anahi Miller MD, [facility]  7/13/2022

## 2022-07-13 NOTE — PROGRESS NOTES
Reviewed discharge instructions with pt. Interpretor present. Pt V/U of teaching. Prescriptions x5 delivered to bedside by pharmacy.

## 2022-07-13 NOTE — H&P
Urology Preoperative History & Physical  [facility]     Patient: Anam Steel MRN: [id number]  Room/Bed: Room/bed info not found   YOB: 1960  Age/Sex: 58 y.o.male  Admission Date: (Not on file)     Date of Service:  7/13/2022    ASSESSMENT/PLAN     Right ureteral stone here for ureteroscopy    Plan: To OR for above procedure    All patient questions were answered. He understands the plan as listed above. HISTORY     Chief Complaint: As above    History of Present Illness: Anam Steel is a 58 y.o. male with above listed problems. No changes in history/physical since last evaluation. No change in symptoms. Past Medical History:  He has a past medical history of Diabetes mellitus ([address] and Hypertension. Hospital Problem List:  Active Problems:    * No active hospital problems. *  Resolved Problems:    * No resolved hospital problems. *      Past Surgical History:  He has a past surgical history that includes Prostatectomy (N/A, 5/16/2022); Cystoscopy (Right, 5/26/2022); and IR TUNNELED CVC PLACE WO SQ PORT/PUMP > 5 YEARS (5/28/2022). Social History:  He reports that he has never smoked. He has never used smokeless tobacco. He reports that he does not drink alcohol and does not use drugs. Family History:  family history is not on file. Allergies:  No Known Allergies    Medications:  Scheduled Meds:  Continuous Infusions:  PRN Meds:    Review of Systems:  Pertinent positives/negatives reviewed in HPI. All other systems reviewed and negative, unless noted below. Constitutional: Negative  Genitourinary: see HPI  HEENT: Negative   Cardiovascular: Negative   Respiratory: Negative   Gastrointestinal: Negative   Musculoskeletal: Negative   Neurological: Negative   Psychiatric: Negative   Integumentary: Negative     PHYSICAL EXAM     There were no vitals filed for this visit. CONSTITUTIONAL: The patient is well nourished/developed, with no distress noted. CARDIOVASCULAR: normal rate. RESPIRATOR: non-labored breathing. GENITOURINARY: Defer to OR; see clinic note for  exam.    Ins/Outs:  No intake or output data in the 24 hours ending 07/13/22 0828    LABS     CBC   Lab Results   Component Value Date/Time    WBC 10.7 06/06/2022 08:47 AM    RBC 2.32 06/06/2022 08:47 AM    HGB 7.1 06/06/2022 08:47 AM    HCT 21.3 06/06/2022 08:47 AM    MCV 91.9 06/06/2022 08:47 AM    MCH 30.8 06/06/2022 08:47 AM    MCHC 33.5 06/06/2022 08:47 AM    RDW 13.8 06/06/2022 08:47 AM     06/06/2022 08:47 AM    MPV 7.6 06/06/2022 08:47 AM     BMP   Lab Results   Component Value Date/Time     06/06/2022 05:07 AM    K 4.2 06/06/2022 05:07 AM    CL 97 06/06/2022 05:07 AM    CO2 24 06/06/2022 05:07 AM    BUN 43 06/06/2022 05:07 AM    CREATININE 6.1 06/06/2022 05:07 AM    GLUCOSE 174 06/06/2022 05:07 AM    CALCIUM 8.8 06/06/2022 05:07 AM     Urinalysis:   Lab Results   Component Value Date/Time    COLORU Yellow 05/26/2022 01:12 PM    GLUCOSEU 500 05/26/2022 01:12 PM    BLOODU LARGE 05/26/2022 01:12 PM    NITRU POSITIVE 05/26/2022 01:12 PM    LEUKOCYTESUR LARGE 05/26/2022 01:12 PM     Urine culture: No results for input(s): LABURIN in the last 72 hours. PSA: No results found for: PSA      IMAGING     No results found.          Electronically signed by: Suzi Hobson MD MD, JONELLE 7/13/2022   [facility]  Office Contact: [phone]

## 2022-07-13 NOTE — PROGRESS NOTES
Teaching / education initiated regarding perioperative experience, expectations, and pain management during stay. Patient verbalized understanding. Pt arrived 30 minutes late. Pt belongings locked in locker 10. Jewelery removed and placed in denture cup with patient at the bedside.

## 2022-07-13 NOTE — ANESTHESIA PRE PROCEDURE
Department of Anesthesiology  Preprocedure Note       Name:  Anam Steel   Age:  58 y.o.  :  1960                                          MRN:  [id number]         Date:  2022      Surgeon: Ethan Medina):  Sherrie Lyon MD    Procedure: Procedure(s):  CYSTOSCOPY WITH RIGHT URETEROSCOPY WITH HOLMIUM LASER LITHOTRIPSY, STONE MANIPULATION, STONE BASKET, POSSIBLE RIGHT STENT PLACEMENT-[facility] #[id number]    Medications prior to admission:   Prior to Admission medications    Medication Sig Start Date End Date Taking? Authorizing Provider   amLODIPine (NORVASC) 5 MG tablet Take 1 tablet by mouth daily 22   Cameron Barrow MD   Lancets MISC 1 each by Does not apply route 2 times daily 22   Arneta Najjar, MD   blood glucose monitor kit and supplies Dispense sufficient amount for indicated testing frequency plus additional to accommodate PRN testing needs. Dispense all needed supplies to include: monitor, strips, lancing device, lancets, control solutions, alcohol swabs. 22   Arneta Najjar, MD   blood glucose monitor strips Test3 times a day & as needed for symptoms of irregular blood glucose. Dispense sufficient amount for indicated testing frequency plus additional to accommodate PRN testing needs. 22   Arneta Najjar, MD   insulin glargine (LANTUS SOLOSTAR) 100 UNIT/ML injection pen Inject 8 Units into the skin nightly 22   Dominique Obregon MD   insulin lispro, 1 Unit Dial, (HUMALOG KWIKPEN) 100 UNIT/ML SOPN Glucose: Dose: If <139   - No Insulin 140-199   --- 1 Unit 200-249   --  2  Units 250-299           3 Units  300-349           4 Units 350-400           5 Units  Above 400       6 Units 22   Dominique Obregon MD   Insulin Pen Needle 29G X 12MM MISC 1 each by Does not apply route daily 22   Arneta Najjar, MD       Current medications:    No current facility-administered medications for this visit. No current outpatient medications on file.      Facility-Administered Medications Ordered in Other Visits   Medication Dose Route Frequency Provider Last Rate Last Admin    ciprofloxacin (CIPRO) IVPB 400 mg  400 mg IntraVENous On Call to Natanael Engel MD        lidocaine PF 1 % injection 0.5 mL  0.5 mL IntraDERmal Once Dedrick Rivera MD        0.9 % sodium chloride infusion   IntraVENous Continuous Dedrick Rivera MD           Allergies:  No Known Allergies    Problem List:    Patient Active Problem List   Diagnosis Code    Prostate cancer ([facility] C61    Septic shock ([facility] A41.9, R65.21    CHRISSY (acute kidney injury) ([facility] N17.9    Ureterolithiasis N20.1    Lactic acidosis E87.2    Infection due to ESBL-producing Escherichia coli A49.8, Z16.12    Bacteremia due to Gram-negative bacteria T93.12    Complicated UTI (urinary tract infection) N39.0    Hyponatremia E87.1    S/P radical cystoprostatectomy Z90.79, Z90.6    Poorly controlled type 2 diabetes mellitus ([facility] E11.65    Fever R50.9    Diabetes education, encounter for Z71.89       Past Medical History:        Diagnosis Date    Diabetes mellitus ([facility]     Hypertension        Past Surgical History:        Procedure Laterality Date    CYSTOSCOPY Right 5/26/2022    CYSTOSCOPY, BILATERAL RETROGRADE PYELOGRAM, PLACEMENT OF RIGHT URETERAL STENT performed by Gerri Grace MD at [facility] IR TUNNELED [address] 5 YEARS  5/28/2022    IR TUNNELED CATHETER PLACEMENT GREATER THAN 5 YEARS 5/28/2022 [facility] SPECIAL PROCEDURES    PROSTATECTOMY N/A 5/16/2022    ROBOTIC LAPAROSCOPIC RADICAL PROSTATECTOMY WITH BILATERAL LYMPH NODE DISSECTION AND BILATERAL NERVE SPARE performed by Dedrick Rivera MD at [address] [place] [address] History:    Social History     Tobacco Use    Smoking status: Never Smoker    Smokeless tobacco: Never Used   Substance Use Topics    Alcohol use: Never                                Counseling given: Not Answered      Vital Signs (Current): There were no vitals filed for this visit. BP Readings from Last 3 Encounters:   06/06/22 136/74   05/16/22 (!) 169/102   03/19/22 (!) 171/98       NPO Status:                                                                                 BMI:   Wt Readings from Last 3 Encounters:   06/06/22 155 lb 3.3 oz (70.4 kg)   05/16/22 148 lb (67.1 kg)   03/19/22 160 lb (72.6 kg)     There is no height or weight on file to calculate BMI.    CBC:   Lab Results   Component Value Date/Time    WBC 10.7 06/06/2022 08:47 AM    RBC 2.32 06/06/2022 08:47 AM    HGB 7.1 06/06/2022 08:47 AM    HCT 21.3 06/06/2022 08:47 AM    MCV 91.9 06/06/2022 08:47 AM    RDW 13.8 06/06/2022 08:47 AM     06/06/2022 08:47 AM       CMP:   Lab Results   Component Value Date/Time     06/06/2022 05:07 AM    K 4.2 06/06/2022 05:07 AM    CL 97 06/06/2022 05:07 AM    CO2 24 06/06/2022 05:07 AM    BUN 43 06/06/2022 05:07 AM    CREATININE 6.1 06/06/2022 05:07 AM    GFRAA 11 06/06/2022 05:07 AM    AGRATIO 0.7 06/06/2022 05:07 AM    LABGLOM 9 06/06/2022 05:07 AM    GLUCOSE 174 06/06/2022 05:07 AM    PROT 6.4 06/06/2022 05:07 AM    CALCIUM 8.8 06/06/2022 05:07 AM    BILITOT 0.6 06/06/2022 05:07 AM    ALKPHOS 174 06/06/2022 05:07 AM    AST 26 06/06/2022 05:07 AM    ALT 25 06/06/2022 05:07 AM       POC Tests: No results for input(s): POCGLU, POCNA, POCK, POCCL, POCBUN, POCHEMO, POCHCT in the last 72 hours.     Coags:   Lab Results   Component Value Date/Time    PROTIME 12.9 05/02/2022 09:56 AM    INR 1.14 05/02/2022 09:56 AM    APTT 34.5 05/02/2022 09:56 AM       HCG (If Applicable): No results found for: PREGTESTUR, PREGSERUM, HCG, HCGQUANT     ABGs: No results found for: PHART, PO2ART, TSX4QTM, NKI1WKC, BEART, B3SJWRCA     Type & Screen (If Applicable):  No results found for: LABABO, LABRH    Drug/Infectious Status (If Applicable):  No results found for: HIV, HEPCAB    COVID-19 Screening (If Applicable): No results found for: COVID19        Anesthesia Evaluation  Patient summary reviewed and Nursing notes reviewed no history of anesthetic complications:   Airway: Mallampati: II  TM distance: >3 FB   Neck ROM: full  Mouth opening: > = 3 FB   Dental: normal exam         Pulmonary:Negative Pulmonary ROS breath sounds clear to auscultation      (-) wheezes                           Cardiovascular:  Exercise tolerance: good (>4 METS),   (+) hypertension:,     (-) CABG/stent, dysrhythmias and  angina      Rhythm: regular  Rate: abnormal                 ROS comment: BP elevated today. Patient stopped norvasc May 8th     Neuro/Psych:      (-) seizures, TIA and CVA            ROS comment: Patient states he is very tense GI/Hepatic/Renal:        (-) GERD       Endo/Other:    (+) Diabetes, malignancy/cancer (prostate). ROS comment: Denies FH of problems with GA Abdominal:             Vascular:     - DVT and PE. Other Findings:             Anesthesia Plan      general     ASA 3     (Medical history and informed consent taken with the help of  at bedside.)  Induction: intravenous. MIPS: Postoperative opioids intended and Prophylactic antiemetics administered. Anesthetic plan and risks discussed with patient. Plan discussed with CRNA.                     Roya Cho MD   7/13/2022

## 2022-07-13 NOTE — PROGRESS NOTES
Patient admitted to PACU via stretcher, arouses  To stimuli, moves ext spontaneously, respirations adeq on 8L o2 per simple mask spo2 100%. Skin warm and dry with good color. abd soft. String taped to penis intact. Will continue to monitor.

## 2022-07-13 NOTE — PROGRESS NOTES
Pt discharged via wheelchair to waiting room per [place] [facility] RN charge nurse. Tessa DEY is with pt. Pt's daughter called and states she is one minute away.

## 2022-07-17 LAB
CALCULI COMPOSITION: NORMAL
MASS: 3 MG
STONE DESCRIPTION: NORMAL

## 2022-07-21 ENCOUNTER — HOSPITAL ENCOUNTER (EMERGENCY)
Age: 62
Discharge: HOME | End: 2022-07-21
Attending: STUDENT IN AN ORGANIZED HEALTH CARE EDUCATION/TRAINING PROGRAM
Payer: MEDICAID

## 2022-07-21 ENCOUNTER — APPOINTMENT (OUTPATIENT)
Dept: CT IMAGING | Age: 62
End: 2022-07-21
Payer: MEDICAID

## 2022-07-21 VITALS
TEMPERATURE: 99.2 F | DIASTOLIC BLOOD PRESSURE: 74 MMHG | HEART RATE: 84 BPM | OXYGEN SATURATION: 98 % | BODY MASS INDEX: 22.73 KG/M2 | WEIGHT: 145.1 LBS | SYSTOLIC BLOOD PRESSURE: 168 MMHG | RESPIRATION RATE: 16 BRPM

## 2022-07-21 DIAGNOSIS — N30.00 ACUTE CYSTITIS WITHOUT HEMATURIA: Primary | ICD-10-CM

## 2022-07-21 LAB
A/G RATIO: 0.9 (ref 1.1–2.2)
ALBUMIN SERPL-MCNC: 4 G/DL (ref 3.4–5)
ALP BLD-CCNC: 84 U/L (ref 40–129)
ALT SERPL-CCNC: 9 U/L (ref 10–40)
ANION GAP SERPL CALCULATED.3IONS-SCNC: 12 MMOL/L (ref 3–16)
AST SERPL-CCNC: 18 U/L (ref 15–37)
BASOPHILS ABSOLUTE: 0.1 K/UL (ref 0–0.2)
BASOPHILS RELATIVE PERCENT: 0.6 %
BILIRUB SERPL-MCNC: 0.3 MG/DL (ref 0–1)
BUN BLDV-MCNC: 13 MG/DL (ref 7–20)
CALCIUM SERPL-MCNC: 9.9 MG/DL (ref 8.3–10.6)
CHLORIDE BLD-SCNC: 98 MMOL/L (ref 99–110)
CO2: 28 MMOL/L (ref 21–32)
CREAT SERPL-MCNC: 1.7 MG/DL (ref 0.8–1.3)
EOSINOPHILS ABSOLUTE: 0.1 K/UL (ref 0–0.6)
EOSINOPHILS RELATIVE PERCENT: 1 %
GFR AFRICAN AMERICAN: 50
GFR NON-AFRICAN AMERICAN: 41
GLUCOSE BLD-MCNC: 113 MG/DL (ref 70–99)
HCT VFR BLD CALC: 40.9 % (ref 40.5–52.5)
HEMOGLOBIN: 13.1 G/DL (ref 13.5–17.5)
LYMPHOCYTES ABSOLUTE: 0.8 K/UL (ref 1–5.1)
LYMPHOCYTES RELATIVE PERCENT: 8.8 %
MCH RBC QN AUTO: 29.5 PG (ref 26–34)
MCHC RBC AUTO-ENTMCNC: 32 G/DL (ref 31–36)
MCV RBC AUTO: 92.1 FL (ref 80–100)
MONOCYTES ABSOLUTE: 1 K/UL (ref 0–1.3)
MONOCYTES RELATIVE PERCENT: 11.3 %
NEUTROPHILS ABSOLUTE: 6.8 K/UL (ref 1.7–7.7)
NEUTROPHILS RELATIVE PERCENT: 78.3 %
OCCULT BLOOD DIAGNOSTIC: NORMAL
PDW BLD-RTO: 13.5 % (ref 12.4–15.4)
PLATELET # BLD: 245 K/UL (ref 135–450)
PMV BLD AUTO: 7.2 FL (ref 5–10.5)
POTASSIUM SERPL-SCNC: 3.5 MMOL/L (ref 3.5–5.1)
RBC # BLD: 4.44 M/UL (ref 4.2–5.9)
SODIUM BLD-SCNC: 138 MMOL/L (ref 136–145)
TOTAL PROTEIN: 8.3 G/DL (ref 6.4–8.2)
WBC # BLD: 8.7 K/UL (ref 4–11)

## 2022-07-21 PROCEDURE — 80053 COMPREHEN METABOLIC PANEL: CPT

## 2022-07-21 PROCEDURE — 82270 OCCULT BLOOD FECES: CPT

## 2022-07-21 PROCEDURE — 99284 EMERGENCY DEPT VISIT MOD MDM: CPT

## 2022-07-21 PROCEDURE — 85025 COMPLETE CBC W/AUTO DIFF WBC: CPT

## 2022-07-21 PROCEDURE — 74176 CT ABD & PELVIS W/O CONTRAST: CPT

## 2022-07-21 PROCEDURE — 36415 COLL VENOUS BLD VENIPUNCTURE: CPT

## 2022-07-21 RX ORDER — CEFDINIR 300 MG/1
300 CAPSULE ORAL 2 TIMES DAILY
Qty: 20 CAPSULE | Refills: 0 | Status: SHIPPED | OUTPATIENT
Start: 2022-07-21 | End: 2022-07-31

## 2022-07-21 ASSESSMENT — PAIN - FUNCTIONAL ASSESSMENT: PAIN_FUNCTIONAL_ASSESSMENT: NONE - DENIES PAIN

## 2022-07-21 NOTE — ED PROVIDER NOTES
Primary Care Physician: Natalie Adair PA-C   Attending Physician: No att. providers found     History   Chief Complaint   Patient presents with    Abdominal Pain     Reports in dialysis and started having abd pain, pt completed dialysis. GRACE Dalal  is a 58 y.o. male history of diabetes, hypertension who was recently admitted after he presented with an infected stone. During that encounter he went into renal failure. He was started on dialysis. His stent was placed and stent was taken out a few days ago. Patient returns this morning afternoon accompanied by daughter stating that he was at dialysis when he developed significant pain. Daughter stated that yesterday he had some blood in his rectum. By the time patient got to the emergency room the pain had resolved. He did complain of some chills but no fevers. Past Medical History:   Diagnosis Date    Diabetes mellitus ([facility]     Hypertension         Past Surgical History:   Procedure Laterality Date    CYSTOSCOPY Right 5/26/2022    CYSTOSCOPY, BILATERAL RETROGRADE PYELOGRAM, PLACEMENT OF RIGHT URETERAL STENT performed by Janeth Castellanos MD at [address] [place] [address] Place Right 7/13/2022    CYSTOSCOPY WITH RIGHT URETEROSCOPY WITH HOLMIUM LASER LITHOTRIPSY, STONE MANIPULATION, STONE BASKET,  RIGHT STENT PLACEMENT-[facility] #[id number] performed by Med Smith MD at [address] [place] [address]    IR TUNNELED [address] 5 YEARS  5/28/2022    IR TUNNELED CATHETER PLACEMENT GREATER THAN 5 YEARS 5/28/2022 [facility] SPECIAL PROCEDURES    PROSTATECTOMY N/A 5/16/2022    ROBOTIC LAPAROSCOPIC RADICAL PROSTATECTOMY WITH BILATERAL LYMPH NODE DISSECTION AND BILATERAL NERVE SPARE performed by Med Smith MD at [address] [place] [address]        History reviewed. No pertinent family history.      Social History     Socioeconomic History    Marital status:      Spouse name: None    Number of children: None    Years of education: None    Highest education level: None Tobacco Use    Smoking status: Never    Smokeless tobacco: Never   Vaping Use    Vaping Use: Never used   Substance and Sexual Activity    Alcohol use: Never    Drug use: Never        Review of Systems   10 total systems reviewed and found to be negative unless otherwise noted in HPI     Physical Exam   BP (!) 168/74   Pulse 84   Temp 99.2 °F (37.3 °C)   Resp 16   Wt 145 lb 1.6 oz (65.8 kg)   SpO2 98%   BMI 22.73 kg/m²      CONSTITUTIONAL: Well appearing, in no acute distress   HEAD: atraumatic, normocephalic   EYES: PERRL, No injection, discharge or scleral icterus. ENT: Moist mucous membranes. NECK: Normal ROM, NO LAD   CARDIOVASCULAR: Regular rate and rhythm. No murmurs or gallop. PULMONARY/CHEST: Airway patent. No retractions. Breath sounds clear with good air entry bilaterally. ABDOMEN: Soft, Non-distended but mild-tender, without guarding or rebound. SKIN: Acyanotic, warm, dry   MUSCULOSKELETAL: No swelling, tenderness or deformity   NEUROLOGICAL: Awake and oriented x 3. Pulses intact. Grossly nonfocal   Nursing note and vitals reviewed.      ED Course & Medical Decision Making   Medications - No data to display   Labs Reviewed   CBC WITH AUTO DIFFERENTIAL - Abnormal; Notable for the following components:       Result Value    Hemoglobin 13.1 (*)     Lymphocytes Absolute 0.8 (*)     All other components within normal limits   COMPREHENSIVE METABOLIC PANEL - Abnormal; Notable for the following components:    Chloride 98 (*)     Glucose 113 (*)     Creatinine 1.7 (*)     GFR Non- 41 (*)     GFR  50 (*)     Total Protein 8.3 (*)     Albumin/Globulin Ratio 0.9 (*)     ALT 9 (*)     All other components within normal limits   BLOOD OCCULT STOOL DIAGNOSTIC    Narrative:     ORDER#: [id number]                          ORDERED BY: JED MIRELES  SOURCE: Stool                              COLLECTED:  07/21/22 18:00  ANTIBIOTICS AT [place].:                      RECEIVED :  07/21/22 18:08      CT ABDOMEN PELVIS WO CONTRAST Additional Contrast? None   Final Result   There is been interval removal of right ureteral stent from prior comparison   with moderate to severe right hydronephrosis however no cortical thinning. 3   mm calculus in the right proximal ureter at the UPJ could represent   obstructing urolithiasis or residual calculus with nonobstructing right   nephrolithiasis noted. Circumferential thickening of the urinary bladder   concerning for cystitis. No mechanical obstructive process of bowel or discrete inflammatory change   with moderate colonic stool burden retention            FL RETROGRADE PYELOGRAM RIGHT    Result Date: 7/13/2022  EXAMINATION: SPOT FLUOROSCOPIC IMAGES 7/13/2022 10:07 am TECHNIQUE: Fluoroscopy was provided by the radiology department for procedure. Radiologist was not present during examination. FLUOROSCOPY DOSE AND TYPE OR TIME AND EXPOSURES: Time: 20 seconds Dose: 1.69 mGy COMPARISON: None HISTORY: ORDERING SYSTEM PROVIDED HISTORY: pain TECHNOLOGIST PROVIDED HISTORY: Reason for exam:->pain Reason for Exam: Retrograde pyelogram right Intraprocedural imaging. FINDINGS: 21 spot images of the right hemiabdomen were obtained during right ureteral stent placement. Intraprocedural fluoroscopic spot images as above. See separate procedure report for more information. PROCEDURES:   Procedures    ASSESSMENT AND PLAN:  [id number] DOB1960Yifan Sa is a 58 y.o. male history of diabetes, hypertension who was recently admitted after he presented with an infected stone. During that encounter he went into renal failure. He was started on dialysis. His stent was placed and stent was taken out a few days ago. Patient returns this morning afternoon accompanied by daughter stating that he was at dialysis when he developed significant pain. Daughter stated that yesterday he had some blood in his rectum.   By the time patient got to the emergency room the pain had resolved. He did complain of some chills but no fevers. On exam patient was tender to palpation diffusely on the abdomen. Vitals were within normal limits. He did not appear to be in any distress and was nontoxic. Nonetheless given his history of kidney stones a CT was obtained and showed a 3 mm nonobstructing stone was also concerning for cystitis. His kidney function actually had improved. I did not think he needed any further testing or evaluation. Of note daughter indicated that 2 months ago when this started the plan was to put patient on dialysis just for 2 months. However she has been trying to contact nephrology team to get patient off dialysis because patient is not tolerating unable to get a hold of the dialysis team.  I consulted with nephrology on-call and patient will be contacted to be seen in clinic for further evaluation. ClINICAL IMPRESSION:  1. Acute cystitis without hematuria          PATIENT REFERRED TO:  Dalton Guillen PA-C  [id number]  [facility]    Schedule an appointment as soon as possible for a visit in 2 days      DISCHARGE MEDICATIONS:  Discharge Medication List as of 7/21/2022  7:52 PM        START taking these medications    Details   cefdinir (OMNICEF) 300 MG capsule Take 1 capsule by mouth in the morning and 1 capsule before bedtime. Do all this for 10 days. , Disp-20 capsule, R-0Print           DISCONTINUED MEDICATIONS:  Discharge Medication List as of 7/21/2022  7:52 PM        DISPOSITION Decision To Discharge 07/21/2022 07:46:32 PM    ___________________________________________________________________________________  _________________________________________________________________________________________  This record is transcribed utilizing voice recognition technology. There are inherent limitations in this technology. In addition, there may be limitations in editing of this report.  If there are any discrepancies, please contact me directly.         Shirley Linn MD  07/21/22 2004

## 2022-08-10 ENCOUNTER — HOSPITAL ENCOUNTER (OUTPATIENT)
Dept: ULTRASOUND IMAGING | Age: 62
Discharge: HOME | DRG: 446 | End: 2022-08-10
Payer: MEDICAID

## 2022-08-10 DIAGNOSIS — N20.0 CALCULUS OF KIDNEY: ICD-10-CM

## 2022-08-10 PROCEDURE — 76770 US EXAM ABDO BACK WALL COMP: CPT

## 2022-08-13 ENCOUNTER — APPOINTMENT (OUTPATIENT)
Dept: GENERAL RADIOLOGY | Age: 62
DRG: 446 | End: 2022-08-13
Payer: MEDICAID

## 2022-08-13 ENCOUNTER — HOSPITAL ENCOUNTER (INPATIENT)
Age: 62
LOS: 9 days | Discharge: HOME | DRG: 446 | End: 2022-08-22
Attending: EMERGENCY MEDICINE | Admitting: INTERNAL MEDICINE
Payer: MEDICAID

## 2022-08-13 DIAGNOSIS — N20.1 CALCULUS OF URETER: ICD-10-CM

## 2022-08-13 DIAGNOSIS — N39.0 URINARY TRACT INFECTION IN MALE: Primary | ICD-10-CM

## 2022-08-13 DIAGNOSIS — R55 SYNCOPE AND COLLAPSE: ICD-10-CM

## 2022-08-13 LAB
A/G RATIO: 1 (ref 1.1–2.2)
ALBUMIN SERPL-MCNC: 4.1 G/DL (ref 3.4–5)
ALP BLD-CCNC: 99 U/L (ref 40–129)
ALT SERPL-CCNC: 10 U/L (ref 10–40)
ANION GAP SERPL CALCULATED.3IONS-SCNC: 9 MMOL/L (ref 3–16)
AST SERPL-CCNC: 19 U/L (ref 15–37)
BACTERIA: ABNORMAL /HPF
BASOPHILS ABSOLUTE: 0.1 K/UL (ref 0–0.2)
BASOPHILS RELATIVE PERCENT: 1.1 %
BILIRUB SERPL-MCNC: 0.3 MG/DL (ref 0–1)
BILIRUBIN URINE: NEGATIVE
BLOOD, URINE: ABNORMAL
BUN BLDV-MCNC: 21 MG/DL (ref 7–20)
CALCIUM SERPL-MCNC: 9.7 MG/DL (ref 8.3–10.6)
CHLORIDE BLD-SCNC: 92 MMOL/L (ref 99–110)
CLARITY: ABNORMAL
CO2: 31 MMOL/L (ref 21–32)
COLOR: YELLOW
CREAT SERPL-MCNC: 2.1 MG/DL (ref 0.8–1.3)
EOSINOPHILS ABSOLUTE: 0.1 K/UL (ref 0–0.6)
EOSINOPHILS RELATIVE PERCENT: 1.1 %
EPITHELIAL CELLS, UA: 0 /HPF (ref 0–5)
GFR AFRICAN AMERICAN: 39
GFR NON-AFRICAN AMERICAN: 32
GLUCOSE BLD-MCNC: 415 MG/DL (ref 70–99)
GLUCOSE URINE: >=1000 MG/DL
HCT VFR BLD CALC: 35.3 % (ref 40.5–52.5)
HEMOGLOBIN: 11.7 G/DL (ref 13.5–17.5)
HYALINE CASTS: 4 /LPF (ref 0–8)
KETONES, URINE: ABNORMAL MG/DL
LACTIC ACID, SEPSIS: 2.5 MMOL/L (ref 0.4–1.9)
LEUKOCYTE ESTERASE, URINE: ABNORMAL
LYMPHOCYTES ABSOLUTE: 0.8 K/UL (ref 1–5.1)
LYMPHOCYTES RELATIVE PERCENT: 15.3 %
MCH RBC QN AUTO: 29.8 PG (ref 26–34)
MCHC RBC AUTO-ENTMCNC: 33.2 G/DL (ref 31–36)
MCV RBC AUTO: 89.9 FL (ref 80–100)
MICROSCOPIC EXAMINATION: YES
MONOCYTES ABSOLUTE: 0.4 K/UL (ref 0–1.3)
MONOCYTES RELATIVE PERCENT: 7.6 %
NEUTROPHILS ABSOLUTE: 4.1 K/UL (ref 1.7–7.7)
NEUTROPHILS RELATIVE PERCENT: 74.9 %
NITRITE, URINE: NEGATIVE
PDW BLD-RTO: 13.6 % (ref 12.4–15.4)
PH UA: 6 (ref 5–8)
PLATELET # BLD: 191 K/UL (ref 135–450)
PMV BLD AUTO: 8 FL (ref 5–10.5)
POTASSIUM SERPL-SCNC: 3.8 MMOL/L (ref 3.5–5.1)
PROCALCITONIN: 0.07 NG/ML (ref 0–0.15)
PROTEIN UA: 30 MG/DL
RBC # BLD: 3.93 M/UL (ref 4.2–5.9)
RBC UA: 1 /HPF (ref 0–4)
SODIUM BLD-SCNC: 132 MMOL/L (ref 136–145)
SPECIFIC GRAVITY UA: 1.02 (ref 1–1.03)
TOTAL PROTEIN: 8.4 G/DL (ref 6.4–8.2)
TROPONIN: <0.01 NG/ML
URINE REFLEX TO CULTURE: YES
URINE TYPE: ABNORMAL
UROBILINOGEN, URINE: 0.2 E.U./DL
WBC # BLD: 5.4 K/UL (ref 4–11)
WBC UA: 379 /HPF (ref 0–5)

## 2022-08-13 PROCEDURE — 81001 URINALYSIS AUTO W/SCOPE: CPT

## 2022-08-13 PROCEDURE — 99285 EMERGENCY DEPT VISIT HI MDM: CPT

## 2022-08-13 PROCEDURE — 80053 COMPREHEN METABOLIC PANEL: CPT

## 2022-08-13 PROCEDURE — 84145 PROCALCITONIN (PCT): CPT

## 2022-08-13 PROCEDURE — 93005 ELECTROCARDIOGRAM TRACING: CPT | Performed by: NURSE PRACTITIONER

## 2022-08-13 PROCEDURE — 87086 URINE CULTURE/COLONY COUNT: CPT

## 2022-08-13 PROCEDURE — 87186 SC STD MICRODIL/AGAR DIL: CPT

## 2022-08-13 PROCEDURE — 87040 BLOOD CULTURE FOR BACTERIA: CPT

## 2022-08-13 PROCEDURE — 87077 CULTURE AEROBIC IDENTIFY: CPT

## 2022-08-13 PROCEDURE — 71045 X-RAY EXAM CHEST 1 VIEW: CPT

## 2022-08-13 PROCEDURE — 1200000000 HC SEMI PRIVATE

## 2022-08-13 PROCEDURE — 85025 COMPLETE CBC W/AUTO DIFF WBC: CPT

## 2022-08-13 PROCEDURE — 83605 ASSAY OF LACTIC ACID: CPT

## 2022-08-13 PROCEDURE — 84484 ASSAY OF TROPONIN QUANT: CPT

## 2022-08-13 PROCEDURE — 36415 COLL VENOUS BLD VENIPUNCTURE: CPT

## 2022-08-13 ASSESSMENT — ENCOUNTER SYMPTOMS
SHORTNESS OF BREATH: 0
DIARRHEA: 0
ABDOMINAL PAIN: 0
VOMITING: 0
NAUSEA: 0
CHEST TIGHTNESS: 0

## 2022-08-13 NOTE — ED PROVIDER NOTES
[facility]        Pt Name: Aimee Ken  MRN: [id number]  [place] 1960  Date of evaluation: 8/13/2022  Provider: LIZ Rios CNP  PCP: Eduar Haley PA-C  Note Started: 6:21 PM EDT        I have seen and evaluated this patient with my supervising physician Diamond Wu MD.    [facility]       Chief Complaint   Patient presents with    Fatigue     Had dialysis today and had syncopeal episode at eating and is extremely weak per [facility]       HISTORY OF PRESENT ILLNESS   (Location, Timing/Onset, Context/Setting, Quality, Duration, Modifying Factors, Severity, Associated Signs and Symptoms)  Note limiting factors. Chief Complaint: syncope     Aimee Ken is a 58 y.o. male who presents to the ER with complaint of syncope following dialysis today while eating with increasing fatigue (since starting dialysis in June), no focal weakness. States that he was dizzy and found himself unconscious on the floor while eating, called EMS and transported to the ER. States that this is the second time that he experienced a syncopal event following dialysis. He believes that the last event was in July when he was brought to the ER for cystitis and possibly infected kidney stone, he did complete antibiotics. States that he passed out at dialysis center in July. States that he is feeling fine other than needing to move his bowels at this time, reports that he does move his bowels frequently. No vomiting or diarrhea. Denies any headache, fever, visual disturbances. No chest pain or pressure. No neck or back pain. No shortness of breath, cough, or congestion. No abdominal pain, nausea, vomiting, diarrhea, constipation, or dysuria. No rash. Nursing Notes were all reviewed and agreed with or any disagreements were addressed in the HPI.     REVIEW OF SYSTEMS    (2-9 systems for level 4, 10 or more for level 5)     Review of Systems   Constitutional:  Negative for activity change, chills and fever. Respiratory:  Negative for chest tightness and shortness of breath. Cardiovascular:  Negative for chest pain. Gastrointestinal:  Negative for abdominal pain, diarrhea, nausea and vomiting. Genitourinary:  Negative for dysuria. Neurological:  Positive for dizziness, syncope and weakness. All other systems reviewed and are negative. Positives and Pertinent negatives as per HPI. Except as noted above in the ROS, all other systems were reviewed and negative. PAST MEDICAL HISTORY     Past Medical History:   Diagnosis Date    Diabetes mellitus ([facility] [address]     Hypertension          SURGICAL HISTORY     Past Surgical History:   Procedure Laterality Date    CYSTOSCOPY Right 5/26/2022    CYSTOSCOPY, BILATERAL RETROGRADE PYELOGRAM, PLACEMENT OF RIGHT URETERAL STENT performed by Briana Hess MD at [facility] Right 7/13/2022    CYSTOSCOPY WITH RIGHT URETEROSCOPY WITH HOLMIUM LASER LITHOTRIPSY, STONE MANIPULATION, STONE BASKET,  RIGHT STENT PLACEMENT-[facility] #[phone] performed by Nelida Bach MD at [address]    IR TUNNELED [address] 5 YEARS  5/28/2022    IR TUNNELED CATHETER PLACEMENT GREATER THAN 5 YEARS 5/28/2022 [facility] SPECIAL PROCEDURES    PROSTATECTOMY N/A 5/16/2022    ROBOTIC LAPAROSCOPIC RADICAL PROSTATECTOMY WITH BILATERAL LYMPH NODE DISSECTION AND BILATERAL NERVE SPARE performed by eNlida Bach MD at [facility]       Previous Medications    AMLODIPINE (NORVASC) 5 MG TABLET    Take 1 tablet by mouth daily    BLOOD GLUCOSE MONITOR KIT AND SUPPLIES    Dispense sufficient amount for indicated testing frequency plus additional to accommodate PRN testing needs. Dispense all needed supplies to include: monitor, strips, lancing device, lancets, control solutions, alcohol swabs.     BLOOD GLUCOSE MONITOR STRIPS    Test3 times a day & as needed for symptoms of irregular blood glucose. Dispense sufficient amount for indicated testing frequency plus additional to accommodate PRN testing needs. INSULIN GLARGINE (LANTUS SOLOSTAR) 100 UNIT/ML INJECTION PEN    Inject 8 Units into the skin nightly    INSULIN LISPRO, 1 UNIT DIAL, (HUMALOG KWIKPEN) 100 UNIT/ML SOPN    Glucose: Dose: If <139   - No Insulin 140-199   --- 1 Unit 200-249   --  2  Units 250-299           3 Units  300-349           4 Units 350-400           5 Units  Above 400       6 Units    INSULIN PEN NEEDLE 29G X 12MM MISC    1 each by Does not apply route daily    LANCETS MISC    1 each by Does not apply route 2 times daily    OXYBUTYNIN (DITROPAN) 5 MG TABLET    Take 1 tablet by mouth 3 times daily as needed (bladder spasms)    TAMSULOSIN (FLOMAX) 0.4 MG CAPSULE    Take 1 capsule by mouth daily For stent pain and stone passage. ALLERGIES     Patient has no known allergies. FAMILYHISTORY     No family history on file. SOCIAL HISTORY       Social History     Tobacco Use    Smoking status: Never    Smokeless tobacco: Never   Vaping Use    Vaping Use: Never used   Substance Use Topics    Alcohol use: Never    Drug use: Never       SCREENINGS             PHYSICAL EXAM    (up to 7 for level 4, 8 or more for level 5)     ED Triage Vitals [08/13/22 1726]   BP Temp Temp Source Pulse Resp SpO2 Height Weight   (!) 157/96 97.9 °F (36.6 °C) Oral -- 20 100 % -- --       Physical Exam  Vitals and nursing note reviewed. Constitutional:       Appearance: He is well-developed. He is not diaphoretic. HENT:      Head: Normocephalic and atraumatic. Right Ear: External ear normal.      Left Ear: External ear normal.   Eyes:      General:         Right eye: No discharge. Left eye: No discharge. Neck:      Vascular: No JVD. Cardiovascular:      Rate and Rhythm: Normal rate. Pulses: Normal pulses. Pulmonary:      Effort: Pulmonary effort is normal. No respiratory distress. interpretation of EKG. RADIOLOGY:   Non-plain film images such as CT, Ultrasound and MRI are read by the radiologist. Plain radiographic images are visualized and preliminarily interpreted by the ED Provider with the below findings:        Interpretation per the Radiologist below, if available at the time of this note:    XR CHEST PORTABLE   Final Result   Status post right-sided dialysis catheter placement in good position. Mild cardiomegaly which is less prominent with no acute pulmonary abnormality. US RENAL COMPLETE    Result Date: 8/11/2022  EXAMINATION: RETROPERITONEAL ULTRASOUND OF THE KIDNEYS AND URINARY BLADDER 8/10/2022 COMPARISON: None HISTORY: ORDERING SYSTEM PROVIDED HISTORY: Calculus of kidney TECHNOLOGIST PROVIDED HISTORY: Reason for Exam: calculus of renal FINDINGS: Kidneys: The right kidney measures 8.6 x 4.5 x 3.8 cm in length and the left kidney measures 9.0 x 4.2 x 4.0 cm in length. A 6 mm stone by 4 mm stone is seen in the right kidney. A 5 mm x 5 mm stone is also seen in the right kidney. There is mild right-sided hydronephrosis. No hydronephrosis on the left. Bladder: Bladder is collapsed and not well evaluated     Small renal stones on the right with mild right-sided hydronephrosis.  No stones or hydronephrosis on the left           PROCEDURES   Unless otherwise noted below, none     Procedures    CRITICAL CARE TIME       CONSULTS:  IP CONSULT TO UROLOGY      EMERGENCY DEPARTMENT COURSE and DIFFERENTIAL DIAGNOSIS/MDM:   Vitals:    Vitals:    08/13/22 1813 08/13/22 1823 08/13/22 1827 08/13/22 1833   BP: (!) 157/87   (!) 171/89   Pulse:   90    Resp:       Temp:       TempSrc:       SpO2: 98% 99%  99%       Patient was given the following medications:  Medications   meropenem (MERREM) 1,000 mg in sodium chloride 0.9 % 100 mL IVPB (mini-bag) (has no administration in time range)         Is this patient to be included in the SEP-1 Core Measure due to severe sepsis or septic shock? No   Exclusion criteria - the patient is NOT to be included for SEP-1 Core Measure due to:  May have criteria for sepsis, but does not meet criteria for severe sepsis or septic shock    Briefly, this is a 58year old male with medical history including bacteremia, diabetes, CKD on dialysis, kidney stone, that presents to the emergency department with syncopal event following dialysis. On exam, the patient has no complaints. Labs are concerning for significant infection in urine. Upon chart review, the patient has had multiple resulted urinary tract infections I did review her recent urine culture which grew ESBL. Meropenem was ordered. Patient does have an elevated lactate. He will be admitted for evaluation of syncope and treatment of complicated UTI    FINAL IMPRESSION      1. Urinary tract infection in male    2. Syncope and collapse          DISPOSITION/PLAN   DISPOSITION Decision To Admit 08/13/2022 09:16:54 PM      PATIENT REFERRED TO:  No follow-up provider specified.     DISCHARGE MEDICATIONS:  New Prescriptions    No medications on file       DISCONTINUED MEDICATIONS:  Discontinued Medications    No medications on file              (Please note that portions of this note were completed with a voice recognition program.  Efforts were made to edit the dictations but occasionally words are mis-transcribed.)    LIZ Atwood - CNP (electronically signed)           LIZ Atwood CNP  08/13/22 5122

## 2022-08-13 NOTE — ED PROVIDER NOTES
In addition to the advanced practice provider, I personally saw Patricia Rios and performed a substantive portion of the visit including all aspects of the medical decision making. Medical Decision Making  Patient presented to the emergency department after episode of syncope today. He just completed dialysis [place] lightheadedness, and passed out. He woke up on the ground. He has had cellulitis before. Denies any chest pressure, palpitations, or other symptoms. He did not have focal weakness or vertiginous dizziness. Patient was found have a urinary tract infection which may contribute to his presentation. He did not have evidence of sepsis. EKG  The Ekg interpreted by me in the absence of a cardiologist shows. normal sinus rhythm with a rate of 86  Axis is   Left axis deviation  QTc is  normal  LVH. Lateral T wave inversions in the precordial chest leads. T wave inversion in V4. These are unchanged in comparison to previous EKG. No significant change from prior EKG dated 5/2/2022      SEP-1  Is this patient to be included in the SEP-1 Core Measure due to severe sepsis or septic shock? No   Exclusion criteria - the patient is NOT to be included for SEP-1 Core Measure due to:  2+ SIRS criteria are not met      FINAL IMPRESSION  1. Urinary tract infection in male    2. Syncope and collapse        Blood pressure (!) 171/89, pulse 90, temperature 97.9 °F (36.6 °C), temperature source Oral, resp. rate 20, SpO2 99 %. For further details of [facility] emergency department encounter, please see documentation by advanced practice provider, Bisi Birmingham CNP.         Verna Walden MD  08/17/22 7278

## 2022-08-14 LAB
ALBUMIN SERPL-MCNC: 4 G/DL (ref 3.4–5)
ANION GAP SERPL CALCULATED.3IONS-SCNC: 5 MMOL/L (ref 3–16)
BASOPHILS ABSOLUTE: 0.1 K/UL (ref 0–0.2)
BASOPHILS RELATIVE PERCENT: 1.1 %
BUN BLDV-MCNC: 29 MG/DL (ref 7–20)
CALCIUM SERPL-MCNC: 10 MG/DL (ref 8.3–10.6)
CHLORIDE BLD-SCNC: 98 MMOL/L (ref 99–110)
CO2: 35 MMOL/L (ref 21–32)
CREAT SERPL-MCNC: 2.6 MG/DL (ref 0.8–1.3)
EKG ATRIAL RATE: 86 BPM
EKG DIAGNOSIS: NORMAL
EKG P AXIS: 50 DEGREES
EKG P-R INTERVAL: 162 MS
EKG Q-T INTERVAL: 384 MS
EKG QRS DURATION: 84 MS
EKG QTC CALCULATION (BAZETT): 459 MS
EKG R AXIS: 0 DEGREES
EKG T AXIS: 16 DEGREES
EKG VENTRICULAR RATE: 86 BPM
EOSINOPHILS ABSOLUTE: 0.2 K/UL (ref 0–0.6)
EOSINOPHILS RELATIVE PERCENT: 2.7 %
ESTIMATED AVERAGE GLUCOSE: 154.2 MG/DL
GFR AFRICAN AMERICAN: 30
GFR NON-AFRICAN AMERICAN: 25
GLUCOSE BLD-MCNC: 121 MG/DL (ref 70–99)
GLUCOSE BLD-MCNC: 122 MG/DL (ref 70–99)
GLUCOSE BLD-MCNC: 154 MG/DL (ref 70–99)
GLUCOSE BLD-MCNC: 216 MG/DL (ref 70–99)
GLUCOSE BLD-MCNC: 217 MG/DL (ref 70–99)
GLUCOSE BLD-MCNC: 237 MG/DL (ref 70–99)
HBA1C MFR BLD: 7 %
HCT VFR BLD CALC: 35.9 % (ref 40.5–52.5)
HEMOGLOBIN: 11.7 G/DL (ref 13.5–17.5)
LACTIC ACID, SEPSIS: 2 MMOL/L (ref 0.4–1.9)
LYMPHOCYTES ABSOLUTE: 1.5 K/UL (ref 1–5.1)
LYMPHOCYTES RELATIVE PERCENT: 25.1 %
MAGNESIUM: 2.2 MG/DL (ref 1.8–2.4)
MCH RBC QN AUTO: 29.2 PG (ref 26–34)
MCHC RBC AUTO-ENTMCNC: 32.6 G/DL (ref 31–36)
MCV RBC AUTO: 89.6 FL (ref 80–100)
MONOCYTES ABSOLUTE: 0.8 K/UL (ref 0–1.3)
MONOCYTES RELATIVE PERCENT: 12.8 %
NEUTROPHILS ABSOLUTE: 3.6 K/UL (ref 1.7–7.7)
NEUTROPHILS RELATIVE PERCENT: 58.3 %
PDW BLD-RTO: 13.6 % (ref 12.4–15.4)
PERFORMED ON: ABNORMAL
PHOSPHORUS: 3.7 MG/DL (ref 2.5–4.9)
PLATELET # BLD: 196 K/UL (ref 135–450)
PMV BLD AUTO: 7.4 FL (ref 5–10.5)
POTASSIUM SERPL-SCNC: 4 MMOL/L (ref 3.5–5.1)
RBC # BLD: 4 M/UL (ref 4.2–5.9)
SODIUM BLD-SCNC: 138 MMOL/L (ref 136–145)
WBC # BLD: 6.2 K/UL (ref 4–11)

## 2022-08-14 PROCEDURE — 93010 ELECTROCARDIOGRAM REPORT: CPT | Performed by: INTERNAL MEDICINE

## 2022-08-14 PROCEDURE — 6370000000 HC RX 637 (ALT 250 FOR IP): Performed by: PHYSICIAN ASSISTANT

## 2022-08-14 PROCEDURE — 36415 COLL VENOUS BLD VENIPUNCTURE: CPT

## 2022-08-14 PROCEDURE — 6360000002 HC RX W HCPCS: Performed by: NURSE PRACTITIONER

## 2022-08-14 PROCEDURE — 6360000002 HC RX W HCPCS: Performed by: PHYSICIAN ASSISTANT

## 2022-08-14 PROCEDURE — 83605 ASSAY OF LACTIC ACID: CPT

## 2022-08-14 PROCEDURE — 2580000003 HC RX 258: Performed by: PHYSICIAN ASSISTANT

## 2022-08-14 PROCEDURE — 1200000000 HC SEMI PRIVATE

## 2022-08-14 PROCEDURE — 83036 HEMOGLOBIN GLYCOSYLATED A1C: CPT

## 2022-08-14 PROCEDURE — 80069 RENAL FUNCTION PANEL: CPT

## 2022-08-14 PROCEDURE — 85025 COMPLETE CBC W/AUTO DIFF WBC: CPT

## 2022-08-14 PROCEDURE — 83735 ASSAY OF MAGNESIUM: CPT

## 2022-08-14 PROCEDURE — 2580000003 HC RX 258: Performed by: NURSE PRACTITIONER

## 2022-08-14 RX ORDER — SODIUM CHLORIDE 0.9 % (FLUSH) 0.9 %
5-40 SYRINGE (ML) INJECTION EVERY 12 HOURS SCHEDULED
Status: DISCONTINUED | OUTPATIENT
Start: 2022-08-14 | End: 2022-08-22 | Stop reason: HOSPADM

## 2022-08-14 RX ORDER — DEXTROSE MONOHYDRATE 100 MG/ML
INJECTION, SOLUTION INTRAVENOUS CONTINUOUS PRN
Status: DISCONTINUED | OUTPATIENT
Start: 2022-08-14 | End: 2022-08-21 | Stop reason: SDUPTHER

## 2022-08-14 RX ORDER — INSULIN GLARGINE 100 [IU]/ML
8 INJECTION, SOLUTION SUBCUTANEOUS NIGHTLY
Status: DISCONTINUED | OUTPATIENT
Start: 2022-08-14 | End: 2022-08-19

## 2022-08-14 RX ORDER — SODIUM CHLORIDE 0.9 % (FLUSH) 0.9 %
5-40 SYRINGE (ML) INJECTION PRN
Status: DISCONTINUED | OUTPATIENT
Start: 2022-08-14 | End: 2022-08-22 | Stop reason: HOSPADM

## 2022-08-14 RX ORDER — INSULIN LISPRO 100 [IU]/ML
0-4 INJECTION, SOLUTION INTRAVENOUS; SUBCUTANEOUS
Status: DISCONTINUED | OUTPATIENT
Start: 2022-08-14 | End: 2022-08-19

## 2022-08-14 RX ORDER — AMLODIPINE BESYLATE 5 MG/1
5 TABLET ORAL DAILY
Status: DISCONTINUED | OUTPATIENT
Start: 2022-08-14 | End: 2022-08-18

## 2022-08-14 RX ORDER — ACETAMINOPHEN 325 MG/1
650 TABLET ORAL EVERY 6 HOURS PRN
Status: DISCONTINUED | OUTPATIENT
Start: 2022-08-14 | End: 2022-08-22 | Stop reason: HOSPADM

## 2022-08-14 RX ORDER — ONDANSETRON 2 MG/ML
4 INJECTION INTRAMUSCULAR; INTRAVENOUS EVERY 6 HOURS PRN
Status: DISCONTINUED | OUTPATIENT
Start: 2022-08-14 | End: 2022-08-22 | Stop reason: HOSPADM

## 2022-08-14 RX ORDER — HEPARIN SODIUM 5000 [USP'U]/ML
5000 INJECTION, SOLUTION INTRAVENOUS; SUBCUTANEOUS EVERY 8 HOURS SCHEDULED
Status: DISCONTINUED | OUTPATIENT
Start: 2022-08-14 | End: 2022-08-22 | Stop reason: HOSPADM

## 2022-08-14 RX ORDER — LACTOBACILLUS RHAMNOSUS GG 10B CELL
1 CAPSULE ORAL 2 TIMES DAILY WITH MEALS
Status: DISCONTINUED | OUTPATIENT
Start: 2022-08-14 | End: 2022-08-22 | Stop reason: HOSPADM

## 2022-08-14 RX ORDER — SODIUM CHLORIDE 9 MG/ML
INJECTION, SOLUTION INTRAVENOUS PRN
Status: DISCONTINUED | OUTPATIENT
Start: 2022-08-14 | End: 2022-08-22 | Stop reason: HOSPADM

## 2022-08-14 RX ORDER — INSULIN LISPRO 100 [IU]/ML
0-4 INJECTION, SOLUTION INTRAVENOUS; SUBCUTANEOUS NIGHTLY
Status: DISCONTINUED | OUTPATIENT
Start: 2022-08-14 | End: 2022-08-19

## 2022-08-14 RX ORDER — POLYETHYLENE GLYCOL 3350 17 G/17G
17 POWDER, FOR SOLUTION ORAL DAILY PRN
Status: DISCONTINUED | OUTPATIENT
Start: 2022-08-14 | End: 2022-08-22 | Stop reason: HOSPADM

## 2022-08-14 RX ORDER — ACETAMINOPHEN 650 MG/1
650 SUPPOSITORY RECTAL EVERY 6 HOURS PRN
Status: DISCONTINUED | OUTPATIENT
Start: 2022-08-14 | End: 2022-08-22 | Stop reason: HOSPADM

## 2022-08-14 RX ADMIN — Medication 1 CAPSULE: at 17:52

## 2022-08-14 RX ADMIN — HEPARIN SODIUM 5000 UNITS: 5000 INJECTION INTRAVENOUS; SUBCUTANEOUS at 05:30

## 2022-08-14 RX ADMIN — HEPARIN SODIUM 5000 UNITS: 5000 INJECTION INTRAVENOUS; SUBCUTANEOUS at 14:11

## 2022-08-14 RX ADMIN — MEROPENEM 1000 MG: 1 INJECTION, POWDER, FOR SOLUTION INTRAVENOUS at 00:43

## 2022-08-14 RX ADMIN — INSULIN LISPRO 1 UNITS: 100 INJECTION, SOLUTION INTRAVENOUS; SUBCUTANEOUS at 13:12

## 2022-08-14 RX ADMIN — SODIUM CHLORIDE: 9 INJECTION, SOLUTION INTRAVENOUS at 00:42

## 2022-08-14 RX ADMIN — INSULIN GLARGINE 8 UNITS: 100 INJECTION, SOLUTION SUBCUTANEOUS at 01:00

## 2022-08-14 RX ADMIN — SODIUM CHLORIDE, PRESERVATIVE FREE 10 ML: 5 INJECTION INTRAVENOUS at 09:21

## 2022-08-14 RX ADMIN — INSULIN LISPRO 1 UNITS: 100 INJECTION, SOLUTION INTRAVENOUS; SUBCUTANEOUS at 17:57

## 2022-08-14 RX ADMIN — MEROPENEM 1000 MG: 1 INJECTION, POWDER, FOR SOLUTION INTRAVENOUS at 17:54

## 2022-08-14 RX ADMIN — MEROPENEM 1000 MG: 1 INJECTION, POWDER, FOR SOLUTION INTRAVENOUS at 09:21

## 2022-08-14 RX ADMIN — HEPARIN SODIUM 5000 UNITS: 5000 INJECTION INTRAVENOUS; SUBCUTANEOUS at 00:59

## 2022-08-14 RX ADMIN — Medication 1 CAPSULE: at 09:20

## 2022-08-14 RX ADMIN — AMLODIPINE BESYLATE 5 MG: 5 TABLET ORAL at 09:20

## 2022-08-14 NOTE — CARE COORDINATION
Discharge Planning Note:    Chart reviewed and it appears that patient has minimal needs for discharge at this time. Discussed with patients nurse and requested that case management be notified if discharge needs are identified.     - Current discharge plan is for the patient to return home with no needs. Case management will continue to follow progress and update discharge plan as needed.       Risk of Readmission Score: 19%    RONALDO Sanchez RN    [facility]  Phone: [phone]

## 2022-08-14 NOTE — PROGRESS NOTES
.4 Eyes Skin Assessment     NAME:  Geovanna Rocah  YOB: 1960  MEDICAL RECORD NUMBER:  [id number]    The patient is being assess for  Admission    I agree that 2 RN's have performed a thorough Head to Toe Skin Assessment on the patient. ALL assessment sites listed below have been assessed. Areas assessed by both nurses:    Head, Face, Ears, Shoulders, Back, Chest, Arms, Elbows, Hands, Sacrum. Buttock, Coccyx, Ischium, and Legs. Feet and Heels        Does the Patient have a Wound?  No noted wound(s)       Erasmo Prevention initiated:  No   Wound Care Orders initiated:  No    Pressure Injury (Stage 3,4, Unstageable, DTI, NWPT, and Complex wounds) if present place referral/consult order under [de-identified] No    New and Established Ostomies if present place consult order under : No      Nurse 1 eSignature: Electronically signed by Barbra Gonzalez RN on 8/14/22 at 1:31 AM EDT    **SHARE this note so that the co-signing nurse is able to place an eSignature**    Nurse 2 eSignature: Electronically signed by Rosita Richardson RN on 8/14/22 at 4:33 AM EDT

## 2022-08-14 NOTE — PROGRESS NOTES
Pt brought up to unit in stable condition. Admission assessment completed. VSS. Oriented pt to room & educated on bed alarm & call light.

## 2022-08-14 NOTE — H&P
Hospital Medicine History & Physical      Patient Name: Bart Ponce    : 1960    PCP: Mario Cifuentes PA-C    Date of Service:  Patient seen and examined on 2022     Chief Complaint: Syncope    History Of Present Illness:    Bart Ponce is a 58 y.o. male who presented to ED for evaluation of syncope following dialysis today. Patient reports he was eating when he became dizzy and then woke up on the floor. He reports he has had increasing fatigue since starting dialysis in . He denies any focal weakness. He reports he had a similar episode following dialysis last month. He was found to have a UTI and reports he completed antibiotic course. He denies fever, chest pain, shortness of breath, cough, abdominal pain, nausea, vomiting, diarrhea, constipation, urinary symptoms. He denies hitting his head, neck pain or stiffness, changes in vision, difficulty swallowing, numbness/tingling in extremities. Patient reports that he currently feels at baseline. He states he is hungry and is requesting to eat but denies any other concerns at this time. Past Medical History:    Patient has a past medical history of Diabetes mellitus ([address] and Hypertension. Past Surgical History:    Patient has a past surgical history that includes Prostatectomy (N/A, 2022); Cystoscopy (Right, 2022); IR TUNNELED CVC PLACE WO SQ PORT/PUMP > 5 YEARS (2022); and Cystoscopy (Right, 2022). Medications Prior to Admission:      Prior to Admission medications    Medication Sig Start Date End Date Taking? Authorizing Provider   tamsulosin (FLOMAX) 0.4 MG capsule Take 1 capsule by mouth daily For stent pain and stone passage.  22  Kimberly Son MD   oxybutynin (DITROPAN) 5 MG tablet Take 1 tablet by mouth 3 times daily as needed (bladder spasms) 22  Kimberly Son MD   amLODIPine (NORVASC) 5 MG tablet Take 1 tablet by mouth daily 22   Mary Mckeon Devin Dunbar MD   Lancets MISC 1 each by Does not apply route 2 times daily 6/6/22   Gaurang Person MD   blood glucose monitor kit and supplies Dispense sufficient amount for indicated testing frequency plus additional to accommodate PRN testing needs. Dispense all needed supplies to include: monitor, strips, lancing device, lancets, control solutions, alcohol swabs. 6/6/22   Gaurang Person MD   blood glucose monitor strips Test3 times a day & as needed for symptoms of irregular blood glucose. Dispense sufficient amount for indicated testing frequency plus additional to accommodate PRN testing needs. 6/6/22   Dominique Obregon MD   insulin glargine (LANTUS SOLOSTAR) 100 UNIT/ML injection pen Inject 8 Units into the skin nightly 6/6/22   Dominique Obregon MD   insulin lispro, 1 Unit Dial, (HUMALOG KWIKPEN) 100 UNIT/ML SOPN Glucose: Dose: If <139   - No Insulin 140-199   --- 1 Unit 200-249   --  2  Units 250-299           3 Units  300-349           4 Units 350-400           5 Units  Above 400       6 Units 6/6/22   Dominique Obregon MD   Insulin Pen Needle 29G X 12MM MISC 1 each by Does not apply route daily 6/6/22   Gaurang Person MD       Allergies:  Patient has no known allergies. Social History:      TOBACCO:   reports that he has never smoked. He has never used smokeless tobacco.  ETOH:   reports no history of alcohol use. DRUGS:  reports no history of drug use. Family History:      Reviewed in detail positive as follows:    History reviewed. No pertinent family history. REVIEW OF SYSTEMS:   Pertinent positives as noted in the HPI. All other systems reviewed and negative. PHYSICAL EXAM PERFORMED:    BP (!) 166/87   Pulse 79   Temp 98 °F (36.7 °C) (Oral)   Resp 19   Ht 5' 7\" (1.702 m)   SpO2 99%   BMI 22.73 kg/m²     General appearance:  Awake, alert, no apparent distress  HEENT:  Normocephalic, atraumatic without obvious deformity. PERRL. EOM intact. Conjunctivae/corneas clear.   Neck: Supple, with full range of motion. No JVD. Trachea midline. Respiratory:  Clear to auscultation bilaterally without rales, wheezes, or rhonchi. Normal respiratory effort. Cardiovascular:  Regular rate and rhythm without murmurs, rubs or gallops. Abdomen: Soft, NT, ND, without rebound or guarding. Normal bowel sounds. Extremities:  No clubbing, cyanosis, or edema bilaterally. Full range of motion without deformity. +2 palpable pulses, equal bilaterally. Capillary refill brisk,< 3 seconds   Skin: No rashes or lesions. Warm/dry. Neurologic:  Neurovascularly intact without any focal sensory/motor deficits. Cranial nerves: II-XII intact, grossly non-focal. Alert and oriented x 3. Normal speech. Psychiatric:  Thought content appropriate, normal insight. Labs:   CBC   Recent Labs     08/13/22 1950   WBC 5.4   HGB 11.7*   HCT 35.3*           RENAL  Recent Labs     08/13/22 1950   *   K 3.8   CL 92*   CO2 31   BUN 21*   CREATININE 2.1*       LFTS  Recent Labs     08/13/22 1950   AST 19   ALT 10   BILITOT 0.3   ALKPHOS 99       COAG  No results for input(s): INR in the last 72 hours. CARDIAC ENZYMES  Recent Labs     08/13/22 1950   TROPONINI <0.01       LIPIDS  No results found for: CHOL, TRIG, HDL, LDLCALC      Radiology:     XR CHEST PORTABLE   Final Result   Status post right-sided dialysis catheter placement in good position. Mild cardiomegaly which is less prominent with no acute pulmonary abnormality. EKG:   Read by ED physician in the absence of a cardiologist:  \"normal sinus rhythm with a rate of 86  Axis is   Left axis deviation  QTc is  normal  LVH. Lateral T wave inversions in the precordial chest leads. T wave inversion in V4. These are unchanged in comparison to previous EKG.   No significant change from prior EKG dated 5/2/2022\"      ASSESSMENT/PLAN:    Syncope and collapse   Twice in the last month  Etiology is unclear but suspect it is related to hypotension/volume depletion as both episodes occurred shortly after dialysis  Check Orthostatic BP and Pulse  Monitor on telemetry  Cardiology consulted    Urinary tract infection  Urine culture sent. Previous urine culture results reviewed. [facility] started in ED due to hx of ESBL UTI. Will continue    ESRD on HD  HD T/Th/Sat. Last HD today  Renally dose medications  Monitor for electrolyte abnormalities  Nephrology consulted    DM2  Check HbA1c  Continue home lantus  Accuchecks, SSI  Hypoglycemia protocol      HTN  Continue home norvasc      DVT prophylaxis: SQ heparin  Probiotic if on abx: Yes    Diet: ADULT DIET; Regular; 4 carb choices (60 gm/meal)  Code Status: Full Code    Consults:  IP CONSULT TO NEPHROLOGY  IP CONSULT TO CARDIOLOGY    Disposition: Admit to Inpatient   ELOS: Greater than two midnights due to medical therapy     Ilan Gallardo PA-C    Thank you Opal Rasheed PA-C for the opportunity to be involved in this patient's care. If you have any questions or concerns please feel free to contact me at [phone].

## 2022-08-14 NOTE — CONSULTS
Urology Consult Note  [facility]     Patient: Moraima Montejo MRN: [id number]  Room/Bed: D-9219/9663-09   YOB: 1960  Age/Sex: 58 y.o.male  Admission Date: 8/13/2022     Date of Service:  8/14/2022    Consulting Physician: Loren Bhagat MD  Admitting/Requesting Physician: Diana Celis MD  Primary Care Physician: Leilani Francois PA-C    Reason for Consult: Complex urologic history with UTI after prostatectomy and ureteroscopy    ASSESSMENT/PLAN     Admission with syncope after dialysis in setting of complex urologic history status post robotic prostatectomy, also ureteroscopy for large right renal stone with hospital course complicated by sepsis and renal failure  -Recently seen in the clinic as detailed below with careful follow-up of his prostate cancer and associated side effects from his last surgery, as well as stone prevention and monitoring given his complex and high risk stone disease  -Outpatient urine culture is pending, UA here on admission with possible infection    Recommendations:  Empiric antibiotics, follow-up urine culture and adjust accordingly  Avoid Baeza catheter if possible given patient's complex urologic history and demonstration of complete voiding on bladder scans previously  Urologic follow-up as requested    All patient questions were answered. He understands the plan as listed above. HISTORY     Chief Complaint:   Chief Complaint   Patient presents with    Fatigue     Had dialysis today and had syncopeal episode at eating and is extremely weak per [facility]       History of Present Illness: Moraima Montejo is a 58 y.o. male with syncope and weakness after dialysis. Onset of symptoms was acute with improved course since that time. Symptoms are aggravated by nothing. Symptoms improved with nothing. Associated symptoms include possible urinary tract infection. Patient also reports complex urologic history as below.  He has tried the past surgical history that includes Prostatectomy (N/A, 5/16/2022); Cystoscopy (Right, 5/26/2022); IR TUNNELED CVC PLACE WO SQ PORT/PUMP > 5 YEARS (5/28/2022); and Cystoscopy (Right, 7/13/2022). Social History:  He reports that he has never smoked. He has never used smokeless tobacco. He reports that he does not drink alcohol and does not use drugs. Family History:  family history is not on file. Allergies:  No Known Allergies    Medications:  Scheduled Meds:   amLODIPine  5 mg Oral Daily    insulin glargine  8 Units SubCUTAneous Nightly    sodium chloride flush  5-40 mL IntraVENous 2 times per day    heparin (porcine)  5,000 Units SubCUTAneous 3 times per day    insulin lispro  0-4 Units SubCUTAneous TID WC    insulin lispro  0-4 Units SubCUTAneous Nightly    meropenem  1,000 mg IntraVENous Q8H    lactobacillus  1 capsule Oral BID WC     Continuous Infusions:   sodium chloride 20 mL/hr at 08/14/22 0042    dextrose       PRN Meds:sodium chloride flush, sodium chloride, acetaminophen **OR** acetaminophen, polyethylene glycol, dextrose bolus **OR** dextrose bolus, glucagon (rDNA), dextrose, ondansetron    Review of Systems:  Pertinent positives/negatives reviewed in HPI. All other systems reviewed and negative, unless noted below. Constitutional: Negative  Genitourinary: see HPI  HEENT: Negative   Cardiovascular: Negative   Respiratory: Negative   Gastrointestinal: Negative   Musculoskeletal: Negative   Neurological: Negative   Psychiatric: Negative   Integumentary: Negative     PHYSICAL EXAM     Vitals:    08/14/22 0416   BP: (!) 179/93   Pulse: 84   Resp: 19   Temp: 98 °F (36.7 °C)   SpO2: 100%     CONSTITUTIONAL: The patient is well nourished/developed, with no distress noted. NEUROLOGICAL/PSYCHIATRIC: Oriented to place and time, normal affected noted. NECK: The neck is symmetrical and supple, with no masses noted.    CARDIOVASCULAR: Regular rate and rhythm, no evidence of swelling noted.   RESPIRATORY: Normal respiratory effort with no wheezing noted. ABDOMEN: Abdomen soft, non-tender, non-distended. No enlarged liver or spleen. No hernias noted. Stool occult blood not indicated. Prior breast ectomy incisions well-healed  SKIN: Skin appears normal.  LYMPHATICS: No adenopathy noted. CVA: No CVA tenderness bilaterally. GENITOURINARY: The penis is without rash or lesions and meatus with expected size and location. The scrotum appears normal. Bilateral testicles appears to be of normal size and location. No masses or tenderness noted of testicles or epididymis. AMY: Deferred. Ins/Outs:  No intake or output data in the 24 hours ending 08/14/22 0830    LABS     CBC   Lab Results   Component Value Date/Time    WBC 6.2 08/14/2022 05:44 AM    RBC 4.00 08/14/2022 05:44 AM    HGB 11.7 08/14/2022 05:44 AM    HCT 35.9 08/14/2022 05:44 AM    MCV 89.6 08/14/2022 05:44 AM    MCH 29.2 08/14/2022 05:44 AM    MCHC 32.6 08/14/2022 05:44 AM    RDW 13.6 08/14/2022 05:44 AM     08/14/2022 05:44 AM    MPV 7.4 08/14/2022 05:44 AM     BMP   Lab Results   Component Value Date/Time     08/14/2022 05:44 AM    K 4.0 08/14/2022 05:44 AM    K 4.2 06/06/2022 05:07 AM    CL 98 08/14/2022 05:44 AM    CO2 35 08/14/2022 05:44 AM    BUN 29 08/14/2022 05:44 AM    CREATININE 2.6 08/14/2022 05:44 AM    GLUCOSE 121 08/14/2022 05:44 AM    CALCIUM 10.0 08/14/2022 05:44 AM     Urinalysis:   Lab Results   Component Value Date/Time    COLORU Yellow 08/13/2022 07:50 PM    GLUCOSEU >=1000 08/13/2022 07:50 PM    BLOODU TRACE 08/13/2022 07:50 PM    NITRU Negative 08/13/2022 07:50 PM    LEUKOCYTESUR LARGE 08/13/2022 07:50 PM     Urine culture: No results for input(s): LABURIN in the last 72 hours.   PSA: No results found for: PSA      IMAGING     CT ABDOMEN PELVIS WO CONTRAST Additional Contrast? None    Result Date: 7/21/2022  EXAMINATION: CT OF THE ABDOMEN AND PELVIS WITHOUT CONTRAST 7/21/2022 5:55 pm TECHNIQUE: CT of the abdomen and pelvis was performed without the administration of intravenous contrast. Multiplanar reformatted images are provided for review. Automated exposure control, iterative reconstruction, and/or weight based adjustment of the mA/kV was utilized to reduce the radiation dose to as low as reasonably achievable. COMPARISON: CT chest abdomen and pelvis dated 05/31/2022 HISTORY: ORDERING SYSTEM PROVIDED HISTORY: abd pain. R/o SBO TECHNOLOGIST PROVIDED HISTORY: Reason for exam:->abd pain. R/o SBO Additional Contrast?->None Decision Support Exception - unselect if not a suspected or confirmed emergency medical condition->Emergency Medical Condition (MA) Reason for Exam:  abd pain. R/o SBO FINDINGS: Lower Chest: Lung bases are clear. Organs: Liver without focal lesion. Gallbladder unremarkable. Pancreas and spleen unremarkable. Adrenals without nodule. Kidneys demonstrate moderate to severe right hydronephrosis with calculus at the right UPJ 3 mm series 2, image 102 for example with minimal periureteral stranding. Fairly decompressed distal right ureter. GI/Bowel: No focal thickening or disproportion dilatation of bowel. No inflammatory findings. Moderate colonic stool burden. Pelvis: No suspicious pelvic lesion or bulky pelvic adenopathy/free fluid. Circumferential thickening of the urinary bladder with mild distension. Peritoneum/Retroperitoneum: No bulky retroperitoneal adenopathy. No suspicious peritoneal or mesenteric process Vasculature: Grossly normal caliber of abdominal aorta and vasculature Bones/Soft Tissues: No acute osseous or soft tissue findings. There is been interval removal of right ureteral stent from prior comparison with moderate to severe right hydronephrosis however no cortical thinning. 3 mm calculus in the right proximal ureter at the UPJ could represent obstructing urolithiasis or residual calculus with nonobstructing right nephrolithiasis noted.   Circumferential thickening

## 2022-08-14 NOTE — CONSULTS
MD Thi Hoang MD Retta Loots, MD                                  Office: [phone]                 Fax: [phone]          [employer]                     NEPHROLOGY CONSULTATION NOTE:     PATIENT NAME: Stanislaw Moss  : 1960  MRN: [id number]      Name:  Stanislaw Moss Date/Time of Admission: 2022  5:10 PM    CSN: [id number] Attending Provider: Gloria Simms MD   [place] [facility] [place]24 : 1960 Age: 58 y.o. Reason for Nephrology consult. Evaluation of patient with ESRD  dependent on dialysis, admitted with generalized weakness and syncope after dialysis      History of Presenting complaint. Bobby Blind 58 y.o. of age,  And is known to have ESRD dependent on dialysis. Patient has regular hemodialysis treatment on   . At hemodialysis unit. Patient has indwelling tunneled dialysis catheter. last hemodialysis treatment was  yesterday. Patient admitted with syncope after hemodialysis treatment. No hypotension noted during admission  Remains afebrile. Feels better. Urology following patient. He has a complex urology problems which include status post robotic prostatectomy and also urethroscopy for large right renal stone which was complicated by sepsis and renal failure. Urine cultures are pending    Medications reviewed. Medical records reviewed    Past Medical History. Past Medical History:   Diagnosis Date    Diabetes mellitus ([facility] [address]     Hypertension        Medications  Which i have  Reviewed, also have reviewed home medications  Prior to Admission medications    Medication Sig Start Date End Date Taking? Authorizing Provider   tamsulosin (FLOMAX) 0.4 MG capsule Take 1 capsule by mouth daily For stent pain and stone passage.  22  Suzi Hobson MD   oxybutynin (DITROPAN) 5 MG tablet Take 1 tablet by mouth 3 times daily as needed (bladder spasms) 22  Aline Ortiz Jay Mota MD   amLODIPine (NORVASC) 5 MG tablet Take 1 tablet by mouth daily 6/7/22   Franny Laureano MD   Lancets MISC 1 each by Does not apply route 2 times daily 6/6/22   Almaz Alaniz MD   blood glucose monitor kit and supplies Dispense sufficient amount for indicated testing frequency plus additional to accommodate PRN testing needs. Dispense all needed supplies to include: monitor, strips, lancing device, lancets, control solutions, alcohol swabs. 6/6/22   Almaz Alaniz MD   blood glucose monitor strips Test3 times a day & as needed for symptoms of irregular blood glucose. Dispense sufficient amount for indicated testing frequency plus additional to accommodate PRN testing needs.  6/6/22   Almaz Alaniz MD   insulin glargine (LANTUS SOLOSTAR) 100 UNIT/ML injection pen Inject 8 Units into the skin nightly 6/6/22   Almaz Alaniz MD   insulin lispro, 1 Unit Dial, (HUMALOG KWIKPEN) 100 UNIT/ML SOPN Glucose: Dose: If <139   - No Insulin 140-199   --- 1 Unit 200-249   --  2  Units 250-299           3 Units  300-349           4 Units 350-400           5 Units  Above 400       6 Units 6/6/22   Dominique Obregon MD   Insulin Pen Needle 29G X 12MM MISC 1 each by Does not apply route daily 6/6/22   Almaz Alaniz MD     Current Facility-Administered Medications: amLODIPine (NORVASC) tablet 5 mg, 5 mg, Oral, Daily  insulin glargine (LANTUS) injection vial 8 Units, 8 Units, SubCUTAneous, Nightly  sodium chloride flush 0.9 % injection 5-40 mL, 5-40 mL, IntraVENous, 2 times per day  sodium chloride flush 0.9 % injection 5-40 mL, 5-40 mL, IntraVENous, PRN  0.9 % sodium chloride infusion, , IntraVENous, PRN  acetaminophen (TYLENOL) tablet 650 mg, 650 mg, Oral, Q6H PRN **OR** acetaminophen (TYLENOL) suppository 650 mg, 650 mg, Rectal, Q6H PRN  polyethylene glycol (GLYCOLAX) packet 17 g, 17 g, Oral, Daily PRN  dextrose bolus 10% 125 mL, 125 mL, IntraVENous, PRN **OR** dextrose bolus 10% 250 mL, 250 mL, IntraVENous, PRN  glucagon (rDNA) injection 1 mg, 1 mg, SubCUTAneous, PRN  dextrose 10 % infusion, , IntraVENous, Continuous PRN  ondansetron (ZOFRAN) injection 4 mg, 4 mg, IntraVENous, Q6H PRN  heparin (porcine) injection 5,000 Units, 5,000 Units, SubCUTAneous, 3 times per day  insulin lispro (HUMALOG) injection vial 0-4 Units, 0-4 Units, SubCUTAneous, TID WC  insulin lispro (HUMALOG) injection vial 0-4 Units, 0-4 Units, SubCUTAneous, Nightly  meropenem (MERREM) 1,000 mg in sodium chloride 0.9 % 100 mL IVPB (mini-bag), 1,000 mg, IntraVENous, Q8H  lactobacillus (CULTURELLE) capsule 1 capsule, 1 capsule, Oral, BID WC   sodium chloride 20 mL/hr at 08/14/22 0042    dextrose           Allergies. No Known Allergies    Social History. Social History     Socioeconomic History    Marital status:      Spouse name: Not on file    Number of children: Not on file    Years of education: Not on file    Highest education level: Not on file   Occupational History    Not on file   Tobacco Use    Smoking status: Never    Smokeless tobacco: Never   Vaping Use    Vaping Use: Never used   Substance and Sexual Activity    Alcohol use: Never    Drug use: Never    Sexual activity: Not on file   Other Topics Concern    Not on file   Social History Narrative    Not on file     Social Determinants of Health     Financial Resource Strain: Not on file   Food Insecurity: Not on file   Transportation Needs: Not on file   Physical Activity: Not on file   Stress: Not on file   Social Connections: Not on file   Intimate Partner Violence: Not on file   Housing Stability: Not on file       Family History    History reviewed. No pertinent family history. Review of Systems. I have reviewed in detail the 10 system review with admitting physician and other consultants on the case    PHYSICAL EXAMINATION.     BP (!) 157/87   Pulse 83   Temp 97.9 °F (36.6 °C) (Oral)   Resp 18   Ht 5' 7\" (1.702 m)   Wt 147 lb 4.8 oz (66.8 kg)   SpO2 97%   BMI 23.07 kg/m²   No intake or output data in the 24 hours ending 08/14/22 1138    INTAKE/OUTPUT:  No intake/output data recorded. No intake/output data recorded. Vitals:    08/14/22 1114   BP: (!) 157/87   Pulse: 83   Resp:    Temp: 97.9 °F (36.6 °C)   SpO2: 97%     No intake or output data in the 24 hours ending 08/14/22 1138    Not ill appearing. No respiratory distress  Awake alert   no hypotension  External exam of the ears and nose are normal  HENT: exam is normal  Eyes: Pupils are equal, round, and reactive to light. Lymph Nodes. No axillary or cervical lymph nodes are palpable. Neck. JVD not visible. No lymph nodes palpable. CVS.  Heart sounds are normal.Palpation of the heart is normal. No murmurs. No pericardial rub.  RS.dullness on percussion of the lower chest wall. Lung fields are clear   PA soft , bowel sounds are normal no distension and no tenderness to palpation. Skin No rash , No palpable nodules  Musculoskeletal: Normal range of motion. 1+ edema and no tenderness. CNS  No focal    Edematrace  Awake and alert   All pulses are well felt      Labs reviewed by me       CBC:   Recent Labs     08/13/22  1950 08/14/22  0544   WBC 5.4 6.2   HGB 11.7* 11.7*   HCT 35.3* 35.9*   MCV 89.9 89.6    196     BMP:   Recent Labs     08/13/22  1950 08/14/22  0544   * 138   K 3.8 4.0   CL 92* 98*   CO2 31 35*   PHOS  --  3.7   BUN 21* 29*   CREATININE 2.1* 2.6*     Magnesium:   Lab Results   Component Value Date/Time    MG 2.20 08/14/2022 05:44 AM           ASSESSMENT AND PLAN    Acute kidney injury-dependent on hemodialysis. Syncope post dialysis treatment. Improving urine output. Complicated urology history. Recurrent UTIs. Hyponatremia. PLAN      Patient admitted for syncope evaluation following dialysis treatment. - He has been on dialysis since June for CHRISSY which he developed from sepsis. Patient reports improved urine output. Monitor for renal recovery.     No immediate indications for dialysis today. - Hold hemodialysis treatment for tomorrow and reevaluate. Volume status appears stable postdialysis. No hypotension. Blood pressure is elevated at the time of admission. Patient started on meropenem. Cultures are pending. Rest of the medications are appropriate. High complexity. Continue inpatient hospital stay    Thanks for the consult . Electronically Signed:  Jimmy Treadwell MD 8/14/2022    Arterial Blood Gasses  No results for input(s): PH, PCO2, PO2 in the last 72 hours. Invalid input(s): U0EDCHZABIVT, INSPIREDO2    UA:  Recent Labs     08/13/22 1950   COLORU Yellow   PHUR 6.0   WBCUA 379*   RBCUA 1   BACTERIA None Seen   CLARITYU CLOUDY*   SPECGRAV 1.020   LEUKOCYTESUR LARGE*   UROBILINOGEN 0.2   BILIRUBINUR Negative   BLOODU TRACE*   GLUCOSEU >=1000*       LIVER PROFILE:   Recent Labs     08/13/22 1950   AST 19   ALT 10   BILITOT 0.3   ALKPHOS 99     PT/INR:    Lab Results   Component Value Date/Time    PROTIME 12.9 05/02/2022 09:56 AM    INR 1.14 05/02/2022 09:56 AM     PTT:    Lab Results   Component Value Date/Time    APTT 34.5 05/02/2022 09:56 AM     BRITTANY:  No results found for: ANATITER, BRITTANY        RADIOLOGY:    CT ABDOMEN PELVIS WO CONTRAST Additional Contrast? None    Result Date: 7/21/2022  EXAMINATION: CT OF THE ABDOMEN AND PELVIS WITHOUT CONTRAST 7/21/2022 5:55 pm TECHNIQUE: CT of the abdomen and pelvis was performed without the administration of intravenous contrast. Multiplanar reformatted images are provided for review. Automated exposure control, iterative reconstruction, and/or weight based adjustment of the mA/kV was utilized to reduce the radiation dose to as low as reasonably achievable. COMPARISON: CT chest abdomen and pelvis dated 05/31/2022 HISTORY: ORDERING SYSTEM PROVIDED HISTORY: abd pain. R/o SBO TECHNOLOGIST PROVIDED HISTORY: Reason for exam:->abd pain.  R/o SBO Additional Contrast?->None Decision Support Exception - unselect if not a suspected or confirmed emergency medical condition->Emergency Medical Condition (MA) Reason for Exam:  abd pain. R/o SBO FINDINGS: Lower Chest: Lung bases are clear. Organs: Liver without focal lesion. Gallbladder unremarkable. Pancreas and spleen unremarkable. Adrenals without nodule. Kidneys demonstrate moderate to severe right hydronephrosis with calculus at the right UPJ 3 mm series 2, image 102 for example with minimal periureteral stranding. Fairly decompressed distal right ureter. GI/Bowel: No focal thickening or disproportion dilatation of bowel. No inflammatory findings. Moderate colonic stool burden. Pelvis: No suspicious pelvic lesion or bulky pelvic adenopathy/free fluid. Circumferential thickening of the urinary bladder with mild distension. Peritoneum/Retroperitoneum: No bulky retroperitoneal adenopathy. No suspicious peritoneal or mesenteric process Vasculature: Grossly normal caliber of abdominal aorta and vasculature Bones/Soft Tissues: No acute osseous or soft tissue findings. There is been interval removal of right ureteral stent from prior comparison with moderate to severe right hydronephrosis however no cortical thinning. 3 mm calculus in the right proximal ureter at the UPJ could represent obstructing urolithiasis or residual calculus with nonobstructing right nephrolithiasis noted. Circumferential thickening of the urinary bladder concerning for cystitis. No mechanical obstructive process of bowel or discrete inflammatory change with moderate colonic stool burden retention     US RENAL COMPLETE    Result Date: 8/11/2022  EXAMINATION: RETROPERITONEAL ULTRASOUND OF THE KIDNEYS AND URINARY BLADDER 8/10/2022 COMPARISON: None HISTORY: ORDERING SYSTEM PROVIDED HISTORY: Calculus of kidney TECHNOLOGIST PROVIDED HISTORY: Reason for Exam: calculus of renal FINDINGS: Kidneys: The right kidney measures 8.6 x 4.5 x 3.8 cm in length and the left kidney measures 9.0 x 4.2 x 4.0 cm in length.  A 6 mm stone by 4 mm stone is seen in the right kidney. A 5 mm x 5 mm stone is also seen in the right kidney. There is mild right-sided hydronephrosis. No hydronephrosis on the left. Bladder: Bladder is collapsed and not well evaluated     Small renal stones on the right with mild right-sided hydronephrosis. No stones or hydronephrosis on the left     XR CHEST PORTABLE    Result Date: 8/13/2022  EXAMINATION: ONE XRAY VIEW OF THE CHEST 8/13/2022 6:41 pm COMPARISON: 05/27/2022 HISTORY: ORDERING SYSTEM PROVIDED HISTORY: SOB TECHNOLOGIST PROVIDED HISTORY: Reason for exam:->SOB Reason for Exam: Fatigue (Had dialysis today and had syncopeal episode at eating and is extremely weak per [facility]) FINDINGS: The heart is mildly enlarged and less prominent. The pulmonary vessels are normal.  No consolidation or effusion is seen. There is a dialysis catheter on the right with the tip in the distal superior vena cava. No effusion or pneumothorax is seen. Status post right-sided dialysis catheter placement in good position. Mild cardiomegaly which is less prominent with no acute pulmonary abnormality. Imaging Results.   Chest X Ray reviwed by me          Electronically Signed:  Kranthi Zhou MD 8/14/2022

## 2022-08-15 ENCOUNTER — APPOINTMENT (OUTPATIENT)
Dept: CT IMAGING | Age: 62
DRG: 446 | End: 2022-08-15
Payer: MEDICAID

## 2022-08-15 LAB
ALBUMIN SERPL-MCNC: 3.7 G/DL (ref 3.4–5)
ANION GAP SERPL CALCULATED.3IONS-SCNC: 10 MMOL/L (ref 3–16)
BUN BLDV-MCNC: 36 MG/DL (ref 7–20)
CALCIUM SERPL-MCNC: 9.8 MG/DL (ref 8.3–10.6)
CHLORIDE BLD-SCNC: 105 MMOL/L (ref 99–110)
CO2: 28 MMOL/L (ref 21–32)
CREAT SERPL-MCNC: 2.7 MG/DL (ref 0.8–1.3)
GFR AFRICAN AMERICAN: 29
GFR NON-AFRICAN AMERICAN: 24
GLUCOSE BLD-MCNC: 107 MG/DL (ref 70–99)
GLUCOSE BLD-MCNC: 150 MG/DL (ref 70–99)
GLUCOSE BLD-MCNC: 160 MG/DL (ref 70–99)
GLUCOSE BLD-MCNC: 163 MG/DL (ref 70–99)
GLUCOSE BLD-MCNC: 196 MG/DL (ref 70–99)
LV EF: 63 %
LVEF MODALITY: NORMAL
MAGNESIUM: 2 MG/DL (ref 1.8–2.4)
PERFORMED ON: ABNORMAL
PHOSPHORUS: 3.7 MG/DL (ref 2.5–4.9)
POTASSIUM SERPL-SCNC: 3.5 MMOL/L (ref 3.5–5.1)
SODIUM BLD-SCNC: 143 MMOL/L (ref 136–145)

## 2022-08-15 PROCEDURE — 82575 CREATININE CLEARANCE TEST: CPT

## 2022-08-15 PROCEDURE — 1200000000 HC SEMI PRIVATE

## 2022-08-15 PROCEDURE — 6370000000 HC RX 637 (ALT 250 FOR IP): Performed by: PHYSICIAN ASSISTANT

## 2022-08-15 PROCEDURE — 99223 1ST HOSP IP/OBS HIGH 75: CPT | Performed by: INTERNAL MEDICINE

## 2022-08-15 PROCEDURE — 80069 RENAL FUNCTION PANEL: CPT

## 2022-08-15 PROCEDURE — 2500000003 HC RX 250 WO HCPCS: Performed by: PHYSICIAN ASSISTANT

## 2022-08-15 PROCEDURE — 97162 PT EVAL MOD COMPLEX 30 MIN: CPT

## 2022-08-15 PROCEDURE — 97530 THERAPEUTIC ACTIVITIES: CPT

## 2022-08-15 PROCEDURE — 97535 SELF CARE MNGMENT TRAINING: CPT

## 2022-08-15 PROCEDURE — 83735 ASSAY OF MAGNESIUM: CPT

## 2022-08-15 PROCEDURE — 2580000003 HC RX 258: Performed by: PHYSICIAN ASSISTANT

## 2022-08-15 PROCEDURE — 74176 CT ABD & PELVIS W/O CONTRAST: CPT

## 2022-08-15 PROCEDURE — 97165 OT EVAL LOW COMPLEX 30 MIN: CPT

## 2022-08-15 PROCEDURE — 93306 TTE W/DOPPLER COMPLETE: CPT

## 2022-08-15 PROCEDURE — 36415 COLL VENOUS BLD VENIPUNCTURE: CPT

## 2022-08-15 PROCEDURE — 6360000002 HC RX W HCPCS: Performed by: PHYSICIAN ASSISTANT

## 2022-08-15 RX ORDER — LABETALOL HYDROCHLORIDE 5 MG/ML
10 INJECTION, SOLUTION INTRAVENOUS ONCE
Status: COMPLETED | OUTPATIENT
Start: 2022-08-15 | End: 2022-08-15

## 2022-08-15 RX ADMIN — HEPARIN SODIUM 5000 UNITS: 5000 INJECTION INTRAVENOUS; SUBCUTANEOUS at 21:08

## 2022-08-15 RX ADMIN — AMLODIPINE BESYLATE 5 MG: 5 TABLET ORAL at 08:25

## 2022-08-15 RX ADMIN — Medication 1 CAPSULE: at 08:25

## 2022-08-15 RX ADMIN — Medication 1 CAPSULE: at 17:36

## 2022-08-15 RX ADMIN — MEROPENEM 1000 MG: 1 INJECTION, POWDER, FOR SOLUTION INTRAVENOUS at 08:41

## 2022-08-15 RX ADMIN — HEPARIN SODIUM 5000 UNITS: 5000 INJECTION INTRAVENOUS; SUBCUTANEOUS at 14:31

## 2022-08-15 RX ADMIN — MEROPENEM 1000 MG: 1 INJECTION, POWDER, FOR SOLUTION INTRAVENOUS at 01:21

## 2022-08-15 RX ADMIN — INSULIN GLARGINE 8 UNITS: 100 INJECTION, SOLUTION SUBCUTANEOUS at 21:08

## 2022-08-15 RX ADMIN — LABETALOL HYDROCHLORIDE 10 MG: 5 INJECTION, SOLUTION INTRAVENOUS at 02:05

## 2022-08-15 RX ADMIN — HEPARIN SODIUM 5000 UNITS: 5000 INJECTION INTRAVENOUS; SUBCUTANEOUS at 05:25

## 2022-08-15 RX ADMIN — HEPARIN SODIUM 5000 UNITS: 5000 INJECTION INTRAVENOUS; SUBCUTANEOUS at 01:20

## 2022-08-15 ASSESSMENT — ENCOUNTER SYMPTOMS
BLOOD IN STOOL: 0
SHORTNESS OF BREATH: 0
ABDOMINAL PAIN: 0
CONSTIPATION: 0
EYE REDNESS: 0
PHOTOPHOBIA: 0
NAUSEA: 0
EYE DISCHARGE: 0
FACIAL SWELLING: 0
VOMITING: 0
CHEST TIGHTNESS: 0
ABDOMINAL DISTENTION: 0
COUGH: 0
DIARRHEA: 0

## 2022-08-15 NOTE — PROGRESS NOTES
[facility]    8/14/2022 8:08 PM        Name: Cy Winkler . Admitted: 8/13/2022  Primary Care Provider: Rhonda Billings (Tel: [phone])      Subjective:  . Admitted for syncopal episode and UTI  Reports some Urinary symptoms. Tolerating diet   No fever, chills. Reviewed interval ancillary notes    Current Medications  amLODIPine (NORVASC) tablet 5 mg, Daily  insulin glargine (LANTUS) injection vial 8 Units, Nightly  sodium chloride flush 0.9 % injection 5-40 mL, 2 times per day  sodium chloride flush 0.9 % injection 5-40 mL, PRN  0.9 % sodium chloride infusion, PRN  acetaminophen (TYLENOL) tablet 650 mg, Q6H PRN   Or  acetaminophen (TYLENOL) suppository 650 mg, Q6H PRN  polyethylene glycol (GLYCOLAX) packet 17 g, Daily PRN  dextrose bolus 10% 125 mL, PRN   Or  dextrose bolus 10% 250 mL, PRN  glucagon (rDNA) injection 1 mg, PRN  dextrose 10 % infusion, Continuous PRN  ondansetron (ZOFRAN) injection 4 mg, Q6H PRN  heparin (porcine) injection 5,000 Units, 3 times per day  insulin lispro (HUMALOG) injection vial 0-4 Units, TID WC  insulin lispro (HUMALOG) injection vial 0-4 Units, Nightly  meropenem (MERREM) 1,000 mg in sodium chloride 0.9 % 100 mL IVPB (mini-bag), Q8H  lactobacillus (CULTURELLE) capsule 1 capsule, BID WC        Objective:  BP (!) 152/97   Pulse 92   Temp (!) 96.3 °F (35.7 °C) (Oral)   Resp 18   Ht 5' 7\" (1.702 m)   Wt 147 lb 4.8 oz (66.8 kg)   SpO2 97%   BMI 23.07 kg/m²   No intake or output data in the 24 hours ending 08/14/22 2008   Wt Readings from Last 3 Encounters:   08/14/22 147 lb 4.8 oz (66.8 kg)   07/21/22 145 lb 1.6 oz (65.8 kg)   07/13/22 142 lb 9.6 oz (64.7 kg)       General appearance:  Appears comfortable  Eyes: Sclera clear. Pupils equal.  ENT: Moist oral mucosa. Trachea midline, no adenopathy. Cardiovascular: Regular rhythm, normal S1, S2.  No murmur. No edema in lower extremities  Respiratory: Not using accessory muscles. Good inspiratory effort. Clear to auscultation bilaterally, no wheeze or crackles. GI: Abdomen soft, no tenderness, not distended, normal bowel sounds  Musculoskeletal: No cyanosis in digits, neck supple  Neurology: CN 2-12 grossly intact. No speech or motor deficits  Psych: Normal affect. Alert and oriented in time, place and person  Skin: Warm, dry, normal turgor    Labs and Tests:  CBC:   Recent Labs     08/13/22 1950 08/14/22  0544   WBC 5.4 6.2   HGB 11.7* 11.7*    196     BMP:    Recent Labs     08/13/22 1950 08/14/22  0544   * 138   K 3.8 4.0   CL 92* 98*   CO2 31 35*   BUN 21* 29*   CREATININE 2.1* 2.6*   GLUCOSE 415* 121*     Hepatic:   Recent Labs     08/13/22 1950   AST 19   ALT 10   BILITOT 0.3   ALKPHOS 99       Discussed care with family      Problem List  Principal Problem:    UTI (urinary tract infection)  Resolved Problems:    * No resolved hospital problems. *       Assessment & Plan:   Syncope and collapse   Recurrent occurs post dialysis  Check Orthostatic BP and Pulse  Will also check ECHO cardiogram     ESBL Urinary tract infection  Urine culture sent. Cont Merrem   Follow cultures     ESRD on HD  HD T/Th/Sat. Last HD today  Nephrology on board      Diet: ADULT DIET;  Regular; 4 carb choices (60 gm/meal)  Code:Full Code  DVT PPX      April Covington MD   8/14/2022 8:08 PM

## 2022-08-15 NOTE — PROGRESS NOTES
moves upper and lower extremities spontaneously  Skin: warmand well perfused, no rashes noted on the face, or arms.      Labs:  Lab Results   Component Value Date    WBC 6.2 08/14/2022    HGB 11.7 (L) 08/14/2022    HCT 35.9 (L) 08/14/2022    MCV 89.6 08/14/2022     08/14/2022     Lab Results   Component Value Date    CREATININE 2.7 (H) 08/15/2022    BUN 36 (H) 08/15/2022     08/15/2022    K 3.5 08/15/2022     08/15/2022    CO2 28 08/15/2022       LIZ Moise - CNP   8/15/2022

## 2022-08-15 NOTE — CONSULTS
[facility]  [phone]      Chief Complaint   Patient presents with    Fatigue     Had dialysis today and had syncopeal episode at eating and is extremely weak per [address] [facility]        Reason for consult:  syncope    [employer]  #[id number] used for this interview and exam    ASSESSMENT AND PLAN:    Syncope, likely due to noncardiac reasons; increased vagal tone from eating versus volume contraction after dialysis  ESRD on dialysis  S/p complex urologic surgeries for prostate cancer  Erectile dysfunction  UTI    Plan  Echo to rule out structural heart disease  30 day event monitor at discharge  [facility] from a cardiac standpoint to trial Viagra for ED; defer to urology if other workup for ED would be needed    History of Present Illness:  Julia Nuñez is a 58 y.o. patient who presented to the hospital with complaints of syncope. I have been asked to provide consultation regarding further management and testing. Visit done via [employer]  on iPad network. His prominent complaint throughout most of our interview is that he hasn't had an erection since he had prostate surgery in May. He developed sepsis with acute kidney injury earlier this summer and has thus been on dialysis. He is still making some urine and has been wearing a diaper because of this. His syncopal episode occurred just after dialysis earlier this week while he was eating in a seated position. He felt woozy and woke up in the ambulance. He had a similar episode about 2 weeks ago while standing on a non-dialysis day. He hasn't had any chest pain, dyspnea, or dizziness. He was found to have a dirty urine in ED and was started on meropenem. Cultures are pending. Past Medical History:   has a past medical history of Diabetes mellitus ([address] and Hypertension. Surgical History:   has a past surgical history that includes Prostatectomy (N/A, 5/16/2022); Cystoscopy (Right, 5/26/2022);  IR TUNNELED CVC PLACE WO SQ PORT/PUMP > 5 YEARS (5/28/2022); and Cystoscopy (Right, 7/13/2022). Social History:   reports that he has never smoked. He has never used smokeless tobacco. He reports that he does not drink alcohol and does not use drugs. Family History:  No family history of premature coronary artery disease, aortic disease, or valve disease. Home Medications:  Were reviewed and are listed in nursing record. and/or listed below  Prior to Admission medications    Medication Sig Start Date End Date Taking? Authorizing Provider   tamsulosin (FLOMAX) 0.4 MG capsule Take 1 capsule by mouth daily For stent pain and stone passage. 7/13/22 8/12/22  Sherrie Lyon MD   oxybutynin (DITROPAN) 5 MG tablet Take 1 tablet by mouth 3 times daily as needed (bladder spasms) 7/13/22 7/20/22  Sherrie Lyon MD   amLODIPine (NORVASC) 5 MG tablet Take 1 tablet by mouth daily 6/7/22   Cameron Barrow MD   Lancets MISC 1 each by Does not apply route 2 times daily 6/6/22   Arneta Najjar, MD   blood glucose monitor kit and supplies Dispense sufficient amount for indicated testing frequency plus additional to accommodate PRN testing needs. Dispense all needed supplies to include: monitor, strips, lancing device, lancets, control solutions, alcohol swabs. 6/6/22   Arneta Najjar, MD   blood glucose monitor strips Test3 times a day & as needed for symptoms of irregular blood glucose. Dispense sufficient amount for indicated testing frequency plus additional to accommodate PRN testing needs.  6/6/22   Arneta Najjar, MD   insulin glargine (LANTUS SOLOSTAR) 100 UNIT/ML injection pen Inject 8 Units into the skin nightly 6/6/22   Arneta Najjar, MD   insulin lispro, 1 Unit Dial, (HUMALOG KWIKPEN) 100 UNIT/ML SOPN Glucose: Dose: If <139   - No Insulin 140-199   --- 1 Unit 200-249   --  2  Units 250-299           3 Units  300-349           4 Units 350-400           5 Units  Above 400       6 Units 6/6/22   Arneta Najjar, MD Insulin Pen Needle 29G X 12MM MISC 1 each by Does not apply route daily 6/6/22   Magaly Peña MD        Current Medications:  Current Facility-Administered Medications   Medication Dose Route Frequency Provider Last Rate Last Admin    amLODIPine (NORVASC) tablet 5 mg  5 mg Oral Daily Jerardo Finely, PA-C   5 mg at 08/15/22 0825    insulin glargine (LANTUS) injection vial 8 Units  8 Units SubCUTAneous Nightly Jerardo Finely, PA-C   8 Units at 08/14/22 0100    sodium chloride flush 0.9 % injection 5-40 mL  5-40 mL IntraVENous 2 times per day Jerardo Finely, PA-C   10 mL at 08/14/22 4102    sodium chloride flush 0.9 % injection 5-40 mL  5-40 mL IntraVENous PRN Jerardo Finely, PA-C        0.9 % sodium chloride infusion   IntraVENous PRN Jerardo Finely, PA-C 20 mL/hr at 08/14/22 0042 New Bag at 08/14/22 0042    acetaminophen (TYLENOL) tablet 650 mg  650 mg Oral Q6H PRN Jerardo Finely, PA-C        Or    acetaminophen (TYLENOL) suppository 650 mg  650 mg Rectal Q6H PRN Jerardo Finely, PA-C        polyethylene glycol (GLYCOLAX) packet 17 g  17 g Oral Daily PRN Jerardo Finely, PA-C        dextrose bolus 10% 125 mL  125 mL IntraVENous PRN Jerardo Finely, PA-C        Or    dextrose bolus 10% 250 mL  250 mL IntraVENous PRN Jerardo Finely, PA-C        glucagon (rDNA) injection 1 mg  1 mg SubCUTAneous PRN Jerardo Finely, PA-C        dextrose 10 % infusion   IntraVENous Continuous PRN Jerardo Finely, PA-C        ondansetron [facility]) injection 4 mg  4 mg IntraVENous Q6H PRN Jerardo Finely, PA-C        heparin (porcine) injection 5,000 Units  5,000 Units SubCUTAneous 3 times per day Jerardo Finely, PA-C   5,000 Units at 08/15/22 0525    insulin lispro (HUMALOG) injection vial 0-4 Units  0-4 Units SubCUTAneous TID WC Jerardo Finely, PA-C   1 Units at 08/14/22 1757    insulin lispro (HUMALOG) injection vial 0-4 Units  0-4 Units SubCUTAneous Nightly Jerardo Finely, PA-C meropenem (MERREM) 1,000 mg in sodium chloride 0.9 % 100 mL IVPB (mini-bag)  1,000 mg IntraVENous Q8H Amy Guillory PA-C 33.3 mL/hr at 08/15/22 0841 1,000 mg at 08/15/22 0841    lactobacillus (CULTURELLE) capsule 1 capsule  1 capsule Oral BID WC Amy Guillory PA-C   1 capsule at 08/15/22 0825        Allergies:  Patient has no known allergies. Review of Systems:     All systems reviewed and negative except as stated above. Physical Examination:    Vitals:    08/15/22 0815   BP: 123/75   Pulse: 82   Resp: 18   Temp: 97.8 °F (36.6 °C)   SpO2: 100%    Weight: 154 lb 8 oz (70.1 kg)       Body mass index is 24.2 kg/m².     General Appearance:  Alert, cooperative, no distress, appears stated age   Head:  Normocephalic, without obvious abnormality, atraumatic   Eyes:  PERRL, conjunctiva/corneas clear   Nose: Nares normal, no drainage or sinus tenderness   Throat: Lips, mucosa, and tongue normal   Neck: Supple, symmetrical, trachea midline, no adenopathy, thyroid: not enlarged, symmetric, no tenderness/mass/nodules, no carotid bruit or JVD   Lungs:   Clear to auscultation bilaterally, respirations unlabored   Chest Wall:  No tenderness or deformity   Heart:  Regular rate and rhythm, S1, S2 normal, no murmur, rub or gallop   Abdomen:   Soft, non-tender, bowel sounds active all four quadrants,  no masses, no organomegaly  : wearing adult diaper   Extremities: Extremities normal, atraumatic, no cyanosis or edema   Pulses: 2+ and symmetric   Skin: Skin color, texture, turgor normal, no rashes or lesions   Psych: Normal mood and affect   Neurologic: CN II-XII grossly intact        Labs  No results found for: PROBNP      No results found for: CHOL, TRIG, HDL, LDLCALC, LABVLDL      Troponin   Date/Time Value Ref Range Status   08/13/2022 07:50 PM <0.01 <0.01 ng/mL Final     Comment:     Methodology by Troponin T           CBC:   Lab Results   Component Value Date/Time    WBC 6.2 08/14/2022 05:44 AM    RBC 4.00 08/14/2022 05:44 AM    HGB 11.7 08/14/2022 05:44 AM    HCT 35.9 08/14/2022 05:44 AM    MCV 89.6 08/14/2022 05:44 AM    RDW 13.6 08/14/2022 05:44 AM     08/14/2022 05:44 AM     CMP:    Lab Results   Component Value Date/Time     08/15/2022 06:29 AM    K 3.5 08/15/2022 06:29 AM    K 4.2 06/06/2022 05:07 AM     08/15/2022 06:29 AM    CO2 28 08/15/2022 06:29 AM    BUN 36 08/15/2022 06:29 AM    CREATININE 2.7 08/15/2022 06:29 AM    GFRAA 29 08/15/2022 06:29 AM    AGRATIO 1.0 08/13/2022 07:50 PM    LABGLOM 24 08/15/2022 06:29 AM    GLUCOSE 150 08/15/2022 06:29 AM    PROT 8.4 08/13/2022 07:50 PM    CALCIUM 9.8 08/15/2022 06:29 AM    BILITOT 0.3 08/13/2022 07:50 PM    ALKPHOS 99 08/13/2022 07:50 PM    AST 19 08/13/2022 07:50 PM    ALT 10 08/13/2022 07:50 PM     PT/INR:  No results found for: PTINR  Lab Results   Component Value Date    CKTOTAL 64 05/26/2022    Judye Overall <0.01 08/13/2022       EKG:  I have reviewed EKG with the following interpretation:  Impression:      Normal sinus rhythm  Possible Left atrial enlargement  Left ventricular hypertrophy  ST elevation, consider early repolarization, pericarditis, or injury  T wave abnormality, consider lateral ischemia  Abnormal ECG    No echo, stress test, or cath in our system     Old notes reviewed  Telemetry reviewd  Ekg personally reviewed  Chest xray personally reviewed  Echo, stress, cath, and/or other cardiac testing reviewed in detail   Medications and labs reviewed    High complexity/medical decision making due to extensive data review, extensive history review, independent review of data    High risk due to acute illness, evaluation of drug-drug interactions, medication management and diagnostic interventions  I will address the patient's cardiac risk factors and adjusted pharmacologic treatment as needed. In addition, I have reinforced the need for patient directed risk factor modification.     Tobacco use was discussed with the patient and educated on the negative effects. I have asked the patient to not utilize these agents. Thank you for allowing to us to participate in the care or [facility]. Further evaluation will be based upon the patient's clinical course and testing results. All questions and concerns were addressed to the patient/family. Alternatives to my treatment were discussed. The note was completed using EMR. Every effort was made to ensure accuracy; however, inadvertent computerized transcription errors may be present.       Justin Olivia MD, MD 8/15/2022 9:49 AM

## 2022-08-15 NOTE — FLOWSHEET NOTE
BP improved from last assessment See vitals. Patient resting quietly in bed No s/s of pain or distress noted Call light within reach. Bed in low position. Bed alarm on.

## 2022-08-15 NOTE — PROGRESS NOTES
[facility]   Phone: [phone]    Occupational Therapy    [x] Initial Evaluation            [] Daily Treatment Note         [x] Discharge Summary      Patient: Sisi Peterson   : 1960   MRN: [id number]   Date of Service:  8/15/2022    Admitting Diagnosis:  UTI (urinary tract infection)  Current Admission Summary: Sisi Peterson is a 58 y.o. male who presents to the ER with complaint of syncope following dialysis today while eating with increasing fatigue (since starting dialysis in ), no focal weakness. States that he was dizzy and found himself unconscious on the floor while eating, called EMS and transported to the ER. States that this is the second time that he experienced a syncopal event following dialysis. He believes that the last event was in July when he was brought to the ER for cystitis and possibly infected kidney stone, he did complete antibiotics. States that he passed out at dialysis center in July. Past Medical History:  has a past medical history of Diabetes mellitus ([address] and Hypertension. Past Surgical History:  has a past surgical history that includes Prostatectomy (N/A, 2022); Cystoscopy (Right, 2022); IR TUNNELED CVC PLACE WO SQ PORT/PUMP > 5 YEARS (2022); and Cystoscopy (Right, 2022). Discharge Recommendations: Sisi Peterson scored a 24/24 on the AM-PAC ADL Inpatient form. At this time, no further OT is recommended upon discharge due to patient at independent level. Recommend patient returns to prior setting with prior services. DME Required For Discharge: No DME required    Precautions/Restrictions: low fall risk, high fall risk, . Comment: low fall risk per mobility assessments. Pre-Admission Information   Lives With: spouse                  Type of Home: apartment  Home Layout: one level  Home Access: 12 step to enter with handrail on L going up.    Bathroom Layout: tub/shower unit  Bathroom Equipment: . Comment: n/a  Toilet Height: standard height  Home Equipment: alert button  Transfer Assistance: Independent without use of device  Ambulation Assistance:Independent without use of device, . Comment: pt states he thinks he might need a cane due to recent difficulty moving  ADL Assistance: independent with all ADL's  IADL Assistance:  pt states son and daughter assist with laundry  Active :        [] Yes                 [] No  Hand Dominance: [] Left                 [] Right  Recent Falls: Pt with 3 falls in last 6 months, 2 occurring right after HD. Examination   Vision:   Vision Gross Assessment: WFL (pt states sometimes having blurry vision, states this is a chronic issue)  Hearing:   WFL  Posture:   good  Sensation:   WFL  ROM:   (B) UE ROM WFL  Strength:   (B) UE gross strength WFL    Decision Making: low complexity  Clinical Presentation: stable      Subjective  General: Pt supine in bed upon entry. Pt speaks proficient Corbin Poet; however had substantial difficulty understanding PT/OT roles and expectations (differences between therapy and internal medicine).  used midway through session. Significantly increased time required due to language comprehension barriers. Pt is pleasant and agreeable to PT/OT evaluations. Pain: 0/10  Pain Interventions: not applicable       Vitals  Heart Rate: 89 (first HR supine; second HR seated EOB:87; third standing EOB: 111)  BP: (!) 158/89 (first BP supine; second BP seated EOB:138/82; third BP standin/83)  MAP (Calculated): 112     Activities of Daily Living  Basic Activities of Daily Living  Lower Extremity Dressing: Independent . Comment: completed sitting EOB  Instrumental Activities of Daily Living  No IADL completed on this date.     Functional Mobility  Bed Mobility  Supine to Sit: Independent  Scooting: Independent  Comments:  Transfers  Sit to stand transfer:Independent  Stand to sit transfer: Independent  Comments:  Functional Mobility:  Sitting Balance: Independent, . Comment Pt sat EOB unsupported for 40 min during acquisition of social-functional history and orthostatic vital signs. Pt does not report pain. Good sitting balance noted and with good activity tolerance. Standing Balance: Independent, . Comment Pt stood for 3-5 min at EOB without DME while taking BP. Please see activity tolerance section for further details regarding vital signs. .    Functional Mobility: . Independent, . Comment Pt ambulated 15ft from EOB to recliner with no DME. BP remains within normal parameters. No dizziness or signs of syncope observed. Other Therapeutic Interventions    Functional Outcomes  AM-PAC Inpatient Daily Activity Raw Score: 24    Cognition  WFL  Orientation:    A&O x 4  Command Following:   [facility]     Education  Barriers To Learning: language  Patient Education: Patient educated on goals, OT role and benefits, plan of care, discharge recommendations  Learning Assessment:  Patient verbalized and demonstrates understanding    Assessment  Impairments Requiring Therapeutic Intervention: none - eval with same day discharge  Prognosis: good without need for therapy intervention  Clinical Assessment: Patient presenting at independent level for completion of required self care tasks for return to home. Eval with d/c at this time. No therapy services indicated. Safety Interventions: patient left in chair, call light within reach, and nurse notified    Plan  Frequency: Eval with same day discharge. No follow up required. Current Treatment Recommendations: Not applicable, evaluation completed with same day discharge. Goals  Patient eval with same day discharge. No goals set as patient is at baseline self care status.       Therapy Session Time     Individual Group Co-treatment   Time In    21    Time Out    1157   Minutes    87         Timed Code Treatment Minutes: 72 Minutes  Total Treatment Minutes:  87 minutes       Electronically Signed By: ARACELIS Kaye OTR/L  [id number]

## 2022-08-15 NOTE — FLOWSHEET NOTE
Shift assessment completed as charted. Patient awake alert lying in bed able to make needs known appropriately Receiving IV abx therapy tolerating at present Patient continent of bowel and bladder. Receives HD on T/Th/Sat Has Right chest HD catheter. On room air denies pain no s/s of distress Call light within reach Bed in low position.   Bed alarm on

## 2022-08-15 NOTE — PROGRESS NOTES
[facility] Note    Patient Active Problem List   Diagnosis    Prostate cancer ([facility]    Se[facility] shock ([facility]    CHRISSY (acute kidney injury) ([facility]    Ureterolithiasis    Lactic acidosis    Infection due to ESBL-producing Escherichia coli    Bacteremia due to Gram-negative bacteria    Complicated UTI (urinary tract infection)    Hyponatremia    S/P radical cystoprostatectomy    Poorly controlled type 2 diabetes mellitus ([facility]    Fever    Diabetes education, encounter for    UTI (urinary tract infection)       Past Medical History:   has a past medical history of Diabetes mellitus ([facility] and Hypertension. Past Social History:   reports that he has never smoked. He has never used smokeless tobacco. He reports that he does not drink alcohol and does not use drugs. Subjective:   Per patient has been having good urine output. Has been having dizziness after HD. No edema  Spoke with patient thru video . Review of Systems   Constitutional:  Positive for fatigue. Negative for activity change, appetite change, chills, fever and unexpected weight change. HENT:  Negative for congestion and facial swelling. Eyes:  Negative for photophobia, discharge and redness. Respiratory:  Negative for cough, chest tightness and shortness of breath. Cardiovascular:  Negative for chest pain, palpitations and leg swelling. Gastrointestinal:  Negative for abdominal distention, abdominal pain, blood in stool, constipation, diarrhea, nausea and vomiting. Endocrine: Negative for cold intolerance, heat intolerance and polyuria. Genitourinary:  Negative for decreased urine volume, difficulty urinating, flank pain and hematuria. Musculoskeletal:  Negative for joint swelling and neck pain. Neurological:  Negative for dizziness, seizures, syncope, speech difficulty, light-headedness and headaches. Hematological:  Does not bruise/bleed easily. Psychiatric/Behavioral:  Negative for agitation, confusion and hallucinations. Objective:      /85   Pulse 96   Temp 97.4 °F (36.3 °C) (Oral)   Resp 18   Ht 5' 7\" (1.702 m)   Wt 154 lb 8 oz (70.1 kg)   SpO2 99%   BMI 24.20 kg/m²     Wt Readings from Last 3 Encounters:   08/15/22 154 lb 8 oz (70.1 kg)   07/21/22 145 lb 1.6 oz (65.8 kg)   07/13/22 142 lb 9.6 oz (64.7 kg)       BP Readings from Last 3 Encounters:   08/15/22 122/85   07/21/22 (!) 168/74   07/13/22 (!) 169/113     Chest- clear  Heart-regular  Abd-soft  Ext- no edema    Labs  Hemoglobin   Date Value Ref Range Status   08/14/2022 11.7 (L) 13.5 - 17.5 g/dL Final     Hematocrit   Date Value Ref Range Status   08/14/2022 35.9 (L) 40.5 - 52.5 % Final     WBC   Date Value Ref Range Status   08/14/2022 6.2 4.0 - 11.0 K/uL Final     Platelets   Date Value Ref Range Status   08/14/2022 196 135 - 450 K/uL Final     Lab Results   Component Value Date    CREATININE 2.7 (H) 08/15/2022    BUN 36 (H) 08/15/2022     08/15/2022    K 3.5 08/15/2022     08/15/2022    CO2 28 08/15/2022     Renal US 8/4/22     FINDINGS:       Kidneys: The right kidney measures 8.6 x 4.5 x 3.8 cm in length and the left kidney   measures 9.0 x 4.2 x 4.0 cm in length. A 6 mm stone by 4 mm stone is seen in the right kidney. A 5 mm x 5 mm stone   is also seen in the right kidney. There is mild right-sided hydronephrosis. No hydronephrosis on the left. Bladder:       Bladder is collapsed and not well evaluated           Impression   Small renal stones on the right with mild right-sided hydronephrosis. No stones or hydronephrosis on the left       Assessment/Plan:   CHRISSY on CKD 3b-crea was 1.5 on 5/2022. Started on HD on 5/2022 due to worsened renal function. Has RFK. Crea 2.1 on presentation and now at 2.7, with lower K and higher HCO3. Saturating well. -170's. Check 24H urine Crea Cl. HD tomorrow.   Na profile and lower temp. Right nepholithiasis with hydronephrosis-will discuss with Urology. May benefit from stone removal in the right. Hyponatremia- improved  Anemia- hgb at goal  HTN-continue current regimen  DM-per Medicine  7.    [facility] Urology plan    Juanito Griggs MD

## 2022-08-15 NOTE — PROGRESS NOTES
[facility] [address]   Phone: [phone]    Physical Therapy    [x] Initial Evaluation            [] Daily Treatment Note         [x] Discharge Summary      Patient: Moraima Montejo   : 1960   MRN: [id number]   Date of Service:  8/15/2022  Admitting Diagnosis: UTI (urinary tract infection)  Current Admission Summary: Moraima Montejo is a 58 y.o. male who presents to the ER with complaint of syncope following dialysis today while eating with increasing fatigue (since starting dialysis in ), no focal weakness. States that he was dizzy and found himself unconscious on the floor while eating, called EMS and transported to the ER. States that this is the second time that he experienced a syncopal event following dialysis. He believes that the last event was in July when he was brought to the ER for cystitis and possibly infected kidney stone, he did complete antibiotics. States that he passed out at dialysis center in July. Past Medical History:  has a past medical history of Diabetes mellitus ([address] and Hypertension. Past Surgical History:  has a past surgical history that includes Prostatectomy (N/A, 2022); Cystoscopy (Right, 2022); IR TUNNELED CVC PLACE WO SQ PORT/PUMP > 5 YEARS (2022); and Cystoscopy (Right, 2022). Discharge Recommendations: Moraima Montejo scored a 24/24 on the AM-PAC short mobility form. At this time, no further PT is recommended upon discharge due to pt being at baseline functional mobility. Recommend patient returns to prior setting with prior services.       DME Required For Discharge:   Precautions/Restrictions: high fall risk, contact precautions  Weight Bearing Restrictions: no restrictions  [] Right Upper Extremity  [] Left Upper Extremity [] Right Lower Extremity  [] Left Lower Extremity     Required Braces/Orthotics: no braces required   [] Right  [] Left  Positional Restrictions:no positional independent  Scooting: Independent  Comments: HOB raised, pt reporting no symptoms with position changes  Transfers  Sit to stand transfer: Independent  Stand to sit transfer: Independent  Comments: Pt reporting no symptoms with position changes  Ambulation  Surface:level surface  Assistive Device: no device  Assistance: Independent  Distance: 20'  Gait Mechanics: appropriate tyler, appropriate Beatrice, no LOB  Comments: Pt reporting no symptoms with mobility. Stair Mobility  Stair mobility not completed on this date. Comments:  Balance  Static Sitting Balance: good(+): independent with high level dynamic balance in unsupported position  Dynamic Sitting Balance: good(+): independent with high level dynamic balance in unsupported position  Static Standing Balance: good(+): independent with high level dynamic balance in unsupported position  Dynamic Standing Balance: good(+): independent with high level dynamic balance in unsupported position  Comments: Pt sat EOB independently ~40 minutes total during discussion with therapy team and hospitalist on subjective information, hospitalization, PMH, and purpose of therapy evaluation. Other Therapeutic Interventions    Functional Outcomes  AM-PAC Inpatient Mobility Raw Score : 24              Cognition  Overall Cognitive Status: WNL  Comments: Pt requiring frequent redirection to therapy evaluation as pt frequently focusing on kidney and HD concerns, language barrier impairing cognition assessment.    Orientation:    alert and oriented x 4  Command Following:   [facility]    Education  Barriers To Learning: language  Patient Education: patient educated on goals, general safety, discharge recommendations  Learning Assessment:  patient verbalizes understanding, would benefit from continued reinforcement    Assessment  Activity Tolerance: pt tolerated session well  Impairments Requiring Therapeutic Intervention: none - eval with same day discharge  Prognosis: good  Clinical Assessment: Pt is presenting at baseline functional mobility and does not require skilled PT services at this time. As such, pt is being discharged from acute PT caseload. Safety Interventions: patient left in chair, chair alarm in place, call light within reach, gait belt, and nurse notified    Plan  Frequency: Eval with same day discharge. No follow up required. Current Treatment Recommendations: not applicable, evaluation completed with same day discharge. Goals  Patient Goals: n/a   Short Term Goals:  Time Frame: n/a  Patient eval with same day discharge. No goals set as patient is at baseline mobility status.       Therapy Session Time      Individual Group Co-treatment   Time In     5081   Time Out     1155   Minutes     86     Timed Code Treatment Minutes:   71  Total Treatment Minutes:  86       Electronically Signed By: Ruperto Lowery, [address] [facility], DPT [id number]

## 2022-08-16 PROBLEM — R55 SYNCOPE AND COLLAPSE: Status: ACTIVE | Noted: 2022-08-16

## 2022-08-16 PROBLEM — B99.9 INFECTION REQUIRING CONTACT ISOLATION PRECAUTIONS: Status: ACTIVE | Noted: 2022-08-16

## 2022-08-16 PROBLEM — N13.9 OBSTRUCTIVE UROPATHY: Status: ACTIVE | Noted: 2022-08-16

## 2022-08-16 PROBLEM — N20.0 BILATERAL NEPHROLITHIASIS: Status: ACTIVE | Noted: 2022-08-16

## 2022-08-16 PROBLEM — Z99.2 ESRD ON DIALYSIS (HCC): Status: ACTIVE | Noted: 2022-08-16

## 2022-08-16 PROBLEM — N18.6 ESRD ON DIALYSIS (HCC): Status: ACTIVE | Noted: 2022-08-16

## 2022-08-16 LAB
ALBUMIN SERPL-MCNC: 3.4 G/DL (ref 3.4–5)
ANION GAP SERPL CALCULATED.3IONS-SCNC: 11 MMOL/L (ref 3–16)
BUN BLDV-MCNC: 39 MG/DL (ref 7–20)
CALCIUM SERPL-MCNC: 9.5 MG/DL (ref 8.3–10.6)
CHLORIDE BLD-SCNC: 102 MMOL/L (ref 99–110)
CO2: 24 MMOL/L (ref 21–32)
CREAT SERPL-MCNC: 2.7 MG/DL (ref 0.8–1.3)
GFR AFRICAN AMERICAN: 29
GFR NON-AFRICAN AMERICAN: 24
GLUCOSE BLD-MCNC: 109 MG/DL (ref 70–99)
GLUCOSE BLD-MCNC: 126 MG/DL (ref 70–99)
GLUCOSE BLD-MCNC: 150 MG/DL (ref 70–99)
GLUCOSE BLD-MCNC: 187 MG/DL (ref 70–99)
GLUCOSE BLD-MCNC: 211 MG/DL (ref 70–99)
HCT VFR BLD CALC: 35.5 % (ref 40.5–52.5)
HEMOGLOBIN: 11.5 G/DL (ref 13.5–17.5)
MAGNESIUM: 2 MG/DL (ref 1.8–2.4)
MCH RBC QN AUTO: 29.1 PG (ref 26–34)
MCHC RBC AUTO-ENTMCNC: 32.5 G/DL (ref 31–36)
MCV RBC AUTO: 89.8 FL (ref 80–100)
ORGANISM: ABNORMAL
ORGANISM: ABNORMAL
PDW BLD-RTO: 13.5 % (ref 12.4–15.4)
PERFORMED ON: ABNORMAL
PHOSPHORUS: 3.8 MG/DL (ref 2.5–4.9)
PLATELET # BLD: 193 K/UL (ref 135–450)
PMV BLD AUTO: 7.4 FL (ref 5–10.5)
POTASSIUM SERPL-SCNC: 3.3 MMOL/L (ref 3.5–5.1)
RBC # BLD: 3.95 M/UL (ref 4.2–5.9)
SODIUM BLD-SCNC: 137 MMOL/L (ref 136–145)
URINE CULTURE, ROUTINE: ABNORMAL
URINE CULTURE, ROUTINE: ABNORMAL
WBC # BLD: 4 K/UL (ref 4–11)

## 2022-08-16 PROCEDURE — 85027 COMPLETE CBC AUTOMATED: CPT

## 2022-08-16 PROCEDURE — 1200000000 HC SEMI PRIVATE

## 2022-08-16 PROCEDURE — 2580000003 HC RX 258: Performed by: INTERNAL MEDICINE

## 2022-08-16 PROCEDURE — 99223 1ST HOSP IP/OBS HIGH 75: CPT | Performed by: INTERNAL MEDICINE

## 2022-08-16 PROCEDURE — 5A1D70Z PERFORMANCE OF URINARY FILTRATION, INTERMITTENT, LESS THAN 6 HOURS PER DAY: ICD-10-PCS | Performed by: INTERNAL MEDICINE

## 2022-08-16 PROCEDURE — 80069 RENAL FUNCTION PANEL: CPT

## 2022-08-16 PROCEDURE — 36415 COLL VENOUS BLD VENIPUNCTURE: CPT

## 2022-08-16 PROCEDURE — 6360000002 HC RX W HCPCS: Performed by: INTERNAL MEDICINE

## 2022-08-16 PROCEDURE — 90935 HEMODIALYSIS ONE EVALUATION: CPT

## 2022-08-16 PROCEDURE — 2580000003 HC RX 258: Performed by: PHYSICIAN ASSISTANT

## 2022-08-16 PROCEDURE — 83735 ASSAY OF MAGNESIUM: CPT

## 2022-08-16 PROCEDURE — 94760 N-INVAS EAR/PLS OXIMETRY 1: CPT

## 2022-08-16 PROCEDURE — 6370000000 HC RX 637 (ALT 250 FOR IP): Performed by: PHYSICIAN ASSISTANT

## 2022-08-16 PROCEDURE — 6360000002 HC RX W HCPCS: Performed by: PHYSICIAN ASSISTANT

## 2022-08-16 RX ORDER — HEPARIN SODIUM 1000 [USP'U]/ML
1000 INJECTION, SOLUTION INTRAVENOUS; SUBCUTANEOUS PRN
Status: DISCONTINUED | OUTPATIENT
Start: 2022-08-16 | End: 2022-08-22 | Stop reason: HOSPADM

## 2022-08-16 RX ADMIN — HEPARIN SODIUM 5000 UNITS: 5000 INJECTION INTRAVENOUS; SUBCUTANEOUS at 21:00

## 2022-08-16 RX ADMIN — HEPARIN SODIUM 5000 UNITS: 5000 INJECTION INTRAVENOUS; SUBCUTANEOUS at 04:26

## 2022-08-16 RX ADMIN — INSULIN GLARGINE 8 UNITS: 100 INJECTION, SOLUTION SUBCUTANEOUS at 20:57

## 2022-08-16 RX ADMIN — SODIUM CHLORIDE, PRESERVATIVE FREE 10 ML: 5 INJECTION INTRAVENOUS at 12:36

## 2022-08-16 RX ADMIN — MEROPENEM 500 MG: 500 INJECTION, POWDER, FOR SOLUTION INTRAVENOUS at 12:37

## 2022-08-16 ASSESSMENT — PAIN SCALES - GENERAL
PAINLEVEL_OUTOF10: 0

## 2022-08-16 ASSESSMENT — ENCOUNTER SYMPTOMS
COUGH: 0
BLOOD IN STOOL: 0
VOMITING: 0
EYE REDNESS: 0
CHEST TIGHTNESS: 0
CONSTIPATION: 0
RHINORRHEA: 0
SINUS PRESSURE: 0
SHORTNESS OF BREATH: 0
NAUSEA: 0
DIARRHEA: 0
SINUS PAIN: 0
FACIAL SWELLING: 0
EYE DISCHARGE: 0
WHEEZING: 0
BACK PAIN: 0
PHOTOPHOBIA: 0
SORE THROAT: 0
ABDOMINAL PAIN: 0
ABDOMINAL DISTENTION: 0

## 2022-08-16 NOTE — PROGRESS NOTES
Hospitalist Progress Note      PCP: Javier Godfrey PA-C    Date of Admission: 8/13/2022    Chief Complaint: Syncope    Hospital Course: 59 yo M with history of prostate cancer s/p robotic prostatectomy, h/o R UPJ stone s/p ureteroscopy, ESRD on HD, DM 2 who came to Er with syncope. Admitted as inpatient for complicated UTI. Followed by Renal, Urology and Cardiology. Echo:   Normal left ventricle size, wall thickness, and systolic function with an   estimated ejection fraction of 60-65%. No regional wall motion abnormalities are seen. Aortic valve appears sclerotic but opens adequately. No evidence of aortic   valve stenosis. The aortic root is mildly dilated. Mildly thickened mitral valve leaflets with no evidence of stenosis. Mild pulmonic regurgitation present. Grade I diastolic dysfunction with normal LV filling pressures. Avg E/e'   estimated to be 10.6. For OR on 8/16/22. Subjective:  Patient is worried about his erectile dysfunction. No CP, SOB, HA or abdominal pain. No fevers. Wondering about his HD status.       Medications:  Reviewed    Infusion Medications    sodium chloride 20 mL/hr at 08/14/22 0042    dextrose       Scheduled Medications    [START ON 8/16/2022] meropenem  500 mg IntraVENous Q24H    amLODIPine  5 mg Oral Daily    insulin glargine  8 Units SubCUTAneous Nightly    sodium chloride flush  5-40 mL IntraVENous 2 times per day    heparin (porcine)  5,000 Units SubCUTAneous 3 times per day    insulin lispro  0-4 Units SubCUTAneous TID WC    insulin lispro  0-4 Units SubCUTAneous Nightly    lactobacillus  1 capsule Oral BID WC     PRN Meds: sodium chloride flush, sodium chloride, acetaminophen **OR** acetaminophen, polyethylene glycol, dextrose bolus **OR** dextrose bolus, glucagon (rDNA), dextrose, ondansetron      Intake/Output Summary (Last 24 hours) at 8/15/2022 2010  Last data filed at 8/15/2022 1738  Gross per 24 hour   Intake 340 ml   Output 900 ml Net -560 ml       Physical Exam Performed:    /86   Pulse 81   Temp 98 °F (36.7 °C) (Oral)   Resp 16   Ht 5' 7\" (1.702 m)   Wt 154 lb 8 oz (70.1 kg)   SpO2 98%   BMI 24.20 kg/m²     General appearance: No apparent distress, appears stated age and cooperative. HEENT: Pupils equal, round, and reactive to light. Conjunctivae/corneas clear. Neck: Supple, with full range of motion. No jugular venous distention. Trachea midline. Respiratory:  Normal respiratory effort. Clear to auscultation, bilaterally without Rales/Wheezes/Rhonchi. Cardiovascular: Regular rate and rhythm with normal S1/S2 without murmurs, rubs or gallops. Abdomen: Soft, non-tender, non-distended with normal bowel sounds. Musculoskeletal: No clubbing, cyanosis or edema bilaterally. Full range of motion without deformity. Skin: Skin color, texture, turgor normal.  No rashes or lesions. Neurologic:  Neurovascularly intact without any focal sensory/motor deficits. Cranial nerves: II-XII intact, grossly non-focal.  Psychiatric: Alert and oriented, thought content appropriate, normal insight  Capillary Refill: Brisk,3 seconds, normal   Peripheral Pulses: +2 palpable, equal bilaterally       Labs:   Recent Labs     08/13/22 1950 08/14/22  0544   WBC 5.4 6.2   HGB 11.7* 11.7*   HCT 35.3* 35.9*    196     Recent Labs     08/13/22 1950 08/14/22  0544 08/15/22  0629   * 138 143   K 3.8 4.0 3.5   CL 92* 98* 105   CO2 31 35* 28   BUN 21* 29* 36*   CREATININE 2.1* 2.6* 2.7*   CALCIUM 9.7 10.0 9.8   PHOS  --  3.7 3.7     Recent Labs     08/13/22 1950   AST 19   ALT 10   BILITOT 0.3   ALKPHOS 99     No results for input(s): INR in the last 72 hours.   Recent Labs     08/13/22 1950   TROPONINI <0.01       Urinalysis:      Lab Results   Component Value Date/Time    NITRU Negative 08/13/2022 07:50 PM    WBCUA 379 08/13/2022 07:50 PM    BACTERIA None Seen 08/13/2022 07:50 PM    RBCUA 1 08/13/2022 07:50 PM    BLOODU TRACE 08/13/2022 07:50 PM    SPECGRAV 1.020 08/13/2022 07:50 PM    GLUCOSEU >=1000 08/13/2022 07:50 PM       Radiology:  CT ABDOMEN PELVIS WO CONTRAST Additional Contrast? None   Final Result   1. Mild right-sided hydronephrosis which has improved from the comparison   exam.  3 mm retained stone within the right kidney. 2.  4 mm low-density stone within the proximal 3rd of the right ureter   appears to have moved distally since the comparison exam.         XR CHEST PORTABLE   Final Result   Status post right-sided dialysis catheter placement in good position. Mild cardiomegaly which is less prominent with no acute pulmonary abnormality. Assessment/Plan:    Active Hospital Problems    Diagnosis     UTI (urinary tract infection) [N39.0]      Priority: Medium    S/P radical cystoprostatectomy [Z90.79, Z90.6]      Priority: Medium    Poorly controlled type 2 diabetes mellitus ([facility] [E11.65]      Priority: Medium    Prostate cancer ([facility] Yamil Wilkes      Priority: Medium     Continue IV Merrem  F/U UCx  NPO p MN for possible OR  Urology input appreciated  HD per Renal  Renal input appreciated  Cardio input appreciated    DVT Prophylaxis: Hep SQ  Diet: ADULT DIET; Regular; 4 carb choices (60 gm/meal)  Diet NPO  Code Status: Full Code    PT/OT Eval Status: Following    Dispo - Home    Discussed with patient, Dr Dameon Barrientos (Renal), Dr Aga Michaud (Cardio), nursing and CM. Possible OR tomorrow. Erectile dysfunction per Urology.     Emily Naranjo MD

## 2022-08-16 NOTE — PROGRESS NOTES
Pt upset with lunch, states that he had issues with breakfast as well. Pt requesting to speak to dietary regarding dislikes of food. Dietary called and stated that someone would be up to talk with pt as requested with use of . Pt made aware.

## 2022-08-16 NOTE — FLOWSHEET NOTE
Shift assessment completed as charted. Patient awake lying in bed alert and oriented x 4 calm cooperative  Currently on a 24hr urine collection urinating in urinal,  Collection container in bathroom on ice,  Change ice frequently, collection ends at 1430. Patient slept thru night well,  BP still high but better than night before,  No prn's to give,  On Norvasc daily,  HD on T/Th/Sat,  HD due today,  IVF's continuous and IV merrem. Tolerating at present VSS Afebrile Call light within reach Bed in low position.

## 2022-08-16 NOTE — PROGRESS NOTES
Hospitalist Progress Note      PCP: Angela Wilkes PA-C    Date of Admission: 8/13/2022    Chief Complaint: Syncope    Hospital Course: 59 yo M with history of prostate cancer s/p robotic prostatectomy, h/o R UPJ stone s/p ureteroscopy, ESRD on HD, DM 2 who came to Er with syncope. Admitted as inpatient for complicated UTI. Followed by Renal, Urology and Cardiology. Echo:   Normal left ventricle size, wall thickness, and systolic function with an   estimated ejection fraction of 60-65%. No regional wall motion abnormalities are seen. Aortic valve appears sclerotic but opens adequately. No evidence of aortic   valve stenosis. The aortic root is mildly dilated. Mildly thickened mitral valve leaflets with no evidence of stenosis. Mild pulmonic regurgitation present. Grade I diastolic dysfunction with normal LV filling pressures. Avg E/e'   estimated to be 10.6. For OR on 8/17/22 for R ureteroscopy and possible stent. Subjective:  Patient is ok. No CP, SOB, HA or abdominal pain. No fevers.         Medications:  Reviewed    Infusion Medications    sodium chloride 20 mL/hr at 08/14/22 0042    dextrose       Scheduled Medications    meropenem  500 mg IntraVENous Q24H    amLODIPine  5 mg Oral Daily    insulin glargine  8 Units SubCUTAneous Nightly    sodium chloride flush  5-40 mL IntraVENous 2 times per day    heparin (porcine)  5,000 Units SubCUTAneous 3 times per day    insulin lispro  0-4 Units SubCUTAneous TID WC    insulin lispro  0-4 Units SubCUTAneous Nightly    lactobacillus  1 capsule Oral BID WC     PRN Meds: heparin (porcine), sodium chloride flush, sodium chloride, acetaminophen **OR** acetaminophen, polyethylene glycol, dextrose bolus **OR** dextrose bolus, glucagon (rDNA), dextrose, ondansetron      Intake/Output Summary (Last 24 hours) at 8/16/2022 1633  Last data filed at 8/16/2022 1231  Gross per 24 hour   Intake 1380 ml   Output 2510 ml   Net -1130 ml         Physical Exam Performed:    BP (!) 159/95   Pulse 97   Temp 97.1 °F (36.2 °C)   Resp 18   Ht 5' 7\" (1.702 m)   Wt 144 lb 13.5 oz (65.7 kg)   SpO2 98%   BMI 22.69 kg/m²     General appearance: No apparent distress, appears stated age and cooperative. HEENT: Pupils equal, round, and reactive to light. Conjunctivae/corneas clear. Neck: Supple, with full range of motion. No jugular venous distention. Trachea midline. Respiratory:  Normal respiratory effort. Clear to auscultation, bilaterally without Rales/Wheezes/Rhonchi. Cardiovascular: Regular rate and rhythm with normal S1/S2 without murmurs, rubs or gallops. Abdomen: Soft, non-tender, non-distended with normal bowel sounds. Musculoskeletal: No clubbing, cyanosis or edema bilaterally. Full range of motion without deformity. Skin: Skin color, texture, turgor normal.  No rashes or lesions. Neurologic:  Neurovascularly intact without any focal sensory/motor deficits. Cranial nerves: II-XII intact, grossly non-focal.  Psychiatric: Alert and oriented, thought content appropriate, normal insight  Capillary Refill: Brisk,3 seconds, normal   Peripheral Pulses: +2 palpable, equal bilaterally       Labs:   Recent Labs     08/13/22 1950 08/14/22  0544 08/16/22  0630   WBC 5.4 6.2 4.0   HGB 11.7* 11.7* 11.5*   HCT 35.3* 35.9* 35.5*    196 193       Recent Labs     08/14/22  0544 08/15/22  0629 08/16/22  0630    143 137   K 4.0 3.5 3.3*   CL 98* 105 102   CO2 35* 28 24   BUN 29* 36* 39*   CREATININE 2.6* 2.7* 2.7*   CALCIUM 10.0 9.8 9.5   PHOS 3.7 3.7 3.8       Recent Labs     08/13/22 1950   AST 19   ALT 10   BILITOT 0.3   ALKPHOS 99       No results for input(s): INR in the last 72 hours.   Recent Labs     08/13/22 1950   TROPONINI <0.01         Urinalysis:      Lab Results   Component Value Date/Time    NITRU Negative 08/13/2022 07:50 PM    WBCUA 379 08/13/2022 07:50 PM    BACTERIA None Seen 08/13/2022 07:50 PM    RBCUA 1 08/13/2022 07:50 PM    BLOODU TRACE 08/13/2022 07:50 PM    SPECGRAV 1.020 08/13/2022 07:50 PM    GLUCOSEU >=1000 08/13/2022 07:50 PM       Radiology:  CT ABDOMEN PELVIS WO CONTRAST Additional Contrast? None   Final Result   1. Mild right-sided hydronephrosis which has improved from the comparison   exam.  3 mm retained stone within the right kidney. 2.  4 mm low-density stone within the proximal 3rd of the right ureter   appears to have moved distally since the comparison exam.         XR CHEST PORTABLE   Final Result   Status post right-sided dialysis catheter placement in good position. Mild cardiomegaly which is less prominent with no acute pulmonary abnormality. Assessment/Plan:    Active Hospital Problems    Diagnosis     UTI (urinary tract infection) [N39.0]      Priority: Medium    S/P radical cystoprostatectomy [Z90.79, Z90.6]      Priority: Medium    Poorly controlled type 2 diabetes mellitus ([address] [E11.65]      Priority: Medium    Prostate cancer ([address] Yamil Wilkes      Priority: Medium     Continue IV Merrem  UCx with ESBL E Coli  NPO p MN for OR  Urology input appreciated  HD per Renal  Renal input appreciated  Cardio input appreciated  ID consult for ESBL E Coli    DVT Prophylaxis: Hep SQ  Diet: ADULT DIET; Regular; 5 carb choices (75 gm/meal)  Diet NPO  Code Status: Full Code    PT/OT Eval Status: Following    Dispo - Home    Discussed with patient, nursing and CM. OR tomorrow. Abx per ID upon DC.     Emily Naranjo MD

## 2022-08-16 NOTE — PLAN OF CARE
Echo within normal limits    Syncope likely vasovagal, however given some risk factors for arrhythmia will need 30 day event monitor upon discharge. We will arrange an office visit to review results. Please advise us when patient is going home so we can place the monitor    Cardiology will sign-off at this time. Please call us if there are other issues or questions.

## 2022-08-16 NOTE — PROGRESS NOTES
[facility] Note    Patient Active Problem List   Diagnosis    Prostate cancer ([facility]    Septic shock ([facility]    CHRISSY (acute kidney injury) ([facility]    Ureterolithiasis    Lactic acidosis    Infection due to ESBL-producing Escherichia coli    Bacteremia due to Gram-negative bacteria    Complicated UTI (urinary tract infection)    Hyponatremia    S/P radical cystoprostatectomy    Poorly controlled type 2 diabetes mellitus ([facility]    Fever    Diabetes education, encounter for    UTI (urinary tract infection)       Past Medical History:   has a past medical history of Diabetes mellitus ([facility] and Hypertension. Past Social History:   reports that he has never smoked. He has never used smokeless tobacco. He reports that he does not drink alcohol and does not use drugs. Subjective:   Seen on HD today, no dizziness. No edema    Review of Systems   Constitutional:  Positive for fatigue. Negative for activity change, appetite change, chills, fever and unexpected weight change. HENT:  Negative for congestion and facial swelling. Eyes:  Negative for photophobia, discharge and redness. Respiratory:  Negative for cough, chest tightness and shortness of breath. Cardiovascular:  Negative for chest pain, palpitations and leg swelling. Gastrointestinal:  Negative for abdominal distention, abdominal pain, blood in stool, constipation, diarrhea, nausea and vomiting. Endocrine: Negative for cold intolerance, heat intolerance and polyuria. Genitourinary:  Negative for decreased urine volume, difficulty urinating, flank pain and hematuria. Musculoskeletal:  Negative for joint swelling and neck pain. Neurological:  Negative for dizziness, seizures, syncope, speech difficulty, light-headedness and headaches. Hematological:  Does not bruise/bleed easily. Psychiatric/Behavioral:  Negative for agitation, confusion and hallucinations.       Objective:      BP (!) 186/120   Pulse 77   Temp 96.9 °F (36.1 °C)   Resp 18   Ht 5' 7\" (1.702 m)   Wt 144 lb 13.5 oz (65.7 kg)   SpO2 98%   BMI 22.69 kg/m²     Wt Readings from Last 3 Encounters:   08/16/22 144 lb 13.5 oz (65.7 kg)   07/21/22 145 lb 1.6 oz (65.8 kg)   07/13/22 142 lb 9.6 oz (64.7 kg)       BP Readings from Last 3 Encounters:   08/16/22 (!) 186/120   07/21/22 (!) 168/74   07/13/22 (!) 169/113     Chest- clear  Heart-regular  Abd-soft  Ext- no edema    Labs  Hemoglobin   Date Value Ref Range Status   08/16/2022 11.5 (L) 13.5 - 17.5 g/dL Final     Hematocrit   Date Value Ref Range Status   08/16/2022 35.5 (L) 40.5 - 52.5 % Final     WBC   Date Value Ref Range Status   08/16/2022 4.0 4.0 - 11.0 K/uL Final     Platelets   Date Value Ref Range Status   08/16/2022 193 135 - 450 K/uL Final     Lab Results   Component Value Date    CREATININE 2.7 (H) 08/16/2022    BUN 39 (H) 08/16/2022     08/16/2022    K 3.3 (L) 08/16/2022     08/16/2022    CO2 24 08/16/2022     Renal US 8/4/22     FINDINGS:       Kidneys: The right kidney measures 8.6 x 4.5 x 3.8 cm in length and the left kidney   measures 9.0 x 4.2 x 4.0 cm in length. A 6 mm stone by 4 mm stone is seen in the right kidney. A 5 mm x 5 mm stone   is also seen in the right kidney. There is mild right-sided hydronephrosis. No hydronephrosis on the left. Bladder:       Bladder is collapsed and not well evaluated           Impression   Small renal stones on the right with mild right-sided hydronephrosis. No stones or hydronephrosis on the left       Assessment/Plan:   CHRISSY on CKD 3b-crea was 1.5 on 5/2022. Started on HD on 5/2022 due to worsened renal function. Has RFK. Crea 2.1 on presentation and now at 2.7, with lower K and higher HCO3. Saturating well. Follow bp after HD. Follow 24H urine Crea Cl. No fluid removal with HD today. Na profile and lower temp due to dizziness.      Right nepholithiasis with hydronephrosis-discussed with Urology. For right stone intervention/stent placement. Appreciate help. Hyponatremia- improved  Anemia- hgb at goal  HTN-continue current regimen. Follow  DM-per Medicine  7. Hypokalemia-4K bath  8. HD TThS at [facility]  9.    2005 A [facility] Urology intervention    Jarred Mccracken MD

## 2022-08-16 NOTE — CONSULTS
Infectious Diseases   Consult Note        Admission Date: 8/13/2022  Hospital Day: Hospital Day: 4   Attending: Mg Steen MD  Date of service: 8/16/22     Reason for admission: Syncope and collapse [R55]  UTI (urinary tract infection) [N39.0]  Urinary tract infection in male [N39.0]    Chief complaint/ Reason for consult: Positive urine culture for ESBL, syncope and collapse    Microbiology:        I have reviewed allavailable micro lab data and cultures    Blood culture (2/2) - collected on 8/13/2022: Negative    Urine culture  - collected on 8/13/2022: 50,000 CFU per mL of ESBL E. coli, 75,000 CFU per mL of Staphylococcus hemolyticus    Susceptibility      Escherichia coli (esbl) (1)    Antibiotic Interpretation Microscan  Method Status    ampicillin Resistant >=32 mcg/mL BACTERIAL SUSCEPTIBILITY PANEL BY RICA     ampicillin-sulbactam Sensitive 8 mcg/mL BACTERIAL SUSCEPTIBILITY PANEL BY RICA     ceFAZolin Resistant >=64 mcg/mL BACTERIAL SUSCEPTIBILITY PANEL BY RICA     cefepime Resistant 16 mcg/mL BACTERIAL SUSCEPTIBILITY PANEL BY RICA     cefTRIAXone Resistant >=64 mcg/mL BACTERIAL SUSCEPTIBILITY PANEL BY RICA     ciprofloxacin Resistant >=4 mcg/mL BACTERIAL SUSCEPTIBILITY PANEL BY RICA     ertapenem Sensitive <=0.12 mcg/mL BACTERIAL SUSCEPTIBILITY PANEL BY RICA     gentamicin Sensitive <=1 mcg/mL BACTERIAL SUSCEPTIBILITY PANEL BY RICA     levofloxacin Resistant >=8 mcg/mL BACTERIAL SUSCEPTIBILITY PANEL BY RICA     nitrofurantoin Sensitive <=16 mcg/mL BACTERIAL SUSCEPTIBILITY PANEL BY RICA     trimethoprim-sulfamethoxazole Resistant >=320 mcg/mL BACTERIAL SUSCEPTIBILITY PANEL BY RICA       Antibiotics and immunizations:       Current antibiotics: All antibiotics and their doses were reviewed by me    Recent Abx Admin                     meropenem (MERREM) 500 mg IVPB (mini-bag) (mg) 500 mg New Bag 08/16/22 1237                      Immunization History: All immunization history was reviewed by me today.       There is cultures  Follow up on liverand renal functions closely    Antimicrobials:    I will leave the patient on IV meropenem for now. Continue IV meropenem at dialysis dose of 500 mg every 24 hour  Urology note reviewed. Plans for right-sided ureteroscopy with possible stent insertion tomorrow noted  Contact isolation for ESBL  Start oral probiotics tomorrow  We will follow up on the culture results and clinical progress and will make further recommendations accordingly. Continue close vitals monitoring. Maintain good glycemic control. Fall precautions. Aspiration precautions. Continue to watch for new fever or diarrhea. DVT prophylaxis. Discussed all above with patient and RN. Drug Monitoring:    Continue serial monitoring for antibiotic toxicity as follows: CBC, CMP  Continue to watch for following: new or worsening fever, hypotension, hives, lip swelling and redness or purulence at vascular access sites. I/v access Management:    Continue to monitor i.v access sites for erythema, induration, discharge or tenderness. As always, continue efforts to minimizetubes/lines/drains as clinically appropriate to reduce chances of line associated infections. Current isolation precautions:    Currently active isolation(s): Contact       Level of complexity of visit and medical decision making: High     Risk of Complications/Morbidity: High     Illness(es)/ Infection present that pose threat to life/bodily function. There is potential for severe exacerbation of infection/side effects of treatment. Therapy requires intensive monitoring for antimicrobial agent toxicity. Thank you for involving me in the care of your patient. I will continue to follow. If you have any additional questions, please do not hesitate to contact me.     Subjective:     Presenting complaint in ER:     Chief Complaint   Patient presents with    Fatigue     Had dialysis today and had syncopeal episode at eating and is extremely weak per [facility]        HPI: Lexus Huynh is a 58 y.o. male patient, who was seen at the request of Dr. Jakob Solis MD.    History was obtained from chart review and the patient. The patient was admitted on 8/13/2022. I have been consulted to see the patient for above mentioned reason(s). The patient has multiple medical comorbidities, and presented to the ER for Concerns for increasing fatigue and a syncopal episode at the dialysis center    His fatigue symptoms were insidious onset, started 1 day before presentation to the ER and was worsening in nature with no aggravating or relieving factors. The patient was brought to the ER and was hospitalized. He was started on IV meropenem. Blood and urine cultures were sent. Urine culture from 8/30/2022 is growing ESBL E. coli    I have been asked for my opinion for management for this patient. Past Medical History: All past medical history reviewed today. Past Medical History:   Diagnosis Date    Diabetes mellitus ([facility] [address]     Hypertension          Past Surgical History: All pastsurgical history was reviewed today. Past Surgical History:   Procedure Laterality Date    CYSTOSCOPY Right 5/26/2022    CYSTOSCOPY, BILATERAL RETROGRADE PYELOGRAM, PLACEMENT OF RIGHT URETERAL STENT performed by Gerri Grace MD at [address] [place] [address] Right 7/13/2022    CYSTOSCOPY WITH RIGHT URETEROSCOPY WITH HOLMIUM LASER LITHOTRIPSY, STONE MANIPULATION, STONE BASKET,  RIGHT STENT PLACEMENT-[facility] #[phone] performed by Dedrick Rivera MD at [address] [place] [address]    IR TUNNELED [address] 5 YEARS  5/28/2022    IR TUNNELED CATHETER PLACEMENT GREATER THAN 5 YEARS 5/28/2022 [facility] SPECIAL PROCEDURES    PROSTATECTOMY N/A 5/16/2022    ROBOTIC LAPAROSCOPIC RADICAL PROSTATECTOMY WITH BILATERAL LYMPH NODE DISSECTION AND BILATERAL NERVE SPARE performed by Dedrick Rivera MD at [address] [place] [address]         Family History:  All family history was reviewed today.    History reviewed. No pertinent family history. Medications: All current and past medications were reviewed. Medications Prior to Admission: tamsulosin (FLOMAX) 0.4 MG capsule, Take 1 capsule by mouth daily For stent pain and stone passage. oxybutynin (DITROPAN) 5 MG tablet, Take 1 tablet by mouth 3 times daily as needed (bladder spasms)  amLODIPine (NORVASC) 5 MG tablet, Take 1 tablet by mouth daily  Lancets MISC, 1 each by Does not apply route 2 times daily  blood glucose monitor kit and supplies, Dispense sufficient amount for indicated testing frequency plus additional to accommodate PRN testing needs. Dispense all needed supplies to include: monitor, strips, lancing device, lancets, control solutions, alcohol swabs. blood glucose monitor strips, Test3 times a day & as needed for symptoms of irregular blood glucose. Dispense sufficient amount for indicated testing frequency plus additional to accommodate PRN testing needs. insulin glargine (LANTUS SOLOSTAR) 100 UNIT/ML injection pen, Inject 8 Units into the skin nightly  insulin lispro, 1 Unit Dial, (HUMALOG KWIKPEN) 100 UNIT/ML SOPN, Glucose: Dose: If <139   - No Insulin 140-199   --- 1 Unit 200-249   --  2  Units 250-299           3 Units  300-349           4 Units 350-400           5 Units  Above 400       6 Units  Insulin Pen Needle 29G X 12MM MISC, 1 each by Does not apply route daily     meropenem  500 mg IntraVENous Q24H    amLODIPine  5 mg Oral Daily    insulin glargine  8 Units SubCUTAneous Nightly    sodium chloride flush  5-40 mL IntraVENous 2 times per day    heparin (porcine)  5,000 Units SubCUTAneous 3 times per day    insulin lispro  0-4 Units SubCUTAneous TID WC    insulin lispro  0-4 Units SubCUTAneous Nightly    lactobacillus  1 capsule Oral BID WC          REVIEW OF SYSTEMS:       Review of Systems   Constitutional:  Positive for fatigue. Negative for chills, diaphoresis and fever.    HENT:  Negative for ear discharge, ear pain, postnasal drip, rhinorrhea, sinus pressure, sinus pain and sore throat. Eyes:  Negative for discharge and redness. Respiratory:  Negative for cough, shortness of breath and wheezing. Cardiovascular:  Negative for chest pain and leg swelling. Gastrointestinal:  Negative for abdominal pain, constipation, diarrhea and nausea. Endocrine: Negative for cold intolerance, heat intolerance and polydipsia. Genitourinary:  Negative for dysuria, flank pain, frequency, hematuria and urgency. Musculoskeletal:  Negative for back pain and myalgias. Skin:  Negative for rash. Allergic/Immunologic: Negative for immunocompromised state. Neurological:  Negative for dizziness, seizures and headaches. Hematological:  Does not bruise/bleed easily. Psychiatric/Behavioral:  Negative for agitation, hallucinations and suicidal ideas. The patient is not nervous/anxious. All other systems reviewed and are negative. Objective:       PHYSICAL EXAM:      Vitals:   Vitals:    08/16/22 1126 08/16/22 1145 08/16/22 1231 08/16/22 1747   BP: (!) 188/106 (!) 159/95  (!) 145/87   Pulse:  97  85   Resp: 18 18  16   Temp: 97.1 °F (36.2 °C)   98 °F (36.7 °C)   TempSrc:  Oral  Axillary   SpO2:  98% 98% 98%   Weight: 144 lb 13.5 oz (65.7 kg)      Height:           Physical Exam  Vitals and nursing note reviewed. Constitutional:       Appearance: He is well-developed. He is not diaphoretic. Comments: The patient was seen earlier today. HENT:      Head: Normocephalic and atraumatic. Right Ear: External ear normal. There is no impacted cerumen. Left Ear: External ear normal. There is no impacted cerumen. Nose: Nose normal.      Mouth/Throat:      Mouth: Mucous membranes are moist.      Pharynx: Oropharynx is clear. No oropharyngeal exudate. Eyes:      General: No scleral icterus. Right eye: No discharge. Left eye: No discharge.       Conjunctiva/sclera: Conjunctivae normal. Pupils: Pupils are equal, round, and reactive to light. Neck:      Thyroid: No thyromegaly. Cardiovascular:      Rate and Rhythm: Normal rate and regular rhythm. Heart sounds: Normal heart sounds. No murmur heard. No friction rub. Pulmonary:      Effort: No respiratory distress. Breath sounds: No stridor. No wheezing or rales. Abdominal:      General: Bowel sounds are normal.      Palpations: Abdomen is soft. Tenderness: There is no abdominal tenderness. There is no guarding or rebound. Musculoskeletal:         General: No swelling, tenderness or deformity. Normal range of motion. Cervical back: Normal range of motion and neck supple. Right lower leg: No edema. Left lower leg: No edema. Lymphadenopathy:      Cervical: No cervical adenopathy. Skin:     General: Skin is warm and dry. Coloration: Skin is not jaundiced. Findings: No bruising, erythema or rash. Neurological:      General: No focal deficit present. Mental Status: He is alert and oriented to person, place, and time. Mental status is at baseline. Motor: No abnormal muscle tone. Psychiatric:         Mood and Affect: Mood normal.         Behavior: Behavior normal.         Lines and drains: All vascular access sites are healthy with no local erythema, discharge or tenderness. Intake and output:     I/O last 3 completed shifts: In: 80 [P.O.:480; IV Piggyback:100]  Out: 2100 [Urine:2100]    Lab Data:   All available labs were reviewed by me today.      CBC:   Recent Labs     08/13/22  1950 08/14/22  0544 08/16/22  0630   WBC 5.4 6.2 4.0   RBC 3.93* 4.00* 3.95*   HGB 11.7* 11.7* 11.5*   HCT 35.3* 35.9* 35.5*    196 193   MCV 89.9 89.6 89.8   MCH 29.8 29.2 29.1   MCHC 33.2 32.6 32.5   RDW 13.6 13.6 13.5        BMP:  Recent Labs     08/14/22  0544 08/15/22  0629 08/16/22  0630    143 137   K 4.0 3.5 3.3*   CL 98* 105 102   CO2 35* 28 24   BUN 29* 36* 39*   CREATININE 2.6* 2.7* Fever R50.9    Diabetes education, encounter for Z71.89    Urinary tract infection without hematuria N39.0    ESRD on dialysis ([facility] [address] N18.6, Z99.2    Infection requiring contact isolation precautions B99.9    Bilateral nephrolithiasis N20.0    Obstructive uropathy N13.9    Syncope and collapse R55         Please note that this chart was generated using Dragon dictation software. Although every effort was made to ensure the accuracy of this automated transcription, some errors in transcription may have occurred inadvertently. If you may need any clarification, please do not hesitate to contact me through EPIC or at the phone number provided below with my electronic signature. Any pictures or media included in this note were obtained after taking informed verbal consent from the patient and with their approval to include those in the patient's medical record. Phil Ritter MD, MPH, [address] [place] [address]  8/16/2022, 6:33 PM  [facility] Infectious Disease   [facility] [address]  Office: [phone]  Fax: [phone]  In-person Clinic days:  Tuesday & Thursday a.m. Virtual clinic days: Monday, Wednesday & Friday a.m.

## 2022-08-16 NOTE — PROGRESS NOTES
Pt states that he is upset still after meeting with dietary. Pt states he will just not eat anything. Attempted to assist pt with meal selection but pt refused.

## 2022-08-16 NOTE — PROGRESS NOTES
Urology Progress Note  [facility]    Provider: LIZ Barriga - CNP  Patient ID:  Admission Date: 2022 Name: Farhad Michaels  OR Date: 2022 MRN: [id number]   Patient Location: [id number] : 1960  Attending: Shelton Crigler, MD Date of Service: 2022  PCP: Rahul Gandara PA-C     Diagnoses:  1. Urinary tract infection in male    2. Syncope and collapse     CHRISSY on CKD3  Right ureteral stone with mild hydronephrosis    Assessment/Plan:  Admission with syncope after dialysis in setting of complex urologic history status post robotic prostatectomy, also ureteroscopy for large right renal stone with hospital course complicated by sepsis and renal failure  - KAVON 08/10- right renal stones with hydro  -CT 08/15/2022- 4mm distal right ureteral stone, 3mm nephrolithiasis, hydronephrosis  - UA with infection- staph epi- continue merrem  - NPO at midnight for right ureteroscopy, possible stent insertion per Dr. Cara Calle      The patient had a chance to ask questions which were answered. he understands the above plan. Subjective:   Farhad Michaels is a 58 y.o. male. He was seen and examined this morning. Today we discussed procedure for stone removal tomorrow. Objective:   Vitals:  Vitals:    22 0830   BP: (!) 186/120   Pulse: 77   Resp: 18   Temp: 96.9 °F (36.1 °C)   SpO2:        Intake/Output Summary (Last 24 hours) at 2022 9423  Last data filed at 2022 0400  Gross per 24 hour   Intake 340 ml   Output 2100 ml   Net -1760 ml     Physical Exam:  Gen: Alert and oriented x3, no acute distress  CV: Regular rate   Resp: unlabored respirations  Abd: Soft, non-distended, non-tender, no masses  Ext: no peripheral edema noted, moves upper and lower extremities spontaneously  Skin: warmand well perfused, no rashes noted on the face, or arms.      Labs:  Lab Results   Component Value Date    WBC 4.0 2022    HGB 11.5 (L) 2022    HCT 35.5 (L) 2022    MCV 89.8 08/16/2022     08/16/2022     Lab Results   Component Value Date    CREATININE 2.7 (H) 08/16/2022    BUN 39 (H) 08/16/2022     08/16/2022    K 3.3 (L) 08/16/2022     08/16/2022    CO2 24 08/16/2022       Valente Laming, APRN - CNP   8/16/2022

## 2022-08-17 ENCOUNTER — ANESTHESIA EVENT (OUTPATIENT)
Dept: OPERATING ROOM | Age: 62
DRG: 446 | End: 2022-08-17
Payer: MEDICAID

## 2022-08-17 LAB
24HR URINE VOLUME (ML): 2000 ML
ALBUMIN SERPL-MCNC: 3.9 G/DL (ref 3.4–5)
ANION GAP SERPL CALCULATED.3IONS-SCNC: 10 MMOL/L (ref 3–16)
BLOOD CULTURE, ROUTINE: NORMAL
BUN BLDV-MCNC: 31 MG/DL (ref 7–20)
CALCIUM SERPL-MCNC: 10 MG/DL (ref 8.3–10.6)
CHLORIDE BLD-SCNC: 100 MMOL/L (ref 99–110)
CO2: 29 MMOL/L (ref 21–32)
CREAT SERPL-MCNC: 2.5 MG/DL (ref 0.8–1.3)
CREAT SERPL-MCNC: 2.7 MG/DL (ref 0.8–1.3)
CREATININE 24 HOUR URINE: 1.3 G/24HR (ref 0.6–2.5)
CREATININE CLEARANCE: 32 ML/MIN (ref 100–140)
CULTURE, BLOOD 2: NORMAL
GFR AFRICAN AMERICAN: 32
GFR NON-AFRICAN AMERICAN: 26
GLUCOSE BLD-MCNC: 128 MG/DL (ref 70–99)
GLUCOSE BLD-MCNC: 135 MG/DL (ref 70–99)
GLUCOSE BLD-MCNC: 155 MG/DL (ref 70–99)
GLUCOSE BLD-MCNC: 211 MG/DL (ref 70–99)
GLUCOSE BLD-MCNC: 89 MG/DL (ref 70–99)
HCT VFR BLD CALC: 35.1 % (ref 40.5–52.5)
HEMOGLOBIN: 11.4 G/DL (ref 13.5–17.5)
Lab: 24 HR
MAGNESIUM: 2.2 MG/DL (ref 1.8–2.4)
MCH RBC QN AUTO: 28.9 PG (ref 26–34)
MCHC RBC AUTO-ENTMCNC: 32.4 G/DL (ref 31–36)
MCV RBC AUTO: 89.3 FL (ref 80–100)
PDW BLD-RTO: 13.4 % (ref 12.4–15.4)
PERFORMED ON: ABNORMAL
PERFORMED ON: NORMAL
PHOSPHORUS: 3.7 MG/DL (ref 2.5–4.9)
PLATELET # BLD: 201 K/UL (ref 135–450)
PMV BLD AUTO: 7.4 FL (ref 5–10.5)
POTASSIUM SERPL-SCNC: 3.7 MMOL/L (ref 3.5–5.1)
RBC # BLD: 3.93 M/UL (ref 4.2–5.9)
SODIUM BLD-SCNC: 139 MMOL/L (ref 136–145)
WBC # BLD: 4.7 K/UL (ref 4–11)

## 2022-08-17 PROCEDURE — 2580000003 HC RX 258: Performed by: INTERNAL MEDICINE

## 2022-08-17 PROCEDURE — 36415 COLL VENOUS BLD VENIPUNCTURE: CPT

## 2022-08-17 PROCEDURE — 6370000000 HC RX 637 (ALT 250 FOR IP): Performed by: PHYSICIAN ASSISTANT

## 2022-08-17 PROCEDURE — 6360000002 HC RX W HCPCS: Performed by: INTERNAL MEDICINE

## 2022-08-17 PROCEDURE — 83735 ASSAY OF MAGNESIUM: CPT

## 2022-08-17 PROCEDURE — 1200000000 HC SEMI PRIVATE

## 2022-08-17 PROCEDURE — 2580000003 HC RX 258: Performed by: PHYSICIAN ASSISTANT

## 2022-08-17 PROCEDURE — 6360000002 HC RX W HCPCS: Performed by: PHYSICIAN ASSISTANT

## 2022-08-17 PROCEDURE — 99232 SBSQ HOSP IP/OBS MODERATE 35: CPT | Performed by: INTERNAL MEDICINE

## 2022-08-17 PROCEDURE — 80069 RENAL FUNCTION PANEL: CPT

## 2022-08-17 PROCEDURE — 85027 COMPLETE CBC AUTOMATED: CPT

## 2022-08-17 RX ADMIN — MEROPENEM 500 MG: 500 INJECTION, POWDER, FOR SOLUTION INTRAVENOUS at 11:22

## 2022-08-17 RX ADMIN — HEPARIN SODIUM 5000 UNITS: 5000 INJECTION INTRAVENOUS; SUBCUTANEOUS at 05:50

## 2022-08-17 RX ADMIN — Medication 1 CAPSULE: at 09:30

## 2022-08-17 RX ADMIN — SODIUM CHLORIDE: 9 INJECTION, SOLUTION INTRAVENOUS at 11:19

## 2022-08-17 RX ADMIN — INSULIN GLARGINE 8 UNITS: 100 INJECTION, SOLUTION SUBCUTANEOUS at 21:24

## 2022-08-17 RX ADMIN — AMLODIPINE BESYLATE 5 MG: 5 TABLET ORAL at 09:30

## 2022-08-17 RX ADMIN — SODIUM CHLORIDE, PRESERVATIVE FREE 10 ML: 5 INJECTION INTRAVENOUS at 21:25

## 2022-08-17 RX ADMIN — HEPARIN SODIUM 5000 UNITS: 5000 INJECTION INTRAVENOUS; SUBCUTANEOUS at 13:55

## 2022-08-17 RX ADMIN — Medication 1 CAPSULE: at 18:28

## 2022-08-17 RX ADMIN — HEPARIN SODIUM 5000 UNITS: 5000 INJECTION INTRAVENOUS; SUBCUTANEOUS at 21:24

## 2022-08-17 ASSESSMENT — ENCOUNTER SYMPTOMS
RHINORRHEA: 0
ABDOMINAL PAIN: 0
SHORTNESS OF BREATH: 0
NAUSEA: 0
SINUS PRESSURE: 0
EYE REDNESS: 0
SORE THROAT: 0
PHOTOPHOBIA: 0
SINUS PAIN: 0
CHEST TIGHTNESS: 0
FACIAL SWELLING: 0
CONSTIPATION: 0
ABDOMINAL DISTENTION: 0
EYE DISCHARGE: 0
DIARRHEA: 0
BACK PAIN: 0
VOMITING: 0
BLOOD IN STOOL: 0
COUGH: 0
WHEEZING: 0

## 2022-08-17 ASSESSMENT — PAIN SCALES - GENERAL
PAINLEVEL_OUTOF10: 0

## 2022-08-17 NOTE — PROGRESS NOTES
Hospitalist Progress Note      PCP: Keon Dueñas PA-C    Date of Admission: 8/13/2022    Chief Complaint: Syncope    Hospital Course: 59 yo M with history of prostate cancer s/p robotic prostatectomy, h/o R UPJ stone s/p ureteroscopy, ESRD on HD, DM 2 who came to Er with syncope. Admitted as inpatient for complicated UTI. Followed by Renal, Urology and Cardiology. Echo:   Normal left ventricle size, wall thickness, and systolic function with an   estimated ejection fraction of 60-65%. No regional wall motion abnormalities are seen. Aortic valve appears sclerotic but opens adequately. No evidence of aortic   valve stenosis. The aortic root is mildly dilated. Mildly thickened mitral valve leaflets with no evidence of stenosis. Mild pulmonic regurgitation present. Grade I diastolic dysfunction with normal LV filling pressures. Avg E/e'   estimated to be 10.6. For OR on 8/18/22 for R ureteroscopy and possible stent. Subjective:  Patient has no complaints. No CP, SOB, HA or abdominal pain. No fevers.         Medications:  Reviewed    Infusion Medications    sodium chloride 25 mL/hr at 08/17/22 1119    dextrose       Scheduled Medications    meropenem  500 mg IntraVENous Q24H    amLODIPine  5 mg Oral Daily    insulin glargine  8 Units SubCUTAneous Nightly    sodium chloride flush  5-40 mL IntraVENous 2 times per day    heparin (porcine)  5,000 Units SubCUTAneous 3 times per day    insulin lispro  0-4 Units SubCUTAneous TID WC    insulin lispro  0-4 Units SubCUTAneous Nightly    lactobacillus  1 capsule Oral BID WC     PRN Meds: heparin (porcine), sodium chloride flush, sodium chloride, acetaminophen **OR** acetaminophen, polyethylene glycol, dextrose bolus **OR** dextrose bolus, glucagon (rDNA), dextrose, ondansetron      Intake/Output Summary (Last 24 hours) at 8/17/2022 1428  Last data filed at 8/17/2022 0000  Gross per 24 hour   Intake 480 ml   Output --   Net 480 ml Physical Exam Performed:    BP (!) 157/97   Pulse 80   Temp 98 °F (36.7 °C) (Axillary)   Resp 17   Ht 5' 7\" (1.702 m)   Wt 148 lb 8 oz (67.4 kg)   SpO2 99%   BMI 23.26 kg/m²     General appearance: No apparent distress, appears stated age and cooperative. HEENT: Pupils equal, round, and reactive to light. Conjunctivae/corneas clear. Neck: Supple, with full range of motion. No jugular venous distention. Trachea midline. Respiratory:  Normal respiratory effort. Clear to auscultation, bilaterally without Rales/Wheezes/Rhonchi. Cardiovascular: Regular rate and rhythm with normal S1/S2 without murmurs, rubs or gallops. Abdomen: Soft, non-tender, non-distended with normal bowel sounds. Musculoskeletal: No clubbing, cyanosis or edema bilaterally. Full range of motion without deformity. Skin: Skin color, texture, turgor normal.  No rashes or lesions. Neurologic:  Neurovascularly intact without any focal sensory/motor deficits. Cranial nerves: II-XII intact, grossly non-focal.  Psychiatric: Alert and oriented, thought content appropriate, normal insight  Capillary Refill: Brisk,3 seconds, normal   Peripheral Pulses: +2 palpable, equal bilaterally       Labs:   Recent Labs     08/16/22  0630 08/17/22  0554   WBC 4.0 4.7   HGB 11.5* 11.4*   HCT 35.5* 35.1*    201       Recent Labs     08/15/22  0629 08/16/22  0630 08/16/22  1650 08/17/22  0554    137  --  139   K 3.5 3.3*  --  3.7    102  --  100   CO2 28 24  --  29   BUN 36* 39*  --  31*   CREATININE 2.7* 2.7* 2.7* 2.5*   CALCIUM 9.8 9.5  --  10.0   PHOS 3.7 3.8  --  3.7       No results for input(s): AST, ALT, BILIDIR, BILITOT, ALKPHOS in the last 72 hours. No results for input(s): INR in the last 72 hours. No results for input(s): Marilyn Ill in the last 72 hours.       Urinalysis:      Lab Results   Component Value Date/Time    NITRU Negative 08/13/2022 07:50 PM    WBCUA 379 08/13/2022 07:50 PM    BACTERIA None Seen 08/13/2022 07:50 PM    RBCUA 1 08/13/2022 07:50 PM    BLOODU TRACE 08/13/2022 07:50 PM    SPECGRAV 1.020 08/13/2022 07:50 PM    GLUCOSEU >=1000 08/13/2022 07:50 PM       Radiology:  CT ABDOMEN PELVIS WO CONTRAST Additional Contrast? None   Final Result   1. Mild right-sided hydronephrosis which has improved from the comparison   exam.  3 mm retained stone within the right kidney. 2.  4 mm low-density stone within the proximal 3rd of the right ureter   appears to have moved distally since the comparison exam.         XR CHEST PORTABLE   Final Result   Status post right-sided dialysis catheter placement in good position. Mild cardiomegaly which is less prominent with no acute pulmonary abnormality. Assessment/Plan:    Active Hospital Problems    Diagnosis     ESRD on dialysis ([facility] [N18.6, Z99.2]      Priority: Medium    Infection requiring contact isolation precautions [B99.9]      Priority: Medium    Bilateral nephrolithiasis [N20.0]      Priority: Medium    Obstructive uropathy [N13.9]      Priority: Medium    Syncope and collapse [R55]      Priority: Medium    Urinary tract infection without hematuria [N39.0]      Priority: Medium    S/P radical cystoprostatectomy [Z90.79, Z90.6]      Priority: Medium    Poorly controlled type 2 diabetes mellitus ([facility] [E11.65]      Priority: Medium    UTI due to extended-spectrum beta lactamase (ESBL) producing Escherichia coli [N39.0, B96.29, Z16.12]      Priority: Medium    Prostate cancer ([facility] Clarence Fernandez      Priority: Medium     Continue IV Merrem  UCx with ESBL E Coli  NPO p [place] for OR on 8/18   Urology input appreciated  HD per Renal  Renal input appreciated  Cardio input appreciated  ID consult for ESBL E Coli appreciated    DVT Prophylaxis: Hep SQ  Diet: ADULT DIET; Regular  Code Status: Full Code    PT/OT Eval Status: Following    Dispo - Home    Discussed with patient, nursing and CM. OR tomorrow. Urology unable to take to OR.     Abx per ID upon DC.     Shira Copeland MD

## 2022-08-17 NOTE — PROGRESS NOTES
Formerly [facility] Note    Patient Active Problem List   Diagnosis    Prostate cancer ([facility]    Se[facility] ([facility]    CHRISSY (acute kidney injury) ([facility]    Ureterolithiasis    Lactic acidosis    UTI due to extended-spectrum beta lactamase (ESBL) producing Escherichia coli    Bacteremia due to Gram-negative bacteria    Complicated UTI (urinary tract infection)    Hyponatremia    S/P radical cystoprostatectomy    Poorly controlled type 2 diabetes mellitus ([facility]    Fever    Diabetes education, encounter for    Urinary tract infection without hematuria    ESRD on dialysis ([facility]    Infection requiring contact isolation precautions    Bilateral nephrolithiasis    Obstructive uropathy    Syncope and collapse       Past Medical History:   has a past medical history of Diabetes mellitus ([facility] and Hypertension. Past Social History:   reports that he has never smoked. He has never used smokeless tobacco. He reports that he does not drink alcohol and does not use drugs. Subjective:   No complaints today. Right ureteroscopy rescheduled for tomorrow. Review of Systems   Constitutional:  Positive for fatigue. Negative for activity change, appetite change, chills, fever and unexpected weight change. HENT:  Negative for congestion and facial swelling. Eyes:  Negative for photophobia, discharge and redness. Respiratory:  Negative for cough, chest tightness and shortness of breath. Cardiovascular:  Negative for chest pain, palpitations and leg swelling. Gastrointestinal:  Negative for abdominal distention, abdominal pain, blood in stool, constipation, diarrhea, nausea and vomiting. Endocrine: Negative for cold intolerance, heat intolerance and polyuria. Genitourinary:  Negative for decreased urine volume, difficulty urinating, flank pain and hematuria. Musculoskeletal:  Negative for joint swelling and neck pain.    Neurological:  Negative for dizziness, seizures, syncope, speech difficulty, light-headedness and headaches. Hematological:  Does not bruise/bleed easily. Psychiatric/Behavioral:  Negative for agitation, confusion and hallucinations. Objective:      BP (!) 150/89   Pulse 86   Temp 96.8 °F (36 °C) (Axillary)   Resp 18   Ht 5' 7\" (1.702 m)   Wt 148 lb 8 oz (67.4 kg)   SpO2 96%   BMI 23.26 kg/m²     Wt Readings from Last 3 Encounters:   08/17/22 148 lb 8 oz (67.4 kg)   07/21/22 145 lb 1.6 oz (65.8 kg)   07/13/22 142 lb 9.6 oz (64.7 kg)       BP Readings from Last 3 Encounters:   08/17/22 (!) 150/89   07/21/22 (!) 168/74   07/13/22 (!) 169/113     Chest- clear  Heart-regular  Abd-soft  Ext- no edema    Labs  Hemoglobin   Date Value Ref Range Status   08/17/2022 11.4 (L) 13.5 - 17.5 g/dL Final     Hematocrit   Date Value Ref Range Status   08/17/2022 35.1 (L) 40.5 - 52.5 % Final     WBC   Date Value Ref Range Status   08/17/2022 4.7 4.0 - 11.0 K/uL Final     Platelets   Date Value Ref Range Status   08/17/2022 201 135 - 450 K/uL Final     Lab Results   Component Value Date    CREATININE 2.5 (H) 08/17/2022    BUN 31 (H) 08/17/2022     08/17/2022    K 3.7 08/17/2022     08/17/2022    CO2 29 08/17/2022     Renal US 8/4/22     FINDINGS:       Kidneys: The right kidney measures 8.6 x 4.5 x 3.8 cm in length and the left kidney   measures 9.0 x 4.2 x 4.0 cm in length. A 6 mm stone by 4 mm stone is seen in the right kidney. A 5 mm x 5 mm stone   is also seen in the right kidney. There is mild right-sided hydronephrosis. No hydronephrosis on the left. Bladder:       Bladder is collapsed and not well evaluated           Impression   Small renal stones on the right with mild right-sided hydronephrosis. No stones or hydronephrosis on the left     24H urine Crea Cl 32ml/min    Assessment/Plan:   CHRISSY on CKD 3b-crea was 1.5 on 5/2022. Started on HD on 5/2022 due to worsened renal function. Has RFK.    Crea 2.1 on presentation and now at 2.5. Saturating well. Na profile and lower temp  with HD due to dizziness. Hold off on HD for now. Follow renal status on Friday after right stent placement tomorrow. Right nepholithiasis with hydronephrosis-discussed with Urology. For right stone intervention/stent placement. Appreciate help. Hyponatremia- improved  Anemia- hgb at goal  HTN-continue current regimen. Follow  DM-[facility]  7. Hypokalemia-better  8. HD TThS at [facility]  9. 2005 A [facility] intervention. Hopefully Friday.      Nasir Christensen MD

## 2022-08-17 NOTE — PLAN OF CARE
Problem: Safety - Adult  Goal: Free from fall injury  8/17/2022 1311 by Elva Pulido RN  Outcome: Progressing  8/17/2022 0108 by Tran Maxwell RN  Outcome: Progressing     Problem: ABCDS Injury Assessment  Goal: Absence of physical injury  8/17/2022 1311 by Elva Pulido RN  Outcome: Progressing  8/17/2022 0108 by Tran Maxwell RN  Outcome: Progressing     Problem: Pain  Goal: Verbalizes/displays adequate comfort level or baseline comfort level  8/17/2022 1311 by Elva Pulido RN  Outcome: Progressing  Flowsheets (Taken 8/17/2022 0607 by Tran Maxwell RN)  Verbalizes/displays adequate comfort level or baseline comfort level:   Encourage patient to monitor pain and request assistance   Assess pain using appropriate pain scale   Administer analgesics based on type and severity of pain and evaluate response   Implement non-pharmacological measures as appropriate and evaluate response   Notify Licensed Independent Practitioner if interventions unsuccessful or patient reports new pain  8/17/2022 0108 by Tran Maxwell RN  Outcome: Progressing  Flowsheets  Taken 8/17/2022 0000  Verbalizes/displays adequate comfort level or baseline comfort level:   Encourage patient to monitor pain and request assistance   Assess pain using appropriate pain scale   Administer analgesics based on type and severity of pain and evaluate response   Implement non-pharmacological measures as appropriate and evaluate response   Consider cultural and social influences on pain and pain management   Notify Licensed Independent Practitioner if interventions unsuccessful or patient reports new pain  Taken 8/16/2022 2045  Verbalizes/displays adequate comfort level or baseline comfort level:   Encourage patient to monitor pain and request assistance   Assess pain using appropriate pain scale   Administer analgesics based on type and severity of pain and evaluate response   Implement non-pharmacological measures as appropriate and evaluate response   Consider cultural and social influences on pain and pain management   Notify Licensed Independent Practitioner if interventions unsuccessful or patient reports new pain

## 2022-08-17 NOTE — PROGRESS NOTES
Shift assessment completed. Routine vitals completed. Scheduled medications given but pt refused his merrem. Patient is awake, alert and oriented. Respirations are easy and unlabored. Patient does not appear to be in distress, resting comfortably at this time. Call light within reach.

## 2022-08-17 NOTE — PROGRESS NOTES
Urology Progress Note  [facility]    Provider: LIZ Zepeda CNP  Patient ID:  Admission Date: 2022 Name: Stanislaw Moss  OR Date: 2022 MRN: [id number]   Patient Location: [facility]-[id number] : 1960  Attending: Kristina Valencia MD Date of Service: 2022  PCP: Abad Mendoza PA-C     Diagnoses:  1. Urinary tract infection in male    2. Syncope and collapse     CHRISSY on CKD3  Right ureteral stone with mild hydronephrosis    Assessment/Plan:  Admission with syncope after dialysis in setting of complex urologic history status post robotic prostatectomy, also ureteroscopy for large right renal stone with hospital course complicated by sepsis and renal failure  - KAVON 08/10- right renal stones with hydro  -CT 08/15/2022- 4mm distal right ureteral stone, 3mm nephrolithiasis, hydronephrosis  - UA with infection- staph epi- continue merrem  - NPO at midnight for right ureteroscopy, possible stent insertion per Dr. Myranda Huynh- This is now scheduled tomorrow at noon. The patient had a chance to ask questions which were answered. he understands the above plan. Subjective:   Stanislaw Moss is a 58 y.o. male. He was seen and examined this morning. Today we discussed procedure for stone removal tomorrow. Objective:   Vitals:  Vitals:    22 0607   BP: (!) 147/77   Pulse: 91   Resp: 17   Temp: 98.3 °F (36.8 °C)   SpO2: 99%       Intake/Output Summary (Last 24 hours) at 2022 7445  Last data filed at 2022 0000  Gross per 24 hour   Intake 1620 ml   Output 1110 ml   Net 510 ml       Physical Exam:  Gen: Alert and oriented x3, no acute distress  CV: Regular rate   Resp: unlabored respirations  Abd: Soft, non-distended, non-tender, no masses  Ext: no peripheral edema noted, moves upper and lower extremities spontaneously  Skin: warmand well perfused, no rashes noted on the face, or arms.      Labs:  Lab Results   Component Value Date    WBC 4.7 2022    HGB 11.4 (L) 08/17/2022    HCT 35.1 (L) 08/17/2022    MCV 89.3 08/17/2022     08/17/2022     Lab Results   Component Value Date    CREATININE 2.5 (H) 08/17/2022    BUN 31 (H) 08/17/2022     08/17/2022    K 3.7 08/17/2022     08/17/2022    CO2 29 08/17/2022       LIZ Longoria - CNP   8/17/2022

## 2022-08-17 NOTE — ANESTHESIA PRE PROCEDURE
Department of Anesthesiology  Preprocedure Note       Name:  Antonio Gonzalez   Age:  58 y.o.  :  1960                                          MRN:  [id number]         Date:  2022      Surgeon: Wilian Gardner):  Julio Westbrook MD    Procedure: Procedure(s):  CYSTOSCOPY WITH RIGHT URETEROSCOPY WITH HOLMIUM LASER LITHOTRIPSY, STONE MANLIPULATION, STONE BASKET, AND POSSIBLE RIGHT STENT PLACEMENT-Former[facility] #CALLING BACK WITH NUMBER     Medications prior to admission:   Prior to Admission medications    Medication Sig Start Date End Date Taking? Authorizing Provider   tamsulosin (FLOMAX) 0.4 MG capsule Take 1 capsule by mouth daily For stent pain and stone passage. 22  Julio Westbrook MD   oxybutynin (DITROPAN) 5 MG tablet Take 1 tablet by mouth 3 times daily as needed (bladder spasms) 22  Julio Westbrook MD   amLODIPine (NORVASC) 5 MG tablet Take 1 tablet by mouth daily 22   Abdi Sanchez MD   Lancets MISC 1 each by Does not apply route 2 times daily 22   Jovany Montelongo MD   blood glucose monitor kit and supplies Dispense sufficient amount for indicated testing frequency plus additional to accommodate PRN testing needs. Dispense all needed supplies to include: monitor, strips, lancing device, lancets, control solutions, alcohol swabs. 22   Jovany Montelongo MD   blood glucose monitor strips Test3 times a day & as needed for symptoms of irregular blood glucose. Dispense sufficient amount for indicated testing frequency plus additional to accommodate PRN testing needs.  22   Jovany Montelongo MD   insulin glargine (LANTUS SOLOSTAR) 100 UNIT/ML injection pen Inject 8 Units into the skin nightly 22   Jovany Montelongo MD   insulin lispro, 1 Unit Dial, (HUMALOG KWIKPEN) 100 UNIT/ML SOPN Glucose: Dose: If <139   - No Insulin 140-199   --- 1 Unit 200-249   --  2  Units 250-299           3 Units  300-349           4 Units 350-400           5 Units  Above 400       6 Units 6/6/22   Dominique Obregon MD   Insulin Pen Needle 29G X 12MM MISC 1 each by Does not apply route daily 6/6/22   Amina Ayala MD       Current medications:    Current Facility-Administered Medications   Medication Dose Route Frequency Provider Last Rate Last Admin    heparin (porcine) injection 1,000 Units  1,000 Units IntraCATHeter PRN Luis Manuel Munoz MD        meropenem (MERREM) 500 mg IVPB (mini-bag)  500 mg IntraVENous Q24H Emily Naranjo MD   Stopped at 08/16/22 1537    amLODIPine (NORVASC) tablet 5 mg  5 mg Oral Daily Gina Hamlin PA-C   5 mg at 08/15/22 0825    insulin glargine (LANTUS) injection vial 8 Units  8 Units SubCUTAneous Nightly Gina Hamlin PA-C   8 Units at 08/16/22 2057    sodium chloride flush 0.9 % injection 5-40 mL  5-40 mL IntraVENous 2 times per day TALYA García-C   10 mL at 08/16/22 1236    sodium chloride flush 0.9 % injection 5-40 mL  5-40 mL IntraVENous PRN TALYA García-MARY ANN        0.9 % sodium chloride infusion   IntraVENous PRN TALYA García-MARY ANN 20 mL/hr at 08/14/22 0042 New Bag at 08/14/22 0042    acetaminophen (TYLENOL) tablet 650 mg  650 mg Oral Q6H PRN Gina Hamlin PA-C        Or    acetaminophen (TYLENOL) suppository 650 mg  650 mg Rectal Q6H PRN TALYA García-MARY ANN        polyethylene glycol (GLYCOLAX) packet 17 g  17 g Oral Daily PRN Gina Hamlin PA-MARY ANN        dextrose bolus 10% 125 mL  125 mL IntraVENous PRN TALYA García-MARY ANN        Or    dextrose bolus 10% 250 mL  250 mL IntraVENous PRN [place] TALYA Hamlin-MARY ANN        glucagon (rDNA) injection 1 mg  1 mg SubCUTAneous PRN TALYA García-MARY ANN        dextrose 10 % infusion   IntraVENous Continuous PRN TALYA García-MARY ANN        ondansetron [facility]) injection 4 mg  4 mg IntraVENous Q6H PRN [place] TALYA Hamlin-MARY ANN        heparin (porcine) injection 5,000 Units  5,000 Units SubCUTAneous 3 times per day Gina Hamlin PA-C   5,000 Units at 08/17/22 2737  insulin lispro (HUMALOG) injection vial 0-4 Units  0-4 Units SubCUTAneous TID  Bessie Arriaga PA-C   1 Units at 08/14/22 1757    insulin lispro (HUMALOG) injection vial 0-4 Units  0-4 Units SubCUTAneous Nightly Bessie Senjessica PA-C        lactobacillus (CULTURELLE) capsule 1 capsule  1 capsule Oral BID  Bessie Arriaga PA-C   1 capsule at 08/15/22 1736       Allergies:  No Known Allergies    Problem List:    Patient Active Problem List   Diagnosis Code    [facility] C61    Septic shock ([facility] A41.9, R65.21    CHRISSY (acute kidney injury) ([facility] N17.9    Ureterolithiasis N20.1    Lactic acidosis E87.2    UTI due to extended-spectrum beta lactamase (ESBL) producing Escherichia coli N39.0, B96.29, Z16.12    Bacteremia due to Gram-negative bacteria S45.22    Complicated UTI (urinary tract infection) N39.0    Hyponatremia E87.1    S/P radical cystoprostatectomy Z90.79, Z90.6    Poorly controlled type 2 diabetes mellitus ([facility] E11.65    Fever R50.9    Diabetes education, encounter for Z71.89    Urinary tract infection without hematuria N39.0    ESRD on dialysis ([facility] N18.6, Z99.2    Infection requiring contact isolation precautions B99.9    Bilateral nephrolithiasis N20.0    Obstructive uropathy N13.9    Syncope and collapse R55       Past Medical History:        Diagnosis Date    Diabetes mellitus ([facility]     Hypertension        Past Surgical History:        Procedure Laterality Date    CYSTOSCOPY Right 5/26/2022    CYSTOSCOPY, BILATERAL RETROGRADE PYELOGRAM, PLACEMENT OF RIGHT URETERAL STENT performed by Diogo Gutierrez MD at [address] [place] [address] Right 7/13/2022    CYSTOSCOPY WITH RIGHT URETEROSCOPY WITH HOLMIUM LASER LITHOTRIPSY, STONE MANIPULATION, STONE BASKET,  RIGHT STENT PLACEMENT-[facility] #[id number] performed by Rigoberto Lagos MD at [facility] OR    IR TUNNELED CATHETER PLACEMENT GREATER THAN 5 YEARS  5/28/2022    IR TUNNELED CATHETER PLACEMENT GREATER THAN 5 YEARS 5/28/2022 [facility] SPECIAL PROCEDURES    PROSTATECTOMY N/A 5/16/2022    ROBOTIC LAPAROSCOPIC RADICAL PROSTATECTOMY WITH BILATERAL LYMPH NODE DISSECTION AND BILATERAL NERVE SPARE performed by Rigoberto Lagos MD at [address] [place] [address] History:    Social History     Tobacco Use    Smoking status: Never    Smokeless tobacco: Never   Substance Use Topics    Alcohol use: Never                                Counseling given: Not Answered      Vital Signs (Current):   Vitals:    08/17/22 0000 08/17/22 0600 08/17/22 0607 08/17/22 0923   BP: (!) 152/86  (!) 147/77 (!) 150/89   Pulse: 82  91 86   Resp: 16 17 18   Temp: 36.7 °C (98 °F)  36.8 °C (98.3 °F) 36 °C (96.8 °F)   TempSrc: Oral  Oral Axillary   SpO2: 98%  99% 96%   Weight:  148 lb 8 oz (67.4 kg)     Height:                                                  BP Readings from Last 3 Encounters:   08/17/22 (!) 150/89   07/21/22 (!) 168/74   07/13/22 (!) 169/113       NPO Status:                                                                                 BMI:   Wt Readings from Last 3 Encounters:   08/17/22 148 lb 8 oz (67.4 kg)   07/21/22 145 lb 1.6 oz (65.8 kg)   07/13/22 142 lb 9.6 oz (64.7 kg)     Body mass index is 23.26 kg/m².     CBC:   Lab Results   Component Value Date/Time    WBC 4.7 08/17/2022 05:54 AM    RBC 3.93 08/17/2022 05:54 AM    HGB 11.4 08/17/2022 05:54 AM    HCT 35.1 08/17/2022 05:54 AM    MCV 89.3 08/17/2022 05:54 AM    RDW 13.4 08/17/2022 05:54 AM     08/17/2022 05:54 AM       CMP:   Lab Results   Component Value Date/Time     08/17/2022 05:54 AM    K 3.7 08/17/2022 05:54 AM    K 4.2 06/06/2022 05:07 AM     08/17/2022 05:54 AM    CO2 29 08/17/2022 05:54 AM    BUN 31 08/17/2022 05:54 AM    CREATININE 2.5 08/17/2022 05:54 AM    GFRAA 32 08/17/2022 05:54 AM    AGRATIO 1.0 08/13/2022 07:50 PM    LABGLOM 26 08/17/2022 05:54 AM    GLUCOSE 128 08/17/2022 05:54 AM    PROT 8.4 08/13/2022 07:50 PM    CALCIUM 10.0 08/17/2022 05:54 AM BILITOT 0.3 08/13/2022 07:50 PM    ALKPHOS 99 08/13/2022 07:50 PM    AST 19 08/13/2022 07:50 PM    ALT 10 08/13/2022 07:50 PM       POC Tests:   Recent Labs     08/17/22  0754   POCGLU 135*       Coags:   Lab Results   Component Value Date/Time    PROTIME 12.9 05/02/2022 09:56 AM    INR 1.14 05/02/2022 09:56 AM    APTT 34.5 05/02/2022 09:56 AM       HCG (If Applicable): No results found for: PREGTESTUR, PREGSERUM, HCG, HCGQUANT     ABGs: No results found for: PHART, PO2ART, OYI6ZAU, YGR8BQF, BEART, E9ALYJWH     Type & Screen (If Applicable):  No results found for: LABABO, LABRH    Drug/Infectious Status (If Applicable):  No results found for: HIV, HEPCAB    COVID-19 Screening (If Applicable): No results found for: COVID19        Anesthesia Evaluation    Airway: Mallampati: II          Dental:          Pulmonary:                              Cardiovascular:    (+) hypertension:,                ROS comment: EKG 8/13/2022:  Normal sinus rhythm  Possible Left atrial enlargement  Left ventricular hypertrophy  ST elevation, consider early repolarization, pericarditis, or injury  T wave abnormality, consider lateral ischemia    Echo 8/13/2022:  Normal left ventricle size, wall thickness, and systolic function with an   estimated ejection fraction of 60-65%. No regional wall motion abnormalities are seen. Aortic valve appears sclerotic but opens adequately. No evidence of aortic   valve stenosis. The aortic root is mildly dilated. Mildly thickened mitral valve leaflets with no evidence of stenosis. Mild pulmonic regurgitation present. Grade I diastolic dysfunction with normal LV filling pressures. Avg E/e'   estimated to be 10.6. Neuro/Psych:               GI/Hepatic/Renal:   (+) renal disease: ESRD and dialysis,          ROS comment: Dialysis started in June 2022, T/Th/S  Last dialysis 8/16. Endo/Other:    (+) DiabetesType II DM, , malignancy/cancer.                  Abdominal:             Vascular: Other Findings:           Anesthesia Plan      general     ASA 3       Induction: intravenous. MIPS: Postoperative opioids intended and Prophylactic antiemetics administered.                         LIZ Mora - DIEGO   8/17/2022

## 2022-08-17 NOTE — PROGRESS NOTES
Infectious Diseases   Progress Note      Admission Date: 8/13/2022  Hospital Day: Hospital Day: 5   Attending: Rhonda Tate MD  Date of service: 8/17/2022     Chief complaint/ Reason for consult:     Complicated urinary tract infection with ESBL E. coli and Staphylococcus hemolyticus  Obstructive uropathy with bilateral nephrolithiasis and right-sided hydronephrosis  Syncopal episode at dialysis center  ESRD on hemodialysis    Microbiology:        I have reviewed allavailable micro lab data and cultures    Blood culture (2/2) - collected on 8/13/2022: Negative    Urine culture  - collected on 8/13/2022: 75,000 CFU per mL of Staphylococcus hemolyticus, 50,000 ESBL E. coli    Susceptibility      Escherichia coli (esbl) (1)    Antibiotic Interpretation Microscan  Method Status    ampicillin Resistant >=32 mcg/mL BACTERIAL SUSCEPTIBILITY PANEL BY RICA     ampicillin-sulbactam Sensitive 8 mcg/mL BACTERIAL SUSCEPTIBILITY PANEL BY RICA     ceFAZolin Resistant >=64 mcg/mL BACTERIAL SUSCEPTIBILITY PANEL BY RICA     cefepime Resistant 16 mcg/mL BACTERIAL SUSCEPTIBILITY PANEL BY RICA     cefTRIAXone Resistant >=64 mcg/mL BACTERIAL SUSCEPTIBILITY PANEL BY RICA     ciprofloxacin Resistant >=4 mcg/mL BACTERIAL SUSCEPTIBILITY PANEL BY RICA     ertapenem Sensitive <=0.12 mcg/mL BACTERIAL SUSCEPTIBILITY PANEL BY RICA     gentamicin Sensitive <=1 mcg/mL BACTERIAL SUSCEPTIBILITY PANEL BY RICA     levofloxacin Resistant >=8 mcg/mL BACTERIAL SUSCEPTIBILITY PANEL BY RICA     nitrofurantoin Sensitive <=16 mcg/mL BACTERIAL SUSCEPTIBILITY PANEL BY RICA     trimethoprim-sulfamethoxazole Resistant >=320 mcg/mL BACTERIAL SUSCEPTIBILITY PANEL BY RICA       Antibiotics and immunizations:       Current antibiotics: All antibiotics and their doses were reviewed by me    Recent Abx Admin                     meropenem (MERREM) 500 mg IVPB (mini-bag) (mg) 500 mg New Bag 08/17/22 1122                      Immunization History: All immunization history was reviewed by me today. There is no immunization history on file for this patient. Known drug allergies: All allergies were reviewed and updated    No Known Allergies    Social history:     Social History:  All social andepidemiologic history was reviewed and updated by me today as needed. Tobacco use:   reports that he has never smoked. He has never used smokeless tobacco.  Alcohol use:   reports no history of alcohol use. Currently lives in: [facility]   reports no history of drug use. COVID VACCINATION AND LAB RESULT RECORDS:     Internal Administration   First Dose      Second Dose           Last COVID Lab No results found for: SARS-COV-2, SARS-COV-2 RNA, SARS-COV-2, SARS-COV-2, SARS-COV-2 BY PCR, SARS-COV-2, SARS-COV-2, SARS-COV-2         Assessment:     The patient is a 58 y.o. old male who  has a past medical history of Diabetes mellitus ([address] and Hypertension. with following problems:    Complicated urinary tract infection with ESBL E. coli and Staphylococcus hemolyticus-covered with meropenem  Obstructive uropathy with bilateral nephrolithiasis and right-sided hydronephrosis-nephrology is following  Syncopal episode at dialysis center  ESRD on hemodialysis  Need for contact isolation  Essential hypertension  History of prostate cancer, s/p robotic prostatectomy  History of right ureteropelvic junction stone, s/p ureteroscopy  Type 2 diabetes mellitus-counseling done      Discussion:      The patient is afebrile. He is on IV meropenem at dialysis dose. He is tolerating the antibiotic okay. He is now scheduled tomorrow for ureteroscopy and possible stent insertion by urology. No fever. No diarrhea.:    Plan:     Diagnostic Workup:      Continue to follow  fever curve, WBC count and blood cultures. Continue to monitor blood counts, liver and renal function. Antimicrobials:     Will continue meropenem at dialysis dose of 500 mg every 24 hour  Contact isolation for ESBL  We will follow up on the culture results and clinical progress and will make further recommendations accordingly. Continue close vitals monitoring. Maintain good glycemic control. Fall precautions. Aspiration precautions. Continue to watch for new fever or diarrhea. DVT prophylaxis. Discussed all above with patient and RN. Drug Monitoring:    Continue monitoring for antibiotic toxicity as follows: CBC, CMP   Continue to watch for following: new or worsening fever, new hypotension, hives, lip swelling and redness or purulence at vascular access sites. I/v access Management:    Continue to monitor i.v access sites for erythema, induration, discharge or tenderness. As always, continue efforts to minimize tubes/lines/drains as clinically appropriate to reduce chances of line associated infections. Patient education and counseling: The patient was educated in detail about the side-effects of various antibiotics and things to watch for like new rashes, lip swelling, severe reaction, worsening diarrhea, break through fever etc.  Discussed patient's condition and what to expect. All of the patient's questions were addressed in a satisfactory manner and patient verbalized understanding all instructions. Level of complexity of visit and medical decision making: High     Diabetes mellitus education and counseling:    Patient was educated in detail about the importance of keeping diabetes under control to improve the cure rate of infection and prevent future infections. Patient was advised to check blood glucose level regularly and to stay compliant with the diabetes medications. Patient was advised to the trim the toe nails carefully, wear shoes or slippers at all times and check both feet everyday before going to bed to look for any cuts, blisters, swelling or redness.  Importance of washing the feet everyday with soap and water and keeping them dry, and seeking prompt medical attention in case of a non-healing wound or ulcer on the feet was also highlighted. Thank you for involving me in the care of your patient. I will continue to follow. If you have anyadditional questions, please do not hesitate to contact me. Subjective: Interval history: Interval history was obtained from chart review and patient/ RN. The patient is afebrile. He is tolerating antibiotic okay. No diarrhea     REVIEW OF SYSTEMS:      Review of Systems   Constitutional:  Positive for fatigue. Negative for chills, diaphoresis and fever. HENT:  Negative for ear discharge, ear pain, postnasal drip, rhinorrhea, sinus pressure, sinus pain and sore throat. Eyes:  Negative for discharge and redness. Respiratory:  Negative for cough, shortness of breath and wheezing. Cardiovascular:  Negative for chest pain and leg swelling. Gastrointestinal:  Negative for abdominal pain, constipation, diarrhea and nausea. Endocrine: Negative for cold intolerance, heat intolerance and polydipsia. Genitourinary:  Negative for dysuria, flank pain, frequency, hematuria and urgency. Musculoskeletal:  Negative for back pain and myalgias. Skin:  Negative for rash. Allergic/Immunologic: Negative for immunocompromised state. Neurological:  Negative for dizziness, seizures and headaches. Hematological:  Does not bruise/bleed easily. Psychiatric/Behavioral:  Negative for agitation, hallucinations and suicidal ideas. The patient is not nervous/anxious. All other systems reviewed and are negative. Past Medical History: All past medical history reviewed today. Past Medical History:   Diagnosis Date    Diabetes mellitus ([facility]     Hypertension        Past Surgical History: All past surgical history was reviewed today.     Past Surgical History:   Procedure Laterality Date    CYSTOSCOPY Right 5/26/2022    CYSTOSCOPY, BILATERAL RETROGRADE PYELOGRAM, PLACEMENT OF RIGHT URETERAL STENT performed by Haile Li MD [place] at [address] Right 7/13/2022    CYSTOSCOPY WITH RIGHT URETEROSCOPY WITH HOLMIUM LASER LITHOTRIPSY, STONE MANIPULATION, STONE BASKET,  RIGHT STENT PLACEMENT-Count includes the [facility] #[id number] performed by Kimberly Son MD at [facility] 5 YEARS  5/28/2022    IR TUNNELED CATHETER PLACEMENT GREATER THAN 5 YEARS 5/28/2022 [facility] SPECIAL PROCEDURES    PROSTATECTOMY N/A 5/16/2022    ROBOTIC LAPAROSCOPIC RADICAL PROSTATECTOMY WITH BILATERAL LYMPH NODE DISSECTION AND BILATERAL NERVE SPARE performed by Kimberly Son MD at [address] [place] [address]       Family History: All family history was reviewed today. History reviewed. No pertinent family history. Objective:       PHYSICAL EXAM:      Vitals:   Vitals:    08/17/22 0600 08/17/22 0607 08/17/22 0923 08/17/22 1210   BP:  (!) 147/77 (!) 150/89 (!) 157/97   Pulse:  91 86 80   Resp:  17 18 17   Temp:  98.3 °F (36.8 °C) 96.8 °F (36 °C) 98 °F (36.7 °C)   TempSrc:  Oral Axillary Axillary   SpO2:  99% 96% 99%   Weight: 148 lb 8 oz (67.4 kg)      Height:           Physical Exam  Vitals and nursing note reviewed. Constitutional:       Appearance: He is well-developed. He is not diaphoretic. Comments: The patient was seen earlier today. HENT:      Head: Normocephalic and atraumatic. Right Ear: External ear normal. There is no impacted cerumen. Left Ear: External ear normal. There is no impacted cerumen. Nose: Nose normal.      Mouth/Throat:      Mouth: Mucous membranes are moist.      Pharynx: Oropharynx is clear. No oropharyngeal exudate. Eyes:      General: No scleral icterus. Right eye: No discharge. Left eye: No discharge. Conjunctiva/sclera: Conjunctivae normal.      Pupils: Pupils are equal, round, and reactive to light. Neck:      Thyroid: No thyromegaly. Cardiovascular:      Rate and Rhythm: Normal rate and regular rhythm. Heart sounds: Normal heart sounds. No murmur heard.     No friction rub. Pulmonary:      Effort: No respiratory distress. Breath sounds: No stridor. No wheezing or rales. Abdominal:      General: Bowel sounds are normal.      Palpations: Abdomen is soft. Tenderness: There is no abdominal tenderness. There is no guarding or rebound. Musculoskeletal:         General: No swelling, tenderness or deformity. Normal range of motion. Cervical back: Normal range of motion and neck supple. Right lower leg: No edema. Left lower leg: No edema. Lymphadenopathy:      Cervical: No cervical adenopathy. Skin:     General: Skin is warm and dry. Coloration: Skin is not jaundiced. Findings: No bruising, erythema or rash. Neurological:      General: No focal deficit present. Mental Status: He is alert and oriented to person, place, and time. Mental status is at baseline. Motor: No abnormal muscle tone. Psychiatric:         Mood and Affect: Mood normal.         Behavior: Behavior normal.          Lines and drains: All vascular access sites are healthy with no local erythema, discharge or tenderness. Intake and output:    I/O last 3 completed shifts: In: 1860 [P.O.:960]  Out: 0 [Urine:1400]    Lab Data:   All available labs and old records have been reviewed by me.     CBC:  Recent Labs     08/16/22  0630 08/17/22  0554   WBC 4.0 4.7   RBC 3.95* 3.93*   HGB 11.5* 11.4*   HCT 35.5* 35.1*    201   MCV 89.8 89.3   MCH 29.1 28.9   MCHC 32.5 32.4   RDW 13.5 13.4        BMP:  Recent Labs     08/15/22  0629 08/16/22  0630 08/16/22  1650 08/17/22  0554    137  --  139   K 3.5 3.3*  --  3.7    102  --  100   CO2 28 24  --  29   BUN 36* 39*  --  31*   CREATININE 2.7* 2.7* 2.7* 2.5*   CALCIUM 9.8 9.5  --  10.0   GLUCOSE 150* 187*  --  128*        Hepatic Function Panel:   Lab Results   Component Value Date/Time    ALKPHOS 99 08/13/2022 07:50 PM    ALT 10 08/13/2022 07:50 PM    AST 19 08/13/2022 07:50 PM    PROT 8.4 08/13/2022 07:50 PM    BILITOT 0.3 08/13/2022 07:50 PM    LABALBU 3.9 08/17/2022 05:54 AM       CPK:   Lab Results   Component Value Date    CKTOTAL 64 05/26/2022     ESR: No results found for: SEDRATE  CRP: No results found for: CRP        Imaging: All pertinent images and reports for the current visit were reviewed by me during this visit. I reviewed the chest x-ray/CT scan/MRI images and independently interpreted the findings and results today. CT ABDOMEN PELVIS WO CONTRAST Additional Contrast? None   Final Result   1. Mild right-sided hydronephrosis which has improved from the comparison   exam.  3 mm retained stone within the right kidney. 2.  4 mm low-density stone within the proximal 3rd of the right ureter   appears to have moved distally since the comparison exam.         XR CHEST PORTABLE   Final Result   Status post right-sided dialysis catheter placement in good position. Mild cardiomegaly which is less prominent with no acute pulmonary abnormality. Medications: All current and past medications were reviewed.      meropenem  500 mg IntraVENous Q24H    amLODIPine  5 mg Oral Daily    insulin glargine  8 Units SubCUTAneous Nightly    sodium chloride flush  5-40 mL IntraVENous 2 times per day    heparin (porcine)  5,000 Units SubCUTAneous 3 times per day    insulin lispro  0-4 Units SubCUTAneous TID WC    insulin lispro  0-4 Units SubCUTAneous Nightly    lactobacillus  1 capsule Oral BID WC        sodium chloride 25 mL/hr at 08/17/22 1119    dextrose         heparin (porcine), sodium chloride flush, sodium chloride, acetaminophen **OR** acetaminophen, polyethylene glycol, dextrose bolus **OR** dextrose bolus, glucagon (rDNA), dextrose, ondansetron      Problem list:       Patient Active Problem List   Diagnosis Code    Prostate cancer ([facility] [address] C61    Septic shock ([facility] [address] A41.9, R65.21    CHRISSY (acute kidney injury) ([facility] [address] N17.9    Ureterolithiasis N20.1    Lactic acidosis E87.2    UTI due to extended-spectrum beta lactamase (ESBL) producing Escherichia coli N39.0, B96.29, Z16.12    Bacteremia due to Gram-negative bacteria T57.65    Complicated UTI (urinary tract infection) N39.0    Hyponatremia E87.1    S/P radical cystoprostatectomy Z90.79, Z90.6    Poorly controlled type 2 diabetes mellitus ([facility] [address] E11.65    Fever R50.9    Diabetes education, encounter for Z71.89    Urinary tract infection without hematuria N39.0    ESRD on dialysis ([facility] [address] N18.6, Z99.2    Infection requiring contact isolation precautions B99.9    Bilateral nephrolithiasis N20.0    Obstructive uropathy N13.9    Syncope and collapse R55       Please note that this chart was generated using Dragon dictation software. Although every effort was made to ensure the accuracy of this automated transcription, some errors in transcription may have occurred inadvertently. If you may need any clarification, please do not hesitate to contact me through EPIC or at the phone number provided below with my electronic signature. Any pictures or media included in this note were obtained after taking informed verbal consent from the patient and with their approval to include those in the patient's medical record. Raymond Haley MD, MPH, FACP, [facility]  8/17/2022, 12:49 PM  [facility] Infectious Disease   [address] [place] [address]., [address] [place] [address], [address] [place] [address]  Office: [phone]  Fax: [phone]  In-person Clinic days:  Tuesday & Thursday a.m. Virtual clinic days: Monday, Wednesday & Friday a.m.

## 2022-08-18 ENCOUNTER — APPOINTMENT (OUTPATIENT)
Dept: GENERAL RADIOLOGY | Age: 62
DRG: 446 | End: 2022-08-18
Payer: MEDICAID

## 2022-08-18 ENCOUNTER — ANESTHESIA (OUTPATIENT)
Dept: OPERATING ROOM | Age: 62
DRG: 446 | End: 2022-08-18
Payer: MEDICAID

## 2022-08-18 DIAGNOSIS — R55 SYNCOPE, UNSPECIFIED SYNCOPE TYPE: Primary | ICD-10-CM

## 2022-08-18 LAB
ALBUMIN SERPL-MCNC: 3.8 G/DL (ref 3.4–5)
ANION GAP SERPL CALCULATED.3IONS-SCNC: 9 MMOL/L (ref 3–16)
BUN BLDV-MCNC: 39 MG/DL (ref 7–20)
CALCIUM SERPL-MCNC: 10.1 MG/DL (ref 8.3–10.6)
CHLORIDE BLD-SCNC: 102 MMOL/L (ref 99–110)
CO2: 28 MMOL/L (ref 21–32)
CREAT SERPL-MCNC: 2.4 MG/DL (ref 0.8–1.3)
GFR AFRICAN AMERICAN: 33
GFR NON-AFRICAN AMERICAN: 28
GLUCOSE BLD-MCNC: 101 MG/DL (ref 70–99)
GLUCOSE BLD-MCNC: 103 MG/DL (ref 70–99)
GLUCOSE BLD-MCNC: 175 MG/DL (ref 70–99)
GLUCOSE BLD-MCNC: 445 MG/DL (ref 70–99)
GLUCOSE BLD-MCNC: 87 MG/DL (ref 70–99)
GLUCOSE BLD-MCNC: 94 MG/DL (ref 70–99)
HCT VFR BLD CALC: 33.8 % (ref 40.5–52.5)
HEMOGLOBIN: 11 G/DL (ref 13.5–17.5)
MAGNESIUM: 2.2 MG/DL (ref 1.8–2.4)
MCH RBC QN AUTO: 29.4 PG (ref 26–34)
MCHC RBC AUTO-ENTMCNC: 32.6 G/DL (ref 31–36)
MCV RBC AUTO: 90.2 FL (ref 80–100)
PDW BLD-RTO: 13.6 % (ref 12.4–15.4)
PERFORMED ON: ABNORMAL
PERFORMED ON: NORMAL
PERFORMED ON: NORMAL
PHOSPHORUS: 4.4 MG/DL (ref 2.5–4.9)
PLATELET # BLD: 204 K/UL (ref 135–450)
PMV BLD AUTO: 7.6 FL (ref 5–10.5)
POTASSIUM SERPL-SCNC: 3.8 MMOL/L (ref 3.5–5.1)
RBC # BLD: 3.75 M/UL (ref 4.2–5.9)
SODIUM BLD-SCNC: 139 MMOL/L (ref 136–145)
WBC # BLD: 4.6 K/UL (ref 4–11)

## 2022-08-18 PROCEDURE — 2580000003 HC RX 258: Performed by: UROLOGY

## 2022-08-18 PROCEDURE — C1769 GUIDE WIRE: HCPCS | Performed by: UROLOGY

## 2022-08-18 PROCEDURE — 2580000003 HC RX 258: Performed by: PHYSICIAN ASSISTANT

## 2022-08-18 PROCEDURE — 6360000002 HC RX W HCPCS: Performed by: NURSE ANESTHETIST, CERTIFIED REGISTERED

## 2022-08-18 PROCEDURE — 0T768DZ DILATION OF RIGHT URETER WITH INTRALUMINAL DEVICE, VIA NATURAL OR ARTIFICIAL OPENING ENDOSCOPIC: ICD-10-PCS | Performed by: INTERNAL MEDICINE

## 2022-08-18 PROCEDURE — 6370000000 HC RX 637 (ALT 250 FOR IP): Performed by: PHYSICIAN ASSISTANT

## 2022-08-18 PROCEDURE — 6360000004 HC RX CONTRAST MEDICATION: Performed by: UROLOGY

## 2022-08-18 PROCEDURE — 6370000000 HC RX 637 (ALT 250 FOR IP): Performed by: NURSE PRACTITIONER

## 2022-08-18 PROCEDURE — C1758 CATHETER, URETERAL: HCPCS | Performed by: UROLOGY

## 2022-08-18 PROCEDURE — 82365 CALCULUS SPECTROSCOPY: CPT

## 2022-08-18 PROCEDURE — 80069 RENAL FUNCTION PANEL: CPT

## 2022-08-18 PROCEDURE — 3700000001 HC ADD 15 MINUTES (ANESTHESIA): Performed by: UROLOGY

## 2022-08-18 PROCEDURE — 2500000003 HC RX 250 WO HCPCS: Performed by: NURSE ANESTHETIST, CERTIFIED REGISTERED

## 2022-08-18 PROCEDURE — C1894 INTRO/SHEATH, NON-LASER: HCPCS | Performed by: UROLOGY

## 2022-08-18 PROCEDURE — 83735 ASSAY OF MAGNESIUM: CPT

## 2022-08-18 PROCEDURE — C2617 STENT, NON-COR, TEM W/O DEL: HCPCS | Performed by: UROLOGY

## 2022-08-18 PROCEDURE — 6360000002 HC RX W HCPCS: Performed by: PHYSICIAN ASSISTANT

## 2022-08-18 PROCEDURE — 3700000000 HC ANESTHESIA ATTENDED CARE: Performed by: UROLOGY

## 2022-08-18 PROCEDURE — 3600000014 HC SURGERY LEVEL 4 ADDTL 15MIN: Performed by: UROLOGY

## 2022-08-18 PROCEDURE — 7100000001 HC PACU RECOVERY - ADDTL 15 MIN: Performed by: UROLOGY

## 2022-08-18 PROCEDURE — 85027 COMPLETE CBC AUTOMATED: CPT

## 2022-08-18 PROCEDURE — 2709999900 HC NON-CHARGEABLE SUPPLY: Performed by: UROLOGY

## 2022-08-18 PROCEDURE — 1200000000 HC SEMI PRIVATE

## 2022-08-18 PROCEDURE — 36415 COLL VENOUS BLD VENIPUNCTURE: CPT

## 2022-08-18 PROCEDURE — 2720000010 HC SURG SUPPLY STERILE: Performed by: UROLOGY

## 2022-08-18 PROCEDURE — 6360000002 HC RX W HCPCS: Performed by: INTERNAL MEDICINE

## 2022-08-18 PROCEDURE — 7100000000 HC PACU RECOVERY - FIRST 15 MIN: Performed by: UROLOGY

## 2022-08-18 PROCEDURE — 3600000004 HC SURGERY LEVEL 4 BASE: Performed by: UROLOGY

## 2022-08-18 PROCEDURE — 99232 SBSQ HOSP IP/OBS MODERATE 35: CPT | Performed by: INTERNAL MEDICINE

## 2022-08-18 PROCEDURE — 0TC68ZZ EXTIRPATION OF MATTER FROM RIGHT URETER, VIA NATURAL OR ARTIFICIAL OPENING ENDOSCOPIC: ICD-10-PCS | Performed by: INTERNAL MEDICINE

## 2022-08-18 PROCEDURE — 2580000003 HC RX 258: Performed by: INTERNAL MEDICINE

## 2022-08-18 PROCEDURE — 6360000002 HC RX W HCPCS

## 2022-08-18 PROCEDURE — 74420 UROGRAPHY RTRGR +-KUB: CPT

## 2022-08-18 DEVICE — STENT URET 6FR L26CM PERCFLX HYDR+ TAPR TIP GRAD: Type: IMPLANTABLE DEVICE | Site: URETER | Status: FUNCTIONAL

## 2022-08-18 RX ORDER — LIDOCAINE HYDROCHLORIDE 20 MG/ML
INJECTION, SOLUTION EPIDURAL; INFILTRATION; INTRACAUDAL; PERINEURAL PRN
Status: DISCONTINUED | OUTPATIENT
Start: 2022-08-18 | End: 2022-08-18 | Stop reason: SDUPTHER

## 2022-08-18 RX ORDER — ONDANSETRON 2 MG/ML
INJECTION INTRAMUSCULAR; INTRAVENOUS PRN
Status: DISCONTINUED | OUTPATIENT
Start: 2022-08-18 | End: 2022-08-18 | Stop reason: SDUPTHER

## 2022-08-18 RX ORDER — LABETALOL HYDROCHLORIDE 5 MG/ML
10 INJECTION, SOLUTION INTRAVENOUS
Status: DISCONTINUED | OUTPATIENT
Start: 2022-08-18 | End: 2022-08-18 | Stop reason: HOSPADM

## 2022-08-18 RX ORDER — HYDRALAZINE HYDROCHLORIDE 20 MG/ML
10 INJECTION INTRAMUSCULAR; INTRAVENOUS
Status: DISCONTINUED | OUTPATIENT
Start: 2022-08-18 | End: 2022-08-18 | Stop reason: HOSPADM

## 2022-08-18 RX ORDER — LABETALOL HYDROCHLORIDE 5 MG/ML
INJECTION, SOLUTION INTRAVENOUS PRN
Status: DISCONTINUED | OUTPATIENT
Start: 2022-08-18 | End: 2022-08-18 | Stop reason: SDUPTHER

## 2022-08-18 RX ORDER — OXYCODONE HYDROCHLORIDE 5 MG/1
5 TABLET ORAL
Status: DISCONTINUED | OUTPATIENT
Start: 2022-08-18 | End: 2022-08-18 | Stop reason: HOSPADM

## 2022-08-18 RX ORDER — DEXAMETHASONE SODIUM PHOSPHATE 4 MG/ML
INJECTION, SOLUTION INTRA-ARTICULAR; INTRALESIONAL; INTRAMUSCULAR; INTRAVENOUS; SOFT TISSUE PRN
Status: DISCONTINUED | OUTPATIENT
Start: 2022-08-18 | End: 2022-08-18 | Stop reason: SDUPTHER

## 2022-08-18 RX ORDER — AMLODIPINE BESYLATE 5 MG/1
10 TABLET ORAL DAILY
Status: DISCONTINUED | OUTPATIENT
Start: 2022-08-19 | End: 2022-08-22 | Stop reason: HOSPADM

## 2022-08-18 RX ORDER — HYDRALAZINE HYDROCHLORIDE 20 MG/ML
INJECTION INTRAMUSCULAR; INTRAVENOUS
Status: COMPLETED
Start: 2022-08-18 | End: 2022-08-18

## 2022-08-18 RX ORDER — LABETALOL HYDROCHLORIDE 5 MG/ML
INJECTION, SOLUTION INTRAVENOUS
Status: DISCONTINUED
Start: 2022-08-18 | End: 2022-08-18 | Stop reason: WASHOUT

## 2022-08-18 RX ORDER — ONDANSETRON 2 MG/ML
4 INJECTION INTRAMUSCULAR; INTRAVENOUS
Status: DISCONTINUED | OUTPATIENT
Start: 2022-08-18 | End: 2022-08-18 | Stop reason: HOSPADM

## 2022-08-18 RX ORDER — LABETALOL 200 MG/1
200 TABLET, FILM COATED ORAL EVERY 12 HOURS SCHEDULED
Status: DISCONTINUED | OUTPATIENT
Start: 2022-08-18 | End: 2022-08-22 | Stop reason: HOSPADM

## 2022-08-18 RX ORDER — PROCHLORPERAZINE EDISYLATE 5 MG/ML
5 INJECTION INTRAMUSCULAR; INTRAVENOUS
Status: DISCONTINUED | OUTPATIENT
Start: 2022-08-18 | End: 2022-08-18 | Stop reason: HOSPADM

## 2022-08-18 RX ORDER — INSULIN LISPRO 100 [IU]/ML
4 INJECTION, SOLUTION INTRAVENOUS; SUBCUTANEOUS ONCE
Status: COMPLETED | OUTPATIENT
Start: 2022-08-18 | End: 2022-08-18

## 2022-08-18 RX ORDER — LIDOCAINE HYDROCHLORIDE 10 MG/ML
1 INJECTION, SOLUTION EPIDURAL; INFILTRATION; INTRACAUDAL; PERINEURAL
Status: CANCELLED | OUTPATIENT
Start: 2022-08-18 | End: 2022-08-18

## 2022-08-18 RX ORDER — FENTANYL CITRATE 50 UG/ML
INJECTION, SOLUTION INTRAMUSCULAR; INTRAVENOUS PRN
Status: DISCONTINUED | OUTPATIENT
Start: 2022-08-18 | End: 2022-08-18 | Stop reason: SDUPTHER

## 2022-08-18 RX ORDER — PROPOFOL 10 MG/ML
INJECTION, EMULSION INTRAVENOUS PRN
Status: DISCONTINUED | OUTPATIENT
Start: 2022-08-18 | End: 2022-08-18 | Stop reason: SDUPTHER

## 2022-08-18 RX ORDER — HYDROMORPHONE HCL 110MG/55ML
0.5 PATIENT CONTROLLED ANALGESIA SYRINGE INTRAVENOUS EVERY 5 MIN PRN
Status: DISCONTINUED | OUTPATIENT
Start: 2022-08-18 | End: 2022-08-18 | Stop reason: HOSPADM

## 2022-08-18 RX ORDER — FENTANYL CITRATE 50 UG/ML
25 INJECTION, SOLUTION INTRAMUSCULAR; INTRAVENOUS EVERY 5 MIN PRN
Status: DISCONTINUED | OUTPATIENT
Start: 2022-08-18 | End: 2022-08-18 | Stop reason: HOSPADM

## 2022-08-18 RX ORDER — MAGNESIUM HYDROXIDE 1200 MG/15ML
LIQUID ORAL
Status: COMPLETED | OUTPATIENT
Start: 2022-08-18 | End: 2022-08-18

## 2022-08-18 RX ORDER — ROCURONIUM BROMIDE 10 MG/ML
INJECTION, SOLUTION INTRAVENOUS PRN
Status: DISCONTINUED | OUTPATIENT
Start: 2022-08-18 | End: 2022-08-18 | Stop reason: SDUPTHER

## 2022-08-18 RX ADMIN — ROCURONIUM BROMIDE 40 MG: 10 INJECTION, SOLUTION INTRAVENOUS at 12:23

## 2022-08-18 RX ADMIN — Medication 1 CAPSULE: at 18:17

## 2022-08-18 RX ADMIN — HEPARIN SODIUM 5000 UNITS: 5000 INJECTION INTRAVENOUS; SUBCUTANEOUS at 21:14

## 2022-08-18 RX ADMIN — INSULIN LISPRO 4 UNITS: 100 INJECTION, SOLUTION INTRAVENOUS; SUBCUTANEOUS at 21:36

## 2022-08-18 RX ADMIN — LIDOCAINE HYDROCHLORIDE 80 MG: 20 INJECTION, SOLUTION EPIDURAL; INFILTRATION; INTRACAUDAL; PERINEURAL at 12:23

## 2022-08-18 RX ADMIN — INSULIN GLARGINE 8 UNITS: 100 INJECTION, SOLUTION SUBCUTANEOUS at 21:16

## 2022-08-18 RX ADMIN — HEPARIN SODIUM 5000 UNITS: 5000 INJECTION INTRAVENOUS; SUBCUTANEOUS at 14:29

## 2022-08-18 RX ADMIN — Medication 1 CAPSULE: at 09:05

## 2022-08-18 RX ADMIN — HYDRALAZINE HYDROCHLORIDE 10 MG: 20 INJECTION INTRAMUSCULAR; INTRAVENOUS at 13:33

## 2022-08-18 RX ADMIN — SODIUM CHLORIDE, PRESERVATIVE FREE 10 ML: 5 INJECTION INTRAVENOUS at 21:36

## 2022-08-18 RX ADMIN — SODIUM CHLORIDE, PRESERVATIVE FREE 10 ML: 5 INJECTION INTRAVENOUS at 09:09

## 2022-08-18 RX ADMIN — PROPOFOL 160 MG: 10 INJECTION, EMULSION INTRAVENOUS at 12:23

## 2022-08-18 RX ADMIN — ONDANSETRON 4 MG: 2 INJECTION INTRAMUSCULAR; INTRAVENOUS at 12:29

## 2022-08-18 RX ADMIN — SUGAMMADEX 200 MG: 100 INJECTION, SOLUTION INTRAVENOUS at 13:02

## 2022-08-18 RX ADMIN — LABETALOL HYDROCHLORIDE 15 MG: 5 INJECTION, SOLUTION INTRAVENOUS at 12:27

## 2022-08-18 RX ADMIN — FENTANYL CITRATE 100 MCG: 50 INJECTION, SOLUTION INTRAMUSCULAR; INTRAVENOUS at 12:13

## 2022-08-18 RX ADMIN — DEXAMETHASONE SODIUM PHOSPHATE 8 MG: 4 INJECTION, SOLUTION INTRA-ARTICULAR; INTRALESIONAL; INTRAMUSCULAR; INTRAVENOUS; SOFT TISSUE at 12:29

## 2022-08-18 RX ADMIN — AMLODIPINE BESYLATE 5 MG: 5 TABLET ORAL at 09:05

## 2022-08-18 RX ADMIN — MEROPENEM 500 MG: 500 INJECTION, POWDER, FOR SOLUTION INTRAVENOUS at 09:08

## 2022-08-18 ASSESSMENT — ENCOUNTER SYMPTOMS
ABDOMINAL PAIN: 0
CHEST TIGHTNESS: 0
SHORTNESS OF BREATH: 0
EYE DISCHARGE: 0
CONSTIPATION: 0
BACK PAIN: 0
SINUS PAIN: 0
VOMITING: 0
WHEEZING: 0
BLOOD IN STOOL: 0
SINUS PRESSURE: 0
NAUSEA: 0
DIARRHEA: 0
FACIAL SWELLING: 0
PHOTOPHOBIA: 0
SORE THROAT: 0
SHORTNESS OF BREATH: 0
ABDOMINAL DISTENTION: 0
EYE REDNESS: 0
RHINORRHEA: 0
COUGH: 0

## 2022-08-18 ASSESSMENT — PAIN SCALES - GENERAL
PAINLEVEL_OUTOF10: 0

## 2022-08-18 NOTE — PLAN OF CARE
Problem: Safety - Adult  Goal: Free from fall injury  Outcome: Progressing     Problem: ABCDS Injury Assessment  Goal: Absence of physical injury  Outcome: Progressing     Problem: Pain  Goal: Verbalizes/displays adequate comfort level or baseline comfort level  Outcome: Progressing     Problem: Chronic Conditions and Co-morbidities  Goal: Patient's chronic conditions and co-morbidity symptoms are monitored and maintained or improved  Outcome: Progressing     Problem: Discharge Planning  Goal: Discharge to home or other facility with appropriate resources  Outcome: Progressing

## 2022-08-18 NOTE — PROGRESS NOTES
[facility] Note    Patient Active Problem List   Diagnosis    Prostate cancer ([facility] [address]    [facility] ([facility]    CHRISSY (acute kidney injury) ([facility]    Ureterolithiasis    Lactic acidosis    UTI due to extended-spectrum beta lactamase (ESBL) producing Escherichia coli    Bacteremia due to Gram-negative bacteria    Complicated UTI (urinary tract infection)    Hyponatremia    S/P radical cystoprostatectomy    Poorly controlled type 2 diabetes mellitus ([facility] [address]    Fever    Diabetes education, encounter for    Urinary tract infection without hematuria    ESRD on dialysis ([facility]    Infection requiring contact isolation precautions    Bilateral nephrolithiasis    Obstructive uropathy    Syncope and collapse       Past Medical History:   has a past medical history of Diabetes mellitus ([facility] and Hypertension. Past Social History:   reports that he has never smoked. He has never used smokeless tobacco. He reports that he does not drink alcohol and does not use drugs. Subjective:   No complaints today. Right ureteroscopy rescheduled for today. Review of Systems   Constitutional:  Positive for fatigue. Negative for activity change, appetite change, chills, fever and unexpected weight change. HENT:  Negative for congestion and facial swelling. Eyes:  Negative for photophobia, discharge and redness. Respiratory:  Negative for cough, chest tightness and shortness of breath. Cardiovascular:  Negative for chest pain, palpitations and leg swelling. Gastrointestinal:  Negative for abdominal distention, abdominal pain, blood in stool, constipation, diarrhea, nausea and vomiting. Endocrine: Negative for cold intolerance, heat intolerance and polyuria. Genitourinary:  Negative for decreased urine volume, difficulty urinating, flank pain and hematuria. Musculoskeletal:  Negative for joint swelling and neck pain.    Neurological:  Negative for dizziness, seizures, function. Has RFK. Crea 2.1 on presentation and now at 2.4. Saturating well. Na profile and lower temp  with HD due to dizziness. Hold off on HD for now. Follow renal status on Friday after right stent placement. May be able to get off HD. Right nepholithiasis with hydronephrosis-discussed with Urology. For right stone intervention/stent placement today. Appreciate help. Hyponatremia- improved  Anemia- hgb at goal  HTN-increase Amlodipine to 10mg daily. Add Labetalol to 200mg bid. DM-per Medicine  7. Hypokalemia-better  8. HD TThS at [facility]  9. 2005 A [facility] intervention. Hopefully Friday.      Daria Pierre MD

## 2022-08-18 NOTE — ANESTHESIA POSTPROCEDURE EVALUATION
Department of Anesthesiology  Postprocedure Note    Patient: Paul Roberson  MRN: [id number]  YOB: 1960  Date of evaluation: 8/18/2022      Procedure Summary     Date: 08/18/22 Room / Location: [address]    Anesthesia Start: 1205 Anesthesia Stop: 7840    Procedure: CYSTOSCOPY WITH RIGHT URETEROSCOPY, STONE MANLIPULATION, STONE BASKET, AND RIGHT STENT PLACEMENT-[facility] #CALLING BACK WITH NUMBER 08/16 (Right) Diagnosis:       Calculus of ureter      (CALCULUS OF URETER)    Surgeons: Myranda Lau MD Responsible Provider: Radha Bal MD    Anesthesia Type: general ASA Status: 3          Anesthesia Type: No value filed.     Sim Phase I: Sim Score: 10    Sim Phase II:        Anesthesia Post Evaluation    Patient location during evaluation: PACU  Patient participation: complete - patient participated  Level of consciousness: awake and alert  Airway patency: patent  Nausea & Vomiting: no nausea and no vomiting  Complications: no  Cardiovascular status: hemodynamically stable  Respiratory status: acceptable  Hydration status: stable  Multimodal analgesia pain management approach

## 2022-08-18 NOTE — OP NOTE
Urology Operative Report  [facility]    Provider: Rosa M Carrera MD MD Patient ID:  Admission Date: 2022 Name: Patricia Rios  OR Date: 2022  MRN: [id number]   Patient Location: OR/NONE : 1960  Attending: Luisana Brar MD Date of Service: 2022  PCP: Esteban Bill PA-C     Date of Operation: 2022    Preoperative Diagnosis:   Mild hydronephrosis with possible calcification proximal ureter and small renal stone  History of prostate cancer status post robotic prostatectomy  History of large proximal ureteral stone with obstruction and sepsis status post stent and prior ureteroscopy  History of renal failure on dialysis    Postoperative Diagnosis: same    Procedure:    1. Right ureteroscopy with basket stone extraction and right retrograde pyelogram with right ureteral stent placement (4.8 x 26 double-J stent with string attached)   2. Fluoroscopy <1hr MD time    Surgeon:   Rosa M Carrera MD, JONELLE    Anesthesia: General LMA anesthesia    Indications: Patricia Rios is a 58 y.o. male who presents for the above named surgery. Informed consent was obtained and the risks, benefits, and details of the procedure were explained to the patient who elected to proceed. Details of Procedure: The patient was brought to the operating room and placed in the supine position on the operating room table. SCDs were placed on the lower extremities. Following induction of anesthesia the patient was positioned in a lithotomy position, all pressure points were padded, and the genitals were prepped and draped in the usual sterile fashion. A routine timeout was performed, confirming the patient, procedure, site, risk of fire, patient allergies and confirming that preoperative antibiotics had been administered prior to beginning. A 21 fr rigid cystoscope was advance via a normal appearing urethra into the bladder.   The patient is status post robotic prostatectomy with a well-healed vesicourethral anastomosis. The bladder was inspected and there were no suspicious lesions, stones or diverticula seen. Attention was turned to the right ureteral orifice. A 0.035 sensor wire was advanced without resistance and positioned within the collecting system under control of fluoroscopy. A semirigid ureteroscope was advanced alongside the safety wire up to the proximal ureter. The stone was not seen. The proximal ureter was carefully examined given the subtle calcification in this area but there was no stone seen and no evidence of a UPJ obstruction. There was very subtle narrowing at the UPJ but again no obvious stricture here. Given the absence of a stone I elected to proceed with flexible ureterscopy. Over the wire a 11/13 by 46 cm ureteral access sheath was positioned within the ureter. A flexible ureteroscopy was advanced until the stone was identified in an interpolar calyx was approximately 3 mm in size. This small stone fragments was grasped and removed using a 0-tip nitinol basket. The ureteroscope was advanced up to the renal collecting system which was inspected systematically and no residual stone burden was seen. The working wire was replaced into the collecting system under vision. The ureter was inspected on removal of the scope and access sheath and no additional stone burden was seen. The cystoscope was back-loaded over the wire and a stent was advanced under control of fluoroscopy with good curl in the renal pelvis and the urinary bladder. A string was left attached to the distal stent and brought out through the urethral meatus. The bladder was emptied and the scope removed. At the end of the procedure all counts were correct. The patient tolerated the procedure well and was transported to the PACU in stable condition.     Findings: The patient had no evidence of a proximal ureteral stone on careful ureteroscopy, he did have a small nonobstructing stone in interpolar calyx which was removed with basket stone extraction, otherwise he is status post prostatectomy with a well-healed vesicourethral anastomosis and no other significant findings on his retrograde pyelogram which showed no significant dilation of the collecting system extravasation or other abnormalities including filling defects on radiographic interpretation    Estimated Blood Loss: minimal                  Drains: 4.8fr x 26cm right right ureteral stent (string)          Specimens: stone for analysis    Complications: none apparent           Disposition:  PACU - hemodynamically stable.      Plan: stent out in 1 week, followup 6 weeks with renal ultrasound           Galileo Leon MD, [facility]  8/18/2022

## 2022-08-18 NOTE — PROGRESS NOTES
Urology Progress Note  [facility]    Provider: Hai Toledo MD  Patient ID:  Admission Date: 2022 Name: Andrew Saucedo  OR Date: 2022 MRN: [id number]   Patient Location: OR/NONE : 1960  Attending: Yumiko Herron MD Date of Service: 2022  PCP: Romeo Bumpers, PA-C     Diagnoses:  1. Urinary tract infection in male    2. Syncope and collapse    3. Calculus of ureter     CHRISSY on CKD3  Right ureteral stone with mild hydronephrosis    Assessment/Plan:  Admission with syncope after dialysis in setting of complex urologic history status post robotic prostatectomy, also ureteroscopy for large right renal stone with hospital course complicated by sepsis and renal failure  - KAVON 08/10- right renal stones with mild hydro  -CT 08/15/2022-very faint proximal right ureteral calcification, 3mm nonobstructing right renal stone, mild hydronephrosis  - UA with infection- staph epi- continue merrem  -Today proceeding with right ureteroscopy      The patient had a chance to ask questions which were answered. he understands the above plan. Subjective:   Andrew Saucedo is a 58 y.o. male. He was seen and examined this morning. Today we discussed procedure for stone removal, patient denies pain main concerns are ongoing incontinence and erectile dysfunction associated with prior    Objective:   Vitals:  Vitals:    22 1325   BP: (!) 168/99   Pulse:    Resp:    Temp:    SpO2: 97%       Intake/Output Summary (Last 24 hours) at 2022 1336  Last data filed at 2022 1311  Gross per 24 hour   Intake 1083.2 ml   Output --   Net 1083.2 ml       Physical Exam:  Gen: Alert and oriented x3, no acute distress  CV: Regular rate   Resp: unlabored respirations  Abd: Soft, non-distended, non-tender, no masses  Ext: no peripheral edema noted, moves upper and lower extremities spontaneously  Skin: warmand well perfused, no rashes noted on the face, or arms.      Labs:  Lab Results   Component Value Date    WBC 4.6 08/18/2022    HGB 11.0 (L) 08/18/2022    HCT 33.8 (L) 08/18/2022    MCV 90.2 08/18/2022     08/18/2022     Lab Results   Component Value Date    CREATININE 2.4 (H) 08/18/2022    BUN 39 (H) 08/18/2022     08/18/2022    K 3.8 08/18/2022     08/18/2022    CO2 28 08/18/2022       Toyn Constantino MD   8/18/2022

## 2022-08-18 NOTE — PROGRESS NOTES
Infectious Diseases   Progress Note      Admission Date: 8/13/2022  Hospital Day: Hospital Day: 6   Attending: Hailee Nunez MD  Date of service: 8/18/2022     Chief complaint/ Reason for consult:     Complicated urinary tract infection with ESBL E. coli and Staphylococcus hemolyticus  Obstructive uropathy with bilateral nephrolithiasis and right-sided hydronephrosis  Syncopal episode at dialysis center  ESRD on hemodialysis    Microbiology:        I have reviewed allavailable micro lab data and cultures    Blood culture (2/2) - collected on 8/13/2022: Negative    Urine culture  - collected on 8/13/2022: 75,000 CFU per mL of Staphylococcus hemolyticus, 50,000 ESBL E. coli    Susceptibility      Escherichia coli (esbl) (1)    Antibiotic Interpretation Microscan  Method Status    ampicillin Resistant >=32 mcg/mL BACTERIAL SUSCEPTIBILITY PANEL BY RICA     ampicillin-sulbactam Sensitive 8 mcg/mL BACTERIAL SUSCEPTIBILITY PANEL BY RICA     ceFAZolin Resistant >=64 mcg/mL BACTERIAL SUSCEPTIBILITY PANEL BY RICA     cefepime Resistant 16 mcg/mL BACTERIAL SUSCEPTIBILITY PANEL BY RICA     cefTRIAXone Resistant >=64 mcg/mL BACTERIAL SUSCEPTIBILITY PANEL BY RICA     ciprofloxacin Resistant >=4 mcg/mL BACTERIAL SUSCEPTIBILITY PANEL BY RICA     ertapenem Sensitive <=0.12 mcg/mL BACTERIAL SUSCEPTIBILITY PANEL BY RICA     gentamicin Sensitive <=1 mcg/mL BACTERIAL SUSCEPTIBILITY PANEL BY RICA     levofloxacin Resistant >=8 mcg/mL BACTERIAL SUSCEPTIBILITY PANEL BY RICA     nitrofurantoin Sensitive <=16 mcg/mL BACTERIAL SUSCEPTIBILITY PANEL BY RICA     trimethoprim-sulfamethoxazole Resistant >=320 mcg/mL BACTERIAL SUSCEPTIBILITY PANEL BY RICA       Antibiotics and immunizations:       Current antibiotics: All antibiotics and their doses were reviewed by me    Recent Abx Admin                     meropenem (MERREM) 500 mg IVPB (mini-bag) (mg) 500 mg New Bag 08/18/22 0908                      Immunization History: All immunization history was reviewed by me today. There is no immunization history on file for this patient. Known drug allergies: All allergies were reviewed and updated    No Known Allergies    Social history:     Social History:  All social andepidemiologic history was reviewed and updated by me today as needed. Tobacco use:   reports that he has never smoked. He has never used smokeless tobacco.  Alcohol use:   reports no history of alcohol use. Currently lives in: [facility]   reports no history of drug use. COVID VACCINATION AND LAB RESULT RECORDS:     Internal Administration   First Dose      Second Dose           Last COVID Lab No results found for: SARS-COV-2, SARS-COV-2 RNA, SARS-COV-2, SARS-COV-2, SARS-COV-2 BY PCR, SARS-COV-2, SARS-COV-2, SARS-COV-2         Assessment:     The patient is a 58 y.o. old male who  has a past medical history of Diabetes mellitus ([address] and Hypertension. with following problems:    Complicated urinary tract infection with ESBL E. coli and Staphylococcus hemolyticus-currently on meropenem  Obstructive uropathy with bilateral nephrolithiasis and right-sided hydronephrosis-s/p ureteral stent  Syncopal episode at dialysis center  ESRD on hemodialysis  Need for contact isolation  Essential hypertension  History of prostate cancer, s/p robotic prostatectomy  History of right ureteropelvic junction stone, s/p ureteroscopy  Type 2 diabetes mellitus-maintain good glycemic control      Discussion:      The patient is afebrile. He is on IV meropenem at dialysis dose for ESBL coverage. He is tolerating antibiotic okay. The patient has undergone a cystoscopy with a right ureteroscopy with right ureteral stent placement earlier today        Plan:       Diagnostic Workup:      Continue to follow  fever curve, WBC count and blood cultures. Continue to monitor blood counts, liver and renal function. Antimicrobials:     Will continue IV meropenem 500 mg every 24 hour for ESBL E. coli coverage. ESBL E. coli was isolated from his urine culture from 8/13/2022  Contact isolation for ESBL  If patient continues to do well, I think antibiotics can be stopped at the time of discharge  He was advised to watch for any new fevers or abdominal pain or night sweats after he comes off antibiotics. If he develops any of those symptoms, he was advised to come back to the ER immediately. The patient verbalizes understanding and is agreeable with above plan. Contact isolation for ESBL  Continue close vitals monitoring. Maintain good glycemic control. Fall precautions. Aspiration precautions. Continue to watch for new fever or diarrhea. DVT prophylaxis. Discussed all above with patient and RN. Drug Monitoring:    Continue monitoring for antibiotic toxicity as follows: CBC, CMP   Continue to watch for following: new or worsening fever, new hypotension, hives, lip swelling and redness or purulence at vascular access sites. I/v access Management:    Continue to monitor i.v access sites for erythema, induration, discharge or tenderness. As always, continue efforts to minimize tubes/lines/drains as clinically appropriate to reduce chances of line associated infections. Patient education and counseling: The patient was educated in detail about the side-effects of various antibiotics and things to watch for like new rashes, lip swelling, severe reaction, worsening diarrhea, break through fever etc.  Discussed patient's condition and what to expect. All of the patient's questions were addressed in a satisfactory manner and patient verbalized understanding all instructions. TIME SPENT TODAY:     - Spent over  26 minutes on visit (including interval history, physical exam, review of data including labs, cultures, imaging, development and implementation of treatment plan and coordination of complex care).  More than 50 percent of this includes face-to-face time spent with the patient for counseling and coordination of care. Thanks for allowing me to participate in your patient's care. I will sign off today, but will be available to answer any further questions or concerns that may arise during patient's stay in the hospital.      Subjective: Interval history: Interval history was obtained from chart review and patient/ RN. He is afebrile. He has undergone a cystoscopy ureteral stent placement today. He is tolerating meropenem okay     REVIEW OF SYSTEMS:      Review of Systems   Constitutional:  Positive for fatigue. Negative for chills, diaphoresis and fever. HENT:  Negative for ear discharge, ear pain, postnasal drip, rhinorrhea, sinus pressure, sinus pain and sore throat. Eyes:  Negative for discharge and redness. Respiratory:  Negative for cough, shortness of breath and wheezing. Cardiovascular:  Negative for chest pain and leg swelling. Gastrointestinal:  Negative for abdominal pain, constipation, diarrhea and nausea. Endocrine: Negative for cold intolerance, heat intolerance and polydipsia. Genitourinary:  Negative for dysuria, flank pain, frequency, hematuria and urgency. Musculoskeletal:  Negative for back pain and myalgias. Skin:  Negative for rash. Allergic/Immunologic: Negative for immunocompromised state. Neurological:  Negative for dizziness, seizures and headaches. Hematological:  Does not bruise/bleed easily. Psychiatric/Behavioral:  Negative for agitation, hallucinations and suicidal ideas. The patient is not nervous/anxious. All other systems reviewed and are negative. Past Medical History: All past medical history reviewed today. Past Medical History:   Diagnosis Date    Diabetes mellitus ([facility] [address]     Hypertension        Past Surgical History: All past surgical history was reviewed today.     Past Surgical History:   Procedure Laterality Date    CYSTOSCOPY Right 5/26/2022    CYSTOSCOPY, BILATERAL RETROGRADE PYELOGRAM, PLACEMENT OF RIGHT URETERAL STENT performed by Tran Del Rio MD at [facility] Right 7/13/2022    CYSTOSCOPY WITH RIGHT URETEROSCOPY WITH HOLMIUM LASER LITHOTRIPSY, STONE MANIPULATION, STONE BASKET,  RIGHT STENT PLACEMENT-[facility] #[id number] performed by Hai Toledo MD at [address] 5 YEARS  5/28/2022    IR TUNNELED CATHETER PLACEMENT GREATER THAN 5 YEARS 5/28/2022 F SPECIAL PROCEDURES    PROSTATECTOMY N/A 5/16/2022    ROBOTIC LAPAROSCOPIC RADICAL PROSTATECTOMY WITH BILATERAL LYMPH NODE DISSECTION AND BILATERAL NERVE SPARE performed by Hai Toledo MD at [address]       Family History: All family history was reviewed today. History reviewed. No pertinent family history. Objective:       PHYSICAL EXAM:      Vitals:   Vitals:    08/18/22 1315 08/18/22 1320 08/18/22 1325 08/18/22 1330   BP: (!) 178/105 (!) 178/93 (!) 168/99 (!) 168/98   Pulse: 87 86 86 80   Resp: 16 16 15 18   Temp: 97 °F (36.1 °C)   97 °F (36.1 °C)   TempSrc: Temporal      SpO2: 100% 99% 97% 100%   Weight:       Height:           Physical Exam  Vitals and nursing note reviewed. Constitutional:       Appearance: He is well-developed. He is not diaphoretic. Comments: The patient was seen earlier today. HENT:      Head: Normocephalic and atraumatic. Right Ear: External ear normal. There is no impacted cerumen. Left Ear: External ear normal. There is no impacted cerumen. Nose: Nose normal.      Mouth/Throat:      Mouth: Mucous membranes are moist.      Pharynx: Oropharynx is clear. No oropharyngeal exudate. Eyes:      General: No scleral icterus. Right eye: No discharge. Left eye: No discharge. Conjunctiva/sclera: Conjunctivae normal.      Pupils: Pupils are equal, round, and reactive to light. Neck:      Thyroid: No thyromegaly. Cardiovascular:      Rate and Rhythm: Normal rate and regular rhythm. Heart sounds: Normal heart sounds. No murmur heard. No friction rub. Pulmonary:      Effort: No respiratory distress. Breath sounds: No stridor. No wheezing or rales. Abdominal:      General: Bowel sounds are normal.      Palpations: Abdomen is soft. Tenderness: There is no abdominal tenderness. There is no guarding or rebound. Musculoskeletal:         General: No swelling, tenderness or deformity. Normal range of motion. Cervical back: Normal range of motion and neck supple. Right lower leg: No edema. Left lower leg: No edema. Lymphadenopathy:      Cervical: No cervical adenopathy. Skin:     General: Skin is warm and dry. Coloration: Skin is not jaundiced. Findings: No bruising, erythema or rash. Neurological:      General: No focal deficit present. Mental Status: He is alert and oriented to person, place, and time. Mental status is at baseline. Motor: No abnormal muscle tone. Psychiatric:         Mood and Affect: Mood normal.         Behavior: Behavior normal.         Lines and drains: All vascular access sites are healthy with no local erythema, discharge or tenderness. Intake and output:    I/O last 3 completed shifts: In: 963.2 [P.O.:480; I.V.:287.2; IV Piggyback:196]  Out: -     Lab Data:   All available labs and old records have been reviewed by me.     CBC:  Recent Labs     08/16/22  0630 08/17/22  0554 08/18/22  0620   WBC 4.0 4.7 4.6   RBC 3.95* 3.93* 3.75*   HGB 11.5* 11.4* 11.0*   HCT 35.5* 35.1* 33.8*    201 204   MCV 89.8 89.3 90.2   MCH 29.1 28.9 29.4   MCHC 32.5 32.4 32.6   RDW 13.5 13.4 13.6          BMP:  Recent Labs     08/16/22  0630 08/16/22  1650 08/17/22  0554 08/18/22  0620     --  139 139   K 3.3*  --  3.7 3.8     --  100 102   CO2 24  --  29 28   BUN 39*  --  31* 39*   CREATININE 2.7* 2.7* 2.5* 2.4*   CALCIUM 9.5  --  10.0 10.1   GLUCOSE 187*  --  128* 101*          Hepatic Function Panel:   Lab Results   Component Value Date/Time    ALKPHOS 99 08/13/2022 07:50 PM    ALT 10 08/13/2022 07:50 PM    AST 19 08/13/2022 07:50 PM    PROT 8.4 08/13/2022 07:50 PM    BILITOT 0.3 08/13/2022 07:50 PM    LABALBU 3.8 08/18/2022 06:20 AM       CPK:   Lab Results   Component Value Date    CKTOTAL 64 05/26/2022     ESR: No results found for: SEDRATE  CRP: No results found for: CRP        Imaging: All pertinent images and reports for the current visit were reviewed by me during this visit. I reviewed the chest x-ray/CT scan/MRI images and independently interpreted the findings and results today. FL RETROGRADE PYELOGRAM RIGHT   Final Result      CT ABDOMEN PELVIS WO CONTRAST Additional Contrast? None   Final Result   1. Mild right-sided hydronephrosis which has improved from the comparison   exam.  3 mm retained stone within the right kidney. 2.  4 mm low-density stone within the proximal 3rd of the right ureter   appears to have moved distally since the comparison exam.         XR CHEST PORTABLE   Final Result   Status post right-sided dialysis catheter placement in good position. Mild cardiomegaly which is less prominent with no acute pulmonary abnormality. Medications: All current and past medications were reviewed.      [START ON 8/19/2022] amLODIPine  10 mg Oral Daily    labetalol  200 mg Oral 2 times per day    meropenem  500 mg IntraVENous Q24H    insulin glargine  8 Units SubCUTAneous Nightly    sodium chloride flush  5-40 mL IntraVENous 2 times per day    heparin (porcine)  5,000 Units SubCUTAneous 3 times per day    insulin lispro  0-4 Units SubCUTAneous TID WC    insulin lispro  0-4 Units SubCUTAneous Nightly    lactobacillus  1 capsule Oral BID WC        sodium chloride 25 mL/hr at 08/17/22 1552    dextrose         fentanNYL, HYDROmorphone, oxyCODONE, ondansetron, prochlorperazine, labetalol **OR** hydrALAZINE, heparin (porcine), sodium chloride flush, sodium chloride, acetaminophen **OR** acetaminophen, polyethylene glycol, dextrose bolus **OR** dextrose bolus, glucagon (rDNA), dextrose, ondansetron      Problem list:       Patient Active Problem List   Diagnosis Code    Prostate cancer ([facility] [address] C61    Septic shock ([facility] [address] A41.9, R65.21    CHRISSY (acute kidney injury) ([facility] [address] N17.9    Ureterolithiasis N20.1    Lactic acidosis E87.2    UTI due to extended-spectrum beta lactamase (ESBL) producing Escherichia coli N39.0, B96.29, Z16.12    Bacteremia due to Gram-negative bacteria P17.45    Complicated UTI (urinary tract infection) N39.0    Hyponatremia E87.1    S/P radical cystoprostatectomy Z90.79, Z90.6    Poorly controlled type 2 diabetes mellitus ([facility] [address] E11.65    Fever R50.9    Diabetes education, encounter for Z71.89    Urinary tract infection without hematuria N39.0    ESRD on dialysis ([facility] [address] N18.6, Z99.2    Infection requiring contact isolation precautions B99.9    Bilateral nephrolithiasis N20.0    Obstructive uropathy N13.9    Syncope and collapse R55       Please note that this chart was generated using Dragon dictation software. Although every effort was made to ensure the accuracy of this automated transcription, some errors in transcription may have occurred inadvertently. If you may need any clarification, please do not hesitate to contact me through EPIC or at the phone number provided below with my electronic signature. Any pictures or media included in this note were obtained after taking informed verbal consent from the patient and with their approval to include those in the patient's medical record. Mikki Pennington MD, MPH, [address] [place] [address]  8/18/2022, 1:55 PM  [facility] Infectious Disease   [address] [place] [address], [address], [address] [place] [address]  Office: [phone]  Fax: [phone]  In-person Clinic days:  Tuesday & Thursday a.m. Virtual clinic days: Monday, Wednesday & Friday a.m.

## 2022-08-18 NOTE — PLAN OF CARE
Problem: Safety - Adult  Goal: Free from fall injury  8/18/2022 0208 by Luis A Shrestha RN  Outcome: Progressing  8/17/2022 1311 by Wallace Carrillo RN  Outcome: Progressing     Problem: ABCDS Injury Assessment  Goal: Absence of physical injury  8/18/2022 0208 by Luis A Shrestha RN  Outcome: Progressing  8/17/2022 1311 by Wallace Carrillo RN  Outcome: Progressing     Problem: Pain  Goal: Verbalizes/displays adequate comfort level or baseline comfort level  8/18/2022 0208 by Luis A Shrestha RN  Outcome: Progressing  8/17/2022 1311 by Wallace Carrillo RN  Outcome: Progressing  Flowsheets (Taken 8/17/2022 0607 by Felicitas Manuel RN)  Verbalizes/displays adequate comfort level or baseline comfort level:   Encourage patient to monitor pain and request assistance   Assess pain using appropriate pain scale   Administer analgesics based on type and severity of pain and evaluate response   Implement non-pharmacological measures as appropriate and evaluate response   Notify Licensed Independent Practitioner if interventions unsuccessful or patient reports new pain     Problem: Chronic Conditions and Co-morbidities  Goal: Patient's chronic conditions and co-morbidity symptoms are monitored and maintained or improved  Outcome: Progressing

## 2022-08-18 NOTE — ANESTHESIA PRE PROCEDURE
Department of Anesthesiology  Preprocedure Note       Name:  Loel Hodgkin   Age:  58 y.o.  :  1960                                          MRN:  [id number]         Date:  2022      Surgeon: Lina Ibrahim):  Cinthia Llanes MD    Procedure: Procedure(s):  CYSTOSCOPY WITH RIGHT URETEROSCOPY WITH HOLMIUM LASER LITHOTRIPSY, STONE MANLIPULATION, STONE BASKET, AND POSSIBLE RIGHT STENT PLACEMENT-Formerly [facility] #CALLING BACK WITH NUMBER     Medications prior to admission:   Prior to Admission medications    Medication Sig Start Date End Date Taking? Authorizing Provider   tamsulosin (FLOMAX) 0.4 MG capsule Take 1 capsule by mouth daily For stent pain and stone passage. 22  Cinthia Llanes MD   oxybutynin (DITROPAN) 5 MG tablet Take 1 tablet by mouth 3 times daily as needed (bladder spasms) 22  Cinthia Llanes MD   amLODIPine (NORVASC) 5 MG tablet Take 1 tablet by mouth daily 22   Tapan Hodge MD   Lancets MISC 1 each by Does not apply route 2 times daily 22   Maribel Correa MD   blood glucose monitor kit and supplies Dispense sufficient amount for indicated testing frequency plus additional to accommodate PRN testing needs. Dispense all needed supplies to include: monitor, strips, lancing device, lancets, control solutions, alcohol swabs. 22   Maribel Correa MD   blood glucose monitor strips Test3 times a day & as needed for symptoms of irregular blood glucose. Dispense sufficient amount for indicated testing frequency plus additional to accommodate PRN testing needs.  22   Maribel Correa MD   insulin glargine (LANTUS SOLOSTAR) 100 UNIT/ML injection pen Inject 8 Units into the skin nightly 22   Maribel Correa MD   insulin lispro, 1 Unit Dial, (HUMALOG KWIKPEN) 100 UNIT/ML SOPN Glucose: Dose: If <139   - No Insulin 140-199   --- 1 Unit 200-249   --  2  Units 250-299           3 Units  300-349           4 Units 350-400           5 Units  Above 400       6 Units 6/6/22   Lisa Montoya MD   Insulin Pen Needle 29G X 12MM MISC 1 each by Does not apply route daily 6/6/22   Lisa Montoya MD       Current medications:    No current facility-administered medications for this visit. No current outpatient medications on file.      Facility-Administered Medications Ordered in Other Visits   Medication Dose Route Frequency Provider Last Rate Last Admin    heparin (porcine) injection 1,000 Units  1,000 Units IntraCATHeter PRN Luis Manuel Posada MD        meropenem (MERREM) 500 mg IVPB (mini-bag)  500 mg IntraVENous Q24H Gurinder Vale MD 33.3 mL/hr at 08/18/22 0908 500 mg at 08/18/22 0908    amLODIPine (NORVASC) tablet 5 mg  5 mg Oral Daily Ayanna Mast, PA-C   5 mg at 08/18/22 0905    insulin glargine (LANTUS) injection vial 8 Units  8 Units SubCUTAneous Nightly Ayanna Mast, PA-C   8 Units at 08/17/22 2124    sodium chloride flush 0.9 % injection 5-40 mL  5-40 mL IntraVENous 2 times per day Ayanna Mast, PA-C   10 mL at 08/18/22 1249    sodium chloride flush 0.9 % injection 5-40 mL  5-40 mL IntraVENous PRN Ayanna Mast, PA-C        0.9 % sodium chloride infusion   IntraVENous PRN Ayanna Mast, PA-C 25 mL/hr at 08/17/22 1552 Rate Verify at 08/17/22 1552    acetaminophen (TYLENOL) tablet 650 mg  650 mg Oral Q6H PRN Ayanna Mast, PA-C        Or    acetaminophen (TYLENOL) suppository 650 mg  650 mg Rectal Q6H PRN Ayanna Mast, PA-C        polyethylene glycol (GLYCOLAX) packet 17 g  17 g Oral Daily PRN Ayanna Mast, PA-C        dextrose bolus 10% 125 mL  125 mL IntraVENous PRN Ayanna Mast, PA-C        Or    dextrose bolus 10% 250 mL  250 mL IntraVENous PRN Ayanna Mast, PA-C        glucagon (rDNA) injection 1 mg  1 mg SubCUTAneous PRN Ayanna Mast, PA-C        dextrose 10 % infusion   IntraVENous Continuous PRN Ayanna Mast, PA-C        ondansetron [facility]) injection 4 mg  4 mg IntraVENous Q6H PRN Clint Pun Myranda Lau MD at [address] IR TUNNELED CATHETER PLACEMENT GREATER THAN 5 YEARS  5/28/2022    IR TUNNELED CATHETER PLACEMENT GREATER THAN 5 YEARS 5/28/2022 [facility] SPECIAL PROCEDURES    PROSTATECTOMY N/A 5/16/2022    ROBOTIC LAPAROSCOPIC RADICAL PROSTATECTOMY WITH BILATERAL LYMPH NODE DISSECTION AND BILATERAL NERVE SPARE performed by Myranda Lau MD at [address] [place] [address] History:    Social History     Tobacco Use    Smoking status: Never    Smokeless tobacco: Never   Substance Use Topics    Alcohol use: Never                                Counseling given: Not Answered      Vital Signs (Current): There were no vitals filed for this visit.                                            BP Readings from Last 3 Encounters:   08/18/22 (!) 167/88   07/21/22 (!) 168/74   07/13/22 (!) 169/113       NPO Status:                                                                                 BMI:   Wt Readings from Last 3 Encounters:   08/18/22 155 lb 8 oz (70.5 kg)   07/21/22 145 lb 1.6 oz (65.8 kg)   07/13/22 142 lb 9.6 oz (64.7 kg)     There is no height or weight on file to calculate BMI.    CBC:   Lab Results   Component Value Date/Time    WBC 4.6 08/18/2022 06:20 AM    RBC 3.75 08/18/2022 06:20 AM    HGB 11.0 08/18/2022 06:20 AM    HCT 33.8 08/18/2022 06:20 AM    MCV 90.2 08/18/2022 06:20 AM    RDW 13.6 08/18/2022 06:20 AM     08/18/2022 06:20 AM       CMP:   Lab Results   Component Value Date/Time     08/18/2022 06:20 AM    K 3.8 08/18/2022 06:20 AM    K 4.2 06/06/2022 05:07 AM     08/18/2022 06:20 AM    CO2 28 08/18/2022 06:20 AM    BUN 39 08/18/2022 06:20 AM    CREATININE 2.4 08/18/2022 06:20 AM    GFRAA 33 08/18/2022 06:20 AM    AGRATIO 1.0 08/13/2022 07:50 PM    LABGLOM 28 08/18/2022 06:20 AM    GLUCOSE 101 08/18/2022 06:20 AM    PROT 8.4 08/13/2022 07:50 PM    CALCIUM 10.1 08/18/2022 06:20 AM    BILITOT 0.3 08/13/2022 07:50 PM    ALKPHOS 99 08/13/2022 07:50 PM    AST 19 08/13/2022 07:50 PM    ALT 10 08/13/2022 07:50 PM       POC Tests:   Recent Labs     08/18/22  0853   POCGLU 94       Coags:   Lab Results   Component Value Date/Time    PROTIME 12.9 05/02/2022 09:56 AM    INR 1.14 05/02/2022 09:56 AM    APTT 34.5 05/02/2022 09:56 AM       HCG (If Applicable): No results found for: PREGTESTUR, PREGSERUM, HCG, HCGQUANT     ABGs: No results found for: PHART, PO2ART, GBQ6CAV, QCA6DES, BEART, O2RJJOFJ     Type & Screen (If Applicable):  No results found for: LABABO, LABRH    Drug/Infectious Status (If Applicable):  No results found for: HIV, HEPCAB    COVID-19 Screening (If Applicable): No results found for: COVID19        Anesthesia Evaluation  Patient summary reviewed and Nursing notes reviewed no history of anesthetic complications:   Airway: Mallampati: II  TM distance: >3 FB   Neck ROM: full  Mouth opening: > = 3 FB   Dental: normal exam         Pulmonary:       (-) asthma and shortness of breath                           Cardiovascular:    (+) hypertension:,     (-)  angina             ROS comment: EKG 8/13/2022:  Normal sinus rhythm  Possible Left atrial enlargement  Left ventricular hypertrophy  ST elevation, consider early repolarization, pericarditis, or injury  T wave abnormality, consider lateral ischemia    Echo 8/13/2022:  Normal left ventricle size, wall thickness, and systolic function with an   estimated ejection fraction of 60-65%. No regional wall motion abnormalities are seen. Aortic valve appears sclerotic but opens adequately. No evidence of aortic   valve stenosis. The aortic root is mildly dilated. Mildly thickened mitral valve leaflets with no evidence of stenosis. Mild pulmonic regurgitation present. Grade I diastolic dysfunction with normal LV filling pressures. Avg E/e'   estimated to be 10.6.      Neuro/Psych:      (-) CVA           GI/Hepatic/Renal:   (+) renal disease: ESRD and dialysis,      (-) GERD and liver disease      ROS comment: Dialysis started in

## 2022-08-19 LAB
ALBUMIN SERPL-MCNC: 3.9 G/DL (ref 3.4–5)
ANION GAP SERPL CALCULATED.3IONS-SCNC: 13 MMOL/L (ref 3–16)
ANION GAP SERPL CALCULATED.3IONS-SCNC: 13 MMOL/L (ref 3–16)
ANION GAP SERPL CALCULATED.3IONS-SCNC: 14 MMOL/L (ref 3–16)
BUN BLDV-MCNC: 50 MG/DL (ref 7–20)
BUN BLDV-MCNC: 51 MG/DL (ref 7–20)
BUN BLDV-MCNC: 54 MG/DL (ref 7–20)
CALCIUM SERPL-MCNC: 9.4 MG/DL (ref 8.3–10.6)
CALCIUM SERPL-MCNC: 9.4 MG/DL (ref 8.3–10.6)
CALCIUM SERPL-MCNC: 9.8 MG/DL (ref 8.3–10.6)
CHLORIDE BLD-SCNC: 100 MMOL/L (ref 99–110)
CHLORIDE BLD-SCNC: 98 MMOL/L (ref 99–110)
CHLORIDE BLD-SCNC: 98 MMOL/L (ref 99–110)
CO2: 22 MMOL/L (ref 21–32)
CO2: 22 MMOL/L (ref 21–32)
CO2: 24 MMOL/L (ref 21–32)
CREAT SERPL-MCNC: 2.6 MG/DL (ref 0.8–1.3)
CREAT SERPL-MCNC: 2.6 MG/DL (ref 0.8–1.3)
CREAT SERPL-MCNC: 2.8 MG/DL (ref 0.8–1.3)
GFR AFRICAN AMERICAN: 28
GFR AFRICAN AMERICAN: 30
GFR AFRICAN AMERICAN: 30
GFR NON-AFRICAN AMERICAN: 23
GFR NON-AFRICAN AMERICAN: 25
GFR NON-AFRICAN AMERICAN: 25
GLUCOSE BLD-MCNC: 100 MG/DL (ref 70–99)
GLUCOSE BLD-MCNC: 100 MG/DL (ref 70–99)
GLUCOSE BLD-MCNC: 107 MG/DL (ref 70–99)
GLUCOSE BLD-MCNC: 114 MG/DL (ref 70–99)
GLUCOSE BLD-MCNC: 119 MG/DL (ref 70–99)
GLUCOSE BLD-MCNC: 130 MG/DL (ref 70–99)
GLUCOSE BLD-MCNC: 165 MG/DL (ref 70–99)
GLUCOSE BLD-MCNC: 167 MG/DL (ref 70–99)
GLUCOSE BLD-MCNC: 169 MG/DL (ref 70–99)
GLUCOSE BLD-MCNC: 187 MG/DL (ref 70–99)
GLUCOSE BLD-MCNC: 190 MG/DL (ref 70–99)
GLUCOSE BLD-MCNC: 191 MG/DL (ref 70–99)
GLUCOSE BLD-MCNC: 210 MG/DL (ref 70–99)
GLUCOSE BLD-MCNC: 223 MG/DL (ref 70–99)
GLUCOSE BLD-MCNC: 235 MG/DL (ref 70–99)
GLUCOSE BLD-MCNC: 235 MG/DL (ref 70–99)
GLUCOSE BLD-MCNC: 238 MG/DL (ref 70–99)
GLUCOSE BLD-MCNC: 244 MG/DL (ref 70–99)
GLUCOSE BLD-MCNC: 248 MG/DL (ref 70–99)
GLUCOSE BLD-MCNC: 249 MG/DL (ref 70–99)
GLUCOSE BLD-MCNC: 255 MG/DL (ref 70–99)
GLUCOSE BLD-MCNC: 269 MG/DL (ref 70–99)
GLUCOSE BLD-MCNC: 295 MG/DL (ref 70–99)
GLUCOSE BLD-MCNC: 347 MG/DL (ref 70–99)
GLUCOSE BLD-MCNC: 54 MG/DL (ref 70–99)
GLUCOSE BLD-MCNC: 59 MG/DL (ref 70–99)
GLUCOSE BLD-MCNC: 68 MG/DL (ref 70–99)
HCT VFR BLD CALC: 32.6 % (ref 40.5–52.5)
HEMOGLOBIN: 10.8 G/DL (ref 13.5–17.5)
MAGNESIUM: 2.2 MG/DL (ref 1.8–2.4)
MCH RBC QN AUTO: 29.6 PG (ref 26–34)
MCHC RBC AUTO-ENTMCNC: 33.1 G/DL (ref 31–36)
MCV RBC AUTO: 89.4 FL (ref 80–100)
PDW BLD-RTO: 13.4 % (ref 12.4–15.4)
PERFORMED ON: ABNORMAL
PHOSPHORUS: 4 MG/DL (ref 2.5–4.9)
PLATELET # BLD: 203 K/UL (ref 135–450)
PMV BLD AUTO: 7.7 FL (ref 5–10.5)
POTASSIUM SERPL-SCNC: 3.5 MMOL/L (ref 3.5–5.1)
POTASSIUM SERPL-SCNC: 3.9 MMOL/L (ref 3.5–5.1)
POTASSIUM SERPL-SCNC: 4.1 MMOL/L (ref 3.5–5.1)
RBC # BLD: 3.64 M/UL (ref 4.2–5.9)
SODIUM BLD-SCNC: 133 MMOL/L (ref 136–145)
SODIUM BLD-SCNC: 134 MMOL/L (ref 136–145)
SODIUM BLD-SCNC: 137 MMOL/L (ref 136–145)
WBC # BLD: 7.8 K/UL (ref 4–11)

## 2022-08-19 PROCEDURE — 2580000003 HC RX 258: Performed by: INTERNAL MEDICINE

## 2022-08-19 PROCEDURE — 6360000002 HC RX W HCPCS: Performed by: PHYSICIAN ASSISTANT

## 2022-08-19 PROCEDURE — 6370000000 HC RX 637 (ALT 250 FOR IP): Performed by: INTERNAL MEDICINE

## 2022-08-19 PROCEDURE — 6370000000 HC RX 637 (ALT 250 FOR IP): Performed by: PHYSICIAN ASSISTANT

## 2022-08-19 PROCEDURE — 36415 COLL VENOUS BLD VENIPUNCTURE: CPT

## 2022-08-19 PROCEDURE — 2580000003 HC RX 258: Performed by: PHYSICIAN ASSISTANT

## 2022-08-19 PROCEDURE — 2500000003 HC RX 250 WO HCPCS: Performed by: PHYSICIAN ASSISTANT

## 2022-08-19 PROCEDURE — 2000000000 HC ICU R&B

## 2022-08-19 PROCEDURE — 80069 RENAL FUNCTION PANEL: CPT

## 2022-08-19 PROCEDURE — 85027 COMPLETE CBC AUTOMATED: CPT

## 2022-08-19 PROCEDURE — 6360000002 HC RX W HCPCS: Performed by: INTERNAL MEDICINE

## 2022-08-19 PROCEDURE — 83735 ASSAY OF MAGNESIUM: CPT

## 2022-08-19 RX ORDER — NICOTINE POLACRILEX 4 MG
15 LOZENGE BUCCAL
Status: DISPENSED | OUTPATIENT
Start: 2022-08-19 | End: 2022-08-19

## 2022-08-19 RX ORDER — LABETALOL 200 MG/1
200 TABLET, FILM COATED ORAL EVERY 12 HOURS SCHEDULED
Qty: 60 TABLET | Refills: 0 | Status: SHIPPED | OUTPATIENT
Start: 2022-08-19

## 2022-08-19 RX ORDER — DEXTROSE MONOHYDRATE 100 MG/ML
INJECTION, SOLUTION INTRAVENOUS CONTINUOUS
Status: DISCONTINUED | OUTPATIENT
Start: 2022-08-19 | End: 2022-08-20

## 2022-08-19 RX ADMIN — LABETALOL HYDROCHLORIDE 200 MG: 200 TABLET, FILM COATED ORAL at 22:42

## 2022-08-19 RX ADMIN — AMLODIPINE BESYLATE 10 MG: 5 TABLET ORAL at 09:08

## 2022-08-19 RX ADMIN — MEROPENEM 500 MG: 500 INJECTION, POWDER, FOR SOLUTION INTRAVENOUS at 09:29

## 2022-08-19 RX ADMIN — HEPARIN SODIUM 5000 UNITS: 5000 INJECTION INTRAVENOUS; SUBCUTANEOUS at 22:44

## 2022-08-19 RX ADMIN — DEXTROSE MONOHYDRATE: 100 INJECTION, SOLUTION INTRAVENOUS at 13:48

## 2022-08-19 RX ADMIN — Medication 1 CAPSULE: at 09:09

## 2022-08-19 RX ADMIN — DEXTROSE MONOHYDRATE: 100 INJECTION, SOLUTION INTRAVENOUS at 15:41

## 2022-08-19 RX ADMIN — LABETALOL HYDROCHLORIDE 200 MG: 200 TABLET, FILM COATED ORAL at 09:08

## 2022-08-19 RX ADMIN — SODIUM CHLORIDE, PRESERVATIVE FREE 10 ML: 5 INJECTION INTRAVENOUS at 09:27

## 2022-08-19 RX ADMIN — DEXTROSE MONOHYDRATE 250 ML: 100 INJECTION, SOLUTION INTRAVENOUS at 15:34

## 2022-08-19 RX ADMIN — GLUCAGON HYDROCHLORIDE 1 MG: KIT at 16:14

## 2022-08-19 RX ADMIN — HEPARIN SODIUM 5000 UNITS: 5000 INJECTION INTRAVENOUS; SUBCUTANEOUS at 06:00

## 2022-08-19 ASSESSMENT — ENCOUNTER SYMPTOMS
DIARRHEA: 0
CHEST TIGHTNESS: 0
BLOOD IN STOOL: 0
NAUSEA: 0
FACIAL SWELLING: 0
EYE REDNESS: 0
SHORTNESS OF BREATH: 0
ABDOMINAL PAIN: 0
ABDOMINAL DISTENTION: 0
PHOTOPHOBIA: 0
VOMITING: 0
EYE DISCHARGE: 0
COUGH: 0
CONSTIPATION: 0

## 2022-08-19 NOTE — PROGRESS NOTES
CLINICAL PHARMACY NOTE: MEDS TO BEDS    Total # of Prescriptions Filled: 1   The following medications were delivered to the patient:  Labetalol     Additional Documentation:  Kris Arciniega signed

## 2022-08-19 NOTE — PROGRESS NOTES
Urology Progress Note  [facility]    Provider: LIZ Hanna CNP  Patient ID:  Admission Date: 2022 Name: Antonio Gonzalez  OR Date: 2022 MRN: [id number]   Patient Location: [facility]-[id number] : 1960  Attending: Rosanna Brown MD Date of Service: 2022  PCP: Latisha Hogan PA-C     Diagnoses:  1. Urinary tract infection in male    2. Syncope and collapse    3. Calculus of ureter     CHRISSY on CKD3  Right ureteral stone with mild hydronephrosis    Assessment/Plan:  Admission with syncope after dialysis in setting of complex urologic history status post robotic prostatectomy, also ureteroscopy for large right renal stone with hospital course complicated by sepsis and renal failure  - KAVON 08/10- right renal stones with hydro  -CT 08/15/2022- 4mm distal right ureteral stone, 3mm nephrolithiasis, hydronephrosis  - UA with infection- staph epi- continue merrem  - s/p RIGHT URS and right stent insertion- no real stone burden causing obstruction, renal pelvis stone removed. Will remove stent on string next week, KAVON and follow up x6 weeks. -Can discharge per  when medically clear      The patient had a chance to ask questions which were answered. he understands the above plan. Subjective:   Antonio Gonzalez is a 58 y.o. male. He was seen and examined this morning. Today we discussed procedure for stone removal tomorrow.       Objective:   Vitals:  Vitals:    22 1117   BP: (!) 148/78   Pulse: 88   Resp:    Temp: 98.2 °F (36.8 °C)   SpO2: 97%       Intake/Output Summary (Last 24 hours) at 2022 1130  Last data filed at 2022 1451  Gross per 24 hour   Intake 600 ml   Output 300 ml   Net 300 ml       Physical Exam:  Gen: Alert and oriented x3, no acute distress  CV: Regular rate   Resp: unlabored respirations  Abd: Soft, non-distended, non-tender, no masses  Ext: no peripheral edema noted, moves upper and lower extremities spontaneously  Skin: warmand well perfused, no rashes noted on the face, or arms.      Labs:  Lab Results   Component Value Date    WBC 7.8 08/19/2022    HGB 10.8 (L) 08/19/2022    HCT 32.6 (L) 08/19/2022    MCV 89.4 08/19/2022     08/19/2022     Lab Results   Component Value Date    CREATININE 2.8 (H) 08/19/2022    BUN 54 (H) 08/19/2022     (L) 08/19/2022    K 4.1 08/19/2022    CL 98 (L) 08/19/2022    CO2 22 08/19/2022       Ayanna Perdomo, APRN - CNP   8/19/2022

## 2022-08-19 NOTE — PROGRESS NOTES
Report given to [facility] RN at bedside. No questions at this time. Patient A/Ox4, BG completed per order, VSS.

## 2022-08-19 NOTE — PROGRESS NOTES
Hospitalist Progress Note      PCP: Esteban Bill PA-C    Date of Admission: 8/13/2022    Chief Complaint: Syncope    Hospital Course: 57 yo M with history of prostate cancer s/p robotic prostatectomy, h/o R UPJ stone s/p ureteroscopy, ESRD on HD, DM 2 who came to [facility] with syncope. Admitted as inpatient for complicated UTI. Followed by Renal, Urology and Cardiology. Echo:   Normal left ventricle size, wall thickness, and systolic function with an   estimated ejection fraction of 60-65%. No regional wall motion abnormalities are seen. Aortic valve appears sclerotic but opens adequately. No evidence of aortic   valve stenosis. The aortic root is mildly dilated. Mildly thickened mitral valve leaflets with no evidence of stenosis. Mild pulmonic regurgitation present. Grade I diastolic dysfunction with normal LV filling pressures. Avg E/e'   estimated to be 10.6. Went to OR on 8/18/22 for:  1. Cystoscopy  2. Right side ureteral stent removal  3. Right ureteroscopy  4. Laser lithotripsy  5. Basket extraction of stone  6. Right side ureteral stent placement  7. Fluoroscopy <1hr MD time    Transferred to ICU for erroneous insulin administration. Started on D10 IVF. Monitoring FS very closely. Subjective:  Patient has no complaints. No CP, SOB, HA or abdominal pain. No fevers.   Wants HD to be 2 hours instead of 3 hrs      Medications:  Reviewed    Infusion Medications    dextrose      sodium chloride 25 mL/hr at 08/17/22 1552    dextrose       Scheduled Medications    amLODIPine  10 mg Oral Daily    labetalol  200 mg Oral 2 times per day    meropenem  500 mg IntraVENous Q24H    sodium chloride flush  5-40 mL IntraVENous 2 times per day    heparin (porcine)  5,000 Units SubCUTAneous 3 times per day    lactobacillus  1 capsule Oral BID WC     PRN Meds: heparin (porcine), sodium chloride flush, sodium chloride, acetaminophen **OR** acetaminophen, polyethylene glycol, dextrose bolus **OR** dextrose bolus, glucagon (rDNA), dextrose, ondansetron      Intake/Output Summary (Last 24 hours) at 8/19/2022 1339  Last data filed at 8/18/2022 1451  Gross per 24 hour   Intake --   Output 300 ml   Net -300 ml         Physical Exam Performed:    BP (!) 148/78   Pulse 88   Temp 98.2 °F (36.8 °C) (Oral)   Resp 20   Ht 5' 7\" (1.702 m)   Wt 147 lb 1 oz (66.7 kg)   SpO2 97%   BMI 23.03 kg/m²     General appearance: No apparent distress, appears stated age and cooperative. HEENT: Pupils equal, round, and reactive to light. Conjunctivae/corneas clear. Neck: Supple, with full range of motion. No jugular venous distention. Trachea midline. Respiratory:  Normal respiratory effort. Clear to auscultation, bilaterally without Rales/Wheezes/Rhonchi. Cardiovascular: Regular rate and rhythm with normal S1/S2 without murmurs, rubs or gallops. Abdomen: Soft, non-tender, non-distended with normal bowel sounds. Musculoskeletal: No clubbing, cyanosis or edema bilaterally. Full range of motion without deformity. Skin: Skin color, texture, turgor normal.  No rashes or lesions. Neurologic:  Neurovascularly intact without any focal sensory/motor deficits. Cranial nerves: II-XII intact, grossly non-focal.  Psychiatric: Alert and oriented, thought content appropriate, normal insight  Capillary Refill: Brisk,3 seconds, normal   Peripheral Pulses: +2 palpable, equal bilaterally       Labs:   Recent Labs     08/17/22  0554 08/18/22  0620 08/19/22  0705   WBC 4.7 4.6 7.8   HGB 11.4* 11.0* 10.8*   HCT 35.1* 33.8* 32.6*    204 203       Recent Labs     08/17/22  0554 08/18/22  0620 08/19/22  0705    139 133*   K 3.7 3.8 4.1    102 98*   CO2 29 28 22   BUN 31* 39* 54*   CREATININE 2.5* 2.4* 2.8*   CALCIUM 10.0 10.1 9.8   PHOS 3.7 4.4 4.0       No results for input(s): AST, ALT, BILIDIR, BILITOT, ALKPHOS in the last 72 hours. No results for input(s): INR in the last 72 hours.   No results for input(s): CKTOTAL, TROPONINI in the last 72 hours. Urinalysis:      Lab Results   Component Value Date/Time    NITRU Negative 08/13/2022 07:50 PM    WBCUA 379 08/13/2022 07:50 PM    BACTERIA None Seen 08/13/2022 07:50 PM    RBCUA 1 08/13/2022 07:50 PM    BLOODU TRACE 08/13/2022 07:50 PM    SPECGRAV 1.020 08/13/2022 07:50 PM    GLUCOSEU >=1000 08/13/2022 07:50 PM       Radiology:  FL RETROGRADE PYELOGRAM RIGHT   Final Result      CT ABDOMEN PELVIS WO CONTRAST Additional Contrast? None   Final Result   1. Mild right-sided hydronephrosis which has improved from the comparison   exam.  3 mm retained stone within the right kidney. 2.  4 mm low-density stone within the proximal 3rd of the right ureter   appears to have moved distally since the comparison exam.         XR CHEST PORTABLE   Final Result   Status post right-sided dialysis catheter placement in good position. Mild cardiomegaly which is less prominent with no acute pulmonary abnormality.                  Assessment/Plan:    Active Hospital Problems    Diagnosis     ESRD on dialysis ([facility] [N18.6, Z99.2]      Priority: Medium    Infection requiring contact isolation precautions [B99.9]      Priority: Medium    Bilateral nephrolithiasis [N20.0]      Priority: Medium    Obstructive uropathy [N13.9]      Priority: Medium    Syncope and collapse [R55]      Priority: Medium    Urinary tract infection in male [N39.0]      Priority: Medium    S/P radical cystoprostatectomy [Z90.79, Z90.6]      Priority: Medium    Poorly controlled type 2 diabetes mellitus ([facility] [E11.65]      Priority: Medium    UTI due to extended-spectrum beta lactamase (ESBL) producing Escherichia coli [N39.0, B96.29, Z16.12]      Priority: Medium    Prostate cancer ([facility] Adeola Lightning      Priority: Medium     Transfer to ICU  Keep glucagon available  Stop Lantus and SSI  Serial FS closely  Continue IV Merrem  UCx with ESBL E Coli  Started on D10 IVF  Urology input appreciated  HD per Renal  Renal input appreciated  Cardio input appreciated  ID consult for ESBL E Coli appreciated  Continue HD twice weekly    DVT Prophylaxis: Hep SQ  Diet: ADULT DIET; Regular  Code Status: Full Code    PT/OT Eval Status: Following    Dispo - Home    Discussed with patient, Dr Henrietta Capellan (Renal), nursing and CM. Discussed with Joel Guerrero ICU RN. Discussed with Brittney Rene (PharmD). We will monitor VERY closely for hypoglycemia for next 24 hrs. Consider Glucagon early if recurrent hypoglycemia.     Michael Leal MD

## 2022-08-19 NOTE — PROGRESS NOTES
Patient transferred from  to ICU. Report given at bedside from [facility]. No questions at this time. Patient in bed, locked, placed in lowest position and bed alarm set. Call light within reach.

## 2022-08-19 NOTE — PROGRESS NOTES
[facility] Note    Patient Active Problem List   Diagnosis    Prostate cancer ([facility]    Se[facility] shock ([facility]    CHRISSY (acute kidney injury) ([facility]    Ureterolithiasis    Lactic acidosis    UTI due to extended-spectrum beta lactamase (ESBL) producing Escherichia coli    Bacteremia due to Gram-negative bacteria    Complicated UTI (urinary tract infection)    Hyponatremia    S/P radical cystoprostatectomy    Poorly controlled type 2 diabetes mellitus ([facility]    Fever    Diabetes education, encounter for    Urinary tract infection in male    ESRD on dialysis ([facility]    Infection requiring contact isolation precautions    Bilateral nephrolithiasis    Obstructive uropathy    Syncope and collapse       Past Medical History:   has a past medical history of Diabetes mellitus ([facility] and Hypertension. Past Social History:   reports that he has never smoked. He has never used smokeless tobacco. He reports that he does not drink alcohol and does not use drugs. Subjective:   No complaints today. Right ureteroscopy done with right stent insertion. Review of Systems   Constitutional:  Positive for fatigue. Negative for activity change, appetite change, chills, fever and unexpected weight change. HENT:  Negative for congestion and facial swelling. Eyes:  Negative for photophobia, discharge and redness. Respiratory:  Negative for cough, chest tightness and shortness of breath. Cardiovascular:  Negative for chest pain, palpitations and leg swelling. Gastrointestinal:  Negative for abdominal distention, abdominal pain, blood in stool, constipation, diarrhea, nausea and vomiting. Endocrine: Negative for cold intolerance, heat intolerance and polyuria. Genitourinary:  Negative for decreased urine volume, difficulty urinating, flank pain and hematuria. Musculoskeletal:  Negative for joint swelling and neck pain.    Neurological:  Negative for dizziness, seizures, syncope, speech difficulty, light-headedness and headaches. Hematological:  Does not bruise/bleed easily. Psychiatric/Behavioral:  Negative for agitation, confusion and hallucinations. Objective:      BP (!) 148/78   Pulse 88   Temp 98.2 °F (36.8 °C) (Oral)   Resp 20   Ht 5' 7\" (1.702 m)   Wt 147 lb 1 oz (66.7 kg)   SpO2 97%   BMI 23.03 kg/m²     Wt Readings from Last 3 Encounters:   08/19/22 147 lb 1 oz (66.7 kg)   07/21/22 145 lb 1.6 oz (65.8 kg)   07/13/22 142 lb 9.6 oz (64.7 kg)       BP Readings from Last 3 Encounters:   08/19/22 (!) 148/78   07/21/22 (!) 168/74   07/13/22 (!) 169/113     Chest- clear  Heart-regular  Abd-soft  Ext- no edema    Labs  Hemoglobin   Date Value Ref Range Status   08/19/2022 10.8 (L) 13.5 - 17.5 g/dL Final     Hematocrit   Date Value Ref Range Status   08/19/2022 32.6 (L) 40.5 - 52.5 % Final     WBC   Date Value Ref Range Status   08/19/2022 7.8 4.0 - 11.0 K/uL Final     Platelets   Date Value Ref Range Status   08/19/2022 203 135 - 450 K/uL Final     Lab Results   Component Value Date    CREATININE 2.8 (H) 08/19/2022    BUN 54 (H) 08/19/2022     (L) 08/19/2022    K 4.1 08/19/2022    CL 98 (L) 08/19/2022    CO2 22 08/19/2022     Renal US 8/4/22     FINDINGS:       Kidneys: The right kidney measures 8.6 x 4.5 x 3.8 cm in length and the left kidney   measures 9.0 x 4.2 x 4.0 cm in length. A 6 mm stone by 4 mm stone is seen in the right kidney. A 5 mm x 5 mm stone   is also seen in the right kidney. There is mild right-sided hydronephrosis. No hydronephrosis on the left. Bladder:       Bladder is collapsed and not well evaluated           Impression   Small renal stones on the right with mild right-sided hydronephrosis. No stones or hydronephrosis on the left     24H urine Crea Cl 32ml/min    Assessment/Plan:   CHRISSY on CKD 3b-crea was 1.5 on 5/2022. Started on HD on 5/2022 due to worsened renal function. Has RFK.    Crea 2.1 on presentation and now at 2.8. Na profile and lower temp  with HD due to dizziness. Decrease HD to 2x/week(Tue/Sat), Na profile, lower temp, no fluid removal.  HD today then next HD Tue as outpt. Follow in 2 weeks if can decrease HD further. Discussed with outpt HD unit. Right nepholithiasis with hydronephrosis-s/p right stone intervention and stent placement 8/18/22. Appreciate Urology help. Hyponatremia- follow. Anemia- hgb at goal  HTN-increased Amlodipine to 10mg daily. Added Labetalol to 200mg bid. Continue. Better bp control. DM-per Medicine   7. Hypokalemia-better  8. HD Tue and Sat at [facility]  9. ESBL E. Coli UTI-on Meropenem. ID following. 8.   Dispo-after HD today    Discussed plan with patient thru .      Willa Zhao MD

## 2022-08-19 NOTE — FLOWSHEET NOTE
08/19/22 1500   Vitals   Temp 97.6 °F (36.4 °C)   Temp Source Oral   Heart Rate 89   Heart Rate Source Monitor   Resp 17   /66   MAP (mmHg) 88   BP Location Left upper arm   BP Upper/Lower Upper   BP Method Automatic   Patient Position Supine   Level of Consciousness Alert (0)   MEWS Score 1   Cardiac Rhythm Sinus rhythm   Pain Assessment   Pain Assessment None - Denies Pain     Patient arrived on unit, A/O x4, follows commands, VSS. See flowsheet and eMar for additional documentation.

## 2022-08-19 NOTE — SIGNIFICANT EVENT
Notified by RN. Erroneously administered 50 U insulin SQ. Started on D10 IVF. Need to move to ICU to watch for hypoglycemia. Ordering q5min FS for next 1 hr, then q10 min FS for 1 hr, then q20 min FS for 1 hr then q1h X 24 hrs. Stopped Lantus. DC is cancelled. Dr Sara Abbasi will come to evaluate patient. Discussed with Suyapa Jennings.     Sarai Barraza MD

## 2022-08-19 NOTE — PROGRESS NOTES
Hospitalist Progress Note      PCP: Leilani Francois PA-C    Date of Admission: 8/13/2022    Chief Complaint: Syncope    Hospital Course: 59 yo M with history of prostate cancer s/p robotic prostatectomy, h/o R UPJ stone s/p ureteroscopy, ESRD on HD, DM 2 who came to Er with syncope. Admitted as inpatient for complicated UTI. Followed by Renal, Urology and Cardiology. Echo:   Normal left ventricle size, wall thickness, and systolic function with an   estimated ejection fraction of 60-65%. No regional wall motion abnormalities are seen. Aortic valve appears sclerotic but opens adequately. No evidence of aortic   valve stenosis. The aortic root is mildly dilated. Mildly thickened mitral valve leaflets with no evidence of stenosis. Mild pulmonic regurgitation present. Grade I diastolic dysfunction with normal LV filling pressures. Avg E/e'   estimated to be 10.6. Went to OR on 8/18/22 for:  1. Cystoscopy  2. Right side ureteral stent removal  3. Right ureteroscopy  4. Laser lithotripsy  5. Basket extraction of stone  6. Right side ureteral stent placement  7. Fluoroscopy <1hr MD time    Subjective:  Patient has no complaints. No CP, SOB, HA or abdominal pain. No fevers.         Medications:  Reviewed    Infusion Medications    sodium chloride 25 mL/hr at 08/17/22 1552    dextrose       Scheduled Medications    [START ON 8/19/2022] amLODIPine  10 mg Oral Daily    labetalol  200 mg Oral 2 times per day    meropenem  500 mg IntraVENous Q24H    insulin glargine  8 Units SubCUTAneous Nightly    sodium chloride flush  5-40 mL IntraVENous 2 times per day    heparin (porcine)  5,000 Units SubCUTAneous 3 times per day    insulin lispro  0-4 Units SubCUTAneous TID WC    insulin lispro  0-4 Units SubCUTAneous Nightly    lactobacillus  1 capsule Oral BID      PRN Meds: heparin (porcine), sodium chloride flush, sodium chloride, acetaminophen **OR** acetaminophen, polyethylene glycol, dextrose bolus **OR** dextrose bolus, glucagon (rDNA), dextrose, ondansetron      Intake/Output Summary (Last 24 hours) at 8/18/2022 2106  Last data filed at 8/18/2022 1451  Gross per 24 hour   Intake 600 ml   Output 300 ml   Net 300 ml         Physical Exam Performed:    BP (!) 148/88   Pulse (!) 113   Temp 97.1 °F (36.2 °C) (Axillary)   Resp 16   Ht 5' 7\" (1.702 m)   Wt 155 lb 8 oz (70.5 kg)   SpO2 99%   BMI 24.35 kg/m²     General appearance: No apparent distress, appears stated age and cooperative. HEENT: Pupils equal, round, and reactive to light. Conjunctivae/corneas clear. Neck: Supple, with full range of motion. No jugular venous distention. Trachea midline. Respiratory:  Normal respiratory effort. Clear to auscultation, bilaterally without Rales/Wheezes/Rhonchi. Cardiovascular: Regular rate and rhythm with normal S1/S2 without murmurs, rubs or gallops. Abdomen: Soft, non-tender, non-distended with normal bowel sounds. Musculoskeletal: No clubbing, cyanosis or edema bilaterally. Full range of motion without deformity. Skin: Skin color, texture, turgor normal.  No rashes or lesions. Neurologic:  Neurovascularly intact without any focal sensory/motor deficits. Cranial nerves: II-XII intact, grossly non-focal.  Psychiatric: Alert and oriented, thought content appropriate, normal insight  Capillary Refill: Brisk,3 seconds, normal   Peripheral Pulses: +2 palpable, equal bilaterally       Labs:   Recent Labs     08/16/22  0630 08/17/22  0554 08/18/22  0620   WBC 4.0 4.7 4.6   HGB 11.5* 11.4* 11.0*   HCT 35.5* 35.1* 33.8*    201 204       Recent Labs     08/16/22  0630 08/16/22  1650 08/17/22  0554 08/18/22  0620     --  139 139   K 3.3*  --  3.7 3.8     --  100 102   CO2 24  --  29 28   BUN 39*  --  31* 39*   CREATININE 2.7* 2.7* 2.5* 2.4*   CALCIUM 9.5  --  10.0 10.1   PHOS 3.8  --  3.7 4.4       No results for input(s): AST, ALT, BILIDIR, BILITOT, ALKPHOS in the last 72 hours.     No results for input(s): INR in the last 72 hours. No results for input(s): Shayna Machuca in the last 72 hours. Urinalysis:      Lab Results   Component Value Date/Time    NITRU Negative 08/13/2022 07:50 PM    WBCUA 379 08/13/2022 07:50 PM    BACTERIA None Seen 08/13/2022 07:50 PM    RBCUA 1 08/13/2022 07:50 PM    BLOODU TRACE 08/13/2022 07:50 PM    SPECGRAV 1.020 08/13/2022 07:50 PM    GLUCOSEU >=1000 08/13/2022 07:50 PM       Radiology:  FL RETROGRADE PYELOGRAM RIGHT   Final Result      CT ABDOMEN PELVIS WO CONTRAST Additional Contrast? None   Final Result   1. Mild right-sided hydronephrosis which has improved from the comparison   exam.  3 mm retained stone within the right kidney. 2.  4 mm low-density stone within the proximal 3rd of the right ureter   appears to have moved distally since the comparison exam.         XR CHEST PORTABLE   Final Result   Status post right-sided dialysis catheter placement in good position. Mild cardiomegaly which is less prominent with no acute pulmonary abnormality.                  Assessment/Plan:    Active Hospital Problems    Diagnosis     ESRD on dialysis ([facility] [N18.6, Z99.2]      Priority: Medium    Infection requiring contact isolation precautions [B99.9]      Priority: Medium    Bilateral nephrolithiasis [N20.0]      Priority: Medium    Obstructive uropathy [N13.9]      Priority: Medium    Syncope and collapse [R55]      Priority: Medium    Urinary tract infection in male [N39.0]      Priority: Medium    S/P radical cystoprostatectomy [Z90.79, Z90.6]      Priority: Medium    Poorly controlled type 2 diabetes mellitus ([facility] [E11.65]      Priority: Medium    UTI due to extended-spectrum beta lactamase (ESBL) producing Escherichia coli [N39.0, B96.29, Z16.12]      Priority: Medium    Prostate cancer ([facility] [C61]      Priority: Medium     Continue IV Merrem  UCx with ESBL E Coli  OR today  Urology input appreciated  HD per Renal  Renal input appreciated  Cardio input appreciated  ID consult for ESBL E Coli appreciated  Might be able to get off HD after surgery    DVT Prophylaxis: Hep SQ  Diet: ADULT DIET; Regular  Code Status: Full Code    PT/OT Eval Status: Following    Dispo - Home    Discussed with patient, Dr Florencio Lane (Renal), Prashant Diego (Urology NP), nursing and CM. Might be able to stop HD. Will see what labs look like tomorrow. Home tomorrow if ok with all. Will not need Abx upon DC.     Kristina Valencia MD

## 2022-08-19 NOTE — PROGRESS NOTES
D: Patient was inadvertently given Humulin R 50 units SQ instead of Heparin 5,000 units SQ. The error immediately recognized by this nurse who administered the wrong medication. A: Informed the charge RN Gordon Francois and called a rapid response. D10 IVF started wide open. Blood sugars started every five minutes. See glucose management flowsheet. Dr. Sean Borges notified. Dr. Shelly Mcduffie responded at the beside and used a video  to explain the situation to the patient and, the interventions being implemented including a tranfer to the ICU for closer monitoring. The patient's sister was contacted and informed of all of the above. The patient was transferred to ICU bed 3918 and report was given to [facility].     R: The patient verbalized understanding and remained calm. There was no change in the patient's assessment. There were no signs or symptoms of hypoglycemia or any other deleterious affects related to the incident at the time of the transfer.

## 2022-08-20 LAB
ALBUMIN SERPL-MCNC: 3.4 G/DL (ref 3.4–5)
ANION GAP SERPL CALCULATED.3IONS-SCNC: 9 MMOL/L (ref 3–16)
BUN BLDV-MCNC: 55 MG/DL (ref 7–20)
CALCIUM SERPL-MCNC: 9.2 MG/DL (ref 8.3–10.6)
CHLORIDE BLD-SCNC: 106 MMOL/L (ref 99–110)
CO2: 23 MMOL/L (ref 21–32)
CREAT SERPL-MCNC: 2.6 MG/DL (ref 0.8–1.3)
GFR AFRICAN AMERICAN: 30
GFR NON-AFRICAN AMERICAN: 25
GLUCOSE BLD-MCNC: 130 MG/DL (ref 70–99)
GLUCOSE BLD-MCNC: 144 MG/DL (ref 70–99)
HCT VFR BLD CALC: 30 % (ref 40.5–52.5)
HEMOGLOBIN: 10 G/DL (ref 13.5–17.5)
MAGNESIUM: 2.2 MG/DL (ref 1.8–2.4)
MCH RBC QN AUTO: 30 PG (ref 26–34)
MCHC RBC AUTO-ENTMCNC: 33.4 G/DL (ref 31–36)
MCV RBC AUTO: 89.8 FL (ref 80–100)
PDW BLD-RTO: 13.5 % (ref 12.4–15.4)
PERFORMED ON: ABNORMAL
PHOSPHORUS: 4.7 MG/DL (ref 2.5–4.9)
PLATELET # BLD: 184 K/UL (ref 135–450)
PMV BLD AUTO: 7.5 FL (ref 5–10.5)
POTASSIUM SERPL-SCNC: 3.9 MMOL/L (ref 3.5–5.1)
RBC # BLD: 3.34 M/UL (ref 4.2–5.9)
SODIUM BLD-SCNC: 138 MMOL/L (ref 136–145)
WBC # BLD: 8.6 K/UL (ref 4–11)

## 2022-08-20 PROCEDURE — 6370000000 HC RX 637 (ALT 250 FOR IP): Performed by: INTERNAL MEDICINE

## 2022-08-20 PROCEDURE — 1200000000 HC SEMI PRIVATE

## 2022-08-20 PROCEDURE — 36415 COLL VENOUS BLD VENIPUNCTURE: CPT

## 2022-08-20 PROCEDURE — 85027 COMPLETE CBC AUTOMATED: CPT

## 2022-08-20 PROCEDURE — 90935 HEMODIALYSIS ONE EVALUATION: CPT

## 2022-08-20 PROCEDURE — 83735 ASSAY OF MAGNESIUM: CPT

## 2022-08-20 PROCEDURE — 2580000003 HC RX 258: Performed by: INTERNAL MEDICINE

## 2022-08-20 PROCEDURE — 6360000002 HC RX W HCPCS: Performed by: PHYSICIAN ASSISTANT

## 2022-08-20 PROCEDURE — 6360000002 HC RX W HCPCS: Performed by: INTERNAL MEDICINE

## 2022-08-20 PROCEDURE — 6370000000 HC RX 637 (ALT 250 FOR IP): Performed by: PHYSICIAN ASSISTANT

## 2022-08-20 PROCEDURE — 36556 INSERT NON-TUNNEL CV CATH: CPT

## 2022-08-20 PROCEDURE — 80069 RENAL FUNCTION PANEL: CPT

## 2022-08-20 RX ADMIN — HEPARIN SODIUM 5000 UNITS: 5000 INJECTION INTRAVENOUS; SUBCUTANEOUS at 06:26

## 2022-08-20 RX ADMIN — Medication 1 CAPSULE: at 17:29

## 2022-08-20 RX ADMIN — LABETALOL HYDROCHLORIDE 200 MG: 200 TABLET, FILM COATED ORAL at 12:57

## 2022-08-20 RX ADMIN — Medication 1 CAPSULE: at 12:57

## 2022-08-20 RX ADMIN — LABETALOL HYDROCHLORIDE 200 MG: 200 TABLET, FILM COATED ORAL at 21:47

## 2022-08-20 RX ADMIN — HEPARIN SODIUM 5000 UNITS: 5000 INJECTION INTRAVENOUS; SUBCUTANEOUS at 21:49

## 2022-08-20 RX ADMIN — AMLODIPINE BESYLATE 10 MG: 5 TABLET ORAL at 12:57

## 2022-08-20 RX ADMIN — MEROPENEM 500 MG: 500 INJECTION, POWDER, FOR SOLUTION INTRAVENOUS at 13:03

## 2022-08-20 ASSESSMENT — ENCOUNTER SYMPTOMS
EYE REDNESS: 0
FACIAL SWELLING: 0
EYE DISCHARGE: 0
CHEST TIGHTNESS: 0
CONSTIPATION: 0
NAUSEA: 0
SHORTNESS OF BREATH: 0
COUGH: 0
ABDOMINAL PAIN: 0
BLOOD IN STOOL: 0
PHOTOPHOBIA: 0
ABDOMINAL DISTENTION: 0
VOMITING: 0
DIARRHEA: 0

## 2022-08-20 NOTE — PROGRESS NOTES
Assessment completed. Vital signs stable. Patient denies pain or discomfort. Safety precautions in place. Call light and bedside table within reach.

## 2022-08-20 NOTE — CONSULTS
Consultation Note    Patient Name: Lexus Huynh  : 1960  Age: 58 y.o. Admitting Physician: Jakob Solis MD   Date of Admission: 2022  5:10 PM   Primary Care Physician: Cherie Canales PA-C        Lexus Huynh is being seen at the request of Jakob Solis MD for the bleeding. Natalia Moreno History of Present Illness:  66-year-old male with complex urological history, admitted with complicated UTI. We are asked to see the patient because of rectal bleeding. Discharge was planned today. However the patient reported couple episodes of rectal bleeding. GI History:  The patient stated that last night and this morning he had couple episodes where he experienced clots and some bright red blood per rectum. He felt some lower abdominal discomfort. Initially he felt. He has some difficulty with bowel movements. He subsequently passed some clots and bright red blood. He denies any similar symptoms in the past.  He has no history of previous colonoscopy. He had a total of 3 episodes. He denies diarrhea. He denies typical issues with constipation although he admits to having irregular bowel movements. He denies anal pain or anal discomfort. He has no known history of internal hemorrhoids or anal fissure.     Past Medical History:  Past Medical History:   Diagnosis Date    Diabetes mellitus ([address]     Hypertension         Past Surgical History:  Past Surgical History:   Procedure Laterality Date    CYSTOSCOPY Right 2022    CYSTOSCOPY, BILATERAL RETROGRADE PYELOGRAM, PLACEMENT OF RIGHT URETERAL STENT performed by Gerri Grace MD at [address] [place] [address] Right 2022    CYSTOSCOPY WITH RIGHT URETEROSCOPY WITH HOLMIUM LASER LITHOTRIPSY, STONE MANIPULATION, STONE BASKET,  RIGHT STENT PLACEMENT-[facility] #[id number] performed by Dedrick Rivera MD at [facility]    IR TUNNELED CATHETER PLACEMENT GREATER THAN 5 YEARS  2022    IR TUNNELED CATHETER PLACEMENT GREATER THAN 5 YEARS 2022 PENG SPECIAL PROCEDURES    PROSTATECTOMY N/A 5/16/2022    ROBOTIC LAPAROSCOPIC RADICAL PROSTATECTOMY WITH BILATERAL LYMPH NODE DISSECTION AND BILATERAL NERVE SPARE performed by Myranda Lau MD at [address] [facility] Medications:  Prior to Visit Medications    Medication Sig Taking? Authorizing Provider   labetalol (NORMODYNE) 200 MG tablet Take 1 tablet by mouth every 12 hours Yes Dimitrios Rico MD   tamsulosin (FLOMAX) 0.4 MG capsule Take 1 capsule by mouth daily For stent pain and stone passage. Myranda Lau MD   oxybutynin (DITROPAN) 5 MG tablet Take 1 tablet by mouth 3 times daily as needed (bladder spasms)  Myranda Lau MD   amLODIPine (NORVASC) 5 MG tablet Take 1 tablet by mouth daily  Joyce Gallegos MD   Lancets MISC 1 each by Does not apply route 2 times daily  Gaurang Person MD   blood glucose monitor kit and supplies Dispense sufficient amount for indicated testing frequency plus additional to accommodate PRN testing needs. Dispense all needed supplies to include: monitor, strips, lancing device, lancets, control solutions, alcohol swabs. Gaurang Person MD   blood glucose monitor strips Test3 times a day & as needed for symptoms of irregular blood glucose. Dispense sufficient amount for indicated testing frequency plus additional to accommodate PRN testing needs.   Gaurang Person MD   insulin glargine (LANTUS SOLOSTAR) 100 UNIT/ML injection pen Inject 8 Units into the skin nightly  Gaurang Person MD   insulin lispro, 1 Unit Dial, (HUMALOG KWIKPEN) 100 UNIT/ML SOPN Glucose: Dose: If <139   - No Insulin 140-199   --- 1 Unit 200-249   --  2  Units 250-299           3 Units  300-349           4 Units 350-400           5 Units  Above 400       6 Units  Dominique Obregon MD   Insulin Pen Needle 29G X 12MM MISC 1 each by Does not apply route daily  Gaurang Person MD        Hospital Medications:  Current Facility-Administered Medications: amLODIPine (NORVASC) tablet 10 mg, 10 mg, Oral, Daily  labetalol (NORMODYNE) tablet 200 mg, 200 mg, Oral, 2 times per day  heparin (porcine) injection 1,000 Units, 1,000 Units, IntraCATHeter, PRN  meropenem (MERREM) 500 mg IVPB (mini-bag), 500 mg, IntraVENous, Q24H  sodium chloride flush 0.9 % injection 5-40 mL, 5-40 mL, IntraVENous, 2 times per day  sodium chloride flush 0.9 % injection 5-40 mL, 5-40 mL, IntraVENous, PRN  0.9 % sodium chloride infusion, , IntraVENous, PRN  acetaminophen (TYLENOL) tablet 650 mg, 650 mg, Oral, Q6H PRN **OR** acetaminophen (TYLENOL) suppository 650 mg, 650 mg, Rectal, Q6H PRN  polyethylene glycol (GLYCOLAX) packet 17 g, 17 g, Oral, Daily PRN  dextrose bolus 10% 125 mL, 125 mL, IntraVENous, PRN **OR** dextrose bolus 10% 250 mL, 250 mL, IntraVENous, PRN  glucagon (rDNA) injection 1 mg, 1 mg, SubCUTAneous, PRN  dextrose 10 % infusion, , IntraVENous, Continuous PRN  ondansetron (ZOFRAN) injection 4 mg, 4 mg, IntraVENous, Q6H PRN  heparin (porcine) injection 5,000 Units, 5,000 Units, SubCUTAneous, 3 times per day  lactobacillus (CULTURELLE) capsule 1 capsule, 1 capsule, Oral, BID WC     Social History:   Social History       Tobacco History       Smoking Status  Never      Smokeless Tobacco Use  Never              Alcohol History       Alcohol Use Status  Never              Drug Use       Drug Use Status  Never              Sexual Activity       Sexually Active  Not Asked                     Family History:  History reviewed. No pertinent family history. Allergies:  No Known Allergies     ROS:   General: No fever or weight change  Hematologic: No unexpected submucosal bleeding or bruising  HEENT: No sore throat or facial pain  Respiratory: No cough or dyspnea  Cardiovascular: No angina or dependent edema  Gastrointestinal: See HPI.    Musculoskeletal: No usual joint pain or stiffness  Skin: No skin eruptions or changing lesions  Neurologic: No focal weakness or numbness  Psychiatric: No anxiety or sleep disturbance    Physical Exam:  Vital Signs: Vitals:    08/20/22 1255   BP: (!) 148/83   Pulse: 96   Resp: 16   Temp: 98.1 °F (36.7 °C)   SpO2: 99%       General: Well-nourished, well-developed  HEENT: Sclera anicteric, mucosal membranes moist  Cardiovascular: Regular rate and rhythm. No murmurs. Respiratory: Respirations nonlabored, no crepitus  GI: Abdomen nondistended, soft, and nontender. Normal active bowel sounds. No masses palpable. A rectal examination was performed. There was no evidence of external hemorrhoids. No masses were palpated on digital rectal examination. Small amount of brown stools were noted. There was no evidence of blood. Rectal: Deferred  Musculoskeletal: No pitting edema of the lower legs. Neurological: Gross memory appears intact. Patient is alert and oriented      Recent Imaging:   FL RETROGRADE PYELOGRAM RIGHT  Radiology exam is complete. No Radiologist dictation. Please follow up   with ordering provider. Labs:   Recent Labs     08/18/22  0620 08/19/22  0705 08/20/22  0350   HGB 11.0* 10.8* 10.0*   WBC 4.6 7.8 8.6   LABALBU 3.8 3.9 3.4        Assessment:    80-year-old male seen in consultation for rectal bleeding. The patient was admitted with a complicated urinary tract infection. He has a complicated urological history. He had a total of 3 episodes of what he describes as clots and bright blood. He denies any similar issues in the past.  He has no history of a previous colonoscopy. Rectal examination is unremarkable. The patient denies anal or rectal pain. He denies any history of hemorrhoids or anal fissure. Plan:  Continue to monitor for now. It does not appear that the patient experienced a significant lower GI bleed. The patient was advised not to flush the toilet after a bowel movement. Continue to trend H&H. We will reassess tomorrow. Patient will need a colonoscopy at some point. Timing to be decided depending on progress. Misael Lee MD    [facility]    [phone]. Also available via [employer]

## 2022-08-20 NOTE — PROGRESS NOTES
Shift assessment  Pt alert and oriented, wife at bedside. D10 currently on hold for FSBS of 347 at 1845, recheck at 200 was FSBS of 295. Will continue to hold D10 and recheck BS at 2200.

## 2022-08-20 NOTE — PROGRESS NOTES
Hospitalist Progress Note      PCP: Mario Cifuentes PA-C    Date of Admission: 8/13/2022    Chief Complaint: Syncope    Hospital Course: 59 yo M with history of prostate cancer s/p robotic prostatectomy, h/o R UPJ stone s/p ureteroscopy, ESRD on HD, DM 2 who came to Er with syncope. Admitted as inpatient for complicated UTI. Followed by Renal, Urology and Cardiology. Echo:   Normal left ventricle size, wall thickness, and systolic function with an   estimated ejection fraction of 60-65%. No regional wall motion abnormalities are seen. Aortic valve appears sclerotic but opens adequately. No evidence of aortic   valve stenosis. The aortic root is mildly dilated. Mildly thickened mitral valve leaflets with no evidence of stenosis. Mild pulmonic regurgitation present. Grade I diastolic dysfunction with normal LV filling pressures. Avg E/e'   estimated to be 10.6. Went to OR on 8/18/22 for:  1. Cystoscopy  2. Right side ureteral stent removal  3. Right ureteroscopy  4. Laser lithotripsy  5. Basket extraction of stone  6. Right side ureteral stent placement  7. Fluoroscopy <1hr MD time    Transferred to ICU for erroneous insulin administration. Started on D10 IVF. Monitoring FS very closely. Complained of rectal bleeding on 8/20. GI consulted to evaluate. Subjective:  Patient with complaints of rectal bleeding today. No CP, SOB, HA or abdominal pain. No fevers.       Medications:  Reviewed    Infusion Medications    sodium chloride 25 mL/hr at 08/17/22 1552    dextrose 100 mL/hr at 08/19/22 1348     Scheduled Medications    amLODIPine  10 mg Oral Daily    labetalol  200 mg Oral 2 times per day    meropenem  500 mg IntraVENous Q24H    sodium chloride flush  5-40 mL IntraVENous 2 times per day    heparin (porcine)  5,000 Units SubCUTAneous 3 times per day    lactobacillus  1 capsule Oral BID      PRN Meds: heparin (porcine), sodium chloride flush, sodium chloride, acetaminophen **OR** acetaminophen, polyethylene glycol, dextrose bolus **OR** dextrose bolus, glucagon (rDNA), dextrose, ondansetron      Intake/Output Summary (Last 24 hours) at 8/20/2022 1746  Last data filed at 8/20/2022 1300  Gross per 24 hour   Intake 1740 ml   Output 900 ml   Net 840 ml         Physical Exam Performed:    BP (!) 148/83   Pulse 96   Temp 98.1 °F (36.7 °C) (Temporal)   Resp 16   Ht 5' 7\" (1.702 m)   Wt 147 lb 11.3 oz (67 kg)   SpO2 99%   BMI 23.13 kg/m²     General appearance: No apparent distress, appears stated age and cooperative. HEENT: Pupils equal, round, and reactive to light. Conjunctivae/corneas clear. Neck: Supple, with full range of motion. No jugular venous distention. Trachea midline. Respiratory:  Normal respiratory effort. Clear to auscultation, bilaterally without Rales/Wheezes/Rhonchi. Cardiovascular: Regular rate and rhythm with normal S1/S2 without murmurs, rubs or gallops. Abdomen: Soft, non-tender, non-distended with normal bowel sounds. Musculoskeletal: No clubbing, cyanosis or edema bilaterally. Full range of motion without deformity. Skin: Skin color, texture, turgor normal.  No rashes or lesions. Neurologic:  Neurovascularly intact without any focal sensory/motor deficits.  Cranial nerves: II-XII intact, grossly non-focal.  Psychiatric: Alert and oriented, thought content appropriate, normal insight  Capillary Refill: Brisk,3 seconds, normal   Peripheral Pulses: +2 palpable, equal bilaterally       Labs:   Recent Labs     08/18/22  0620 08/19/22  0705 08/20/22  0350   WBC 4.6 7.8 8.6   HGB 11.0* 10.8* 10.0*   HCT 33.8* 32.6* 30.0*    203 184       Recent Labs     08/18/22  0620 08/19/22  0705 08/19/22  1732 08/19/22  2156 08/20/22  0350    133* 137 134* 138   K 3.8 4.1 3.5 3.9 3.9    98* 100 98* 106   CO2 28 22 24 22 23   BUN 39* 54* 50* 51* 55*   CREATININE 2.4* 2.8* 2.6* 2.6* 2.6*   CALCIUM 10.1 9.8 9.4 9.4 9.2   PHOS 4.4 4.0  --   --  4.7       No results for input(s): AST, ALT, BILIDIR, BILITOT, ALKPHOS in the last 72 hours. No results for input(s): INR in the last 72 hours. No results for input(s): Rhae Childes in the last 72 hours. Urinalysis:      Lab Results   Component Value Date/Time    NITRU Negative 08/13/2022 07:50 PM    WBCUA 379 08/13/2022 07:50 PM    BACTERIA None Seen 08/13/2022 07:50 PM    RBCUA 1 08/13/2022 07:50 PM    BLOODU TRACE 08/13/2022 07:50 PM    SPECGRAV 1.020 08/13/2022 07:50 PM    GLUCOSEU >=1000 08/13/2022 07:50 PM       Radiology:  FL RETROGRADE PYELOGRAM RIGHT   Final Result      CT ABDOMEN PELVIS WO CONTRAST Additional Contrast? None   Final Result   1. Mild right-sided hydronephrosis which has improved from the comparison   exam.  3 mm retained stone within the right kidney. 2.  4 mm low-density stone within the proximal 3rd of the right ureter   appears to have moved distally since the comparison exam.         XR CHEST PORTABLE   Final Result   Status post right-sided dialysis catheter placement in good position. Mild cardiomegaly which is less prominent with no acute pulmonary abnormality.                  Assessment/Plan:    Active Hospital Problems    Diagnosis     ESRD on dialysis ([facility] [N18.6, Z99.2]      Priority: Medium    Infection requiring contact isolation precautions [B99.9]      Priority: Medium    Bilateral nephrolithiasis [N20.0]      Priority: Medium    Obstructive uropathy [N13.9]      Priority: Medium    Syncope and collapse [R55]      Priority: Medium    Urinary tract infection in male [N39.0]      Priority: Medium    S/P radical cystoprostatectomy [Z90.79, Z90.6]      Priority: Medium    Poorly controlled type 2 diabetes mellitus ([facility] [E11.65]      Priority: Medium    UTI due to extended-spectrum beta lactamase (ESBL) producing Escherichia coli [N39.0, B96.29, Z16.12]      Priority: Medium    Prostate cancer ([facility] Paty Crumble      Priority: Medium     GI consult for rectal bleeding  DC D10 IVF  Hold Lantus and SSI today  Follow FS   Continue IV Merrem  UCx with ESBL E Coli  Urology input appreciated  HD per Renal  Renal input appreciated  Cardio input appreciated  ID consult for ESBL E Coli appreciated  Continue HD twice weekly    DVT Prophylaxis: Hep SQ  Diet: ADULT DIET; Regular  Code Status: Full Code    PT/OT Eval Status: Following    Dispo - Home    Discussed with patient and nursing. Will see what GI thinks. Transfer to floor.     Semaj Shin MD

## 2022-08-20 NOTE — PROGRESS NOTES
Urology Progress Note  [facility]    Provider: Yoselin Baldwin MD MD Patient ID:  Admission Date: 2022 Name: Alejandra Narvaez Date: 2022 MRN: [id number]   Patient Location: [id number] : 1960  Attending: Michael Leal MD Date of Service: 2022  PCP: Beba Allen PA-C     Diagnoses:  1. Urinary tract infection in male    2. Syncope and collapse    3. Calculus of ureter     CHRISSY on CKD3  Right ureteral stone with mild hydronephrosis     Assessment/Plan:  Admission with syncope after dialysis in setting of complex urologic history status post robotic prostatectomy, also ureteroscopy for large right renal stone with hospital course complicated by   -CT - 4mm distal right ureteral stone, 3mm nephrolithiasis, hydronephrosis  - UA with infection- staph epi- continue merrem  - 22 s/p RIGHT URS/LL and right stent insertion- no real stone burden causing obstruction, renal pelvis stone removed. Will remove stent on string next week, KAVON and follow up x6 weeks. -Can discharge per  when medically clear        The patient was sleeping during HD today     Subjective:   Tan Caballero is a 58 y.o. male. He was seen and examined this morning.  Sleeping    Objective:   Vitals:  Vitals:    22 0800   BP: (!) 146/91   Pulse:    Resp: 18   Temp: 96.9 °F (36.1 °C)   SpO2: 100%       Intake/Output Summary (Last 24 hours) at 2022 0910  Last data filed at 2022 1657  Gross per 24 hour   Intake --   Output 550 ml   Net -550 ml     Physical Exam:  Gen: NAD        Labs:  Lab Results   Component Value Date    WBC 8.6 2022    HGB 10.0 (L) 2022    HCT 30.0 (L) 2022    MCV 89.8 2022     2022     Lab Results   Component Value Date    CREATININE 2.6 (H) 2022    BUN 55 (H) 2022     2022    K 3.9 2022     2022    CO2 23 2022       Yoselin Baldwin MD MD  2022

## 2022-08-20 NOTE — FLOWSHEET NOTE
Treatment time: 3 hours  Net UF: 0 ml     Pre weight: 67 kg   Post weight: 67 kg  EDW: 66 kg     Access used: RCW CVC  Access function: Good with  ml/min     Medications or blood products given: None     Regular outpatient schedule: [facility], Tue/Sat     Summary of response to treatment: Tolerated tx well. No HD related complaints or complications.       Copy of dialysis treatment record placed in chart, to be scanned into EMR.       08/20/22 0844 08/20/22 1155   Treatment   Time On  --  0849   Time Off  --  1149   Vital Signs   BP (!) 158/92 (!) 149/90   Temp 96.9 °F (36.1 °C) 98.3 °F (36.8 °C)   Heart Rate 80 83   Resp 18 18   Dialysis Bath   K+ (Potassium) 3  --    Ca+ (Calcium) 2.5  --    Na+ (Sodium) 137  --    HCO3 (Bicarb) 32  --

## 2022-08-20 NOTE — PROGRESS NOTES
[facility] Note    Patient Active Problem List   Diagnosis    Prostate cancer ([facility]    Se[facility] shock ([facility]    CHRISSY (acute kidney injury) ([facility]    Ureterolithiasis    Lactic acidosis    UTI due to extended-spectrum beta lactamase (ESBL) producing Escherichia coli    Bacteremia due to Gram-negative bacteria    Complicated UTI (urinary tract infection)    Hyponatremia    S/P radical cystoprostatectomy    Poorly controlled type 2 diabetes mellitus ([facility]    Fever    Diabetes education, encounter for    Urinary tract infection in male    ESRD on dialysis ([facility]    Infection requiring contact isolation precautions    Bilateral nephrolithiasis    Obstructive uropathy    Syncope and collapse       Past Medical History:   has a past medical history of Diabetes mellitus ([facility] and Hypertension. Past Social History:   reports that he has never smoked. He has never used smokeless tobacco. He reports that he does not drink alcohol and does not use drugs. Subjective:   No complaints today. Right ureteroscopy done with right stent insertion. Received higher dose subQ regular insulin yesterday. Seen on HD today. No complaints. Blood glucose not decreased. Review of Systems   Constitutional:  Positive for fatigue. Negative for activity change, appetite change, chills, fever and unexpected weight change. HENT:  Negative for congestion and facial swelling. Eyes:  Negative for photophobia, discharge and redness. Respiratory:  Negative for cough, chest tightness and shortness of breath. Cardiovascular:  Negative for chest pain, palpitations and leg swelling. Gastrointestinal:  Negative for abdominal distention, abdominal pain, blood in stool, constipation, diarrhea, nausea and vomiting. Endocrine: Negative for cold intolerance, heat intolerance and polyuria.    Genitourinary:  Negative for decreased urine volume, difficulty urinating, flank pain and hematuria. Musculoskeletal:  Negative for joint swelling and neck pain. Neurological:  Negative for dizziness, seizures, syncope, speech difficulty, light-headedness and headaches. Hematological:  Does not bruise/bleed easily. Psychiatric/Behavioral:  Negative for agitation, confusion and hallucinations. Objective:      BP (!) 158/92   Pulse 80   Temp 96.9 °F (36.1 °C)   Resp 18   Ht 5' 7\" (1.702 m)   Wt 147 lb 11.3 oz (67 kg)   SpO2 100%   BMI 23.13 kg/m²     Wt Readings from Last 3 Encounters:   08/20/22 147 lb 11.3 oz (67 kg)   07/21/22 145 lb 1.6 oz (65.8 kg)   07/13/22 142 lb 9.6 oz (64.7 kg)       BP Readings from Last 3 Encounters:   08/20/22 (!) 158/92   07/21/22 (!) 168/74   07/13/22 (!) 169/113     Chest- clear  Heart-regular  Abd-soft  Ext- no edema    Labs  Hemoglobin   Date Value Ref Range Status   08/20/2022 10.0 (L) 13.5 - 17.5 g/dL Final     Hematocrit   Date Value Ref Range Status   08/20/2022 30.0 (L) 40.5 - 52.5 % Final     WBC   Date Value Ref Range Status   08/20/2022 8.6 4.0 - 11.0 K/uL Final     Platelets   Date Value Ref Range Status   08/20/2022 184 135 - 450 K/uL Final     Lab Results   Component Value Date    CREATININE 2.6 (H) 08/20/2022    BUN 55 (H) 08/20/2022     08/20/2022    K 3.9 08/20/2022     08/20/2022    CO2 23 08/20/2022     Renal US 8/4/22     FINDINGS:       Kidneys: The right kidney measures 8.6 x 4.5 x 3.8 cm in length and the left kidney   measures 9.0 x 4.2 x 4.0 cm in length. A 6 mm stone by 4 mm stone is seen in the right kidney. A 5 mm x 5 mm stone   is also seen in the right kidney. There is mild right-sided hydronephrosis. No hydronephrosis on the left. Bladder:       Bladder is collapsed and not well evaluated           Impression   Small renal stones on the right with mild right-sided hydronephrosis.        No stones or hydronephrosis on the left     24H urine Crea Cl 32ml/min    Assessment/Plan:   CHRISSY on CKD 3b-crea was 1.5 on 5/2022. Started on HD on 5/2022 due to worsened renal function. Has RFK. Crea 2.1 on presentation and now at 2.8. Na profile and lower temp  with HD due to dizziness. Decrease HD to 2x/week(Tue/Sat), Na profile, lower temp, no fluid removal.  HD today then next HD Tue as outpt. Follow in 2 weeks if can decrease HD further. Discussed with outpt HD unit. Right nepholithiasis with hydronephrosis-s/p right stone intervention and stent placement 8/18/22. Appreciate Urology help. Hyponatremia-better  Anemia- hgb at goal  HTN-increased Amlodipine to 10mg daily. Added Labetalol to 200mg bid. Continue. Better bp control. DM-per Medicine   7. Hypokalemia-better  8. HD Tue and Sat at [facility]  9. ESBL E. Coli UTI-on Meropenem. ID following. 10.   Dispo-stable for DC after HD today from renal perspective and once okay with Medicine.     Carlos Brown MD

## 2022-08-20 NOTE — PROGRESS NOTES
Nutrition Note    RECOMMENDATIONS  PO Diet: add CCC / YARI modifer  ONS: n/a      NUTRITION ASSESSMENT   LOS nutrition assessment. Pt on regular diet, recommend carb control, YARI d/t hx of DM and CKD (on HD). PO intake overall good, mostly 50% or greater. Nutrition Related Findings: HD 2x/wk; active BS  Wounds: None  Nutrition Education:  No recommendation at this time   Nutrition Goals: PO intake 75% or greater     MALNUTRITION ASSESSMENT      Malnutrition Status: No malnutrition      NUTRITION DIAGNOSIS   No nutrition diagnosis at this time       CURRENT NUTRITION THERAPIES  ADULT DIET; Regular     PO Intake: 51-75%, %   PO Supplement Intake:None Ordered        ANTHROPOMETRICS  Current Height: 5' 7\" (170.2 cm)  Current Weight: 147 lb 12.8 oz (67 kg)    Admission weight: 147 lb (66.7 kg)  Ideal Body Weight (IBW): 148 lbs  (67 kg)          BMI: 23        The patient will be monitored per nutrition standards of care. Consult dietitian if additional nutrition interventions are needed prior to RD reassessment.      Ryann Santacruz RD, LD    Contact: [phone]

## 2022-08-21 LAB
ALBUMIN SERPL-MCNC: 3.4 G/DL (ref 3.4–5)
ANION GAP SERPL CALCULATED.3IONS-SCNC: 9 MMOL/L (ref 3–16)
BUN BLDV-MCNC: 39 MG/DL (ref 7–20)
CALCIUM SERPL-MCNC: 9.1 MG/DL (ref 8.3–10.6)
CHLORIDE BLD-SCNC: 104 MMOL/L (ref 99–110)
CO2: 25 MMOL/L (ref 21–32)
CREAT SERPL-MCNC: 2.3 MG/DL (ref 0.8–1.3)
GFR AFRICAN AMERICAN: 35
GFR NON-AFRICAN AMERICAN: 29
GLUCOSE BLD-MCNC: 119 MG/DL (ref 70–99)
GLUCOSE BLD-MCNC: 135 MG/DL (ref 70–99)
GLUCOSE BLD-MCNC: 149 MG/DL (ref 70–99)
GLUCOSE BLD-MCNC: 205 MG/DL (ref 70–99)
GLUCOSE BLD-MCNC: 250 MG/DL (ref 70–99)
HCT VFR BLD CALC: 30.4 % (ref 40.5–52.5)
HEMOGLOBIN: 10.1 G/DL (ref 13.5–17.5)
MCH RBC QN AUTO: 30 PG (ref 26–34)
MCHC RBC AUTO-ENTMCNC: 33.2 G/DL (ref 31–36)
MCV RBC AUTO: 90.2 FL (ref 80–100)
PDW BLD-RTO: 14.2 % (ref 12.4–15.4)
PERFORMED ON: ABNORMAL
PHOSPHORUS: 4.2 MG/DL (ref 2.5–4.9)
PLATELET # BLD: 173 K/UL (ref 135–450)
PMV BLD AUTO: 7.8 FL (ref 5–10.5)
POTASSIUM SERPL-SCNC: 3.9 MMOL/L (ref 3.5–5.1)
RBC # BLD: 3.37 M/UL (ref 4.2–5.9)
SODIUM BLD-SCNC: 138 MMOL/L (ref 136–145)
WBC # BLD: 6.1 K/UL (ref 4–11)

## 2022-08-21 PROCEDURE — 6370000000 HC RX 637 (ALT 250 FOR IP): Performed by: PHYSICIAN ASSISTANT

## 2022-08-21 PROCEDURE — 36556 INSERT NON-TUNNEL CV CATH: CPT

## 2022-08-21 PROCEDURE — 36415 COLL VENOUS BLD VENIPUNCTURE: CPT

## 2022-08-21 PROCEDURE — 1200000000 HC SEMI PRIVATE

## 2022-08-21 PROCEDURE — 6370000000 HC RX 637 (ALT 250 FOR IP): Performed by: INTERNAL MEDICINE

## 2022-08-21 PROCEDURE — 6360000002 HC RX W HCPCS: Performed by: INTERNAL MEDICINE

## 2022-08-21 PROCEDURE — 2580000003 HC RX 258: Performed by: INTERNAL MEDICINE

## 2022-08-21 PROCEDURE — 85027 COMPLETE CBC AUTOMATED: CPT

## 2022-08-21 PROCEDURE — 2580000003 HC RX 258: Performed by: PHYSICIAN ASSISTANT

## 2022-08-21 PROCEDURE — 6360000002 HC RX W HCPCS: Performed by: PHYSICIAN ASSISTANT

## 2022-08-21 PROCEDURE — 80069 RENAL FUNCTION PANEL: CPT

## 2022-08-21 PROCEDURE — 83036 HEMOGLOBIN GLYCOSYLATED A1C: CPT

## 2022-08-21 RX ORDER — INSULIN LISPRO 100 [IU]/ML
0-4 INJECTION, SOLUTION INTRAVENOUS; SUBCUTANEOUS
Status: DISCONTINUED | OUTPATIENT
Start: 2022-08-21 | End: 2022-08-22 | Stop reason: HOSPADM

## 2022-08-21 RX ORDER — INSULIN LISPRO 100 [IU]/ML
0-4 INJECTION, SOLUTION INTRAVENOUS; SUBCUTANEOUS NIGHTLY
Status: DISCONTINUED | OUTPATIENT
Start: 2022-08-21 | End: 2022-08-22 | Stop reason: HOSPADM

## 2022-08-21 RX ORDER — DEXTROSE MONOHYDRATE 100 MG/ML
INJECTION, SOLUTION INTRAVENOUS CONTINUOUS PRN
Status: DISCONTINUED | OUTPATIENT
Start: 2022-08-21 | End: 2022-08-22 | Stop reason: HOSPADM

## 2022-08-21 RX ADMIN — SODIUM CHLORIDE, PRESERVATIVE FREE 10 ML: 5 INJECTION INTRAVENOUS at 21:40

## 2022-08-21 RX ADMIN — HEPARIN SODIUM 5000 UNITS: 5000 INJECTION INTRAVENOUS; SUBCUTANEOUS at 06:13

## 2022-08-21 RX ADMIN — MEROPENEM 500 MG: 500 INJECTION, POWDER, FOR SOLUTION INTRAVENOUS at 08:25

## 2022-08-21 RX ADMIN — LABETALOL HYDROCHLORIDE 200 MG: 200 TABLET, FILM COATED ORAL at 21:40

## 2022-08-21 RX ADMIN — Medication 1 CAPSULE: at 18:54

## 2022-08-21 RX ADMIN — SODIUM CHLORIDE: 9 INJECTION, SOLUTION INTRAVENOUS at 08:24

## 2022-08-21 RX ADMIN — POLYETHYLENE GLYCOL 3350 AND ELECTROLYTES WITH LEMON FLAVOR 2000 ML: 236; 22.74; 6.74; 5.86; 2.97 POWDER, FOR SOLUTION ORAL at 18:56

## 2022-08-21 RX ADMIN — Medication 1 CAPSULE: at 08:30

## 2022-08-21 RX ADMIN — AMLODIPINE BESYLATE 10 MG: 5 TABLET ORAL at 08:30

## 2022-08-21 RX ADMIN — HEPARIN SODIUM 5000 UNITS: 5000 INJECTION INTRAVENOUS; SUBCUTANEOUS at 21:39

## 2022-08-21 RX ADMIN — SODIUM CHLORIDE, PRESERVATIVE FREE 10 ML: 5 INJECTION INTRAVENOUS at 08:32

## 2022-08-21 RX ADMIN — LABETALOL HYDROCHLORIDE 200 MG: 200 TABLET, FILM COATED ORAL at 08:30

## 2022-08-21 ASSESSMENT — ENCOUNTER SYMPTOMS
DIARRHEA: 0
ABDOMINAL PAIN: 0
NAUSEA: 0
FACIAL SWELLING: 0
EYE REDNESS: 0
PHOTOPHOBIA: 0
COUGH: 0
EYE DISCHARGE: 0
CONSTIPATION: 0
BLOOD IN STOOL: 0
ABDOMINAL DISTENTION: 0
SHORTNESS OF BREATH: 0
CHEST TIGHTNESS: 0
VOMITING: 0

## 2022-08-21 ASSESSMENT — PAIN SCALES - GENERAL: PAINLEVEL_OUTOF10: 0

## 2022-08-21 NOTE — PROGRESS NOTES
[facility] Note    Patient Active Problem List   Diagnosis    Prostate cancer ([facility]    Septic shock ([facility]    CHRISSY (acute kidney injury) ([facility]    Ureterolithiasis    Lactic acidosis    UTI due to extended-spectrum beta lactamase (ESBL) producing Escherichia coli    Bacteremia due to Gram-negative bacteria    Complicated UTI (urinary tract infection)    Hyponatremia    S/P radical cystoprostatectomy    Poorly controlled type 2 diabetes mellitus ([facility]    Fever    Diabetes education, encounter for    Urinary tract infection in male    ESRD on dialysis ([facility]    Infection requiring contact isolation precautions    Bilateral nephrolithiasis    Obstructive uropathy    Syncope and collapse       Past Medical History:   has a past medical history of Diabetes mellitus ([facility] and Hypertension. Past Social History:   reports that he has never smoked. He has never used smokeless tobacco. He reports that he does not drink alcohol and does not use drugs. Subjective:   No complaints today. Right ureteroscopy done with right stent insertion. Tolerated HD well yesterday. Blood glucose higher level. Review of Systems   Constitutional:  Positive for fatigue. Negative for activity change, appetite change, chills, fever and unexpected weight change. HENT:  Negative for congestion and facial swelling. Eyes:  Negative for photophobia, discharge and redness. Respiratory:  Negative for cough, chest tightness and shortness of breath. Cardiovascular:  Negative for chest pain, palpitations and leg swelling. Gastrointestinal:  Negative for abdominal distention, abdominal pain, blood in stool, constipation, diarrhea, nausea and vomiting. Endocrine: Negative for cold intolerance, heat intolerance and polyuria. Genitourinary:  Negative for decreased urine volume, difficulty urinating, flank pain and hematuria.    Musculoskeletal:  Negative for joint swelling and neck pain. Neurological:  Negative for dizziness, seizures, syncope, speech difficulty, light-headedness and headaches. Hematological:  Does not bruise/bleed easily. Psychiatric/Behavioral:  Negative for agitation, confusion and hallucinations. Objective:      BP (!) 148/86   Pulse 95   Temp 97.8 °F (36.6 °C) (Axillary)   Resp 16   Ht 5' 7\" (1.702 m)   Wt 147 lb 11.3 oz (67 kg)   SpO2 100%   BMI 23.13 kg/m²     Wt Readings from Last 3 Encounters:   08/20/22 147 lb 11.3 oz (67 kg)   07/21/22 145 lb 1.6 oz (65.8 kg)   07/13/22 142 lb 9.6 oz (64.7 kg)       BP Readings from Last 3 Encounters:   08/21/22 (!) 148/86   07/21/22 (!) 168/74   07/13/22 (!) 169/113     Chest- clear  Heart-regular  Abd-soft  Ext- no edema    Labs  Hemoglobin   Date Value Ref Range Status   08/21/2022 10.1 (L) 13.5 - 17.5 g/dL Final     Hematocrit   Date Value Ref Range Status   08/21/2022 30.4 (L) 40.5 - 52.5 % Final     WBC   Date Value Ref Range Status   08/21/2022 6.1 4.0 - 11.0 K/uL Final     Platelets   Date Value Ref Range Status   08/21/2022 173 135 - 450 K/uL Final     Lab Results   Component Value Date    CREATININE 2.3 (H) 08/21/2022    BUN 39 (H) 08/21/2022     08/21/2022    K 3.9 08/21/2022     08/21/2022    CO2 25 08/21/2022     Renal US 8/4/22     FINDINGS:       Kidneys: The right kidney measures 8.6 x 4.5 x 3.8 cm in length and the left kidney   measures 9.0 x 4.2 x 4.0 cm in length. A 6 mm stone by 4 mm stone is seen in the right kidney. A 5 mm x 5 mm stone   is also seen in the right kidney. There is mild right-sided hydronephrosis. No hydronephrosis on the left. Bladder:       Bladder is collapsed and not well evaluated           Impression   Small renal stones on the right with mild right-sided hydronephrosis. No stones or hydronephrosis on the left     24H urine Crea Cl 32ml/min    Assessment/Plan:   CHRISSY on CKD 3b-crea was 1.5 on 5/2022.   Started on HD on 5/2022 due to worsened renal function. Has RFK. Crea 2.1 on presentation and now at 2.8. Na profile and lower temp  with HD due to dizziness. Decrease HD to 2x/week(Tue/Sat), Na profile, lower temp, no fluid removal.  Next HD Tue as outpt. Follow in 2 weeks if can decrease HD further. Discussed with outpt HD unit. Right nepholithiasis with hydronephrosis-s/p right stone intervention and stent placement 8/18/22. Appreciate Urology help. Hyponatremia-better  Anemia- hgb at goal  HTN-increased Amlodipine to 10mg daily. Added Labetalol to 200mg bid. Continue. Better bp control. DM-per Medicine   7. Hypokalemia-better  8. HD Tue and Sat at [facility]  9. ESBL E. Coli UTI-on Meropenem. ID following. 10.   Dispo-stable for DC from renal perspective and once okay with Medicine.     Manju Noel MD

## 2022-08-21 NOTE — PROGRESS NOTES
Hospitalist Progress Note      PCP: Opal Rasheed PA-C    Date of Admission: 8/13/2022    Chief Complaint: Syncope    Hospital Course: 59 yo M with history of prostate cancer s/p robotic prostatectomy, h/o R UPJ stone s/p ureteroscopy, ESRD on HD, DM 2 who came to Er with syncope. Admitted as inpatient for complicated UTI. Followed by Renal, Urology and Cardiology. Echo:   Normal left ventricle size, wall thickness, and systolic function with an   estimated ejection fraction of 60-65%. No regional wall motion abnormalities are seen. Aortic valve appears sclerotic but opens adequately. No evidence of aortic   valve stenosis. The aortic root is mildly dilated. Mildly thickened mitral valve leaflets with no evidence of stenosis. Mild pulmonic regurgitation present. Grade I diastolic dysfunction with normal LV filling pressures. Avg E/e'   estimated to be 10.6. Went to OR on 8/18/22 for:  1. Cystoscopy  2. Right side ureteral stent removal  3. Right ureteroscopy  4. Laser lithotripsy  5. Basket extraction of stone  6. Right side ureteral stent placement  7. Fluoroscopy <1hr MD time    Transferred to ICU for erroneous insulin administration. Started on D10 IVF. Monitoring FS very closely. Complained of rectal bleeding on 8/20. GI consulted to evaluate. For colonoscopy on 8/22/22. Subjective:  Patient denies rectal bleeding today. No CP, SOB, HA or abdominal pain. No fevers.       Medications:  Reviewed    Infusion Medications    sodium chloride 10 mL/hr at 08/21/22 0824    dextrose 100 mL/hr at 08/19/22 1348     Scheduled Medications    polyethylene glycol  2,000 mL Oral Once    polyethylene glycol  2,000 mL Oral Once    amLODIPine  10 mg Oral Daily    labetalol  200 mg Oral 2 times per day    meropenem  500 mg IntraVENous Q24H    sodium chloride flush  5-40 mL IntraVENous 2 times per day    heparin (porcine)  5,000 Units SubCUTAneous 3 times per day    lactobacillus  1 55* 39*   CREATININE 2.8*   < > 2.6* 2.6* 2.3*   CALCIUM 9.8   < > 9.4 9.2 9.1   PHOS 4.0  --   --  4.7 4.2    < > = values in this interval not displayed. No results for input(s): AST, ALT, BILIDIR, BILITOT, ALKPHOS in the last 72 hours. No results for input(s): INR in the last 72 hours. No results for input(s): Lashawn Marko in the last 72 hours. Urinalysis:      Lab Results   Component Value Date/Time    NITRU Negative 08/13/2022 07:50 PM    WBCUA 379 08/13/2022 07:50 PM    BACTERIA None Seen 08/13/2022 07:50 PM    RBCUA 1 08/13/2022 07:50 PM    BLOODU TRACE 08/13/2022 07:50 PM    SPECGRAV 1.020 08/13/2022 07:50 PM    GLUCOSEU >=1000 08/13/2022 07:50 PM       Radiology:  FL RETROGRADE PYELOGRAM RIGHT   Final Result      CT ABDOMEN PELVIS WO CONTRAST Additional Contrast? None   Final Result   1. Mild right-sided hydronephrosis which has improved from the comparison   exam.  3 mm retained stone within the right kidney. 2.  4 mm low-density stone within the proximal 3rd of the right ureter   appears to have moved distally since the comparison exam.         XR CHEST PORTABLE   Final Result   Status post right-sided dialysis catheter placement in good position. Mild cardiomegaly which is less prominent with no acute pulmonary abnormality.                  Assessment/Plan:    Active Hospital Problems    Diagnosis     ESRD on dialysis ([facility] [N18.6, Z99.2]      Priority: Medium    Infection requiring contact isolation precautions [B99.9]      Priority: Medium    Bilateral nephrolithiasis [N20.0]      Priority: Medium    Obstructive uropathy [N13.9]      Priority: Medium    Syncope and collapse [R55]      Priority: Medium    Urinary tract infection in male [N39.0]      Priority: Medium    S/P radical cystoprostatectomy [Z90.79, Z90.6]      Priority: Medium    Poorly controlled type 2 diabetes mellitus ([facility] [E11.65]      Priority: Medium    UTI due to extended-spectrum beta lactamase (ESBL) producing Escherichia coli [N39.0, B96.29, Z16.12]      Priority: Medium    Prostate cancer [facility] Olson      Priority: Medium     GI consult for rectal bleeding appreciated  Colonoscopy on 8/22/22  CLD  Resume low SSI today  Follow FS   Continue IV Merrem  UCx with ESBL E Coli  Urology input appreciated  HD per Renal  Renal input appreciated  Cardio input appreciated  ID consult for ESBL E Coli appreciated  Continue HD twice weekly    DVT Prophylaxis: Hep SQ  Diet: ADULT DIET; Clear Liquid  Diet NPO  Code Status: Full Code    PT/OT Eval Status: Following    Dispo - Home    Discussed with patient, nursing and CM. Colonoscopy tomorrow. Home tomorrow after colonoscopy.     Rosanna Brown MD

## 2022-08-21 NOTE — PROGRESS NOTES
Progress Note    Patient Juan A Salinas  MRN: [id number]  YOB: 1960 Age: 58 y.o. Sex: male  Room: [address] [place] [address]       Admitting Physician: Ann Marie Hoffman MD   Date of Admission: 8/13/2022  5:10 PM   Primary Care Physician: Dalton Guillen PA-C     Subjective:  Juan A Salinas was seen and examined. We are following for a few episodes of rectal bleeding. .  -- Patient was seen yesterday. Since then, patient experienced couple episodes with minimal amount of blood. He is also complaining of anal discomfort today. ROS:  Constitutional: Denies fever, no change in appetite  Respiratory: Denies cough or shortness of breath  Cardiovascular: Denies chest pain or edema    Objective:  Vital Signs:   Vitals:    08/21/22 0815   BP: (!) 148/86   Pulse: 95   Resp: 16   Temp: 97.8 °F (36.6 °C)   SpO2: 100%         Physical Exam:  Constitutional: Alert and oriented x 4. No acute distress. Respiratory: Respirations nonlabored, no crepitus  GI: Abdomen nondistended, soft, and nontender. Neurological: No focal deficits noted. No asterixis.     Intake/Output:    Intake/Output Summary (Last 24 hours) at 8/21/2022 1036  Last data filed at 8/20/2022 2037  Gross per 24 hour   Intake 1380 ml   Output 1125 ml   Net 255 ml        Current Medications:  Current Facility-Administered Medications   Medication Dose Route Frequency Provider Last Rate Last Admin    amLODIPine (NORVASC) tablet 10 mg  10 mg Oral Daily Luis Manuel Valdes MD   10 mg at 08/21/22 0830    labetalol (NORMODYNE) tablet 200 mg  200 mg Oral 2 times per day Priya Soliz MD   200 mg at 08/21/22 0830    heparin (porcine) injection 1,000 Units  1,000 Units IntraCATHeter PRN Luis Manuel Valdes MD        meropenem (MERREM) 500 mg IVPB (mini-bag)  500 mg IntraVENous Q24H Shira Copeland MD 33.3 mL/hr at 08/21/22 0825 500 mg at 08/21/22 0825    sodium chloride flush 0.9 % injection 5-40 mL  5-40 mL IntraVENous 2 times per day Roney Hammans Betty PA-C   10 mL at 08/21/22 7867    sodium chloride flush 0.9 % injection 5-40 mL  5-40 mL IntraVENous PRN Lavonne Tami, PA-C        0.9 % sodium chloride infusion   IntraVENous PRN Lavonne Tami, PA-C 10 mL/hr at 08/21/22 0824 New Bag at 08/21/22 0824    acetaminophen (TYLENOL) tablet 650 mg  650 mg Oral Q6H PRN Lavonne Tami, PA-C        Or    acetaminophen (TYLENOL) suppository 650 mg  650 mg Rectal Q6H PRN Lavonne Tami, PA-C        polyethylene glycol (GLYCOLAX) packet 17 g  17 g Oral Daily PRN Lavonne Tami, PA-C        dextrose bolus 10% 125 mL  125 mL IntraVENous PRN Lavonne Tami, PA-C        Or    dextrose bolus 10% 250 mL  250 mL IntraVENous PRN Lavonne Taim, PA-C   Stopped at 08/19/22 1600    glucagon (rDNA) injection 1 mg  1 mg SubCUTAneous PRN Lavonne Tami, PA-C   1 mg at 08/19/22 1614    dextrose 10 % infusion   IntraVENous Continuous PRN Lavonne Tami, PA-C 100 mL/hr at 08/19/22 1348 New Bag at 08/19/22 1348    ondansetron (ZOFRAN) injection 4 mg  4 mg IntraVENous Q6H PRN Lavonne Tami, PA-C        heparin (porcine) injection 5,000 Units  5,000 Units SubCUTAneous 3 times per day Lavonne Tami, PA-C   5,000 Units at 08/21/22 4244    lactobacillus (CULTURELLE) capsule 1 capsule  1 capsule Oral BID WC Lavonne Tami, PA-C   1 capsule at 08/21/22 0830         Recent Imaging:   [facility] RIGHT  Radiology exam is complete. No Radiologist dictation. Please follow up   with ordering provider. Labs:   Recent Labs     08/19/22  0705 08/20/22  0350 08/21/22  0932   HGB 10.8* 10.0* 10.1*   WBC 7.8 8.6 6.1   LABALBU 3.9 3.4 3.4          Assessment:    58-year-old male seen in consultation for rectal bleeding. The patient was admitted with a complicated urinary tract infection. He has a complicated urological history. He had a total of 3 episodes of what he describes as clots and bright blood.   He denies any similar issues in the past. He has no history of a previous colonoscopy. Rectal examination is unremarkable. The patient denies anal or rectal pain. He denies any history of hemorrhoids or anal fissure. Hemoglobin remained stable. Patient reports another episode of stool with streaks of blood. He feels that he is not bleeding as much. However he is also complaining of anal discomfort. Plan:  Rectal bleeding, likely related to hemorrhoids. Rectal examination unremarkable. No history of previous colonoscopy. Colonoscopy on Monday. Lila Cárdenas MD    [facility]    [phone].  Also available via [employer]

## 2022-08-21 NOTE — PROGRESS NOTES
Patient admitted to room, new gown and blanket provided, assisted to position of comfort, patient encouraged to call with needs, call light in reach, items within reach, will continue to monitor

## 2022-08-21 NOTE — PLAN OF CARE
Problem: Chronic Conditions and Co-morbidities  Goal: Patient's chronic conditions and co-morbidity symptoms are monitored and maintained or improved  Recent Flowsheet Documentation  Taken 8/20/2022 2345 by Rafaela Leon 34 - Patient's Chronic Conditions and Co-Morbidity Symptoms are Monitored and Maintained or Improved: Monitor and assess patient's chronic conditions and comorbid symptoms for stability, deterioration, or improvement     Problem: Discharge Planning  Goal: Discharge to home or other facility with appropriate resources  Recent Flowsheet Documentation  Taken 8/20/2022 2345 by Bryanna Whalen RN  Discharge to home or other facility with appropriate resources: Identify barriers to discharge with patient and caregiver

## 2022-08-21 NOTE — PROGRESS NOTES
Urology Progress Note  [facility]    Provider: Adam Sanchez MD MD Patient ID:  Admission Date: 2022 Name: Arturo Kiser  OR Date: 2022 MRN: [id number]   Patient Location: [id number] : 1960  Attending: Mg Steen MD Date of Service: 2022  PCP: Jose Francisco Ash PA-C     Diagnoses:  1. Urinary tract infection in male    2. Syncope and collapse    3. Calculus of ureter     CHRISSY on CKD3  Right ureteral stone with mild hydronephrosis     Assessment/Plan:  Admission with syncope after dialysis in setting of complex urologic history status post robotic prostatectomy, also ureteroscopy for large right renal stone with hospital course complicated by renal failure. HD yesterday    - will need outpt colonscopy  -CT 08/15/2022- 4mm distal right ureteral stone, 3mm nephrolithiasis, hydronephrosis  - UA with infection- staph epi- continue merrem  - 22 s/p RIGHT URS/LL and right stent insertion- no real stone burden causing obstruction, renal pelvis stone removed. Will remove stent on string next week, KAVON and follow up x6 weeks. -Can discharge per  when medically clear          Subjective:   Arturo Kiser is a 58 y.o. male. He was seen and examined this morning.  HE is feeling fell other than some blood in stool yesterday, now normal color and consistency    Objective:   Vitals:  Vitals:    22 0815   BP: (!) 148/86   Pulse: 95   Resp: 16   Temp: 97.8 °F (36.6 °C)   SpO2: 100%       Intake/Output Summary (Last 24 hours) at 2022 1051  Last data filed at 2022 2037  Gross per 24 hour   Intake 1380 ml   Output 1125 ml   Net 255 ml     Physical Exam:  Gen: Alert and oriented x3, no acute distress      Labs:  Lab Results   Component Value Date    WBC 6.1 2022    HGB 10.1 (L) 2022    HCT 30.4 (L) 2022    MCV 90.2 2022     2022     Lab Results   Component Value Date    CREATININE 2.3 (H) 2022    BUN 39 (H) 2022  08/21/2022    K 3.9 08/21/2022     08/21/2022    CO2 25 08/21/2022       Juliocesar Macedo MD MD  8/21/2022

## 2022-08-22 ENCOUNTER — ANESTHESIA (OUTPATIENT)
Dept: ENDOSCOPY | Age: 62
DRG: 446 | End: 2022-08-22
Payer: MEDICAID

## 2022-08-22 ENCOUNTER — ANESTHESIA EVENT (OUTPATIENT)
Dept: ENDOSCOPY | Age: 62
DRG: 446 | End: 2022-08-22
Payer: MEDICAID

## 2022-08-22 VITALS
OXYGEN SATURATION: 99 % | TEMPERATURE: 96.9 F | WEIGHT: 144.25 LBS | DIASTOLIC BLOOD PRESSURE: 90 MMHG | BODY MASS INDEX: 22.64 KG/M2 | HEART RATE: 115 BPM | RESPIRATION RATE: 21 BRPM | SYSTOLIC BLOOD PRESSURE: 162 MMHG | HEIGHT: 67 IN

## 2022-08-22 LAB
ANION GAP SERPL CALCULATED.3IONS-SCNC: 12 MMOL/L (ref 3–16)
BASOPHILS ABSOLUTE: 0.1 K/UL (ref 0–0.2)
BASOPHILS RELATIVE PERCENT: 1.5 %
BUN BLDV-MCNC: 29 MG/DL (ref 7–20)
CALCIUM SERPL-MCNC: 9.9 MG/DL (ref 8.3–10.6)
CALCULI COMPOSITION: NORMAL
CHLORIDE BLD-SCNC: 106 MMOL/L (ref 99–110)
CO2: 21 MMOL/L (ref 21–32)
CREAT SERPL-MCNC: 1.9 MG/DL (ref 0.8–1.3)
EOSINOPHILS ABSOLUTE: 0.3 K/UL (ref 0–0.6)
EOSINOPHILS RELATIVE PERCENT: 6.3 %
ESTIMATED AVERAGE GLUCOSE: 162.8 MG/DL
GFR AFRICAN AMERICAN: 44
GFR NON-AFRICAN AMERICAN: 36
GLUCOSE BLD-MCNC: 126 MG/DL (ref 70–99)
GLUCOSE BLD-MCNC: 151 MG/DL (ref 70–99)
GLUCOSE BLD-MCNC: 161 MG/DL (ref 70–99)
HBA1C MFR BLD: 7.3 %
HCT VFR BLD CALC: 36 % (ref 40.5–52.5)
HEMOGLOBIN: 11.5 G/DL (ref 13.5–17.5)
LYMPHOCYTES ABSOLUTE: 1.4 K/UL (ref 1–5.1)
LYMPHOCYTES RELATIVE PERCENT: 31.2 %
MASS: 2 MG
MCH RBC QN AUTO: 29.1 PG (ref 26–34)
MCHC RBC AUTO-ENTMCNC: 32.1 G/DL (ref 31–36)
MCV RBC AUTO: 90.8 FL (ref 80–100)
MONOCYTES ABSOLUTE: 0.5 K/UL (ref 0–1.3)
MONOCYTES RELATIVE PERCENT: 10.7 %
NEUTROPHILS ABSOLUTE: 2.3 K/UL (ref 1.7–7.7)
NEUTROPHILS RELATIVE PERCENT: 50.3 %
PDW BLD-RTO: 13.9 % (ref 12.4–15.4)
PERFORMED ON: ABNORMAL
PERFORMED ON: ABNORMAL
PLATELET # BLD: 199 K/UL (ref 135–450)
PMV BLD AUTO: 7.3 FL (ref 5–10.5)
POTASSIUM SERPL-SCNC: 3.7 MMOL/L (ref 3.5–5.1)
RBC # BLD: 3.96 M/UL (ref 4.2–5.9)
SODIUM BLD-SCNC: 139 MMOL/L (ref 136–145)
STONE DESCRIPTION: NORMAL
WBC # BLD: 4.6 K/UL (ref 4–11)

## 2022-08-22 PROCEDURE — 7100000000 HC PACU RECOVERY - FIRST 15 MIN: Performed by: INTERNAL MEDICINE

## 2022-08-22 PROCEDURE — 6370000000 HC RX 637 (ALT 250 FOR IP): Performed by: INTERNAL MEDICINE

## 2022-08-22 PROCEDURE — 7100000001 HC PACU RECOVERY - ADDTL 15 MIN: Performed by: INTERNAL MEDICINE

## 2022-08-22 PROCEDURE — 3700000000 HC ANESTHESIA ATTENDED CARE: Performed by: INTERNAL MEDICINE

## 2022-08-22 PROCEDURE — 85025 COMPLETE CBC W/AUTO DIFF WBC: CPT

## 2022-08-22 PROCEDURE — 6370000000 HC RX 637 (ALT 250 FOR IP): Performed by: UROLOGY

## 2022-08-22 PROCEDURE — 6360000002 HC RX W HCPCS: Performed by: PHYSICIAN ASSISTANT

## 2022-08-22 PROCEDURE — 3700000001 HC ADD 15 MINUTES (ANESTHESIA): Performed by: INTERNAL MEDICINE

## 2022-08-22 PROCEDURE — 2709999900 HC NON-CHARGEABLE SUPPLY: Performed by: INTERNAL MEDICINE

## 2022-08-22 PROCEDURE — 6360000002 HC RX W HCPCS: Performed by: NURSE ANESTHETIST, CERTIFIED REGISTERED

## 2022-08-22 PROCEDURE — 36415 COLL VENOUS BLD VENIPUNCTURE: CPT

## 2022-08-22 PROCEDURE — 2500000003 HC RX 250 WO HCPCS: Performed by: NURSE ANESTHETIST, CERTIFIED REGISTERED

## 2022-08-22 PROCEDURE — 0DJD8ZZ INSPECTION OF LOWER INTESTINAL TRACT, VIA NATURAL OR ARTIFICIAL OPENING ENDOSCOPIC: ICD-10-PCS | Performed by: INTERNAL MEDICINE

## 2022-08-22 PROCEDURE — 80048 BASIC METABOLIC PNL TOTAL CA: CPT

## 2022-08-22 PROCEDURE — 3609027000 HC COLONOSCOPY: Performed by: INTERNAL MEDICINE

## 2022-08-22 RX ORDER — PROPOFOL 10 MG/ML
INJECTION, EMULSION INTRAVENOUS PRN
Status: DISCONTINUED | OUTPATIENT
Start: 2022-08-22 | End: 2022-08-22 | Stop reason: SDUPTHER

## 2022-08-22 RX ORDER — LIDOCAINE HYDROCHLORIDE 20 MG/ML
INJECTION, SOLUTION EPIDURAL; INFILTRATION; INTRACAUDAL; PERINEURAL PRN
Status: DISCONTINUED | OUTPATIENT
Start: 2022-08-22 | End: 2022-08-22 | Stop reason: SDUPTHER

## 2022-08-22 RX ORDER — GLYCOPYRROLATE 0.2 MG/ML
INJECTION INTRAMUSCULAR; INTRAVENOUS PRN
Status: DISCONTINUED | OUTPATIENT
Start: 2022-08-22 | End: 2022-08-22 | Stop reason: SDUPTHER

## 2022-08-22 RX ADMIN — PROPOFOL 100 MG: 10 INJECTION, EMULSION INTRAVENOUS at 09:06

## 2022-08-22 RX ADMIN — HEPARIN SODIUM 5000 UNITS: 5000 INJECTION INTRAVENOUS; SUBCUTANEOUS at 04:50

## 2022-08-22 RX ADMIN — GLYCOPYRROLATE 0.2 MG: 0.2 INJECTION, SOLUTION INTRAMUSCULAR; INTRAVENOUS at 08:59

## 2022-08-22 RX ADMIN — POLYETHYLENE GLYCOL 3350 AND ELECTROLYTES WITH LEMON FLAVOR 2000 ML: 236; 22.74; 6.74; 5.86; 2.97 POWDER, FOR SOLUTION ORAL at 04:55

## 2022-08-22 RX ADMIN — LIDOCAINE HYDROCHLORIDE 100 MG: 20 INJECTION, SOLUTION EPIDURAL; INFILTRATION; INTRACAUDAL; PERINEURAL at 09:06

## 2022-08-22 RX ADMIN — PROPOFOL 100 MG: 10 INJECTION, EMULSION INTRAVENOUS at 09:08

## 2022-08-22 RX ADMIN — LABETALOL HYDROCHLORIDE 200 MG: 200 TABLET, FILM COATED ORAL at 10:18

## 2022-08-22 RX ADMIN — AMLODIPINE BESYLATE 10 MG: 5 TABLET ORAL at 10:18

## 2022-08-22 RX ADMIN — PROPOFOL 50 MG: 10 INJECTION, EMULSION INTRAVENOUS at 09:11

## 2022-08-22 ASSESSMENT — ENCOUNTER SYMPTOMS
BLOOD IN STOOL: 0
EYE DISCHARGE: 0
NAUSEA: 0
PHOTOPHOBIA: 0
CONSTIPATION: 0
VOMITING: 0
SHORTNESS OF BREATH: 0
COUGH: 0
ABDOMINAL DISTENTION: 0
SHORTNESS OF BREATH: 0
EYE REDNESS: 0
CHEST TIGHTNESS: 0
ABDOMINAL PAIN: 0
DIARRHEA: 0
FACIAL SWELLING: 0

## 2022-08-22 ASSESSMENT — PAIN SCALES - GENERAL: PAINLEVEL_OUTOF10: 0

## 2022-08-22 NOTE — PROGRESS NOTES
Teaching / education initiated regarding perioperative experience, expectations, and pain management during stay. Patient verbalized understanding. [facility]  Tanja Vasquez #[id number] used for preop. Dr. Fernanda Spaulding spoke with patient at bedside.      Electronically signed by Divina Lundberg RN on 8/22/2022 at 8:39 AM

## 2022-08-22 NOTE — PROGRESS NOTES
Pt arrived from endo to PACU bay 10. Report received from endo staff. Pt asleep, unresponsive at time of arrival, spontaneous respirations noted, vitals as charted.

## 2022-08-22 NOTE — ANESTHESIA POSTPROCEDURE EVALUATION
Department of Anesthesiology  Postprocedure Note    Patient: Geovanna Rocha  MRN: [id number]  YOB: 1960  Date of evaluation: 8/22/2022      Procedure Summary     Date: 08/22/22 Room / Location: [address]    Anesthesia Start: 0900 Anesthesia Stop: 0926    Procedure: COLONOSCOPY DIAGNOSTIC Diagnosis:       Rectal bleeding      (Rectal bleeding [K62.5])    Surgeons: Fabienne Barton MD Responsible Provider: Terra Strauss MD    Anesthesia Type: general ASA Status: 3          Anesthesia Type: No value filed.     Sim Phase I: Sim Score: 6    Sim Phase II:        Anesthesia Post Evaluation    Patient location during evaluation: PACU  Patient participation: complete - patient participated  Level of consciousness: sedated and ventilated  Airway patency: patent  Nausea & Vomiting: no vomiting and no nausea  Complications: no  Cardiovascular status: hemodynamically stable  Respiratory status: acceptable  Hydration status: stable  Multimodal analgesia pain management approach

## 2022-08-22 NOTE — PROGRESS NOTES
Bedside report given to [address] [place] [address]; assisted pt back to bed; all belongings with pt.

## 2022-08-22 NOTE — PROGRESS NOTES
Colonoscopy nl except small internal hemorrhoids  Rec  benefiber 1 tbsp daily  repeat colonoscopy 10 years  if bleeding recurs/persists can refer Dr Girma Alcantar for hemorrhoids  will sign off, call if needed.

## 2022-08-22 NOTE — DISCHARGE SUMMARY
Hospital Medicine Discharge Summary    Patient: Patricia Rios     Gender: male  : 1960   Age: 58 y.o. MRN: [id number]    Admitting Physician: Torey Zamora MD  Discharge Physician: Luisana Brar MD     Code Status: Full Code     Admit Date: 2022   Discharge Date:   22    Disposition:  Home    Discharge Diagnoses: Active Hospital Problems    Diagnosis Date Noted    ESRD on dialysis ([facility] [N18.6, Z99.2] 2022     Priority: Medium    Infection requiring contact isolation precautions [B99.9] 2022     Priority: Medium    Bilateral nephrolithiasis [N20.0] 2022     Priority: Medium    Obstructive uropathy [N13.9] 2022     Priority: Medium    Syncope and collapse [R55] 2022     Priority: Medium    Urinary tract infection in male [N39.0] 2022     Priority: Medium    S/P radical cystoprostatectomy [Z90.79, Z90.6]      Priority: Medium    Poorly controlled type 2 diabetes mellitus ([facility] [E11.65]      Priority: Medium    UTI due to extended-spectrum beta lactamase (ESBL) producing Escherichia coli [N39.0, B96.29, Z16.12]      Priority: Medium    Prostate cancer ([facility] Arvel Linger 2022     Priority: Medium       Follow-up appointments:  one week    Outpatient to do list: F/U with PCP, Renal, Urology and GI    Condition at Discharge:  Stable    Hospital Course:   57 yo M with history of prostate cancer s/p robotic prostatectomy, h/o R UPJ stone s/p ureteroscopy, ESRD on HD, DM 2 who came to Er with syncope. Admitted as inpatient for complicated UTI. Followed by Renal, Urology and Cardiology. Echo:   Normal left ventricle size, wall thickness, and systolic function with an   estimated ejection fraction of 60-65%. No regional wall motion abnormalities are seen. Aortic valve appears sclerotic but opens adequately. No evidence of aortic   valve stenosis. The aortic root is mildly dilated.    Mildly thickened mitral valve leaflets with no evidence of stenosis. Mild pulmonic regurgitation present. Grade I diastolic dysfunction with normal LV filling pressures. Avg E/e'   estimated to be 10.6. Went to OR on 8/18/22 for:  1. Cystoscopy  2. Right side ureteral stent removal  3. Right ureteroscopy  4. Laser lithotripsy  5. Basket extraction of stone  6. Right side ureteral stent placement  7. Fluoroscopy <1hr MD time     Transferred to ICU for erroneous insulin administration. Started on D10 IVF. Monitoring FS very closely. Resume on home Lantus dose without hypoglycemia. Complained of rectal bleeding on 8/20. GI consulted to evaluate. S/P colonoscopy on 8/22/22:  Colonoscopy nl except small internal hemorrhoids  Rec  benefiber 1 tbsp daily  repeat colonoscopy 10 years  if bleeding recurs/persists can refer Dr Andrés Montoya for hemorrhoids  will sign off, call if needed. F/U with PCP, Renal, Urology and Cardiology. Discharge Medications:   Current Discharge Medication List        START taking these medications    Details   labetalol (NORMODYNE) 200 MG tablet Take 1 tablet by mouth every 12 hours  Qty: 60 tablet, Refills: 0           Current Discharge Medication List        Current Discharge Medication List        CONTINUE these medications which have NOT CHANGED    Details   amLODIPine (NORVASC) 5 MG tablet Take 1 tablet by mouth daily  Qty: 30 tablet, Refills: 3      Lancets MISC 1 each by Does not apply route 2 times daily  Qty: 100 each, Refills: 5      blood glucose monitor kit and supplies Dispense sufficient amount for indicated testing frequency plus additional to accommodate PRN testing needs. Dispense all needed supplies to include: monitor, strips, lancing device, lancets, control solutions, alcohol swabs. Qty: 1 kit, Refills: 0    Comments: Brand per patient preference. May round up to next available package size. blood glucose monitor strips Test3 times a day & as needed for symptoms of irregular blood glucose.  Dispense sufficient amount for indicated testing frequency plus additional to accommodate PRN testing needs. Qty: 100 strip, Refills: 1    Comments: Brand per patient preference. May round up to next available package size. insulin glargine (LANTUS SOLOSTAR) 100 UNIT/ML injection pen Inject 8 Units into the skin nightly  Qty: 5 pen, Refills: 0      insulin lispro, 1 Unit Dial, (HUMALOG KWIKPEN) 100 UNIT/ML SOPN Glucose: Dose: If <139   - No Insulin 140-199   --- 1 Unit 200-249   --  2  Units 250-299           3 Units  300-349           4 Units 350-400           5 Units  Above 400       6 Units  Qty: 5 pen, Refills: 0      Insulin Pen Needle 29G X 12MM MISC 1 each by Does not apply route daily  Qty: 100 each, Refills: 3           Current Discharge Medication List        STOP taking these medications       tamsulosin (FLOMAX) 0.4 MG capsule Comments:   Reason for Stopping:         oxybutynin (DITROPAN) 5 MG tablet Comments:   Reason for Stopping:             Discharge Exam:    BP (!) 162/90   Pulse (!) 115   Temp 96.9 °F (36.1 °C) (Infrared)   Resp 21   Ht 5' 7\" (1.702 m)   Wt 144 lb 4 oz (65.4 kg)   SpO2 99%   BMI 22.59 kg/m²      General appearance: No apparent distress, appears stated age and cooperative. HEENT: Pupils equal, round, and reactive to light. Conjunctivae/corneas clear. Neck: Supple, with full range of motion. No jugular venous distention. Trachea midline. Respiratory:  Normal respiratory effort. Clear to auscultation, bilaterally without Rales/Wheezes/Rhonchi. Cardiovascular: Regular rate and rhythm with normal S1/S2 without murmurs, rubs or gallops. Abdomen: Soft, non-tender, non-distended with normal bowel sounds. Musculoskeletal: No clubbing, cyanosis or edema bilaterally. Full range of motion without deformity. Skin: Skin color, texture, turgor normal.  No rashes or lesions. Neurologic:  Neurovascularly intact without any focal sensory/motor deficits.  Cranial nerves: II-XII intact, grossly non-focal.  Psychiatric: Alert and oriented, thought content appropriate, normal insight  Capillary Refill: Brisk,3 seconds, normal   Peripheral Pulses: +2 palpable, equal bilaterally        Labs: For convenience and continuity at follow-up the following most recent labs are provided:    Lab Results   Component Value Date/Time    WBC 4.6 08/22/2022 10:38 AM    HGB 11.5 08/22/2022 10:38 AM    HCT 36.0 08/22/2022 10:38 AM    MCV 90.8 08/22/2022 10:38 AM     08/22/2022 10:38 AM     08/22/2022 10:38 AM    K 3.7 08/22/2022 10:38 AM    K 4.2 06/06/2022 05:07 AM     08/22/2022 10:38 AM    CO2 21 08/22/2022 10:38 AM    BUN 29 08/22/2022 10:38 AM    CREATININE 1.9 08/22/2022 10:38 AM    CALCIUM 9.9 08/22/2022 10:38 AM    PHOS 4.2 08/21/2022 09:32 AM    ALKPHOS 99 08/13/2022 07:50 PM    ALT 10 08/13/2022 07:50 PM    AST 19 08/13/2022 07:50 PM    BILITOT 0.3 08/13/2022 07:50 PM    LABALBU 3.4 08/21/2022 09:32 AM     Lab Results   Component Value Date    INR 1.14 (H) 05/02/2022       Radiology:  Echo Complete    Result Date: 8/15/2022  Transthoracic Echocardiography Report (TTE)  Demographics   Patient Name       Kylie Bustillo   Date of Study      08/15/2022        Gender              Male   Patient Number     [id number]        Date of Birth       1960   Visit Number       [id number]         Age                 58 year(s)   Accession Number   [id number]        Room Number         5360   Corporate ID                    [facility], JAVON Stewart RVT, RDCS   Ordering Physician Christian Brown MD Interpreting        Renee Grover MD                                       Physician  Procedure Type of Study   TTE procedure:ECHOCARDIOGRAM COMPLETE 2D W DOPPLER W COLOR.   Procedure Date Date: 08/15/2022 Start: 02:43 PM Study Location: [facility] - Echo Lab Technical Quality: Adequate visualization Indications:Syncope. Patient Status: Routine Height: 67 inches Weight: 154 pounds BSA: 1.81 m2 BMI: 24.12 kg/m2 BP: 166/90 mmHg  Conclusions   Summary  Normal left ventricle size, wall thickness, and systolic function with an  estimated ejection fraction of 60-65%. No regional wall motion abnormalities are seen. Aortic valve appears sclerotic but opens adequately. No evidence of aortic  valve stenosis. The aortic root is mildly dilated. Mildly thickened mitral valve leaflets with no evidence of stenosis. Mild pulmonic regurgitation present. Grade I diastolic dysfunction with normal LV filling pressures. Avg E/e'  estimated to be 10.6. Signature   ------------------------------------------------------------------  Electronically signed by Germaine Blackwell MD (Interpreting  physician) on 08/15/2022 at 04:15 PM  ------------------------------------------------------------------   Findings   Left Ventricle  Normal left ventricle size, wall thickness, and systolic function with an  estimated ejection fraction of 60-65%. No regional wall motion abnormalities  are seen. Grade I diastolic dysfunction with normal LV filling pressures. Avg E/e'  estimated to be 10.6. Mitral Valve  Mildly thickened mitral valve leaflets with no evidence of stenosis. No evidence of mitral regurgitation. Left Atrium  The left atrium is normal in size. Aortic Valve  Aortic valve appears sclerotic but opens adequately. No evidence of aortic  valve stenosis. Trivial aortic regurgitation. Aorta  The aortic root is mildly dilated. The ascending aorta is normal in size. Right Ventricle  The right ventricle is normal in size and function. TAPSE estimated to be  1.91 cm and RV S' to be 12.1. Tricuspid Valve  The tricuspid valve is normal in structure and function.  There is no  significant tricuspid valve regurgitation or stenosis. Right Atrium  The right atrial size is normal.   Pulmonic Valve  The pulmonic valve is structurally normal.  No evidence of pulmonic valve stenosis. Mild pulmonic regurgitation present. Pericardial Effusion  No pericardial effusion noted. Pleural Effusion  No pleural effusion. Miscellaneous  The inferior vena cava appears normal in size with normal respiratory  variation. M-Mode/2D Measurements (cm)   LV Diastolic Dimension: 4.41 cm LV Systolic Dimension: 4.24 cm  LV Septum Diastolic: 7.47 cm  LV PW Diastolic: 0.9 cm         AO Root Dimension: 3.8 cm                                  LA Dimension: 3.9 cm                                  LA Area: 16.8 cm2  LVOT: 2 cm                      LA volume/Index: 43.1 ml /24 ml/m2  Doppler Measurements   AV Peak Velocity: 168 cm/s     MV Peak E-Wave: 66.5 cm/s  AV Peak Gradient: 11.29 mmHg   MV Peak A-Wave: 98 cm/s  AV Mean Gradient: 6 mmHg       MV E/A Ratio: 0.68  LVOT Peak Velocity: 136 cm/s   MV Mean Gradient: 2 mmHg  AV Area (Continuity):2.97 cm2  MV Max P mmHg                                 MV Vmax:102 cm/s                                 MV VTI:29.3 cm/s   E' Septal Velocity: 5 cm/s     MV Area (continuity): 2.79 cm2  E' Lateral Velocity: 8.49 cm/s   Aortic Valve   Peak Velocity: 168 cm/s     Mean Velocity: 113 cm/s  Peak Gradient: 11.29 mmHg   Mean Gradient: 6 mmHg  Area (continuity): 2.97 cm2  AV VTI: 27.5 cm  Aorta   Aortic Root: 3.8 cm  Ascending Aorta: 3.8 cm  LVOT Diameter: 2 cm      CT ABDOMEN PELVIS WO CONTRAST Additional Contrast? None    Result Date: 8/15/2022  EXAMINATION: CT OF THE ABDOMEN AND PELVIS WITHOUT CONTRAST 8/15/2022 4:00 pm TECHNIQUE: CT of the abdomen and pelvis was performed without the administration of intravenous contrast. Multiplanar reformatted images are provided for review.  Automated exposure control, iterative reconstruction, and/or weight based adjustment of the mA/kV was utilized to reduce the radiation dose to as low as reasonably achievable. COMPARISON: CT abdomen/pelvis dated 21 July 2022 HISTORY: ORDERING SYSTEM PROVIDED HISTORY: right hydro and urolithiasis TECHNOLOGIST PROVIDED HISTORY: Reason for exam:->right hydro and urolithiasis Additional Contrast?->None Reason for Exam: right hydro and urolithiasis FINDINGS: Lower Chest: Mild bibasilar atelectasis. Small pericardial effusion. Organs: Limited evaluation of the abdominal organs due to lack of intravenous contrast.  Small hypodensity in the right hepatic lobe posteriorly is too small to characterize but appears benign. No follow-up is required. Gallbladder is normal.  The spleen and pancreas are normal.  Adrenal glands are normal.  The left kidney appears normal.  There is a 3 mm stone within the right kidney. There is mild right-sided hydronephrosis improved from the comparison exam.  There is a 4 mm low-density stone within the proximal 3rd of the right ureter. It appears lower density than in the comparison exam and is positioned more distally in the ureter. GI/Bowel: Stomach appears normal.  Small bowel loops appear normal without evidence of obstruction. There is moderate formed stool within the rectum and sigmoid colon. Colon shows no evidence of surrounding inflammatory change. No evidence of appendicitis. Pelvis: Urinary bladder is normal.  Prostate is normal.  No free fluid. Peritoneum/Retroperitoneum: The aorta is normal caliber. No lymphadenopathy. Bones/Soft Tissues: Degenerative changes in the lumbar spine. 1.  Mild right-sided hydronephrosis which has improved from the comparison exam.  3 mm retained stone within the right kidney.  2.  4 mm low-density stone within the proximal 3rd of the right ureter appears to have moved distally since the comparison exam.     US RENAL COMPLETE    Result Date: 8/11/2022  EXAMINATION: RETROPERITONEAL ULTRASOUND OF THE KIDNEYS AND URINARY BLADDER 8/10/2022 COMPARISON: None HISTORY: ORDERING SYSTEM PROVIDED HISTORY: Calculus of kidney TECHNOLOGIST PROVIDED HISTORY: Reason for Exam: calculus of renal FINDINGS: Kidneys: The right kidney measures 8.6 x 4.5 x 3.8 cm in length and the left kidney measures 9.0 x 4.2 x 4.0 cm in length. A 6 mm stone by 4 mm stone is seen in the right kidney. A 5 mm x 5 mm stone is also seen in the right kidney. There is mild right-sided hydronephrosis. No hydronephrosis on the left. Bladder: Bladder is collapsed and not well evaluated     Small renal stones on the right with mild right-sided hydronephrosis. No stones or hydronephrosis on the left     XR CHEST PORTABLE    Result Date: 8/13/2022  EXAMINATION: ONE XRAY VIEW OF THE CHEST 8/13/2022 6:41 pm COMPARISON: 05/27/2022 HISTORY: ORDERING SYSTEM PROVIDED HISTORY: SOB TECHNOLOGIST PROVIDED HISTORY: Reason for exam:->SOB Reason for Exam: Fatigue (Had dialysis today and had syncopeal episode at eating and is extremely weak per [facility]) FINDINGS: The heart is mildly enlarged and less prominent. The pulmonary vessels are normal.  No consolidation or effusion is seen. There is a dialysis catheter on the right with the tip in the distal superior vena cava. No effusion or pneumothorax is seen. Status post right-sided dialysis catheter placement in good position. Mild cardiomegaly which is less prominent with no acute pulmonary abnormality. FL RETROGRADE PYELOGRAM RIGHT    Result Date: 8/18/2022  Radiology exam is complete. No Radiologist dictation. Please follow up with ordering provider. The patient was seen and examined on day of discharge and this discharge summary is in conjunction with any daily progress note from day of discharge. Time Spent on discharge is 45 minutes  in the examination, evaluation, counseling and review of medications and discharge plan.       Note that more than 30 minutes was spent in preparing discharge papers, discussing discharge with patient, medication review, etc.       Signed:    Sarai Barraza MD   8/22/2022      Thank you Justin Cole PA-C for the opportunity to be involved in this patient's care.  If you have any questions or concerns please feel free to contact me at [address] [place] [address]

## 2022-08-22 NOTE — ANESTHESIA PRE PROCEDURE
Department of Anesthesiology  Preprocedure Note       Name:  Salbador Hussein   Age:  58 y.o.  :  1960                                          MRN:  [id number]         Date:  2022      Surgeon: Michelle Hurt):  Emeterio Maciel MD    Procedure: Procedure(s):  COLONOSCOPY DIAGNOSTIC    Medications prior to admission:   Prior to Admission medications    Medication Sig Start Date End Date Taking? Authorizing Provider   labetalol (NORMODYNE) 200 MG tablet Take 1 tablet by mouth every 12 hours 22   Joanie Joyner MD   tamsulosin (FLOMAX) 0.4 MG capsule Take 1 capsule by mouth daily For stent pain and stone passage. 22  Nelida Bach MD   oxybutynin (DITROPAN) 5 MG tablet Take 1 tablet by mouth 3 times daily as needed (bladder spasms) 22  Nelida Bach MD   amLODIPine (NORVASC) 5 MG tablet Take 1 tablet by mouth daily 22   Magaly Mclean MD   Lancets MISC 1 each by Does not apply route 2 times daily 22   Eunice Maldonado MD   blood glucose monitor kit and supplies Dispense sufficient amount for indicated testing frequency plus additional to accommodate PRN testing needs. Dispense all needed supplies to include: monitor, strips, lancing device, lancets, control solutions, alcohol swabs. 22   Eunice Maldonado MD   blood glucose monitor strips Test3 times a day & as needed for symptoms of irregular blood glucose. Dispense sufficient amount for indicated testing frequency plus additional to accommodate PRN testing needs.  22   Eunice Maldonado MD   insulin glargine (LANTUS SOLOSTAR) 100 UNIT/ML injection pen Inject 8 Units into the skin nightly 22   Eunice Maldonado MD   insulin lispro, 1 Unit Dial, (HUMALOG KWIKPEN) 100 UNIT/ML SOPN Glucose: Dose: If <139   - No Insulin 140-199   --- 1 Unit 200-249   --  2  Units 250-299           3 Units  300-349           4 Units 350-400           5 Units  Above 400       6 Units 22   Dominique Obregon MD   Insulin Pen Needle 29G X 12MM MISC 1 each by Does not apply route daily 6/6/22   Rock Kermit MD       Current medications:    No current facility-administered medications for this visit.      Current Outpatient Medications   Medication Sig Dispense Refill    labetalol (NORMODYNE) 200 MG tablet Take 1 tablet by mouth every 12 hours 60 tablet 0     Facility-Administered Medications Ordered in Other Visits   Medication Dose Route Frequency Provider Last Rate Last Admin    dextrose bolus 10% 125 mL  125 mL IntraVENous PRN Luisana Brar MD        Or    dextrose bolus 10% 250 mL  250 mL IntraVENous PRN Luisana Brar MD        glucagon (rDNA) injection 1 mg  1 mg SubCUTAneous PRN Luisana Brar MD        dextrose 10 % infusion   IntraVENous Continuous PRN Luisana Brar MD        insulin lispro (HUMALOG) injection vial 0-4 Units  0-4 Units SubCUTAneous TID WC Luisana Brar MD        insulin lispro (HUMALOG) injection vial 0-4 Units  0-4 Units SubCUTAneous Nightly Luisana Brar MD        amLODIPine (NORVASC) tablet 10 mg  10 mg Oral Daily Luis Manuel Núñez MD   10 mg at 08/21/22 0830    labetalol (NORMODYNE) tablet 200 mg  200 mg Oral 2 times per day Anthony Delacruz MD   200 mg at 08/21/22 2140    heparin (porcine) injection 1,000 Units  1,000 Units IntraCATHeter PRN Luis Manuel Núñez MD        meropenem (MERREM) 500 mg IVPB (mini-bag)  500 mg IntraVENous Q24H Luisana Brar MD   Stopped at 08/21/22 1125    sodium chloride flush 0.9 % injection 5-40 mL  5-40 mL IntraVENous 2 times per day Jimmy Quezadaal, PA-C   10 mL at 08/21/22 2140    sodium chloride flush 0.9 % injection 5-40 mL  5-40 mL IntraVENous PRN Jimmy Quezadaal, PA-C        0.9 % sodium chloride infusion   IntraVENous PRN Jimmy Quezadaal, PA-C 10 mL/hr at 08/21/22 0824 New Bag at 08/21/22 0824    acetaminophen (TYLENOL) tablet 650 mg  650 mg Oral Q6H PRN Jimmy Quezadaal, PA-C        Or    acetaminophen (TYLENOL) suppository 650 mg  650 mg Rectal Q6H PRN Claudell Ditty YEE Hernández PA-C        polyethylene glycol (GLYCOLAX) packet 17 g  17 g Oral Daily PRN Hernan Martin PA-C        ondansetron [facility]) injection 4 mg  4 mg IntraVENous Q6H PRN Hernan Martin PA-C        heparin (porcine) injection 5,000 Units  5,000 Units SubCUTAneous 3 times per day Hernan Martin PA-C   5,000 Units at 08/22/22 2406    lactobacillus (CULTURELLE) capsule 1 capsule  1 capsule Oral BID WC Hernan Martin PA-C   1 capsule at 08/21/22 1854       Allergies:  No Known Allergies    Problem List:    Patient Active Problem List   Diagnosis Code    [facility] C61    Septic shock ([facility] A41.9, R65.21    CHRISSY (acute kidney injury) ([facility] N17.9    Ureterolithiasis N20.1    Lactic acidosis E87.2    UTI due to extended-spectrum beta lactamase (ESBL) producing Escherichia coli N39.0, B96.29, Z16.12    Bacteremia due to Gram-negative bacteria M02.95    Complicated UTI (urinary tract infection) N39.0    Hyponatremia E87.1    S/P radical cystoprostatectomy Z90.79, Z90.6    Poorly controlled type 2 diabetes mellitus ([facility] E11.65    Fever R50.9    Diabetes education, encounter for Z71.89    Urinary tract infection in male N39.0    ESRD on dialysis ([facility] N18.6, Z99.2    Infection requiring contact isolation precautions B99.9    Bilateral nephrolithiasis N20.0    Obstructive uropathy N13.9    Syncope and collapse R55       Past Medical History:        Diagnosis Date    Diabetes mellitus ([facility]     Hypertension        Past Surgical History:        Procedure Laterality Date    CYSTOSCOPY Right 5/26/2022    CYSTOSCOPY, BILATERAL RETROGRADE PYELOGRAM, PLACEMENT OF RIGHT URETERAL STENT performed by Brice You MD at [facility] Right 7/13/2022    CYSTOSCOPY WITH RIGHT URETEROSCOPY WITH HOLMIUM LASER LITHOTRIPSY, STONE MANIPULATION, STONE BASKET,  RIGHT STENT PLACEMENT-FORT #[id number] performed by Latoya Langley MD at [address] IR TUNNELED CATHETER PLACEMENT GREATER THAN 5 YEARS  5/28/2022    IR TUNNELED CATHETER PLACEMENT GREATER THAN 5 YEARS 5/28/2022 [facility] SPECIAL PROCEDURES    PROSTATECTOMY N/A 5/16/2022    ROBOTIC LAPAROSCOPIC RADICAL PROSTATECTOMY WITH BILATERAL LYMPH NODE DISSECTION AND BILATERAL NERVE SPARE performed by Fox Guevara MD at [address] [place] [address] History:    Social History     Tobacco Use    Smoking status: Never    Smokeless tobacco: Never   Substance Use Topics    Alcohol use: Never                                Counseling given: Not Answered      Vital Signs (Current): There were no vitals filed for this visit.                                            BP Readings from Last 3 Encounters:   08/22/22 133/79   07/21/22 (!) 168/74   07/13/22 (!) 169/113       NPO Status:                                                                                 BMI:   Wt Readings from Last 3 Encounters:   08/22/22 144 lb 4 oz (65.4 kg)   07/21/22 145 lb 1.6 oz (65.8 kg)   07/13/22 142 lb 9.6 oz (64.7 kg)     There is no height or weight on file to calculate BMI.    CBC:   Lab Results   Component Value Date/Time    WBC 6.1 08/21/2022 09:32 AM    RBC 3.37 08/21/2022 09:32 AM    HGB 10.1 08/21/2022 09:32 AM    HCT 30.4 08/21/2022 09:32 AM    MCV 90.2 08/21/2022 09:32 AM    RDW 14.2 08/21/2022 09:32 AM     08/21/2022 09:32 AM       CMP:   Lab Results   Component Value Date/Time     08/21/2022 09:32 AM    K 3.9 08/21/2022 09:32 AM    K 4.2 06/06/2022 05:07 AM     08/21/2022 09:32 AM    CO2 25 08/21/2022 09:32 AM    BUN 39 08/21/2022 09:32 AM    CREATININE 2.3 08/21/2022 09:32 AM    GFRAA 35 08/21/2022 09:32 AM    AGRATIO 1.0 08/13/2022 07:50 PM    LABGLOM 29 08/21/2022 09:32 AM    GLUCOSE 250 08/21/2022 09:32 AM    PROT 8.4 08/13/2022 07:50 PM    CALCIUM 9.1 08/21/2022 09:32 AM    BILITOT 0.3 08/13/2022 07:50 PM    ALKPHOS 99 08/13/2022 07:50 PM    AST 19 08/13/2022 07:50 PM    ALT 10 08/13/2022 07:50 PM       POC Tests:   Recent Labs 08/22/22  0715   POCGLU 126*       Coags:   Lab Results   Component Value Date/Time    PROTIME 12.9 05/02/2022 09:56 AM    INR 1.14 05/02/2022 09:56 AM    APTT 34.5 05/02/2022 09:56 AM       HCG (If Applicable): No results found for: PREGTESTUR, PREGSERUM, HCG, HCGQUANT     ABGs: No results found for: PHART, PO2ART, VKM2WAO, BFH7INA, BEART, B8CAZQHV     Type & Screen (If Applicable):  No results found for: LABABO, LABRH    Drug/Infectious Status (If Applicable):  No results found for: HIV, HEPCAB    COVID-19 Screening (If Applicable): No results found for: COVID19        Anesthesia Evaluation  Patient summary reviewed and Nursing notes reviewed no history of anesthetic complications:   Airway: Mallampati: II  TM distance: >3 FB   Neck ROM: full  Mouth opening: > = 3 FB   Dental: normal exam         Pulmonary:       (-) asthma and shortness of breath                           Cardiovascular:    (+) hypertension:,     (-)  angina             ROS comment: EKG 8/13/2022:  Normal sinus rhythm  Possible Left atrial enlargement  Left ventricular hypertrophy  ST elevation, consider early repolarization, pericarditis, or injury  T wave abnormality, consider lateral ischemia    Echo 8/13/2022:  Normal left ventricle size, wall thickness, and systolic function with an   estimated ejection fraction of 60-65%. No regional wall motion abnormalities are seen. Aortic valve appears sclerotic but opens adequately. No evidence of aortic   valve stenosis. The aortic root is mildly dilated. Mildly thickened mitral valve leaflets with no evidence of stenosis. Mild pulmonic regurgitation present. Grade I diastolic dysfunction with normal LV filling pressures. Avg E/e'   estimated to be 10.6. Neuro/Psych:      (-) CVA           GI/Hepatic/Renal:   (+) renal disease: ESRD and dialysis,      (-) GERD and liver disease      ROS comment: Dialysis started in June 2022, T/Th/S  Last dialysis 8/16.    Endo/Other:    (+) DiabetesType II DM, , malignancy/cancer. (-) hypothyroidism               Abdominal:             Vascular:     - PVD. Other Findings:             Anesthesia Plan      general     ASA 3       Induction: intravenous. MIPS: Postoperative opioids intended and Prophylactic antiemetics administered. Anesthetic plan and risks discussed with patient. Use of blood products discussed with patient whom. Plan discussed with CRNA.                     Augustine Zaragoza MD   8/22/2022

## 2022-08-23 NOTE — PROGRESS NOTES
[facility] Note    Patient Active Problem List   Diagnosis    Prostate cancer ([facility]    Septic shock ([facility]    CHRISSY (acute kidney injury) ([facility]    Ureterolithiasis    Lactic acidosis    UTI due to extended-spectrum beta lactamase (ESBL) producing Escherichia coli    Bacteremia due to Gram-negative bacteria    Complicated UTI (urinary tract infection)    Hyponatremia    S/P radical cystoprostatectomy    Poorly controlled type 2 diabetes mellitus ([facility]    Fever    Diabetes education, encounter for    Urinary tract infection in male    ESRD on dialysis ([facility]    Infection requiring contact isolation precautions    Bilateral nephrolithiasis    Obstructive uropathy    Syncope and collapse       Past Medical History:   has a past medical history of Diabetes mellitus ([facility] and Hypertension. Past Social History:   reports that he has never smoked. He has never used smokeless tobacco. He reports that he does not drink alcohol and does not use drugs. Subjective:   Seen this am.  No complaints today. Right ureteroscopy done with right stent insertion. Tolerating HD well. For D/C today. Review of Systems   Constitutional:  Positive for fatigue. Negative for activity change, appetite change, chills, fever and unexpected weight change. HENT:  Negative for congestion and facial swelling. Eyes:  Negative for photophobia, discharge and redness. Respiratory:  Negative for cough, chest tightness and shortness of breath. Cardiovascular:  Negative for chest pain, palpitations and leg swelling. Gastrointestinal:  Negative for abdominal distention, abdominal pain, blood in stool, constipation, diarrhea, nausea and vomiting. Endocrine: Negative for cold intolerance, heat intolerance and polyuria. Genitourinary:  Negative for decreased urine volume, difficulty urinating, flank pain and hematuria.    Musculoskeletal:  Negative for joint swelling and neck pain.   Neurological:  Negative for dizziness, seizures, syncope, speech difficulty, light-headedness and headaches. Hematological:  Does not bruise/bleed easily. Psychiatric/Behavioral:  Negative for agitation, confusion and hallucinations. Objective:      BP (!) 162/90   Pulse (!) 115   Temp 96.9 °F (36.1 °C) (Infrared)   Resp 21   Ht 5' 7\" (1.702 m)   Wt 144 lb 4 oz (65.4 kg)   SpO2 99%   BMI 22.59 kg/m²     Wt Readings from Last 3 Encounters:   08/22/22 144 lb 4 oz (65.4 kg)   07/21/22 145 lb 1.6 oz (65.8 kg)   07/13/22 142 lb 9.6 oz (64.7 kg)       BP Readings from Last 3 Encounters:   08/22/22 (!) 162/90   07/21/22 (!) 168/74   07/13/22 (!) 169/113     Chest- clear  Heart-regular  Abd-soft  Ext- no edema    Labs  Hemoglobin   Date Value Ref Range Status   08/22/2022 11.5 (L) 13.5 - 17.5 g/dL Final     Hematocrit   Date Value Ref Range Status   08/22/2022 36.0 (L) 40.5 - 52.5 % Final     WBC   Date Value Ref Range Status   08/22/2022 4.6 4.0 - 11.0 K/uL Final     Platelets   Date Value Ref Range Status   08/22/2022 199 135 - 450 K/uL Final     Lab Results   Component Value Date    CREATININE 1.9 (H) 08/22/2022    BUN 29 (H) 08/22/2022     08/22/2022    K 3.7 08/22/2022     08/22/2022    CO2 21 08/22/2022     Renal US 8/4/22     FINDINGS:       Kidneys: The right kidney measures 8.6 x 4.5 x 3.8 cm in length and the left kidney   measures 9.0 x 4.2 x 4.0 cm in length. A 6 mm stone by 4 mm stone is seen in the right kidney. A 5 mm x 5 mm stone   is also seen in the right kidney. There is mild right-sided hydronephrosis. No hydronephrosis on the left. Bladder:       Bladder is collapsed and not well evaluated           Impression   Small renal stones on the right with mild right-sided hydronephrosis. No stones or hydronephrosis on the left     24H urine Crea Cl 32ml/min    Assessment/Plan:   CHRISSY on CKD 3b-crea was 1.5 on 5/2022.   Started on HD on 5/2022 due to worsened renal function. Has RFK. Crea 2.1 on presentation and now at 1.9. Na profile and lower temp  with HD due to dizziness. Decrease HD to 2x/week(Tue/Sat), Na profile, lower temp, no fluid removal.  Next HD Tue as outpt. Follow in 2 weeks if can decrease HD further. Discussed with outpt HD unit. Right nepholithiasis with hydronephrosis-s/p right stone intervention and stent placement 8/18/22. Appreciate Urology help. Hyponatremia-better  Anemia- hgb at goal  HTN-increased Amlodipine to 10mg daily. Added Labetalol to 200mg bid. Continue. Better bp control. DM-per Medicine   7. Hypokalemia-better  8. HD Tue and Sat at [facility]  9. ESBL E. Coli UTI-on Meropenem. ID following.    10.   Dispo-stable for DC from renal perspective    Manju Noel MD

## 2022-09-06 ENCOUNTER — HOSPITAL ENCOUNTER (OUTPATIENT)
Age: 62
Discharge: HOME | End: 2022-09-06
Payer: MEDICAID

## 2022-09-06 PROCEDURE — 36415 COLL VENOUS BLD VENIPUNCTURE: CPT

## 2022-09-06 PROCEDURE — 83036 HEMOGLOBIN GLYCOSYLATED A1C: CPT

## 2022-09-07 LAB
ESTIMATED AVERAGE GLUCOSE: 151.3 MG/DL
HBA1C MFR BLD: 6.9 %

## 2022-09-08 ENCOUNTER — HOSPITAL ENCOUNTER (OUTPATIENT)
Age: 62
Discharge: HOME | End: 2022-09-08
Payer: MEDICAID

## 2022-09-08 DIAGNOSIS — Z99.2 ESRD ON DIALYSIS (HCC): ICD-10-CM

## 2022-09-08 DIAGNOSIS — N18.6 ESRD ON DIALYSIS (HCC): ICD-10-CM

## 2022-09-08 LAB
ALBUMIN SERPL-MCNC: 3.9 G/DL (ref 3.4–5)
ANION GAP SERPL CALCULATED.3IONS-SCNC: 14 MMOL/L (ref 3–16)
BUN BLDV-MCNC: 37 MG/DL (ref 7–20)
CALCIUM SERPL-MCNC: 9.3 MG/DL (ref 8.3–10.6)
CHLORIDE BLD-SCNC: 101 MMOL/L (ref 99–110)
CO2: 22 MMOL/L (ref 21–32)
CREAT SERPL-MCNC: 2.7 MG/DL (ref 0.8–1.3)
GFR AFRICAN AMERICAN: 29
GFR NON-AFRICAN AMERICAN: 24
GLUCOSE BLD-MCNC: 182 MG/DL (ref 70–99)
HCT VFR BLD CALC: 33.5 % (ref 40.5–52.5)
HEMOGLOBIN: 11 G/DL (ref 13.5–17.5)
MCH RBC QN AUTO: 29.6 PG (ref 26–34)
MCHC RBC AUTO-ENTMCNC: 32.9 G/DL (ref 31–36)
MCV RBC AUTO: 89.9 FL (ref 80–100)
PDW BLD-RTO: 15 % (ref 12.4–15.4)
PHOSPHORUS: 3.5 MG/DL (ref 2.5–4.9)
PLATELET # BLD: 254 K/UL (ref 135–450)
PMV BLD AUTO: 7.5 FL (ref 5–10.5)
POTASSIUM SERPL-SCNC: 3.9 MMOL/L (ref 3.5–5.1)
RBC # BLD: 3.73 M/UL (ref 4.2–5.9)
SODIUM BLD-SCNC: 137 MMOL/L (ref 136–145)
WBC # BLD: 4.7 K/UL (ref 4–11)

## 2022-09-08 PROCEDURE — 85027 COMPLETE CBC AUTOMATED: CPT

## 2022-09-08 PROCEDURE — 36415 COLL VENOUS BLD VENIPUNCTURE: CPT

## 2022-09-08 PROCEDURE — 80069 RENAL FUNCTION PANEL: CPT

## 2022-09-12 ENCOUNTER — HOSPITAL ENCOUNTER (OUTPATIENT)
Age: 62
Discharge: HOME | End: 2022-09-12
Payer: MEDICAID

## 2022-09-12 LAB
ALBUMIN SERPL-MCNC: 4.1 G/DL (ref 3.4–5)
ANION GAP SERPL CALCULATED.3IONS-SCNC: 17 MMOL/L (ref 3–16)
BUN BLDV-MCNC: 46 MG/DL (ref 7–20)
CALCIUM SERPL-MCNC: 9.4 MG/DL (ref 8.3–10.6)
CHLORIDE BLD-SCNC: 103 MMOL/L (ref 99–110)
CO2: 19 MMOL/L (ref 21–32)
CREAT SERPL-MCNC: 2.7 MG/DL (ref 0.8–1.3)
GFR AFRICAN AMERICAN: 29
GFR NON-AFRICAN AMERICAN: 24
GLUCOSE BLD-MCNC: 241 MG/DL (ref 70–99)
HCT VFR BLD CALC: 34 % (ref 40.5–52.5)
HEMOGLOBIN: 11.2 G/DL (ref 13.5–17.5)
MCH RBC QN AUTO: 29.9 PG (ref 26–34)
MCHC RBC AUTO-ENTMCNC: 33 G/DL (ref 31–36)
MCV RBC AUTO: 90.7 FL (ref 80–100)
PDW BLD-RTO: 15.4 % (ref 12.4–15.4)
PHOSPHORUS: 4.2 MG/DL (ref 2.5–4.9)
PLATELET # BLD: 225 K/UL (ref 135–450)
PMV BLD AUTO: 7.6 FL (ref 5–10.5)
POTASSIUM SERPL-SCNC: 3.9 MMOL/L (ref 3.5–5.1)
RBC # BLD: 3.75 M/UL (ref 4.2–5.9)
SODIUM BLD-SCNC: 139 MMOL/L (ref 136–145)
WBC # BLD: 4.9 K/UL (ref 4–11)

## 2022-09-12 PROCEDURE — 36415 COLL VENOUS BLD VENIPUNCTURE: CPT

## 2022-09-12 PROCEDURE — 80069 RENAL FUNCTION PANEL: CPT

## 2022-09-12 PROCEDURE — 85027 COMPLETE CBC AUTOMATED: CPT

## 2023-04-13 NOTE — DISCHARGE INSTR - DIET
Good nutrition is important when healing from an illness, injury, or surgery. Follow any nutrition recommendations given to you during your hospital stay. If you were given an oral nutrition supplement while in the hospital, continue to take this supplement at home. You can take it with meals, in-between meals, and/or before bedtime. These supplements can be purchased at most local grocery stores, pharmacies, and chain [employer]-stores. If you have any questions about your diet or nutrition, call the hospital and ask for the dietitian.   Regular
36.5

## 2023-06-16 NOTE — PLAN OF CARE
Problem: Chronic Conditions and Co-morbidities  Goal: Patient's chronic conditions and co-morbidity symptoms are monitored and maintained or improved  Outcome: Progressing     Problem: Discharge Planning  Goal: Discharge to home or other facility with appropriate resources  Outcome: Progressing     Problem: Pain  Goal: Verbalizes/displays adequate comfort level or baseline comfort level  Outcome: Progressing     Problem: Skin/Tissue Integrity  Goal: Absence of new skin breakdown  Description: 1. Monitor for areas of redness and/or skin breakdown  2. Assess vascular access sites hourly  3. Every 4-6 hours minimum:  Change oxygen saturation probe site  4. Every 4-6 hours:  If on nasal continuous positive airway pressure, respiratory therapy assess nares and determine need for appliance change or resting period.   Outcome: Progressing     Problem: Safety - Adult  Goal: Free from fall injury  Outcome: Progressing     Problem: ABCDS Injury Assessment  Goal: Absence of physical injury  Outcome: Progressing     Problem: Neurosensory - Adult  Goal: Achieves stable or improved neurological status  Outcome: Progressing  Goal: Achieves maximal functionality and self care  Outcome: Progressing     Problem: Cardiovascular - Adult  Goal: Maintains optimal cardiac output and hemodynamic stability  Outcome: Progressing  Goal: Absence of cardiac dysrhythmias or at baseline  Outcome: Progressing     Problem: Skin/Tissue Integrity - Adult  Goal: Skin integrity remains intact  Outcome: Progressing  Goal: Incisions, wounds, or drain sites healing without S/S of infection  Outcome: Progressing  Goal: Oral mucous membranes remain intact  Outcome: Progressing     Problem: Musculoskeletal - Adult  Goal: Return mobility to safest level of function  Outcome: Progressing  Goal: Maintain proper alignment of affected body part  Outcome: Progressing  Goal: Return ADL status to a safe level of function  Outcome: Progressing     Problem: Gastrointestinal - Adult  Goal: Maintains or returns to baseline bowel function  Outcome: Progressing  Goal: Maintains adequate nutritional intake  Outcome: Progressing     Problem: Genitourinary - Adult  Goal: Absence of urinary retention  Outcome: Progressing  Goal: Urinary catheter remains patent  Outcome: Progressing     Problem: Infection - Adult  Goal: Absence of infection at discharge  Outcome: Progressing     Problem: Metabolic/Fluid and Electrolytes - Adult  Goal: Electrolytes maintained within normal limits  Outcome: Progressing  Goal: Hemodynamic stability and optimal renal function maintained  Outcome: Progressing  Goal: Glucose maintained within prescribed range  Outcome: Progressing     Problem: Hematologic - Adult  Goal: Maintains hematologic stability  Outcome: Progressing color consistent with ethnicity/race
